# Patient Record
Sex: MALE | Race: WHITE | NOT HISPANIC OR LATINO | Employment: UNEMPLOYED | URBAN - METROPOLITAN AREA
[De-identification: names, ages, dates, MRNs, and addresses within clinical notes are randomized per-mention and may not be internally consistent; named-entity substitution may affect disease eponyms.]

---

## 2019-05-21 ENCOUNTER — HOSPITAL ENCOUNTER (EMERGENCY)
Facility: CLINIC | Age: 49
Discharge: HOME | End: 2019-05-21
Attending: EMERGENCY MEDICINE | Admitting: EMERGENCY MEDICINE
Payer: MEDICAID

## 2019-05-21 VITALS
TEMPERATURE: 98.1 F | SYSTOLIC BLOOD PRESSURE: 120 MMHG | HEART RATE: 114 BPM | DIASTOLIC BLOOD PRESSURE: 79 MMHG | OXYGEN SATURATION: 96 % | RESPIRATION RATE: 18 BRPM

## 2019-05-21 DIAGNOSIS — K92.1 HEMATOCHEZIA: ICD-10-CM

## 2019-05-21 DIAGNOSIS — R19.7 DIARRHEA, UNSPECIFIED TYPE: ICD-10-CM

## 2019-05-21 DIAGNOSIS — R11.0 NAUSEA: ICD-10-CM

## 2019-05-21 LAB
ALBUMIN SERPL-MCNC: 3.2 G/DL (ref 3.4–5)
ALP SERPL-CCNC: 74 U/L (ref 40–150)
ALT SERPL W P-5'-P-CCNC: 12 U/L (ref 0–70)
ANION GAP SERPL CALCULATED.3IONS-SCNC: 12 MMOL/L (ref 3–14)
AST SERPL W P-5'-P-CCNC: 12 U/L (ref 0–45)
BASOPHILS # BLD AUTO: 0.1 10E9/L (ref 0–0.2)
BASOPHILS NFR BLD AUTO: 0.8 %
BILIRUB SERPL-MCNC: 0.7 MG/DL (ref 0.2–1.3)
BUN SERPL-MCNC: 8 MG/DL (ref 7–30)
CALCIUM SERPL-MCNC: 8.8 MG/DL (ref 8.5–10.1)
CHLORIDE SERPL-SCNC: 99 MMOL/L (ref 94–109)
CO2 SERPL-SCNC: 26 MMOL/L (ref 20–32)
CREAT SERPL-MCNC: 0.95 MG/DL (ref 0.66–1.25)
DIFFERENTIAL METHOD BLD: ABNORMAL
EOSINOPHIL # BLD AUTO: 1.7 10E9/L (ref 0–0.7)
EOSINOPHIL NFR BLD AUTO: 10.8 %
ERYTHROCYTE [DISTWIDTH] IN BLOOD BY AUTOMATED COUNT: 12.6 % (ref 10–15)
GFR SERPL CREATININE-BSD FRML MDRD: >90 ML/MIN/{1.73_M2}
GLUCOSE SERPL-MCNC: 101 MG/DL (ref 70–99)
HCT VFR BLD AUTO: 45.9 % (ref 40–53)
HEMOCCULT STL QL: POSITIVE
HGB BLD-MCNC: 15.7 G/DL (ref 13.3–17.7)
IMM GRANULOCYTES # BLD: 0.1 10E9/L (ref 0–0.4)
IMM GRANULOCYTES NFR BLD: 0.4 %
LYMPHOCYTES # BLD AUTO: 2 10E9/L (ref 0.8–5.3)
LYMPHOCYTES NFR BLD AUTO: 13.1 %
MCH RBC QN AUTO: 29.9 PG (ref 26.5–33)
MCHC RBC AUTO-ENTMCNC: 34.2 G/DL (ref 31.5–36.5)
MCV RBC AUTO: 87 FL (ref 78–100)
MONOCYTES # BLD AUTO: 1.2 10E9/L (ref 0–1.3)
MONOCYTES NFR BLD AUTO: 7.7 %
NEUTROPHILS # BLD AUTO: 10.4 10E9/L (ref 1.6–8.3)
NEUTROPHILS NFR BLD AUTO: 67.2 %
NRBC # BLD AUTO: 0 10*3/UL
NRBC BLD AUTO-RTO: 0 /100
PLATELET # BLD AUTO: 396 10E9/L (ref 150–450)
POTASSIUM SERPL-SCNC: 3.4 MMOL/L (ref 3.4–5.3)
PROT SERPL-MCNC: 7.4 G/DL (ref 6.8–8.8)
RBC # BLD AUTO: 5.25 10E12/L (ref 4.4–5.9)
SODIUM SERPL-SCNC: 137 MMOL/L (ref 133–144)
WBC # BLD AUTO: 15.5 10E9/L (ref 4–11)

## 2019-05-21 PROCEDURE — 99284 EMERGENCY DEPT VISIT MOD MDM: CPT | Mod: 25

## 2019-05-21 PROCEDURE — 82272 OCCULT BLD FECES 1-3 TESTS: CPT | Performed by: EMERGENCY MEDICINE

## 2019-05-21 PROCEDURE — 85025 COMPLETE CBC W/AUTO DIFF WBC: CPT | Performed by: EMERGENCY MEDICINE

## 2019-05-21 PROCEDURE — 25800030 ZZH RX IP 258 OP 636: Performed by: EMERGENCY MEDICINE

## 2019-05-21 PROCEDURE — 96374 THER/PROPH/DIAG INJ IV PUSH: CPT

## 2019-05-21 PROCEDURE — 96361 HYDRATE IV INFUSION ADD-ON: CPT

## 2019-05-21 PROCEDURE — 25000128 H RX IP 250 OP 636: Performed by: EMERGENCY MEDICINE

## 2019-05-21 PROCEDURE — 80053 COMPREHEN METABOLIC PANEL: CPT | Performed by: EMERGENCY MEDICINE

## 2019-05-21 RX ORDER — ONDANSETRON 2 MG/ML
8 INJECTION INTRAMUSCULAR; INTRAVENOUS ONCE
Status: COMPLETED | OUTPATIENT
Start: 2019-05-21 | End: 2019-05-21

## 2019-05-21 RX ORDER — ONDANSETRON 4 MG/1
4 TABLET, ORALLY DISINTEGRATING ORAL EVERY 6 HOURS PRN
Qty: 15 TABLET | Refills: 0 | Status: SHIPPED | OUTPATIENT
Start: 2019-05-21 | End: 2019-05-25

## 2019-05-21 RX ORDER — DICYCLOMINE HCL 20 MG
20 TABLET ORAL 4 TIMES DAILY PRN
Qty: 15 TABLET | Refills: 0 | Status: ON HOLD | OUTPATIENT
Start: 2019-05-21 | End: 2019-06-15

## 2019-05-21 RX ADMIN — SODIUM CHLORIDE, POTASSIUM CHLORIDE, SODIUM LACTATE AND CALCIUM CHLORIDE 2000 ML: 600; 310; 30; 20 INJECTION, SOLUTION INTRAVENOUS at 05:39

## 2019-05-21 RX ADMIN — ONDANSETRON 8 MG: 2 INJECTION INTRAMUSCULAR; INTRAVENOUS at 05:39

## 2019-05-21 ASSESSMENT — ENCOUNTER SYMPTOMS
DIARRHEA: 1
SHORTNESS OF BREATH: 0
VOMITING: 0
NAUSEA: 1
FEVER: 0

## 2019-05-21 NOTE — ED AVS SNAPSHOT
[facility] Emergency Department  [address]  [facility] [address]  Phone:  [phone]  Fax:  [phone]                                    Alex Cardona   MRN: [id number]    Department:  [facility] Emergency Department   Date of Visit:  5/21/2019           After Visit Summary Signature Page    I have received my discharge instructions, and my questions have been answered. I have discussed any challenges I see with this plan with the nurse or doctor.    ..........................................................................................................................................  Patient/Patient Representative Signature      ..........................................................................................................................................  Patient Representative Print Name and Relationship to Patient    ..................................................               ................................................  Date                                   Time    ..........................................................................................................................................  Reviewed by Signature/Title    ...................................................              ..............................................  Date                                               Time          22EPIC Rev 08/18

## 2019-05-21 NOTE — ED PROVIDER NOTES
"  History     Chief Complaint:  Diarrhea     HPI   Alex Cardona is a 48 year old male who presents with diarrhea.  Over the last 3 to 4 weeks the patient has had progressively worsening diarrhea.  Initially the diarrhea was controlled with Imodium but over the last 2 weeks the diarrhea has become more severe.  He is having approximately 12 episodes of watery stool.  He has had intermittent episodes of bright red blood spotted throughout the stool.  He has mild abdominal discomfort described as a \"gas pain\" although it is not severe.  The pain is intermittent and nonradiating.  He has become nauseated without vomiting.  There is no associated fever.  He denies any history of recent travel, antibiotic use, hospitalization or sick contacts.    Allergies:  NKDA    Medications:    Imodium     Past Medical History:    None    Past Surgical History:    None    Family History:    Family history reviewed and noncontributory    Social History:   reports that he has quit smoking. He has never used smokeless tobacco. He reports that he drinks alcohol. He reports that he has current or past drug history. Drug: Marijuana.    PCP: No Ref-Primary, Physician     Review of Systems   Constitutional: Negative for fever.   Respiratory: Negative for shortness of breath.    Gastrointestinal: Positive for diarrhea and nausea. Negative for vomiting.   Skin: Negative for rash.   All other systems reviewed and are negative.        Physical Exam     Patient Vitals for the past 24 hrs:   BP Temp Temp src Pulse Resp SpO2   05/21/19 0510 (!) 143/94 98.1  F (36.7  C) Temporal 114 18 100 %        Physical Exam    Constitutional:  Pleasant, age appropriate.       Resting comfortably in the bed.  HEENT:    Oropharynx is moist.  Eyes:    Conjunctiva normal.  Neck:    Supple, no meningismus.     CV:     Regular rate and rhythm.      No murmurs, rubs or gallops.     No lower extremity edema.  PULM:    Clear to auscultation bilateral.       No " respiratory distress.      Good air exchange.     No rales or wheezing.  ABD:    Soft, non-distended.       No abdominal tenderness even with deep palpation of the abdomen.     Bowel sounds normal.     No pulsatile masses.       No rebound, guarding or rigidity.     No CVA tenderness.   :    No external hemorrhoids.     No anal fissure.     Good sphincter tone.      No rectal mass.     No blood on gloved finger.  MSK:     No gross deformity to all four extremities.   LYMPH:   No cervical lymphadenopathy.  NEURO:   Alert.  Good muscular tone, no atrophy.   Skin:    Warm, dry and intact.    Psych:    Mood is good and affect is appropriate.    Emergency Department Course       Laboratory:  Results for orders placed or performed during the hospital encounter of 05/21/19 (from the past 24 hour(s))   CBC + differential   Result Value Ref Range    WBC 15.5 (H) 4.0 - 11.0 10e9/L    RBC Count 5.25 4.4 - 5.9 10e12/L    Hemoglobin 15.7 13.3 - 17.7 g/dL    Hematocrit 45.9 40.0 - 53.0 %    MCV 87 78 - 100 fl    MCH 29.9 26.5 - 33.0 pg    MCHC 34.2 31.5 - 36.5 g/dL    RDW 12.6 10.0 - 15.0 %    Platelet Count 396 150 - 450 10e9/L    Diff Method Automated Method     % Neutrophils 67.2 %    % Lymphocytes 13.1 %    % Monocytes 7.7 %    % Eosinophils 10.8 %    % Basophils 0.8 %    % Immature Granulocytes 0.4 %    Nucleated RBCs 0 0 /100    Absolute Neutrophil 10.4 (H) 1.6 - 8.3 10e9/L    Absolute Lymphocytes 2.0 0.8 - 5.3 10e9/L    Absolute Monocytes 1.2 0.0 - 1.3 10e9/L    Absolute Eosinophils 1.7 (H) 0.0 - 0.7 10e9/L    Absolute Basophils 0.1 0.0 - 0.2 10e9/L    Abs Immature Granulocytes 0.1 0 - 0.4 10e9/L    Absolute Nucleated RBC 0.0    Comprehensive metabolic panel   Result Value Ref Range    Sodium 137 133 - 144 mmol/L    Potassium 3.4 3.4 - 5.3 mmol/L    Chloride 99 94 - 109 mmol/L    Carbon Dioxide 26 20 - 32 mmol/L    Anion Gap 12 3 - 14 mmol/L    Glucose 101 (H) 70 - 99 mg/dL    Urea Nitrogen 8 7 - 30 mg/dL    Creatinine  0.95 0.66 - 1.25 mg/dL    GFR Estimate >90 >60 mL/min/[1.73_m2]    GFR Estimate If Black >90 >60 mL/min/[1.73_m2]    Calcium 8.8 8.5 - 10.1 mg/dL    Bilirubin Total 0.7 0.2 - 1.3 mg/dL    Albumin 3.2 (L) 3.4 - 5.0 g/dL    Protein Total 7.4 6.8 - 8.8 g/dL    Alkaline Phosphatase 74 40 - 150 U/L    ALT 12 0 - 70 U/L    AST 12 0 - 45 U/L   Stool: occult blood   Result Value Ref Range    Occult Blood Positive (A) NEG^Negative       Interventions:  LR bolus IV 2000 mL  Zofran 8 mg IV      Emergency Department Course:  Past medical records, nursing notes, and vitals reviewed.  I performed an exam of the patient and obtained history, as documented above.   0620: HR 75    I rechecked the patient. Findings and plan explained to the Patient. Patient was discharged home.    Impression & Plan      Medical Decision Makin-year-old male presented to the ED with intermittent episodes of diarrhea and diffuse abdominal discomfort.  He has no risk factors that are concerning for C. difficile or bacterial colitis.  The way he describes his symptoms is most concerning for an exacerbation of a chronic underlying GI process (IBD, IBS, dietary intolerance) that is not well identified.  Basic laboratory studies are reassuring.  He is hemodynamically stable.  Hemoglobin is within normal limits.  No gross bleeding was in the ED thus no indication for admission the hospital.  Patient will be referred to gastroenterology given the recurrent nature of his symptoms and hematochezia.  Patient will return ED for any worsening symptoms.    Diagnosis:    ICD-10-CM    1. Diarrhea, unspecified type R19.7 Comprehensive metabolic panel     Enteric Bacteria and Virus Panel by [facility] Stool     Clostridium difficile toxin B   2. Nausea R11.0    3. Hematochezia K92.1         Discharge Medications:  Discharge Medication List as of 2019  6:54 AM      START taking these medications    Details   dicyclomine (BENTYL) 20 MG tablet Take 1 tablet (20 mg) by  mouth 4 times daily as needed (abdominal pain), Disp-15 tablet, R-0, Local Print      ondansetron (ZOFRAN ODT) 4 MG ODT tab Take 1 tablet (4 mg) by mouth every 6 hours as needed for nausea, Disp-15 tablet, R-0, Local Print              5/21/2019   Marvin Hernandez MD Matthews, Jeremiah R, MD  05/27/19 2012

## 2019-05-21 NOTE — ED TRIAGE NOTES
"Diarrhea for \"several weeks\" NV with diarrhea last 2-3 days.    Pt A&O x 3, CMS x 3, ABCD's adequate in triage    "

## 2019-05-21 NOTE — DISCHARGE INSTRUCTIONS
Please make an appointment to follow up with [facility] [phone] in 3-4 days even if entirely better.    Please use an over-the-counter probiotic once daily to assist with the diarrhea.  Discharge Instructions  Adult Diarrhea    You have been seen today for diarrhea (loose stools). This is usually caused by a virus, but some bacteria, parasites, medicines, or other medical conditions can cause similar symptoms. At this time your provider does not find that your diarrhea is a sign of anything dangerous or life-threatening. However, sometimes the signs of serious illness do not show up right away. If you have new or worse symptoms, you may need to be seen again in the Emergency Department or by your primary provider.     Generally, every Emergency Department visit should have a follow-up clinic visit with either a primary or a specialty clinic/provider. Please follow-up as instructed by your emergency provider today.    Return to the Emergency Department if:  You feel you are getting dehydrated, such as being very thirsty, not urinating (peeing) like usual, or feeling faint or lightheaded.   You develop a new fever.  You have abdominal (belly) pain that seems worse than cramps, is in one spot, or is getting worse over time.   You have blood in your stool or your stool becomes black.  (Remember that if you take Pepto-Bismol , this will turn your stool black).   You feel very weak.    What can I do to help myself?  The most important thing to do is to drink clear liquids.   It is best to have only small, frequent sips of liquids. Drinking too much at once may cause more diarrhea. You should also replace minerals, sodium and potassium lost with diarrhea. Pedialyte  and sports drinks can help you replace these minerals. You can also drink clear liquids such as water, weak tea, apple juice, and 7-Up . Avoid acidic liquids (orange juice), caffeine (coffee) or alcohol. Milk products will make the diarrhea  "worse.  Eat bland (plain) foods. Soda crackers, toast, plain noodles, gelatin, applesauce and bananas are good first choices. Avoid foods that have acid, are spicy, fatty or fibrous (such as meats, coarse grains, vegetables). You may start eating these foods again in about 3 days when you are better.   Sometimes treatment includes prescription medicine to prevent diarrhea. If your provider prescribes these for you, take them as directed.   Nonprescription medicine is available for the treatment of diarrhea and can be very effective. If you use it, make sure you use the dose recommended on the package. Check with your healthcare provider before you use any medicine for diarrhea.   Do not take ibuprofen, or other nonsteroidal anti-inflammatory medicines, without checking with your healthcare provider.   Probiotics: If you have been given an antibiotic, you may want to also take a probiotic pill or eat yogurt with live cultures. Probiotics have \"good bacteria\" to help your intestines stay healthy. Studies have shown that probiotics help prevent diarrhea and other intestine problems (including C. diff infection) when you take antibiotics. You can buy these without a prescription in the pharmacy section of the store.   If you were given a prescription for medicine here today, be sure to read all of the information (including the package insert) that comes with your prescription.  This will include important information about the medicine, its side effects, and any warnings that you need to know about.  The pharmacist who fills the prescription can provide more information and answer questions you may have about the medicine.  If you have questions or concerns that the pharmacist cannot address, please call or return to the Emergency Department.  Remember that you can always come back to the Emergency Department if you are not able to see your regular provider in the amount of time listed above, if you get any new " symptoms, or if there is anything that worries you.    Discharge Instructions  Gastrointestinal (GI) Bleeding    You have been seen today because of gastrointestinal (GI) bleeding, bleeding somewhere along your digestive tract.  Most common symptoms are blood in the stool or when you have a bowel movement.  The most common causes of minor GI bleeding are ulcers and hemorrhoids.  Other conditions that cause bleeding include abnormal blood vessels in your GI tract, diverticulosis, inflammatory bowel disease, and cancer.  Fortunately, many cases of GI bleeding are not immediately life-threatening and it does not appear that your bleeding is serious enough to require admission to the hospital.    Generally, every Emergency Department visit should have a follow-up clinic visit with either a primary or a specialty clinic/provider. Please follow-up as instructed by your emergency provider today.    Return to the Emergency Department right away if you:  Develop fever with a temperature above 100.4 F.  Vomit (throw up) blood or something that looks like coffee grounds.  Have a bowel movement that looks like tar or has a large amount of blood in it.  Feel weak, light-headed, or faint.  Have a racing heartbeat.  Have abdominal (belly) pain that is new or increasing.  Have new symptoms that worry you.    At your follow up, your regular provider or GI specialist may order further testing such as:  Blood and stool tests.  Endoscopy and/or colonoscopy, where a tube with a camera is used to look at your digestive tract.  Other very specialized tests depending on your symptoms.    What can I do to help myself?  Take any acid reducing medication prescribed by your provider.  Avoid over the counter medications such as aspirin and Advil , Motrin  (ibuprofen) that can thin your blood or irritate your stomach, making ulcers worse.  If you were given a prescription for medicine here today, be sure to read all of the information (including  the package insert) that comes with your prescription.  This will include important information about the medicine, its side effects, and any warnings that you need to know about.  The pharmacist who fills the prescription can provide more information and answer questions you may have about the medicine.  If you have questions or concerns that the pharmacist cannot address, please call or return to the Emergency Department.   Remember that you can always come back to the Emergency Department if you are not able to see your regular provider in the amount of time listed above, if you get any new symptoms, or if there is anything that worries you.

## 2019-05-21 NOTE — ED NOTES
Patient having increased anxiety. Reinforced calming techniques. Discussed discharge plans with patient.

## 2019-05-22 DIAGNOSIS — R19.7 DIARRHEA, UNSPECIFIED TYPE: ICD-10-CM

## 2019-05-22 LAB
C COLI+JEJUNI+LARI FUSA STL QL NAA+PROBE: NOT DETECTED
C DIFF TOX B STL QL: NEGATIVE
EC STX1 GENE STL QL NAA+PROBE: NOT DETECTED
EC STX2 GENE STL QL NAA+PROBE: NOT DETECTED
ENTERIC PATHOGEN COMMENT: NORMAL
NOROV GI+II ORF1-ORF2 JNC STL QL NAA+PR: NOT DETECTED
RVA NSP5 STL QL NAA+PROBE: NOT DETECTED
SALMONELLA SP RPOD STL QL NAA+PROBE: NOT DETECTED
SHIGELLA SP+EIEC IPAH STL QL NAA+PROBE: NOT DETECTED
SPECIMEN SOURCE: NORMAL
V CHOL+PARA RFBL+TRKH+TNAA STL QL NAA+PR: NOT DETECTED
Y ENTERO RECN STL QL NAA+PROBE: NOT DETECTED

## 2019-05-22 PROCEDURE — 87506 IADNA-DNA/RNA PROBE TQ 6-11: CPT | Performed by: EMERGENCY MEDICINE

## 2019-05-22 PROCEDURE — 87493 C DIFF AMPLIFIED PROBE: CPT | Mod: 59 | Performed by: EMERGENCY MEDICINE

## 2019-05-22 NOTE — RESULT ENCOUNTER NOTE
Final Clostridium Difficile toxin B PCR is NEGATIVE.    No treatment or change in treatment per [facility] ED Lab Result protocol.

## 2019-05-23 ENCOUNTER — HOSPITAL ENCOUNTER (EMERGENCY)
Facility: CLINIC | Age: 49
Discharge: HOME | End: 2019-05-23
Attending: PHYSICIAN ASSISTANT | Admitting: PHYSICIAN ASSISTANT
Payer: MEDICAID

## 2019-05-23 ENCOUNTER — APPOINTMENT (OUTPATIENT)
Dept: CT IMAGING | Facility: CLINIC | Age: 49
End: 2019-05-23
Attending: PHYSICIAN ASSISTANT
Payer: MEDICAID

## 2019-05-23 ENCOUNTER — TELEPHONE (OUTPATIENT)
Dept: EMERGENCY MEDICINE | Facility: CLINIC | Age: 49
End: 2019-05-23

## 2019-05-23 VITALS
OXYGEN SATURATION: 98 % | TEMPERATURE: 97.9 F | WEIGHT: 148.81 LBS | SYSTOLIC BLOOD PRESSURE: 128 MMHG | HEART RATE: 81 BPM | RESPIRATION RATE: 16 BRPM | DIASTOLIC BLOOD PRESSURE: 70 MMHG

## 2019-05-23 DIAGNOSIS — R19.7 DIARRHEA: ICD-10-CM

## 2019-05-23 LAB
ALBUMIN SERPL-MCNC: 3 G/DL (ref 3.4–5)
ALBUMIN UR-MCNC: 70 MG/DL
ALP SERPL-CCNC: 77 U/L (ref 40–150)
ALT SERPL W P-5'-P-CCNC: 16 U/L (ref 0–70)
ANION GAP SERPL CALCULATED.3IONS-SCNC: 11 MMOL/L (ref 3–14)
APPEARANCE UR: CLEAR
AST SERPL W P-5'-P-CCNC: 13 U/L (ref 0–45)
BASOPHILS # BLD AUTO: 0.1 10E9/L (ref 0–0.2)
BASOPHILS NFR BLD AUTO: 1 %
BILIRUB SERPL-MCNC: 0.6 MG/DL (ref 0.2–1.3)
BILIRUB UR QL STRIP: NEGATIVE
BUN SERPL-MCNC: 5 MG/DL (ref 7–30)
C DIFF TOX B STL QL: NEGATIVE
CALCIUM SERPL-MCNC: 8.7 MG/DL (ref 8.5–10.1)
CHLORIDE SERPL-SCNC: 96 MMOL/L (ref 94–109)
CO2 SERPL-SCNC: 26 MMOL/L (ref 20–32)
COLOR UR AUTO: YELLOW
CREAT SERPL-MCNC: 0.86 MG/DL (ref 0.66–1.25)
DIFFERENTIAL METHOD BLD: ABNORMAL
EOSINOPHIL # BLD AUTO: 0.8 10E9/L (ref 0–0.7)
EOSINOPHIL NFR BLD AUTO: 6.2 %
ERYTHROCYTE [DISTWIDTH] IN BLOOD BY AUTOMATED COUNT: 12.5 % (ref 10–15)
GFR SERPL CREATININE-BSD FRML MDRD: >90 ML/MIN/{1.73_M2}
GLUCOSE SERPL-MCNC: 95 MG/DL (ref 70–99)
GLUCOSE UR STRIP-MCNC: NEGATIVE MG/DL
HCT VFR BLD AUTO: 45.4 % (ref 40–53)
HGB BLD-MCNC: 16 G/DL (ref 13.3–17.7)
HGB UR QL STRIP: NEGATIVE
IMM GRANULOCYTES # BLD: 0.1 10E9/L (ref 0–0.4)
IMM GRANULOCYTES NFR BLD: 0.8 %
KETONES UR STRIP-MCNC: >150 MG/DL
LEUKOCYTE ESTERASE UR QL STRIP: NEGATIVE
LYMPHOCYTES # BLD AUTO: 1 10E9/L (ref 0.8–5.3)
LYMPHOCYTES NFR BLD AUTO: 8.1 %
MCH RBC QN AUTO: 30.2 PG (ref 26.5–33)
MCHC RBC AUTO-ENTMCNC: 35.2 G/DL (ref 31.5–36.5)
MCV RBC AUTO: 86 FL (ref 78–100)
MONOCYTES # BLD AUTO: 1.2 10E9/L (ref 0–1.3)
MONOCYTES NFR BLD AUTO: 9.3 %
MUCOUS THREADS #/AREA URNS LPF: PRESENT /LPF
NEUTROPHILS # BLD AUTO: 9.5 10E9/L (ref 1.6–8.3)
NEUTROPHILS NFR BLD AUTO: 74.6 %
NITRATE UR QL: NEGATIVE
NRBC # BLD AUTO: 0 10*3/UL
NRBC BLD AUTO-RTO: 0 /100
PH UR STRIP: 6 PH (ref 5–7)
PLATELET # BLD AUTO: 425 10E9/L (ref 150–450)
POTASSIUM SERPL-SCNC: 3.4 MMOL/L (ref 3.4–5.3)
PROT SERPL-MCNC: 7.5 G/DL (ref 6.8–8.8)
RBC # BLD AUTO: 5.3 10E12/L (ref 4.4–5.9)
RBC #/AREA URNS AUTO: <1 /HPF (ref 0–2)
SODIUM SERPL-SCNC: 133 MMOL/L (ref 133–144)
SOURCE: ABNORMAL
SP GR UR STRIP: 1.03 (ref 1–1.03)
SPECIMEN SOURCE: NORMAL
UROBILINOGEN UR STRIP-MCNC: NORMAL MG/DL (ref 0–2)
WBC # BLD AUTO: 12.7 10E9/L (ref 4–11)
WBC #/AREA URNS AUTO: 2 /HPF (ref 0–5)

## 2019-05-23 PROCEDURE — 81001 URINALYSIS AUTO W/SCOPE: CPT | Performed by: PHYSICIAN ASSISTANT

## 2019-05-23 PROCEDURE — 80053 COMPREHEN METABOLIC PANEL: CPT | Performed by: PHYSICIAN ASSISTANT

## 2019-05-23 PROCEDURE — 25000128 H RX IP 250 OP 636: Performed by: PHYSICIAN ASSISTANT

## 2019-05-23 PROCEDURE — 85025 COMPLETE CBC W/AUTO DIFF WBC: CPT | Performed by: PHYSICIAN ASSISTANT

## 2019-05-23 PROCEDURE — 99285 EMERGENCY DEPT VISIT HI MDM: CPT | Mod: 25

## 2019-05-23 PROCEDURE — 96361 HYDRATE IV INFUSION ADD-ON: CPT

## 2019-05-23 PROCEDURE — 74177 CT ABD & PELVIS W/CONTRAST: CPT

## 2019-05-23 PROCEDURE — 87493 C DIFF AMPLIFIED PROBE: CPT | Performed by: PHYSICIAN ASSISTANT

## 2019-05-23 PROCEDURE — 96374 THER/PROPH/DIAG INJ IV PUSH: CPT | Mod: 59

## 2019-05-23 RX ORDER — ONDANSETRON 2 MG/ML
4 INJECTION INTRAMUSCULAR; INTRAVENOUS ONCE
Status: COMPLETED | OUTPATIENT
Start: 2019-05-23 | End: 2019-05-23

## 2019-05-23 RX ORDER — DIPHENOXYLATE HCL/ATROPINE 2.5-.025MG
2 TABLET ORAL 2 TIMES DAILY
Qty: 30 TABLET | Refills: 0 | Status: ON HOLD | OUTPATIENT
Start: 2019-05-23 | End: 2019-06-15

## 2019-05-23 RX ORDER — IOPAMIDOL 755 MG/ML
500 INJECTION, SOLUTION INTRAVASCULAR ONCE
Status: COMPLETED | OUTPATIENT
Start: 2019-05-23 | End: 2019-05-23

## 2019-05-23 RX ADMIN — IOPAMIDOL 74 ML: 755 INJECTION, SOLUTION INTRAVENOUS at 16:09

## 2019-05-23 RX ADMIN — SODIUM CHLORIDE 1000 ML: 9 INJECTION, SOLUTION INTRAVENOUS at 13:57

## 2019-05-23 RX ADMIN — SODIUM CHLORIDE 1000 ML: 9 INJECTION, SOLUTION INTRAVENOUS at 15:24

## 2019-05-23 RX ADMIN — ONDANSETRON 4 MG: 2 INJECTION INTRAMUSCULAR; INTRAVENOUS at 13:57

## 2019-05-23 RX ADMIN — SODIUM CHLORIDE 59 ML: 9 INJECTION, SOLUTION INTRAVENOUS at 16:09

## 2019-05-23 ASSESSMENT — ENCOUNTER SYMPTOMS
DIZZINESS: 1
WEAKNESS: 1
BLOOD IN STOOL: 1
NAUSEA: 1
DIARRHEA: 1
ABDOMINAL PAIN: 1

## 2019-05-23 NOTE — ED TRIAGE NOTES
Diarrhea x 3 weeks, getting worse.  Dizziness and nausea also.  States he was here on Tuesday, nothing has helped.  ABCDs intact.

## 2019-05-23 NOTE — ED PROVIDER NOTES
History     Chief Complaint:  Diarrhea    HPI   Alex Cardona is a 48 year old male who presents to the emergency department for evaluation of diarrhea. The patient states that he has had diarrhea for the past several weeks. He notes it is essentially every hour on the hour even at night. However it has worsened within the past week. He notes his bowel movements consist of some blood intermittently, but mostly watery stool. He notes currently feeling dehydrated, nauseous, dizzy and weak. He experiences some abdominal pain due to gas. He notes one episode similar to this that happened last year which lasted 3-4 months. He tried drinking water and Gatorade to stay hydrated. He's had previous stool studies that were negative. He has not used any any recent antibiotics or had any recent travel.     Allergies:  No known Drug Allergies      Medications:    dicyclomine (BENTYL) 20 MG tablet  ondansetron (ZOFRAN ODT) 4 MG ODT tab    Past Medical History:    Past medical history reviewed. No pertinent history.     Past Surgical History:    Past surgical history reviewed. No pertinent history.     Family History:    History reviewed. No pertinent family history.     Social History:  Marital Status:  Single [1]  Social History     Tobacco Use     Smoking status: Former Smoker     Smokeless tobacco: Never Used     Tobacco comment: quit 4 years ago   Substance Use Topics     Alcohol use: Yes     Drug use: Yes     Types: Marijuana        Review of Systems   Gastrointestinal: Positive for abdominal pain, blood in stool, diarrhea and nausea.   Neurological: Positive for dizziness and weakness.   All other systems reviewed and are negative.      Physical Exam   First Vitals:  BP: 114/81  Pulse: 107  Temp: 97.9  F (36.6  C)  Resp: 20  Weight: 67.5 kg (148 lb 13 oz)  SpO2: 99 %      Physical Exam  Constitutional: well appearing, no acute distress.   Head: No external signs of trauma noted to head or face.   Eyes: Pupils are equal,  round, and reactive to light. Conjunctiva normal. No scleral icterus.  ENT: lips dry, but mucous membranes moist. Normal voice.   Neck: normal ROM.   Cardiovascular: Normal rate, regular rhythm, and intact distal pulses.    Respiratory: Effort normal. No respiratory distress. Lungs clear to auscultation bilaterally.   GI: Soft. Mild diffuse tenderness without rebound or guarding.   Musculoskeletal: No deformities appreciated. Normal ROM. No edema noted.  Neurological: Alert and Oriented x 3. Speech normal. Moves all extremities equally.   Psychiatric: Appropriate mood, affect, and behavior.   Skin: Skin is warm and dry. no jaundice.    Emergency Department Course     Imaging:  Radiology findings were communicated with the patient who voiced understanding of the findings.    CT Abdomen Pelvis w Contrast  Final Result  IMPRESSION: Infectious or inflammatory pancolitis. Minimal apparent  involvement of the rectum. Abdominal and pelvic organs are otherwise  within normal limits. Appendix and terminal ileum appear normal.  LAURIE CAPELLAN MD    Reading per radiology     Laboratory:  Laboratory findings were communicated with the patient who voiced understanding of the findings.    UA: mucous present (A), protein albumin 70 (A), Ketone >150 (A)  CBC: WBC 12.7 (H), HGB 16,   CMP: BUN 5 (L), albumin 3 (L), o/w WNL (Creatinine 0.86)    Interventions:  NS 1L IV Bolus x2  Zofran 4 mg IV    Emergency Department Course:  Nursing notes and vitals reviewed.  I performed an exam of the patient as documented above.   I rechecked the patient and updated him regarding results thus far.   The patient was sent for CT abd/pelvis with contrast.  I rechecked the patient again and updated him with CT results.  I discussed the patient with Dr. Burt of [facility].   I discussed the treatment plan with the patient. They expressed understanding of this plan and consented to discharge. They will be discharged home with instructions for care and  follow up. In addition, the patient will return to the emergency department if their symptoms persist, worsen, if new symptoms arise or if there is any concern.  All questions were answered.    I personally reviewed the imaging and lab results with the Patient and answered all related questions prior to discharge.  Impression & Plan      Medical Decision Makin year old male presenting with ongoing diarrhea for at least the past 3 weeks, progressively worsening. He is afebrile on arrival, but does appear mildly dehydrated. He has a non-surgical abdominal exam. Urinalysis is negative aside from ketones. The remainder of his labs are unrevealing aside from a mild leukocytosis. Given his persistent symptoms and diffuse abdominal tenderness, a CT abd/pelvis was obtained. This showed infectious or inflammatory pancolitis. Patient had negative stool studies 2 days ago. I discussed the patient with Dr. Burt of [facility]. He recommended close outpatient follow-up in clinic and for colonoscopy. Clinic will call him to schedule these. He also recommended trying Lomotil and pepto bismal, which patient will be prescribed. He also recommended obtaining C diff again, which patient was unable to do while here and was sent home with kit to collect. On reassessment, patient is well appearing with a non-surgical abdominal exam. He is tolerating PO here and is appropriate for discharge home with close follow-up with GI, who will contact him to schedule the follow up. He was instructed to return to the ED for any new or worsening symptoms.     Addendum 1747: Nurse informed me that patient was able to provide a C diff sample prior to discharge and that stool appeared bloody at this time. He had prior only reported intermittent blood, but mostly just watery stool. I do not think this needs to change our management at this time as he is hemodynamically stable with a normal hemoglobin and will have close follow-up with  GI.    Diagnosis:    ICD-10-CM    1. Diarrhea R19.7 Clostridium difficile toxin B     Disposition:   Discharged     Discharge Medications:  START taking      Dose / Directions   bismuth subsalicylate 262 MG Tabs  Commonly known as:  PEPTO-BISMOL      Dose:  2 tablet  Take 2 tablets by mouth every 6 hours  Quantity:  60 tablet  Refills:  0     diphenoxylate-atropine 2.5-0.025 MG tablet  Commonly known as:  LOMOTIL      Dose:  2 tablet  Take 2 tablets by mouth 2 times daily  Quantity:  30 tablet  Refills:  0        CONTINUE these medicines which have NOT CHANGED      Dose / Directions   NO ACTIVE MEDICATIONS      Refills:  0           Where to get your medicines      Some of these will need a paper prescription and others can be bought over the counter. Ask your nurse if you have questions.    Bring a paper prescription for each of these medications    bismuth subsalicylate 262 MG Tabs    diphenoxylate-atropine 2.5-0.025 MG tablet         Scribe Disclosure:  I, Kulwant Lowery, am serving as a scribe at 5:08 PM on 5/23/2019 to document services personally performed by Brenda Justice PA-C, based on my observations and the provider's statements to me.   [facility] EMERGENCY DEPARTMENT       Brenda Justice PA-C  05/23/19 1727       Brenda Justice PA-C  05/23/19 1728       Brenda Justice PA-C  05/23/19 1740

## 2019-05-23 NOTE — ED AVS SNAPSHOT
[facility] Emergency Department  [address]  [facility] [address]  Phone:  [phone]  Fax:  [phone]                                    Alex Cardona   MRN: [id number]    Department:  [facility] Emergency Department   Date of Visit:  5/23/2019           After Visit Summary Signature Page    I have received my discharge instructions, and my questions have been answered. I have discussed any challenges I see with this plan with the nurse or doctor.    ..........................................................................................................................................  Patient/Patient Representative Signature      ..........................................................................................................................................  Patient Representative Print Name and Relationship to Patient    ..................................................               ................................................  Date                                   Time    ..........................................................................................................................................  Reviewed by Signature/Title    ...................................................              ..............................................  Date                                               Time          22EPIC Rev 08/18

## 2019-05-23 NOTE — TELEPHONE ENCOUNTER
[facility] Emergency Department Lab result notification     Patient/parent Name  Alex    Reason for call  Patient requesting lab result    Lab Result  Fecal labs done following recent ED visit.   Current symptoms  Pt states his mouth is very dry and he is feeling weak and some what light headed. His diarrhea has increased.   Recommendations/Instructions  I advised he be seen in the ED again now for evaluation and treatment as needed.     Contact your PCP clinic or return to the Emergency department if your:    Symptoms return.    Symptoms worsen or other concerning symptom's.    PCP follow-up Questions asked: YES       Noemy Goodrich RN  [facility] RN  Lung Nodule and ED Lab Result F/u RN  Epic pool (ED late result f/u RN): P [id number]  # [phone]

## 2019-05-24 NOTE — RESULT ENCOUNTER NOTE
Final Clostridium Difficile toxin B PCR is NEGATIVE.    No treatment or change in treatment per [place] ED Lab Result protocol.

## 2019-05-25 ENCOUNTER — HOSPITAL ENCOUNTER (INPATIENT)
Facility: CLINIC | Age: 49
LOS: 22 days | Discharge: HOME HEALTH | End: 2019-06-16
Attending: EMERGENCY MEDICINE | Admitting: INTERNAL MEDICINE
Payer: MEDICAID

## 2019-05-25 DIAGNOSIS — R19.7 DIARRHEA, UNSPECIFIED TYPE: ICD-10-CM

## 2019-05-25 DIAGNOSIS — K52.9 COLITIS: ICD-10-CM

## 2019-05-25 DIAGNOSIS — K51.911 ULCERATIVE COLITIS, ACUTE, WITH RECTAL BLEEDING (H): Primary | ICD-10-CM

## 2019-05-25 LAB
ALBUMIN SERPL-MCNC: 2 G/DL (ref 3.4–5)
ALP SERPL-CCNC: 60 U/L (ref 40–150)
ALT SERPL W P-5'-P-CCNC: 9 U/L (ref 0–70)
ANION GAP SERPL CALCULATED.3IONS-SCNC: 7 MMOL/L (ref 3–14)
AST SERPL W P-5'-P-CCNC: 11 U/L (ref 0–45)
BASOPHILS # BLD AUTO: 0.1 10E9/L (ref 0–0.2)
BASOPHILS NFR BLD AUTO: 0.9 %
BILIRUB SERPL-MCNC: 0.6 MG/DL (ref 0.2–1.3)
BUN SERPL-MCNC: 6 MG/DL (ref 7–30)
CA-I BLD-MCNC: 4.1 MG/DL (ref 4.4–5.2)
CALCIUM SERPL-MCNC: 7.4 MG/DL (ref 8.5–10.1)
CHLORIDE SERPL-SCNC: 93 MMOL/L (ref 94–109)
CO2 SERPL-SCNC: 28 MMOL/L (ref 20–32)
CREAT SERPL-MCNC: 0.86 MG/DL (ref 0.66–1.25)
DIFFERENTIAL METHOD BLD: ABNORMAL
EOSINOPHIL # BLD AUTO: 0.2 10E9/L (ref 0–0.7)
EOSINOPHIL NFR BLD AUTO: 1.8 %
ERYTHROCYTE [DISTWIDTH] IN BLOOD BY AUTOMATED COUNT: 12.2 % (ref 10–15)
GFR SERPL CREATININE-BSD FRML MDRD: >90 ML/MIN/{1.73_M2}
GLUCOSE SERPL-MCNC: 146 MG/DL (ref 70–99)
HCT VFR BLD AUTO: 38.9 % (ref 40–53)
HGB BLD-MCNC: 13.5 G/DL (ref 13.3–17.7)
IMM GRANULOCYTES # BLD: 0.1 10E9/L (ref 0–0.4)
IMM GRANULOCYTES NFR BLD: 1.2 %
LACTATE BLD-SCNC: 2.5 MMOL/L (ref 0.7–2)
LIPASE SERPL-CCNC: 33 U/L (ref 73–393)
LYMPHOCYTES # BLD AUTO: 0.5 10E9/L (ref 0.8–5.3)
LYMPHOCYTES NFR BLD AUTO: 5.4 %
MAGNESIUM SERPL-MCNC: 1.6 MG/DL (ref 1.6–2.3)
MCH RBC QN AUTO: 29.6 PG (ref 26.5–33)
MCHC RBC AUTO-ENTMCNC: 34.7 G/DL (ref 31.5–36.5)
MCV RBC AUTO: 85 FL (ref 78–100)
MONOCYTES # BLD AUTO: 1.1 10E9/L (ref 0–1.3)
MONOCYTES NFR BLD AUTO: 11.1 %
NEUTROPHILS # BLD AUTO: 7.7 10E9/L (ref 1.6–8.3)
NEUTROPHILS NFR BLD AUTO: 79.6 %
NRBC # BLD AUTO: 0 10*3/UL
NRBC BLD AUTO-RTO: 0 /100
PHOSPHATE SERPL-MCNC: 1.2 MG/DL (ref 2.5–4.5)
PHOSPHATE SERPL-MCNC: 1.9 MG/DL (ref 2.5–4.5)
PLATELET # BLD AUTO: 395 10E9/L (ref 150–450)
POTASSIUM SERPL-SCNC: 3.4 MMOL/L (ref 3.4–5.3)
PROT SERPL-MCNC: 5.6 G/DL (ref 6.8–8.8)
RBC # BLD AUTO: 4.56 10E12/L (ref 4.4–5.9)
SODIUM SERPL-SCNC: 128 MMOL/L (ref 133–144)
WBC # BLD AUTO: 9.7 10E9/L (ref 4–11)

## 2019-05-25 PROCEDURE — 83690 ASSAY OF LIPASE: CPT | Performed by: EMERGENCY MEDICINE

## 2019-05-25 PROCEDURE — 80053 COMPREHEN METABOLIC PANEL: CPT | Performed by: EMERGENCY MEDICINE

## 2019-05-25 PROCEDURE — 25800030 ZZH RX IP 258 OP 636: Performed by: INTERNAL MEDICINE

## 2019-05-25 PROCEDURE — 87209 SMEAR COMPLEX STAIN: CPT | Performed by: INTERNAL MEDICINE

## 2019-05-25 PROCEDURE — 82330 ASSAY OF CALCIUM: CPT | Performed by: EMERGENCY MEDICINE

## 2019-05-25 PROCEDURE — G0378 HOSPITAL OBSERVATION PER HR: HCPCS

## 2019-05-25 PROCEDURE — 84100 ASSAY OF PHOSPHORUS: CPT | Performed by: EMERGENCY MEDICINE

## 2019-05-25 PROCEDURE — 87177 OVA AND PARASITES SMEARS: CPT | Performed by: INTERNAL MEDICINE

## 2019-05-25 PROCEDURE — 96374 THER/PROPH/DIAG INJ IV PUSH: CPT

## 2019-05-25 PROCEDURE — 25000128 H RX IP 250 OP 636: Performed by: EMERGENCY MEDICINE

## 2019-05-25 PROCEDURE — 25000128 H RX IP 250 OP 636: Performed by: INTERNAL MEDICINE

## 2019-05-25 PROCEDURE — 85025 COMPLETE CBC W/AUTO DIFF WBC: CPT | Performed by: EMERGENCY MEDICINE

## 2019-05-25 PROCEDURE — 84100 ASSAY OF PHOSPHORUS: CPT | Performed by: INTERNAL MEDICINE

## 2019-05-25 PROCEDURE — 12000000 ZZH R&B MED SURG/OB

## 2019-05-25 PROCEDURE — 25800030 ZZH RX IP 258 OP 636: Performed by: EMERGENCY MEDICINE

## 2019-05-25 PROCEDURE — 82306 VITAMIN D 25 HYDROXY: CPT | Performed by: EMERGENCY MEDICINE

## 2019-05-25 PROCEDURE — 99285 EMERGENCY DEPT VISIT HI MDM: CPT | Mod: 25

## 2019-05-25 PROCEDURE — 25000132 ZZH RX MED GY IP 250 OP 250 PS 637: Performed by: INTERNAL MEDICINE

## 2019-05-25 PROCEDURE — 83735 ASSAY OF MAGNESIUM: CPT | Performed by: EMERGENCY MEDICINE

## 2019-05-25 PROCEDURE — 83605 ASSAY OF LACTIC ACID: CPT | Performed by: EMERGENCY MEDICINE

## 2019-05-25 PROCEDURE — 25000125 ZZHC RX 250: Performed by: INTERNAL MEDICINE

## 2019-05-25 PROCEDURE — 36415 COLL VENOUS BLD VENIPUNCTURE: CPT | Performed by: INTERNAL MEDICINE

## 2019-05-25 PROCEDURE — 99223 1ST HOSP IP/OBS HIGH 75: CPT | Mod: AI | Performed by: INTERNAL MEDICINE

## 2019-05-25 RX ORDER — LIDOCAINE 40 MG/G
CREAM TOPICAL
Status: DISCONTINUED | OUTPATIENT
Start: 2019-05-25 | End: 2019-05-25

## 2019-05-25 RX ORDER — ONDANSETRON 4 MG/1
4 TABLET, ORALLY DISINTEGRATING ORAL EVERY 6 HOURS PRN
Status: ON HOLD | COMMUNITY
End: 2019-06-15

## 2019-05-25 RX ORDER — NITROGLYCERIN 0.4 MG/1
0.4 TABLET SUBLINGUAL EVERY 5 MIN PRN
Status: DISCONTINUED | OUTPATIENT
Start: 2019-05-25 | End: 2019-06-16 | Stop reason: HOSPADM

## 2019-05-25 RX ORDER — PROCHLORPERAZINE MALEATE 5 MG
10 TABLET ORAL EVERY 6 HOURS PRN
Status: DISCONTINUED | OUTPATIENT
Start: 2019-05-25 | End: 2019-06-12

## 2019-05-25 RX ORDER — ONDANSETRON 2 MG/ML
4 INJECTION INTRAMUSCULAR; INTRAVENOUS EVERY 6 HOURS PRN
Status: DISCONTINUED | OUTPATIENT
Start: 2019-05-25 | End: 2019-05-27

## 2019-05-25 RX ORDER — POTASSIUM CHLORIDE 1500 MG/1
20-40 TABLET, EXTENDED RELEASE ORAL
Status: DISCONTINUED | OUTPATIENT
Start: 2019-05-25 | End: 2019-06-16 | Stop reason: HOSPADM

## 2019-05-25 RX ORDER — MAGNESIUM SULFATE HEPTAHYDRATE 40 MG/ML
4 INJECTION, SOLUTION INTRAVENOUS EVERY 4 HOURS PRN
Status: DISCONTINUED | OUTPATIENT
Start: 2019-05-25 | End: 2019-06-16 | Stop reason: HOSPADM

## 2019-05-25 RX ORDER — FLUMAZENIL 0.1 MG/ML
0.2 INJECTION, SOLUTION INTRAVENOUS
Status: ACTIVE | OUTPATIENT
Start: 2019-05-26 | End: 2019-05-28

## 2019-05-25 RX ORDER — NALOXONE HYDROCHLORIDE 0.4 MG/ML
.1-.4 INJECTION, SOLUTION INTRAMUSCULAR; INTRAVENOUS; SUBCUTANEOUS
Status: DISCONTINUED | OUTPATIENT
Start: 2019-05-25 | End: 2019-05-25

## 2019-05-25 RX ORDER — POTASSIUM CHLORIDE 29.8 MG/ML
20 INJECTION INTRAVENOUS
Status: DISCONTINUED | OUTPATIENT
Start: 2019-05-25 | End: 2019-06-16 | Stop reason: HOSPADM

## 2019-05-25 RX ORDER — POTASSIUM CL/LIDO/0.9 % NACL 10MEQ/0.1L
10 INTRAVENOUS SOLUTION, PIGGYBACK (ML) INTRAVENOUS
Status: DISCONTINUED | OUTPATIENT
Start: 2019-05-25 | End: 2019-06-16 | Stop reason: HOSPADM

## 2019-05-25 RX ORDER — ACETAMINOPHEN 650 MG/1
650 SUPPOSITORY RECTAL EVERY 4 HOURS PRN
Status: DISCONTINUED | OUTPATIENT
Start: 2019-05-25 | End: 2019-05-27

## 2019-05-25 RX ORDER — SODIUM CHLORIDE 9 MG/ML
INJECTION, SOLUTION INTRAVENOUS CONTINUOUS
Status: DISCONTINUED | OUTPATIENT
Start: 2019-05-25 | End: 2019-05-26

## 2019-05-25 RX ORDER — PROCHLORPERAZINE 25 MG
25 SUPPOSITORY, RECTAL RECTAL EVERY 12 HOURS PRN
Status: DISCONTINUED | OUTPATIENT
Start: 2019-05-25 | End: 2019-06-12

## 2019-05-25 RX ORDER — LIDOCAINE 40 MG/G
CREAM TOPICAL
Status: DISCONTINUED | OUTPATIENT
Start: 2019-05-25 | End: 2019-06-12

## 2019-05-25 RX ORDER — NALOXONE HYDROCHLORIDE 0.4 MG/ML
.1-.4 INJECTION, SOLUTION INTRAMUSCULAR; INTRAVENOUS; SUBCUTANEOUS
Status: DISCONTINUED | OUTPATIENT
Start: 2019-05-25 | End: 2019-06-12

## 2019-05-25 RX ORDER — ONDANSETRON 4 MG/1
4 TABLET, ORALLY DISINTEGRATING ORAL EVERY 6 HOURS PRN
Status: DISCONTINUED | OUTPATIENT
Start: 2019-05-25 | End: 2019-05-27

## 2019-05-25 RX ORDER — ONDANSETRON 2 MG/ML
4 INJECTION INTRAMUSCULAR; INTRAVENOUS ONCE
Status: COMPLETED | OUTPATIENT
Start: 2019-05-25 | End: 2019-05-25

## 2019-05-25 RX ORDER — POTASSIUM CHLORIDE 7.45 MG/ML
10 INJECTION INTRAVENOUS
Status: DISCONTINUED | OUTPATIENT
Start: 2019-05-25 | End: 2019-06-16 | Stop reason: HOSPADM

## 2019-05-25 RX ORDER — SODIUM CHLORIDE 9 MG/ML
1000 INJECTION, SOLUTION INTRAVENOUS CONTINUOUS
Status: DISCONTINUED | OUTPATIENT
Start: 2019-05-25 | End: 2019-05-25

## 2019-05-25 RX ORDER — POTASSIUM CHLORIDE 1.5 G/1.58G
20-40 POWDER, FOR SOLUTION ORAL
Status: DISCONTINUED | OUTPATIENT
Start: 2019-05-25 | End: 2019-06-16 | Stop reason: HOSPADM

## 2019-05-25 RX ADMIN — SODIUM CHLORIDE 1000 ML: 9 INJECTION, SOLUTION INTRAVENOUS at 05:06

## 2019-05-25 RX ADMIN — ONDANSETRON 4 MG: 2 INJECTION INTRAMUSCULAR; INTRAVENOUS at 07:23

## 2019-05-25 RX ADMIN — POTASSIUM PHOSPHATE, MONOBASIC AND POTASSIUM PHOSPHATE, DIBASIC 20 MMOL: 224; 236 INJECTION, SOLUTION INTRAVENOUS at 13:13

## 2019-05-25 RX ADMIN — POLYETHYLENE GLYCOL-3350 AND ELECTROLYTES 4000 ML: 236; 6.74; 5.86; 2.97; 22.74 POWDER, FOR SOLUTION ORAL at 15:04

## 2019-05-25 RX ADMIN — PROCHLORPERAZINE EDISYLATE 10 MG: 5 INJECTION INTRAMUSCULAR; INTRAVENOUS at 19:59

## 2019-05-25 RX ADMIN — SODIUM CHLORIDE 1000 ML: 9 INJECTION, SOLUTION INTRAVENOUS at 06:14

## 2019-05-25 RX ADMIN — ONDANSETRON 4 MG: 2 INJECTION INTRAMUSCULAR; INTRAVENOUS at 16:39

## 2019-05-25 RX ADMIN — SODIUM CHLORIDE, POTASSIUM CHLORIDE, SODIUM LACTATE AND CALCIUM CHLORIDE 1000 ML: 600; 310; 30; 20 INJECTION, SOLUTION INTRAVENOUS at 10:27

## 2019-05-25 ASSESSMENT — ACTIVITIES OF DAILY LIVING (ADL)
ADLS_ACUITY_SCORE: 14

## 2019-05-25 ASSESSMENT — ENCOUNTER SYMPTOMS
DIARRHEA: 1
DIZZINESS: 1
FEVER: 0
VOMITING: 0
BLOOD IN STOOL: 1
ABDOMINAL PAIN: 1
PALPITATIONS: 1

## 2019-05-25 ASSESSMENT — MIFFLIN-ST. JEOR: SCORE: 1565.71

## 2019-05-25 NOTE — ED NOTES
[facility]  ED Nurse Handoff Report    Alex Cardona is a 48 year old male   ED Chief complaint: Generalized Weakness  . ED Diagnosis:   Final diagnoses:   None     Allergies: No Known Allergies    Code Status: Full Code  Activity level - Baseline/Home:  Independent. Activity Level - Current:   Stand with Assist. Lift room needed: No. Bariatric: No   Needed: No   Isolation: No. Infection: Not Applicable.     Vital Signs:   Vitals:    05/25/19 0434   BP: 132/76   Pulse: 99   Resp: 18   Temp: 97.9  F (36.6  C)   TempSrc: Oral   SpO2: 99%       Cardiac Rhythm:  ,      Pain level: 0-10 Pain Scale: 4  Patient confused: No. Patient Falls Risk: Yes.   Elimination Status: Due to void   Patient Report - Initial Complaint: Generalized weakness. Focused Assessment: GI- Frequent, watery diarrhea with intermittent shades of red over last few weeks. C/o abdominal distention, generalized soreness with palpation, belching and gas discomfort. Bowel sounds active x 4. Has been in ED 2x this week with same symptoms. Has not been able to follow up with GI yet d/t frequent return of symptoms. Had negative infectious stool studies within last week. Musculoskeletal- C/o generalized weakness related to dehydration from frequent diarrhea. Skin- Pale.   Tests Performed: Labs. Abnormal Results:   Labs Ordered and Resulted from Time of ED Arrival Up to the Time of Departure from the ED   CBC WITH PLATELETS DIFFERENTIAL - Abnormal; Notable for the following components:       Result Value    Hematocrit 38.9 (*)     Absolute Lymphocytes 0.5 (*)     All other components within normal limits   LACTIC ACID WHOLE BLOOD - Abnormal; Notable for the following components:    Lactic Acid 2.5 (*)     All other components within normal limits   COMPREHENSIVE METABOLIC PANEL   LIPASE     .   Treatments provided: 1 L bolus NS  Family Comments: N/A  OBS brochure/video discussed/provided to patient:  N/A  ED Medications:   Medications    0.9% sodium chloride BOLUS (1,000 mLs Intravenous New Bag 5/25/19 0506)     Followed by   sodium chloride 0.9% infusion (has no administration in time range)     Drips infusing:  No  For the majority of the shift, the patient's behavior Green. Interventions performed were N/A.     Severe Sepsis OR Septic Shock Diagnosis Present: No      ED Nurse Name/Phone Number: Tosha Conley,   5:16 AM    RECEIVING UNIT ED HANDOFF REVIEW    Above ED Nurse Handoff Report was reviewed: Yes  Reviewed by: Apple Dominguez on May 25, 2019 at 7:49 AM

## 2019-05-25 NOTE — H&P
[facility]    History and Physical - Hospitalist Service       Date of Admission:  5/25/2019    Assessment & Plan   Alex Cardona is a 48 year old male admitted on 5/25/2019. He presented to emergency room with abdominal pain, nausea, and diarrhea.  He was found to have pancolitis on CT scan 2 days ago.    1.  Pancolitis.  With bloody diarrhea.  Gastroenterology consult.  N.p.o.  Continuous IV fluids.  Antiemetics as needed.    2.  Dehydration.  Start continuous IV fluids.    3.  Hyponatremia.  Suspect due to dehydration and frequent diarrhea.  Start continuous IV fluids.    4.  Hypocalcemia.  Suspect due to frequent diarrhea.  Check ionized calcium level.  Also check phosphorus and magnesium levels.     Diet: NPO.  DVT Prophylaxis: Ambulate every shift  Huddleston Catheter: not present  Code Status: Full code.      Senthil Reynolds, DO  [facility]    ______________________________________________________________________    Chief Complaint   Diarrhea.    History is obtained from the patient    History of Present Illness   Alex Cardona is a 48 year old male who developed diarrhea 4 weeks ago.  Has continued to have diarrhea daily for the past 4 weeks.  Currently very frequent happening approximately every 2 hours.  Is also having nausea with this.  Does note that he has been having small amounts of bright red blood in his stools.  He has not vomited.  No fevers or chills.  Does feel quite dehydrated.  Has not been eating or drinking well now for a few weeks.  Has noted diminished urine output.  Feels weak.  Lightheaded at times.  Nothing at home seem to be helping symptoms.  He did have a similar episode approximately 1 year ago.  That episode lasted approximately 3 months.  Did not seem to resolve on its own.    Review of Systems    The 10 point Review of Systems is negative other than noted in the HPI     Past Medical History    I have reviewed this patient's medical history and  updated it with pertinent information if needed.   No past medical history on file.    Past Surgical History   I have reviewed this patient's surgical history and updated it with pertinent information if needed.  No past surgical history on file.    Social History   I have reviewed this patient's social history and updated it with pertinent information if needed.  Social History     Tobacco Use     Smoking status: Former Smoker     Smokeless tobacco: Never Used     Tobacco comment: quit 4 years ago   Substance Use Topics     Alcohol use: Yes     Drug use: Yes     Types: Marijuana       Family History   I have reviewed this patient's family history and updated it with pertinent information if needed.   Father with esophageal cancer.    Prior to Admission Medications   Prior to Admission Medications   Prescriptions Last Dose Informant Patient Reported? Taking?   NO ACTIVE MEDICATIONS   Yes No   Sig:     bismuth subsalicylate (PEPTO-BISMOL) 262 MG TABS   No No   Sig: Take 2 tablets by mouth every 6 hours   dicyclomine (BENTYL) 20 MG tablet   No No   Sig: Take 1 tablet (20 mg) by mouth 4 times daily as needed (abdominal pain)   diphenoxylate-atropine (LOMOTIL) 2.5-0.025 MG tablet   No No   Sig: Take 2 tablets by mouth 2 times daily   ondansetron (ZOFRAN ODT) 4 MG ODT tab   No No   Sig: Take 1 tablet (4 mg) by mouth every 6 hours as needed for nausea      Facility-Administered Medications: None     Allergies   No Known Allergies    Physical Exam   Vital Signs: Temp: 97.9  F (36.6  C) Temp src: Oral BP: 118/68 Pulse: 83 Heart Rate: 99 Resp: 18 SpO2: 97 %      Weight: 0 lbs 0 oz    Gen:  NAD, A&Ox3.  Eyes:  PERRL, sclera anicteric.  OP:  MMM, no lesions.  Neck:  Supple.  CV:  Regular, no murmurs.  Lung:  CTA b/l, normal effort.  Ab:  +BS, soft.  Skin:  Warm, dry to touch.  No rash.  Ext:  No pitting edema LE b/l.      Data   Data reviewed today: I reviewed all medications, new labs and imaging results over the last 24  hours.    Recent Labs   Lab 05/25/19  0505 05/23/19  1340 05/21/19  0533   WBC 9.7 12.7* 15.5*   HGB 13.5 16.0 15.7   MCV 85 86 87    425 396   * 133 137   POTASSIUM 3.4 3.4 3.4   CHLORIDE 93* 96 99   CO2 28 26 26   BUN 6* 5* 8   CR 0.86 0.86 0.95   ANIONGAP 7 11 12   KEZIA 7.4* 8.7 8.8   * 95 101*   ALBUMIN 2.0* 3.0* 3.2*   PROTTOTAL 5.6* 7.5 7.4   BILITOTAL 0.6 0.6 0.7   ALKPHOS 60 77 74   ALT 9 16 12   AST 11 13 12   LIPASE 33*  --   --

## 2019-05-25 NOTE — PROGRESS NOTES
GI   Full consult to follow.   Pt seen/examined.    Needs diagnosis, highly suspicious for IBD.   Orders placed for colon prep, plan for colonoscopy tomorrow 800.  Supervisor called, she is arranging with endo RN on call.     Herbert Dior DO   [facility]  Cell [phone]

## 2019-05-25 NOTE — ED PROVIDER NOTES
History     Chief Complaint:  Generalized Weakness    HPI   Alex Cardona is a 48 year old male who presents to the emergency department today for evaluation of generalized weakness. Per chart review, the patient was evaluated in the ED on 5/23 for multiple weeks of diarrhea, as well as nausea, dizziness, and weakness. At that time he underwent laboratory and imaging studies, noted below, and he was subsequently discharged on pepto bismol and lomotil.    The patient reports that he has been experiencing watery diarrhea every 2-3 hours accompanied by abdominal pressure and gas for multiple weeks, adding that over the last week he has noted bright red blood and white puss in his stools. He explains that tonight while using the bathroom he developed dizziness, palpitations, and had an episode of near syncope, prompting presentation. Here the patient reports a similar experience one year ago that resolved without intervention after 3-4 months. He denies any fever, emesis, or recent travel.     CT Abdomen Pelvis w Contrast: 5/23/19  Infectious or inflammatory pancolitis. Minimal apparent  involvement of the rectum. Abdominal and pelvic organs are otherwise  within normal limits. Appendix and terminal ileum appear normal.  LAURIE CAPELLAN MD    Laboratory Studies: 5/23/19  UA: mucous present (A), protein albumin 70 (A), ketone >150 (A), o/w WNL  CBC: WBC 12.7 (H), HGB 16,   CMP: BUN 5 (L), albumin 3 (L), o/w WNL (Creatinine 0.86)  C Diff Toxin B PCR: negative    Allergies:  No Known Drug Allergies     Medications:    Pepto Bismol  Bentyl  Lomotil  Zofran    Past Medical History:    Past medical history reviewed. No pertinent past medical history.    Past Surgical History:    Surgical history reviewed. No pertinent surgical history.    Family History:    Family history reviewed. No pertinent family history.    Social History:  Smoking Status: Former Smoker  Smokeless Tobacco: Never Used  Alcohol Use: Positive  Drug  Use: Positive   Types: Marijuana  Marital Status:  Single  Patient denies recent travel      Review of Systems   Constitutional: Negative for fever.   Cardiovascular: Positive for palpitations.   Gastrointestinal: Positive for abdominal pain, blood in stool (and puss) and diarrhea. Negative for vomiting.   Neurological: Positive for dizziness and syncope (near).   All other systems reviewed and are negative.      Physical Exam     Patient Vitals for the past 24 hrs:   BP Temp Temp src Pulse Heart Rate Resp SpO2   05/25/19 0543 118/69 -- -- 85 -- -- 99 %   05/25/19 0434 132/76 97.9  F (36.6  C) Oral 99 99 18 99 %     Physical Exam  General: Patient is alert and interactive when I enter the room, pale and thin appearing  Head:  The scalp, face, and head appear normal  Eyes:  Conjunctivae are normal  ENT:    The nose is normal    Pinnae are normal    External acoustic canals are normal    Very dry mucous membranes  Neck:  Trachea midline  CV:  Pulses are normal   Resp:  No respiratory distress   Abdomen:      Soft, non-tender, non-distended  Musc:  Normal muscular tone    No major joint effusions    No asymmetric leg swelling  Skin:  No rash or lesions noted  Neuro: Speech is normal and fluent. Face is symmetric.     Moving all extremities well.   Psych:  Awake. Alert.  Normal affect.  Appropriate interactions.    Emergency Department Course     Laboratory:  Laboratory findings were communicated with the patient who voiced understanding of the findings.    CBC: WBC 9.7, HGB 13.5,   CMP:  (L), Chloride 93 (L), Glucose 146 (H), Bun 6 (L), Calcium 7.4 (H), Albumin 2.0 (L), Protein Total 5.6 (L) o/w WNL (Creatinine 0.86)  Lactic Acid (Resulted: 0505): 2.5 (H)  Lipase: 33 (L)    Interventions:  0506 NS 1000 ml IV  0314 NS 1000 ml IV    Emergency Department Course:    0438 Nursing notes and vitals reviewed.    0452 I performed an exam of the patient as documented above.     0505 IV was inserted and blood was  drawn for laboratory testing, results above.    0609 I spoke with Dr. Reynolds of the hospitalist service from [facility] regarding patient's presentation, findings, and plan of care.    Impression & Plan      Medical Decision Making:  Alex Cardona is a 48 year old male who presents to the emergency department today for evaluation of diarrhea. The patient has been seen here twice for the same symptoms. He had a CT done on the 23rd which revealed pancolitis. At that point they consulted with GI who stated he could follow up as an outpatient as soon as possible. Patient has continued to have diarrhea and has been unable to follow up with GI as he has been unable to reach them. He arrives here vitally stable, however he looks profoundly dehydrated with dry mucous membranes. He has signs of dehydration on his bloodwork with hyponatremia at 128 and lactic acidosis at 2.5, which is likely dehydration. I think this is likely ulcerative colitis or crohn's disease. C diff was negative and enteric panel was negative. No need to repeat these at this time. He has had some blood in his stool but his hemoglobin is normal. The patient was given a liter of fluids and, given that this is his third presentation with continued dehydration, the patient will be admitted to medicine.     Diagnosis:    ICD-10-CM    1. Diarrhea, unspecified type R19.7      Disposition:   The patient is admitted into the care of Dr. Reynolds.    CMS Diagnoses: The Lactic acid level is elevated due to dehydration, at this time there is no sign of severe sepsis or septic shock.    Scribe Disclosure:  I, Zora Weathers, am serving as a scribe at 4:44 AM on 5/25/2019 to document services personally performed by Azul Vaughan MD based on my observations and the provider's statements to me.    [facility] EMERGENCY DEPARTMENT       Azul Vaughan MD  05/25/19 0708

## 2019-05-25 NOTE — ED TRIAGE NOTES
Generalized weakness, abdominal pain, sob, diarrhea with blood in stool ongoing for few weeks but getting worst. Seen here on Thursday for similar complaints. ABCs intact.

## 2019-05-25 NOTE — CONSULTS
GASTROENTEROLOGY CONSULTATION      Alex Cardona  [address]   [place] [id number]  48 year old male     Admission Date/Time: 5/25/2019  Primary Care Provider: No Ref-Primary, Physician  Referring / Attending Physician:  Gail     We were asked to see the patient in consultation by Dr. Reynolds for evaluation of colitis on CT.    ASSESSMENT:    Diarrhea, hematochezia, colitis on CT    DDx:  Given neg C diff, and chronicity, IBD is highest on the DDx, less likely ischemia or infectious.      RECOMMENDATIONS:  Colonoscopy tomorrow AM  Prep orders placed for today     Herbert Dior DO   [facility]  Cell [phone]  ________________________________________________________________________        CC: diarrhea     HPI:  Alex Cardona is a 48 year old male who we are asked to see for diarrhea and colitis on CT.  Pt states that about 4 wks ago developed diarrhea, with occasional blood/mucous in the stool.  Diarrhea was similar to several month episode about 15 months ago, which slowly resolved.  States over the last several days he's been having 5-15 stools/day, often several times per hour.  There tends to be diffuse abd cramping prior to BM, resolves afterward.  No vomiting, some nausea, thus decreased appetite and intake.  Endorses ~10lb wt loss over the last 4-6wks.     Denies abx, smoking oor NSAIDs.    Recalls that his father had an abscess years ago which required a resection of the small bowel.  Discussed with him that this sounds suggestive of Crohn's, if no alternative  Dx was offered.       ROS: A comprehensive ten point review of systems was negative aside from those in mentioned in the HPI.      PAST MEDICAL HISTORY:  Patient Active Problem List    Diagnosis Date Noted     Diarrhea 05/25/2019     Priority: Medium     Colitis 05/25/2019     Priority: Medium     SOCIAL HISTORY:  Social History     Tobacco Use     Smoking status: Former Smoker     Smokeless tobacco:  Never Used     Tobacco comment: quit 4 years ago   Substance Use Topics     Alcohol use: Yes     Drug use: Yes     Types: Marijuana     FAMILY HISTORY:  No family history on file.  ALLERGIES: No Known Allergies  MEDICATIONS:   Current Facility-Administered Medications   Medication     acetaminophen (TYLENOL) Suppository 650 mg     [START ON 5/26/2019] flumazenil (ROMAZICON) injection 0.2 mg     lidocaine (LMX4) cream     lidocaine 1 % 0.1-1 mL     lidocaine 1 % 0.1-1 mL     magnesium sulfate 4 g in 100 mL sterile water (premade)     May continue current IV fluids if patient has IV fluids infusing.     May take regular AM medications except those listed below     [START ON 5/26/2019] midazolam (VERSED) injection 2 mg    Followed by     [START ON 5/26/2019] midazolam (VERSED) injection 1 mg     naloxone (NARCAN) injection 0.1-0.4 mg     nitroGLYcerin (NITROSTAT) sublingual tablet 0.4 mg     ondansetron (ZOFRAN-ODT) ODT tab 4 mg    Or     ondansetron (ZOFRAN) injection 4 mg     polyethylene glycol (GoLYTELY/NuLYTELY) suspension 4,000 mL     potassium chloride (KLOR-CON) Packet 20-40 mEq     potassium chloride 10 mEq in 100 mL intermittent infusion with 10 mg lidocaine     potassium chloride 10 mEq in 100 mL sterile water intermittent infusion (premix)     potassium chloride 20 mEq in 50 mL intermittent infusion     potassium chloride ER (K-DUR/KLOR-CON M) CR tablet 20-40 mEq     potassium phosphate 15 mmol in D5W 250 mL intermittent infusion     potassium phosphate 20 mmol in D5W 250 mL intermittent infusion     potassium phosphate 20 mmol in D5W 500 mL intermittent infusion     potassium phosphate 25 mmol in D5W 500 mL intermittent infusion     prochlorperazine (COMPAZINE) injection 10 mg    Or     prochlorperazine (COMPAZINE) tablet 10 mg    Or     prochlorperazine (COMPAZINE) Suppository 25 mg     sodium chloride (PF) 0.9% PF flush 3 mL     sodium chloride (PF) 0.9% PF flush 3 mL     sodium chloride 0.9% infusion  "    PHYSICAL EXAM:   /63 (BP Location: Left arm)   Pulse 83   Temp 98.6  F (37  C) (Oral)   Resp 18   Ht 1.803 m (5' 11\")   Wt 67.9 kg (149 lb 9.6 oz)   SpO2 98%   BMI 20.86 kg/m     GEN: Alert, oriented x3, communicative and in NAD.  LEATHA: AT, anicteric, OP without erythema, exudate, or ulcers.    NECK: Supple.    LYMPH: No LAD noted.  HRT: Reg  LUNGS: CTA  ABD: ND, +BS, no guarding or pain to palpation, no rebound, no HSM.  SKIN: No rash, jaundice or spider angiomata  MSKL: LE free of edema, strength 5/5 all 4 extrems  NEURO: CN grossly intact, sensation intact to light touch, toes downgoing.     ADDITIONAL DATA:   I reviewed the patient's new clinical lab test results.   Recent Labs   Lab Test 05/25/19  0505 05/23/19  1340 05/21/19  0533   WBC 9.7 12.7* 15.5*   HGB 13.5 16.0 15.7   MCV 85 86 87    425 396     Recent Labs   Lab Test 05/25/19  0505 05/23/19  1340 05/21/19  0533   POTASSIUM 3.4 3.4 3.4   CHLORIDE 93* 96 99   CO2 28 26 26   BUN 6* 5* 8   ANIONGAP 7 11 12     Recent Labs   Lab Test 05/25/19  0505 05/23/19  1400 05/23/19  1340 05/21/19  0533   ALBUMIN 2.0*  --  3.0* 3.2*   BILITOTAL 0.6  --  0.6 0.7   ALT 9  --  16 12   AST 11  --  13 12   PROTEIN  --  70*  --   --    LIPASE 33*  --   --   --         I reviewed the patient's new imaging results.               "

## 2019-05-25 NOTE — PHARMACY-ADMISSION MEDICATION HISTORY
Admission medication history interview status for this patient is complete. See [facility] admission navigator for allergy information, prior to admission medications and immunization status.     Medication history interview source(s):Patient  Medication history resources (including written lists, pill bottles, clinic record):None    Changes made to PTA medication list:  Added: none  Deleted: none  Changed: re-entered ondansetron    Actions taken by pharmacist (provider contacted, etc):None     Additional medication history information:None    Medication reconciliation/reorder completed by provider prior to medication history? No    Prior to Admission medications    Medication Sig Last Dose Taking? Auth Provider   bismuth subsalicylate (PEPTO-BISMOL) 262 MG TABS Take 2 tablets by mouth every 6 hours 5/25/2019 at 0100 Yes Brenda Justice PA-C   diphenoxylate-atropine (LOMOTIL) 2.5-0.025 MG tablet Take 2 tablets by mouth 2 times daily 5/24/2019 at PM Yes Brenda Justice PA-C   ondansetron (ZOFRAN-ODT) 4 MG ODT tab Take 4 mg by mouth every 6 hours as needed for nausea 5/24/2019 at 1900 Yes Reported, Patient   dicyclomine (BENTYL) 20 MG tablet Take 1 tablet (20 mg) by mouth 4 times daily as needed (abdominal pain) a few days ago  Marvin Hernandez MD

## 2019-05-25 NOTE — PLAN OF CARE
Arrived to room 605 from ER at 0830 via cart, oriented to room and call system, reviewed welcome folder and pain/medication information packet with patient.    Pt A&O x4. VS stable; afebrile. Phosphorus 1.2-replacing. Rates pain 4/10-declines intervention. CMS intact. Up w/ SBA, using gait belt. Voiding in good amts. Loose BM x2 this shift-sample collected. Tolerating clear liquid diet-fluids infusing. Bolus given. Plan is to be NPO @ midnight for colonoscopy tomorrow-prep started @ 1500. Will continue to monitor.

## 2019-05-26 LAB
ANION GAP SERPL CALCULATED.3IONS-SCNC: 5 MMOL/L (ref 3–14)
BUN SERPL-MCNC: 5 MG/DL (ref 7–30)
CALCIUM SERPL-MCNC: 7.4 MG/DL (ref 8.5–10.1)
CHLORIDE SERPL-SCNC: 98 MMOL/L (ref 94–109)
CO2 SERPL-SCNC: 30 MMOL/L (ref 20–32)
COLONOSCOPY: NORMAL
CREAT SERPL-MCNC: 0.72 MG/DL (ref 0.66–1.25)
ERYTHROCYTE [DISTWIDTH] IN BLOOD BY AUTOMATED COUNT: 12.4 % (ref 10–15)
GFR SERPL CREATININE-BSD FRML MDRD: >90 ML/MIN/{1.73_M2}
GLUCOSE SERPL-MCNC: 129 MG/DL (ref 70–99)
HCT VFR BLD AUTO: 34.9 % (ref 40–53)
HGB BLD-MCNC: 12 G/DL (ref 13.3–17.7)
MAGNESIUM SERPL-MCNC: 1.9 MG/DL (ref 1.6–2.3)
MCH RBC QN AUTO: 29.3 PG (ref 26.5–33)
MCHC RBC AUTO-ENTMCNC: 34.4 G/DL (ref 31.5–36.5)
MCV RBC AUTO: 85 FL (ref 78–100)
PHOSPHATE SERPL-MCNC: 2.7 MG/DL (ref 2.5–4.5)
PLATELET # BLD AUTO: 345 10E9/L (ref 150–450)
POTASSIUM SERPL-SCNC: 3.3 MMOL/L (ref 3.4–5.3)
POTASSIUM SERPL-SCNC: 3.7 MMOL/L (ref 3.4–5.3)
RBC # BLD AUTO: 4.1 10E12/L (ref 4.4–5.9)
SODIUM SERPL-SCNC: 133 MMOL/L (ref 133–144)
WBC # BLD AUTO: 8.7 10E9/L (ref 4–11)

## 2019-05-26 PROCEDURE — 83735 ASSAY OF MAGNESIUM: CPT | Performed by: INTERNAL MEDICINE

## 2019-05-26 PROCEDURE — 45380 COLONOSCOPY AND BIOPSY: CPT | Performed by: INTERNAL MEDICINE

## 2019-05-26 PROCEDURE — 25000128 H RX IP 250 OP 636: Performed by: INTERNAL MEDICINE

## 2019-05-26 PROCEDURE — 86705 HEP B CORE ANTIBODY IGM: CPT | Performed by: INTERNAL MEDICINE

## 2019-05-26 PROCEDURE — 84132 ASSAY OF SERUM POTASSIUM: CPT | Performed by: INTERNAL MEDICINE

## 2019-05-26 PROCEDURE — 36415 COLL VENOUS BLD VENIPUNCTURE: CPT | Performed by: INTERNAL MEDICINE

## 2019-05-26 PROCEDURE — 99233 SBSQ HOSP IP/OBS HIGH 50: CPT | Performed by: INTERNAL MEDICINE

## 2019-05-26 PROCEDURE — 12000000 ZZH R&B MED SURG/OB

## 2019-05-26 PROCEDURE — 88342 IMHCHEM/IMCYTCHM 1ST ANTB: CPT | Performed by: INTERNAL MEDICINE

## 2019-05-26 PROCEDURE — 25000125 ZZHC RX 250: Performed by: INTERNAL MEDICINE

## 2019-05-26 PROCEDURE — 25000132 ZZH RX MED GY IP 250 OP 250 PS 637: Performed by: INTERNAL MEDICINE

## 2019-05-26 PROCEDURE — 86708 HEPATITIS A ANTIBODY: CPT | Performed by: INTERNAL MEDICINE

## 2019-05-26 PROCEDURE — G0500 MOD SEDAT ENDO SERVICE >5YRS: HCPCS | Performed by: INTERNAL MEDICINE

## 2019-05-26 PROCEDURE — 85027 COMPLETE CBC AUTOMATED: CPT | Performed by: INTERNAL MEDICINE

## 2019-05-26 PROCEDURE — 86481 TB AG RESPONSE T-CELL SUSP: CPT | Performed by: INTERNAL MEDICINE

## 2019-05-26 PROCEDURE — 88342 IMHCHEM/IMCYTCHM 1ST ANTB: CPT | Mod: 26 | Performed by: INTERNAL MEDICINE

## 2019-05-26 PROCEDURE — 88305 TISSUE EXAM BY PATHOLOGIST: CPT | Performed by: INTERNAL MEDICINE

## 2019-05-26 PROCEDURE — 25800030 ZZH RX IP 258 OP 636: Performed by: INTERNAL MEDICINE

## 2019-05-26 PROCEDURE — 86704 HEP B CORE ANTIBODY TOTAL: CPT | Performed by: INTERNAL MEDICINE

## 2019-05-26 PROCEDURE — 88305 TISSUE EXAM BY PATHOLOGIST: CPT | Mod: 26 | Performed by: INTERNAL MEDICINE

## 2019-05-26 PROCEDURE — 84100 ASSAY OF PHOSPHORUS: CPT | Performed by: INTERNAL MEDICINE

## 2019-05-26 PROCEDURE — 0DBG8ZX EXCISION OF LEFT LARGE INTESTINE, VIA NATURAL OR ARTIFICIAL OPENING ENDOSCOPIC, DIAGNOSTIC: ICD-10-PCS | Performed by: INTERNAL MEDICINE

## 2019-05-26 PROCEDURE — 80048 BASIC METABOLIC PNL TOTAL CA: CPT | Performed by: INTERNAL MEDICINE

## 2019-05-26 PROCEDURE — 86706 HEP B SURFACE ANTIBODY: CPT | Performed by: INTERNAL MEDICINE

## 2019-05-26 RX ORDER — METHYLPREDNISOLONE SODIUM SUCCINATE 40 MG/ML
40 INJECTION, POWDER, LYOPHILIZED, FOR SOLUTION INTRAMUSCULAR; INTRAVENOUS EVERY 12 HOURS
Status: DISCONTINUED | OUTPATIENT
Start: 2019-05-26 | End: 2019-06-01

## 2019-05-26 RX ORDER — OXYCODONE HYDROCHLORIDE 5 MG/1
5 TABLET ORAL EVERY 4 HOURS PRN
Status: DISCONTINUED | OUTPATIENT
Start: 2019-05-26 | End: 2019-06-03

## 2019-05-26 RX ORDER — TRAMADOL HYDROCHLORIDE 50 MG/1
50 TABLET ORAL EVERY 6 HOURS PRN
Status: DISCONTINUED | OUTPATIENT
Start: 2019-05-26 | End: 2019-06-12

## 2019-05-26 RX ORDER — FENTANYL CITRATE 50 UG/ML
INJECTION, SOLUTION INTRAMUSCULAR; INTRAVENOUS PRN
Status: DISCONTINUED | OUTPATIENT
Start: 2019-05-26 | End: 2019-05-26 | Stop reason: HOSPADM

## 2019-05-26 RX ORDER — FLUMAZENIL 0.1 MG/ML
0.2 INJECTION, SOLUTION INTRAVENOUS
Status: ACTIVE | OUTPATIENT
Start: 2019-05-26 | End: 2019-05-26

## 2019-05-26 RX ORDER — NALOXONE HYDROCHLORIDE 0.4 MG/ML
.1-.4 INJECTION, SOLUTION INTRAMUSCULAR; INTRAVENOUS; SUBCUTANEOUS
Status: ACTIVE | OUTPATIENT
Start: 2019-05-26 | End: 2019-05-27

## 2019-05-26 RX ADMIN — METHYLPREDNISOLONE 40 MG: 40 INJECTION, POWDER, LYOPHILIZED, FOR SOLUTION INTRAMUSCULAR; INTRAVENOUS at 10:38

## 2019-05-26 RX ADMIN — PROCHLORPERAZINE EDISYLATE 10 MG: 5 INJECTION INTRAMUSCULAR; INTRAVENOUS at 21:42

## 2019-05-26 RX ADMIN — PROCHLORPERAZINE EDISYLATE 10 MG: 5 INJECTION INTRAMUSCULAR; INTRAVENOUS at 07:14

## 2019-05-26 RX ADMIN — ONDANSETRON 4 MG: 2 INJECTION INTRAMUSCULAR; INTRAVENOUS at 20:52

## 2019-05-26 RX ADMIN — POTASSIUM PHOSPHATE, MONOBASIC AND POTASSIUM PHOSPHATE, DIBASIC 20 MMOL: 224; 236 INJECTION, SOLUTION INTRAVENOUS at 00:12

## 2019-05-26 RX ADMIN — Medication 10 MEQ: at 14:12

## 2019-05-26 RX ADMIN — Medication 10 MEQ: at 12:58

## 2019-05-26 RX ADMIN — SODIUM CHLORIDE: 9 INJECTION, SOLUTION INTRAVENOUS at 09:52

## 2019-05-26 RX ADMIN — OXYCODONE HYDROCHLORIDE 5 MG: 5 TABLET ORAL at 22:00

## 2019-05-26 RX ADMIN — ONDANSETRON 4 MG: 2 INJECTION INTRAMUSCULAR; INTRAVENOUS at 03:37

## 2019-05-26 RX ADMIN — Medication 10 MEQ: at 15:27

## 2019-05-26 RX ADMIN — METHYLPREDNISOLONE 40 MG: 40 INJECTION, POWDER, LYOPHILIZED, FOR SOLUTION INTRAMUSCULAR; INTRAVENOUS at 21:58

## 2019-05-26 RX ADMIN — Medication 10 MEQ: at 11:43

## 2019-05-26 ASSESSMENT — ACTIVITIES OF DAILY LIVING (ADL)
ADLS_ACUITY_SCORE: 14

## 2019-05-26 ASSESSMENT — MIFFLIN-ST. JEOR: SCORE: 1567.53

## 2019-05-26 NOTE — PROGRESS NOTES
Had an endoscopy and a coloscopy this morning, results pending. Patient ambulating to the bathroom with SB assist. Bowels clearing up in color. Potassium replacement in process, 3.3 this morning. Patient resting comfortably, advanced to full liquid diet, some belching no major discomforts. Will keep monitoring. Last potassium started recheck at 1830. BMP tomorrow morning.

## 2019-05-26 NOTE — PROGRESS NOTES
[facility]    Hospitalist Progress Note  Name: Alex Cardona    MRN: [id number]  Provider: Preeti Starr MD  Date of Service: 05/26/2019    Assessment & Plan   Summary of Stay: Alex Cardona is a 48 year old male who was admitted on 5/25/2019 Abdominal pain nausea and diarrhea.  Prior to admission CT scan showed pancolitis.    Pancolitis with bloody diarrhea  --Status post colonoscopy 5/26/2019  --Clinical presentation concerning for ulcerative colitis  --Advance diet post procedure  --IV fluids  -Started on steroids per GI  --We will check hepatitis profile and QuantiFERON      Dehydration  --Continue IV fluids until adequate p.o. Intake    Hyponatremia  --Resolved    Hypokalemia  --We will replace per protocol    Hypocalcemia  --Corrected calcium would be normal if corrected for albumin    DVT Prophylaxis: Pneumatic Compression Devices.  Encourage ambulation  Code Status: Full Code    Disposition: Expected discharge in 2 to 3 days to home      Interval History   Assumed care reviewed chart.  Patient seen after colonoscopy complained of some bloating nausea and fullness in the abdomen.  Denies any chest pain shortness of breath fever or chills.  He was started on steroids.  Diet would be advanced.  Review of all the other symptoms are negative    -Data reviewed today: I reviewed all new labs and imaging reports over the last 24 hours. I personally reviewed no images or EKG's today.    Physical Exam   Temp: 95.9  F (35.5  C) Temp src: Axillary BP: 128/70 Pulse: 79 Heart Rate: 73 Resp: 18 SpO2: 100 % O2 Device: None (Room air)    Vitals:    05/25/19 0831 05/26/19 0750   Weight: 67.9 kg (149 lb 9.6 oz) 68 kg (150 lb)     Vital Signs with Ranges  Temp:  [95.9  F (35.5  C)-98.6  F (37  C)] 95.9  F (35.5  C)  Pulse:  [74-88] 79  Heart Rate:  [73-86] 73  Resp:  [8-21] 18  BP: (110-138)/(62-85) 128/70  SpO2:  [93 %-100 %] 100 %  I/O last 3 completed shifts:  In: 1482 [I.V.:1482]  Out: -       GEN:   Alert, oriented x 3, appears comfortable, NAD.  HEENT:  Normocephalic/atraumatic, no scleral icterus, no nasal discharge, mouth moist.  CV:  Regular rate and rhythm, no murmur or JVD.  S1 + S2 noted, no S3 or S4.  LUNGS:  Clear to auscultation bilaterally without rales/rhonchi/wheezing/retractions.  Symmetric chest rise on inhalation noted.  ABD:  Active bowel sounds, soft, distended. no rebound/guarding/rigidity.  EXT:  No edema.  No cyanosis.  No joint synovitis noted.  SKIN:  Dry to touch, no exanthems noted in the visualized areas.    Medications     - MEDICATION INSTRUCTIONS -       - MEDICATION INSTRUCTIONS -       - MEDICATION INSTRUCTIONS -       sodium chloride 125 mL/hr at 05/26/19 0952       methylPREDNISolone  40 mg Intravenous Q12H     midazolam  2 mg Intravenous Once within 24 hrs     sodium chloride (PF)  3 mL Intracatheter Q8H     Data     Recent Labs   Lab 05/26/19  0629 05/25/19  0505 05/23/19  1340   WBC 8.7 9.7 12.7*   HGB 12.0* 13.5 16.0   HCT 34.9* 38.9* 45.4   MCV 85 85 86    395 425     Recent Labs   Lab 05/26/19  0629 05/25/19  0505 05/23/19  1340    128* 133   POTASSIUM 3.3* 3.4 3.4   CHLORIDE 98 93* 96   CO2 30 28 26   ANIONGAP 5 7 11   * 146* 95   BUN 5* 6* 5*   CR 0.72 0.86 0.86   GFRESTIMATED >90 >90 >90   GFRESTBLACK >90 >90 >90   KEZIA 7.4* 7.4* 8.7     No results for input(s): CULT in the last 168 hours.  Recent Labs   Lab 05/25/19  0505 05/23/19  1340 05/21/19  0533   AST 11 13 12   ALT 9 16 12   ALKPHOS 60 77 74   BILITOTAL 0.6 0.6 0.7     No results for input(s): INR in the last 168 hours.  Recent Labs   Lab 05/25/19  0505   LACT 2.5*     Recent Labs   Lab 05/23/19  1400   COLOR Yellow   APPEARANCE Clear   URINEGLC Negative   URINEBILI Negative   URINEKETONE >150*   SG 1.030   UBLD Negative   URINEPH 6.0   PROTEIN 70*   NITRITE Negative   LEUKEST Negative   RBCU <1   WBCU 2       No results found for this or any previous visit (from the past 24 hour(s)).

## 2019-05-26 NOTE — PLAN OF CARE
Pt A&Ox4. Up with SBA. Abdomen soft, tender. Rated discomfort 4/10 pain,denied pain medication. Pt started bowel prep at 3pm for colonoscopy procedure. Pt had trouble taking the fluids down d/t nausea. Given zofran and compazine with some relief. Continuing to encourage to drink more. . Stool is Clear yellow with small blood noted.  Voiding. VSS, RA. Phosphorous recheck-1.9. Will start another replacement. NPO after midnight.

## 2019-05-26 NOTE — PLAN OF CARE
A&Ox4, VSS: stable. NPO from midnight. Finished most of the liquid prep before midnight. Has been going to the bathroom almost every hour overnight. Watery bloody stools. C/O mild abdominal pain and nausea. Zofran given with relief. Phosphorous replaced and redraw is scheduled this am. Nursing continue to monitor.

## 2019-05-26 NOTE — PROGRESS NOTES
GI    Incomplete colonoscopy today due to severity of colitis, favor UC.   Okay to advance diet, draw hep serologies, quantiferon, start steroids.  HIGH risk for DVT - compression devices appropriate, encourage ambulation.   Anticipate discharge home in 3-5 days if he response to steroids.   No need at this time for colorectal surgery, however if there is no response to steroids by day #2, this would be appropriate.     Herbert Dior DO   [facility]  Cell [phone]

## 2019-05-27 LAB
ANION GAP SERPL CALCULATED.3IONS-SCNC: 5 MMOL/L (ref 3–14)
BASOPHILS # BLD AUTO: 0.1 10E9/L (ref 0–0.2)
BASOPHILS NFR BLD AUTO: 0.7 %
BUN SERPL-MCNC: 8 MG/DL (ref 7–30)
CALCIUM SERPL-MCNC: 7.7 MG/DL (ref 8.5–10.1)
CHLORIDE SERPL-SCNC: 97 MMOL/L (ref 94–109)
CO2 SERPL-SCNC: 29 MMOL/L (ref 20–32)
CREAT SERPL-MCNC: 0.71 MG/DL (ref 0.66–1.25)
DIFFERENTIAL METHOD BLD: ABNORMAL
EOSINOPHIL # BLD AUTO: 0 10E9/L (ref 0–0.7)
EOSINOPHIL NFR BLD AUTO: 0.1 %
ERYTHROCYTE [DISTWIDTH] IN BLOOD BY AUTOMATED COUNT: 12.6 % (ref 10–15)
GFR SERPL CREATININE-BSD FRML MDRD: >90 ML/MIN/{1.73_M2}
GLUCOSE SERPL-MCNC: 138 MG/DL (ref 70–99)
HCT VFR BLD AUTO: 37.6 % (ref 40–53)
HGB BLD-MCNC: 12.8 G/DL (ref 13.3–17.7)
IMM GRANULOCYTES # BLD: 0.1 10E9/L (ref 0–0.4)
IMM GRANULOCYTES NFR BLD: 1.2 %
LYMPHOCYTES # BLD AUTO: 0.5 10E9/L (ref 0.8–5.3)
LYMPHOCYTES NFR BLD AUTO: 5.3 %
MCH RBC QN AUTO: 29.4 PG (ref 26.5–33)
MCHC RBC AUTO-ENTMCNC: 34 G/DL (ref 31.5–36.5)
MCV RBC AUTO: 86 FL (ref 78–100)
MONOCYTES # BLD AUTO: 0.9 10E9/L (ref 0–1.3)
MONOCYTES NFR BLD AUTO: 8.6 %
NEUTROPHILS # BLD AUTO: 8.4 10E9/L (ref 1.6–8.3)
NEUTROPHILS NFR BLD AUTO: 84.1 %
NRBC # BLD AUTO: 0 10*3/UL
NRBC BLD AUTO-RTO: 0 /100
O+P STL MICRO: NORMAL
PLATELET # BLD AUTO: 376 10E9/L (ref 150–450)
POTASSIUM SERPL-SCNC: 3.7 MMOL/L (ref 3.4–5.3)
RBC # BLD AUTO: 4.35 10E12/L (ref 4.4–5.9)
SODIUM SERPL-SCNC: 131 MMOL/L (ref 133–144)
SPECIMEN SOURCE: NORMAL
WBC # BLD AUTO: 10 10E9/L (ref 4–11)

## 2019-05-27 PROCEDURE — 25000132 ZZH RX MED GY IP 250 OP 250 PS 637: Performed by: INTERNAL MEDICINE

## 2019-05-27 PROCEDURE — 85025 COMPLETE CBC W/AUTO DIFF WBC: CPT | Performed by: INTERNAL MEDICINE

## 2019-05-27 PROCEDURE — 99233 SBSQ HOSP IP/OBS HIGH 50: CPT | Performed by: INTERNAL MEDICINE

## 2019-05-27 PROCEDURE — 36415 COLL VENOUS BLD VENIPUNCTURE: CPT | Performed by: INTERNAL MEDICINE

## 2019-05-27 PROCEDURE — 25000128 H RX IP 250 OP 636: Performed by: INTERNAL MEDICINE

## 2019-05-27 PROCEDURE — 12000000 ZZH R&B MED SURG/OB

## 2019-05-27 PROCEDURE — 80048 BASIC METABOLIC PNL TOTAL CA: CPT | Performed by: INTERNAL MEDICINE

## 2019-05-27 RX ADMIN — TRAMADOL HYDROCHLORIDE 50 MG: 50 TABLET, COATED ORAL at 12:09

## 2019-05-27 RX ADMIN — ENOXAPARIN SODIUM 40 MG: 40 INJECTION SUBCUTANEOUS at 12:44

## 2019-05-27 RX ADMIN — PROCHLORPERAZINE EDISYLATE 10 MG: 5 INJECTION INTRAMUSCULAR; INTRAVENOUS at 18:25

## 2019-05-27 RX ADMIN — OXYCODONE HYDROCHLORIDE 5 MG: 5 TABLET ORAL at 06:19

## 2019-05-27 RX ADMIN — PROCHLORPERAZINE MALEATE 10 MG: 10 TABLET, FILM COATED ORAL at 12:09

## 2019-05-27 RX ADMIN — METHYLPREDNISOLONE 40 MG: 40 INJECTION, POWDER, LYOPHILIZED, FOR SOLUTION INTRAMUSCULAR; INTRAVENOUS at 10:45

## 2019-05-27 RX ADMIN — TRAMADOL HYDROCHLORIDE 50 MG: 50 TABLET, COATED ORAL at 18:25

## 2019-05-27 RX ADMIN — METHYLPREDNISOLONE 40 MG: 40 INJECTION, POWDER, LYOPHILIZED, FOR SOLUTION INTRAMUSCULAR; INTRAVENOUS at 21:52

## 2019-05-27 ASSESSMENT — ACTIVITIES OF DAILY LIVING (ADL)
ADLS_ACUITY_SCORE: 14
TRANSFERRING: 0-->INDEPENDENT
DRESS: 0-->INDEPENDENT
ADLS_ACUITY_SCORE: 12
FALL_HISTORY_WITHIN_LAST_SIX_MONTHS: NO
ADLS_ACUITY_SCORE: 12
ADLS_ACUITY_SCORE: 12
SWALLOWING: 0-->SWALLOWS FOODS/LIQUIDS WITHOUT DIFFICULTY
TOILETING: 0-->INDEPENDENT
ADLS_ACUITY_SCORE: 14
RETIRED_COMMUNICATION: 0-->UNDERSTANDS/COMMUNICATES WITHOUT DIFFICULTY
RETIRED_EATING: 0-->INDEPENDENT
BATHING: 0-->INDEPENDENT
AMBULATION: 0-->INDEPENDENT
ADLS_ACUITY_SCORE: 14
COGNITION: 0 - NO COGNITION ISSUES REPORTED

## 2019-05-27 NOTE — PLAN OF CARE
A&Ox4, VSS: stable. Up SBAx1 to bathroom, ambulated in hallways.  Edema +2 BLE. Continues to have bloody stools but improving, stool have more brownish consistence. Denies nausea.PRN  Oxycodone is managing pain. Remote tele monitoring. Nursing continue to monitor.

## 2019-05-27 NOTE — PLAN OF CARE
Pt A&O x4. VSS. Ambulating to bathroom with SBA. Continues to have bloody stools. Tolerating full liquids. K+ on recheck 3.7. Tele-SR. Zofran and compazine given for nausea. PRN Oxycodone given for abdominal pain. On scheduled solu-medrol. Will continue to monitor.

## 2019-05-27 NOTE — PROGRESS NOTES
"GASTROENTEROLOGY PROGRESS NOTE     IMPRESSION:  Severe indeterminate colitis, favor UC   -day #2 steroids, improving   -pending quant, Hep serologies   -goal is to transition to po prednisone no later than Wednesday (vs start Remicade)  Mild acute blood loss anemia due to colitis    RECOMMENDATIONS:  Okay to start DVT prophylaxis  Doubtful that this patient will benefit from starting 5ASA prior to discharge - outpt decision  Await colonoscopy path  Continue diet, solumedrol  No NSAIDs.  Narcotics okay  Low threshold to repeat C diff if not responding to steroids    Herbert Dior,    [facility]  Cell [phone]  ________________________________________________________________________      SUBJECTIVE:  States overnight about 4-5 stools, about 40-50% few stools last 24 hrs however - though colonoscopy was yesterday and diet resumed afterward.  Significantly less abd pain with BM today.  With po has some \"bloating and nausea.\"  Compazine seems to work better than zofran.     Discussed that goal is to move to po steroids by day #3-5, otherwise will start Remicade.      OBJECTIVE:  /79 (BP Location: Left arm)   Pulse 73   Temp 96.7  F (35.9  C) (Oral)   Resp 16   Ht 1.803 m (5' 11\")   Wt 68 kg (150 lb)   SpO2 98%   BMI 20.92 kg/m    Temp (24hrs), Av.5  F (36.4  C), Min:96.5  F (35.8  C), Max:98.2  F (36.8  C)    Patient Vitals for the past 72 hrs:   Weight   19 0750 68 kg (150 lb)   19 0831 67.9 kg (149 lb 9.6 oz)       Intake/Output Summary (Last 24 hours) at 2019 1106  Last data filed at 2019 2135  Gross per 24 hour   Intake 1324 ml   Output --   Net 1324 ml        PHYSICAL EXAM  GEN: Alert, oriented x3, communicative and in NAD.    LYMPH: No LAD noted.  HRT: RRR  LUNGS: CTA  ABD: ND, +BS, no guarding or pain to palpation, no rebound, no HSM.  SKIN: No rash, jaundice or spider angiomata      Additional Data:  I have reviewed the patient's new clinical lab " results:     Recent Labs   Lab Test 05/27/19  0639 05/26/19  0629 05/25/19  0505   WBC 10.0 8.7 9.7   HGB 12.8* 12.0* 13.5   MCV 86 85 85    345 395     Recent Labs   Lab Test 05/27/19  0639 05/26/19  1834 05/26/19  0629 05/25/19  0505   POTASSIUM 3.7 3.7 3.3* 3.4   CHLORIDE 97  --  98 93*   CO2 29  --  30 28   BUN 8  --  5* 6*   ANIONGAP 5  --  5 7     Recent Labs   Lab Test 05/25/19  0505 05/23/19  1400 05/23/19  1340 05/21/19  0533   ALBUMIN 2.0*  --  3.0* 3.2*   BILITOTAL 0.6  --  0.6 0.7   ALT 9  --  16 12   AST 11  --  13 12   PROTEIN  --  70*  --   --    LIPASE 33*  --   --   --

## 2019-05-27 NOTE — PLAN OF CARE
Orientation: Alert and oriented x 4  VSS. 98% on RA.   Tele: SR. HR 76.   LS: clear and equal  GI:  Passing gas. 3 red, bloody, watery BM. Denies N/V. BS hypoactive.  : Adequate urine output.   Skin: clean and dry  Has +2 edema to lower extremities, PCDs in place and on Lovenox for DVTS.  Activity: SBA. . Pt slept and ambulated comfortably throughout shift.   Pain: 5/10. Ultram given once  Plan: Continue with current cares. Admitted 5/25 w/ potential colitis. Had colonoscopy w/ no success d/t inflammation. Started on IV solumedrol w/ hope of reducing inflammation. Hgb 12.8, Na 131.  GI following. Plan of discharge in 2-3 days.

## 2019-05-27 NOTE — PROGRESS NOTES
[facility]    Hospitalist Progress Note  Name: Alex Cardona    MRN: [id number]  Provider: Preeti Starr MD  Date of Service: 05/27/2019    Assessment & Plan   Summary of Stay: Alex Cardona is a 48 year old male who was admitted on 5/25/2019 Abdominal pain nausea and diarrhea.  Prior to admission CT scan showed pancolitis.    Pancolitis with bloody diarrhea.  Clinical presentation suggestive of ulcerative colitis  --Status post colonoscopy 5/26/2019  --Clinical presentation concerning for ulcerative colitis  --Advance diet post procedure  --Continue IV fluids until patient able to tolerate p.o.  -Started on steroids per GI  --hepatitis profile and QuantiFERON pending  --GI is following        Dehydration  --Continue IV fluids until adequate p.o. Intake    Hyponatremia  --Resolved    Hypokalemia  --Replace per protocol    Hypocalcemia  --Corrected calcium would be normal if corrected for albumin    DVT Prophylaxis: Pneumatic Compression Devices.  Encourage ambulation  Code Status: Full Code    Disposition: Expected discharge in 2 to 3 days to home      Interval History   Reviewed chart.  Trying to take p.o.  Is afraid of abdominal discomfort nausea.  Continues to have BMs but minimal amount of blood.  Feels bloated and minimal abdominal discomfort and he is anxious about his diagnosis.  Review of all the other symptoms are negative    -Data reviewed today: I reviewed all new labs and imaging reports over the last 24 hours. I personally reviewed no images or EKG's today.    Physical Exam   Temp: 96.7  F (35.9  C) Temp src: Oral BP: 124/79 Pulse: 73 Heart Rate: 76 Resp: 16 SpO2: 98 % O2 Device: None (Room air)    Vitals:    05/25/19 0831 05/26/19 0750   Weight: 67.9 kg (149 lb 9.6 oz) 68 kg (150 lb)     Vital Signs with Ranges  Temp:  [95.9  F (35.5  C)-98.2  F (36.8  C)] 96.7  F (35.9  C)  Pulse:  [73-79] 73  Heart Rate:  [] 76  Resp:  [14-18] 16  BP: (117-137)/(67-79) 124/79  SpO2:  [95  %-100 %] 98 %  I/O last 3 completed shifts:  In: 1324 [P.O.:540; I.V.:784]  Out: -       GEN:  Alert, oriented x 3, appears comfortable, NAD.  HEENT:  Normocephalic/atraumatic, no scleral icterus, no nasal discharge, mouth moist.  CV:  Regular rate and rhythm, no murmur or JVD.  S1 + S2 noted, no S3 or S4.  LUNGS:  Clear to auscultation bilaterally without rales/rhonchi/wheezing/retractions.  Symmetric chest rise on inhalation noted.  ABD:  Active bowel sounds, soft, distended. no rebound/guarding/rigidity.  EXT:  No edema.  No cyanosis.  No joint synovitis noted.  SKIN:  Dry to touch, no exanthems noted in the visualized areas.    Medications     - MEDICATION INSTRUCTIONS -       - MEDICATION INSTRUCTIONS -       - MEDICATION INSTRUCTIONS -         methylPREDNISolone  40 mg Intravenous Q12H     sodium chloride (PF)  3 mL Intracatheter Q8H     Data     Recent Labs   Lab 05/27/19  0639 05/26/19  0629 05/25/19  0505   WBC 10.0 8.7 9.7   HGB 12.8* 12.0* 13.5   HCT 37.6* 34.9* 38.9*   MCV 86 85 85    345 395     Recent Labs   Lab 05/27/19  0639 05/26/19  1834 05/26/19  0629 05/25/19  0505   *  --  133 128*   POTASSIUM 3.7 3.7 3.3* 3.4   CHLORIDE 97  --  98 93*   CO2 29  --  30 28   ANIONGAP 5  --  5 7   *  --  129* 146*   BUN 8  --  5* 6*   CR 0.71  --  0.72 0.86   GFRESTIMATED >90  --  >90 >90   GFRESTBLACK >90  --  >90 >90   KEZIA 7.7*  --  7.4* 7.4*     No results for input(s): CULT in the last 168 hours.  Recent Labs   Lab 05/25/19  0505 05/23/19  1340 05/21/19  0533   AST 11 13 12   ALT 9 16 12   ALKPHOS 60 77 74   BILITOTAL 0.6 0.6 0.7     No results for input(s): INR in the last 168 hours.  Recent Labs   Lab 05/25/19  0505   LACT 2.5*     Recent Labs   Lab 05/23/19  1400   COLOR Yellow   APPEARANCE Clear   URINEGLC Negative   URINEBILI Negative   URINEKETONE >150*   SG 1.030   UBLD Negative   URINEPH 6.0   PROTEIN 70*   NITRITE Negative   LEUKEST Negative   RBCU <1   WBCU 2       No results  found for this or any previous visit (from the past 24 hour(s)).

## 2019-05-28 LAB
ANION GAP SERPL CALCULATED.3IONS-SCNC: 4 MMOL/L (ref 3–14)
BASOPHILS # BLD AUTO: 0 10E9/L (ref 0–0.2)
BASOPHILS NFR BLD AUTO: 0 %
BUN SERPL-MCNC: 10 MG/DL (ref 7–30)
CALCIUM SERPL-MCNC: 7.8 MG/DL (ref 8.5–10.1)
CHLORIDE SERPL-SCNC: 94 MMOL/L (ref 94–109)
CO2 SERPL-SCNC: 32 MMOL/L (ref 20–32)
CREAT SERPL-MCNC: 0.64 MG/DL (ref 0.66–1.25)
DEPRECATED CALCIDIOL+CALCIFEROL SERPL-MC: 19 UG/L (ref 20–75)
DIFFERENTIAL METHOD BLD: ABNORMAL
EOSINOPHIL # BLD AUTO: 0 10E9/L (ref 0–0.7)
EOSINOPHIL NFR BLD AUTO: 0 %
ERYTHROCYTE [DISTWIDTH] IN BLOOD BY AUTOMATED COUNT: 12.6 % (ref 10–15)
GAMMA INTERFERON BACKGROUND BLD IA-ACNC: 0.06 IU/ML
GFR SERPL CREATININE-BSD FRML MDRD: >90 ML/MIN/{1.73_M2}
GLUCOSE SERPL-MCNC: 133 MG/DL (ref 70–99)
HAV IGG SER QL IA: REACTIVE
HBV CORE AB SERPL QL IA: NONREACTIVE
HBV CORE IGM SERPL QL IA: NONREACTIVE
HBV SURFACE AB SERPL IA-ACNC: 0.3 M[IU]/ML
HCT VFR BLD AUTO: 35.9 % (ref 40–53)
HGB BLD-MCNC: 12.5 G/DL (ref 13.3–17.7)
LYMPHOCYTES # BLD AUTO: 0.3 10E9/L (ref 0.8–5.3)
LYMPHOCYTES NFR BLD AUTO: 3 %
M TB IFN-G BLD-IMP: ABNORMAL
M TB IFN-G CD4+ BCKGRND COR BLD-ACNC: 0.46 IU/ML
MAGNESIUM SERPL-MCNC: 2.1 MG/DL (ref 1.6–2.3)
MCH RBC QN AUTO: 29.5 PG (ref 26.5–33)
MCHC RBC AUTO-ENTMCNC: 34.8 G/DL (ref 31.5–36.5)
MCV RBC AUTO: 85 FL (ref 78–100)
MITOGEN IGNF BCKGRD COR BLD-ACNC: 0 IU/ML
MITOGEN IGNF BCKGRD COR BLD-ACNC: 0 IU/ML
MONOCYTES # BLD AUTO: 1.1 10E9/L (ref 0–1.3)
MONOCYTES NFR BLD AUTO: 12 %
NEUTROPHILS # BLD AUTO: 8.1 10E9/L (ref 1.6–8.3)
NEUTROPHILS NFR BLD AUTO: 85 %
PLATELET # BLD AUTO: 398 10E9/L (ref 150–450)
PLATELET # BLD EST: ABNORMAL 10*3/UL
POTASSIUM SERPL-SCNC: 3.9 MMOL/L (ref 3.4–5.3)
RBC # BLD AUTO: 4.24 10E12/L (ref 4.4–5.9)
RBC MORPH BLD: ABNORMAL
SODIUM SERPL-SCNC: 129 MMOL/L (ref 133–144)
TOXIC GRANULES BLD QL SMEAR: PRESENT
WBC # BLD AUTO: 9.5 10E9/L (ref 4–11)

## 2019-05-28 PROCEDURE — 25800030 ZZH RX IP 258 OP 636: Performed by: INTERNAL MEDICINE

## 2019-05-28 PROCEDURE — 25000128 H RX IP 250 OP 636: Performed by: INTERNAL MEDICINE

## 2019-05-28 PROCEDURE — 25000132 ZZH RX MED GY IP 250 OP 250 PS 637: Performed by: INTERNAL MEDICINE

## 2019-05-28 PROCEDURE — 80048 BASIC METABOLIC PNL TOTAL CA: CPT | Performed by: INTERNAL MEDICINE

## 2019-05-28 PROCEDURE — 36415 COLL VENOUS BLD VENIPUNCTURE: CPT | Performed by: INTERNAL MEDICINE

## 2019-05-28 PROCEDURE — 85025 COMPLETE CBC W/AUTO DIFF WBC: CPT | Performed by: INTERNAL MEDICINE

## 2019-05-28 PROCEDURE — 12000000 ZZH R&B MED SURG/OB

## 2019-05-28 PROCEDURE — 99233 SBSQ HOSP IP/OBS HIGH 50: CPT | Performed by: INTERNAL MEDICINE

## 2019-05-28 PROCEDURE — 83735 ASSAY OF MAGNESIUM: CPT | Performed by: INTERNAL MEDICINE

## 2019-05-28 RX ORDER — SODIUM CHLORIDE 9 MG/ML
INJECTION, SOLUTION INTRAVENOUS CONTINUOUS
Status: ACTIVE | OUTPATIENT
Start: 2019-05-28 | End: 2019-05-29

## 2019-05-28 RX ADMIN — METHYLPREDNISOLONE 40 MG: 40 INJECTION, POWDER, LYOPHILIZED, FOR SOLUTION INTRAMUSCULAR; INTRAVENOUS at 22:56

## 2019-05-28 RX ADMIN — PROCHLORPERAZINE MALEATE 10 MG: 10 TABLET, FILM COATED ORAL at 00:01

## 2019-05-28 RX ADMIN — PROCHLORPERAZINE MALEATE 10 MG: 10 TABLET, FILM COATED ORAL at 12:17

## 2019-05-28 RX ADMIN — TRAMADOL HYDROCHLORIDE 50 MG: 50 TABLET, COATED ORAL at 00:01

## 2019-05-28 RX ADMIN — ENOXAPARIN SODIUM 40 MG: 40 INJECTION SUBCUTANEOUS at 11:47

## 2019-05-28 RX ADMIN — TRAMADOL HYDROCHLORIDE 50 MG: 50 TABLET, COATED ORAL at 06:35

## 2019-05-28 RX ADMIN — TRAMADOL HYDROCHLORIDE 50 MG: 50 TABLET, COATED ORAL at 18:35

## 2019-05-28 RX ADMIN — PROCHLORPERAZINE MALEATE 10 MG: 10 TABLET, FILM COATED ORAL at 06:35

## 2019-05-28 RX ADMIN — METHYLPREDNISOLONE 40 MG: 40 INJECTION, POWDER, LYOPHILIZED, FOR SOLUTION INTRAMUSCULAR; INTRAVENOUS at 09:39

## 2019-05-28 RX ADMIN — TRAMADOL HYDROCHLORIDE 50 MG: 50 TABLET, COATED ORAL at 12:17

## 2019-05-28 RX ADMIN — SODIUM CHLORIDE: 9 INJECTION, SOLUTION INTRAVENOUS at 13:59

## 2019-05-28 RX ADMIN — OMEPRAZOLE 20 MG: 20 CAPSULE, DELAYED RELEASE ORAL at 17:00

## 2019-05-28 RX ADMIN — PROCHLORPERAZINE MALEATE 10 MG: 10 TABLET, FILM COATED ORAL at 18:36

## 2019-05-28 ASSESSMENT — ACTIVITIES OF DAILY LIVING (ADL)
ADLS_ACUITY_SCORE: 12

## 2019-05-28 NOTE — PLAN OF CARE
Pt continues to have watery bloody stools every 1-2 hours. Hgb 12.5. Vss but pt is exhausted from interrupted sleep. IVF started up this pm. Continues to c/o bloating, abdominal fullness and discomfort. Taking the tramadol and po compazine every 6 hours so pain doesn't get out of control. Taking only small sips of clears today. Continues on the IV steroids and lovenox. Ambulated in halls twice this shift. Getting frustrated about lack of progress. SWS consulted to help with lack of insurance so can possibly start remecade.

## 2019-05-28 NOTE — PLAN OF CARE
A/O x 4. Up Independent in room.  3-4 watery red/bloody stools.   Tramadol and Compazine for pain and nausea.   Tolerating full liquid diet.

## 2019-05-28 NOTE — PLAN OF CARE
RN [id number] A/O, VSS 3 stools this shift red watery voiding adequate.  Tolerated a ice cream denied bloating.  Pain controlled with tramadol.  Ambulating in hallways SBA.  On solumedrol.  Biopsy results pending from colonoscopy.

## 2019-05-28 NOTE — PROGRESS NOTES
"GASTROENTEROLOGY PROGRESS NOTE        SUBJECTIVE:  Ongoing loose stool with blood. Intermittent abdominal pain. No fever.      OBJECTIVE:    /79 (BP Location: Left arm)   Pulse 70   Temp 96.4  F (35.8  C) (Oral)   Resp 16   Ht 1.803 m (5' 11\")   Wt 68 kg (150 lb)   SpO2 95%   BMI 20.92 kg/m    Temp (24hrs), Av.7  F (35.9  C), Min:95.9  F (35.5  C), Max:98  F (36.7  C)    Patient Vitals for the past 72 hrs:   Weight   19 0750 68 kg (150 lb)        PHYSICAL EXAM     Constitutional: Fatigued  Cardiovascular: RRR, normal S1, S2, no murmur appreciated   Respiratory: good transmission, CTAB    Abdomen: +BS, diffusely tender      Additional Comments:  ROS, FH, SH: See initial GI consult for details.     I have reviewed the patient's new clinical lab results:     Recent Labs   Lab Test 19  0603 19  0639 19  0629   WBC 9.5 10.0 8.7   HGB 12.5* 12.8* 12.0*   MCV 85 86 85    376 345     Recent Labs   Lab Test 19  0603 19  0639 19  1834 19  0629   POTASSIUM 3.9 3.7 3.7 3.3*   CHLORIDE 94 97  --  98   CO2 32 29  --  30   BUN 10 8  --  5*   ANIONGAP 4 5  --  5     Recent Labs   Lab Test 19  0505 19  1400 19  1340 19  0533   ALBUMIN 2.0*  --  3.0* 3.2*   BILITOTAL 0.6  --  0.6 0.7   ALT 9  --  16 12   AST 11  --  13 12   PROTEIN  --  70*  --   --    LIPASE 33*  --   --   --         ASSESSMENT/ PLAN  Alex Cardona is a 48-year-old male who was admitted to the hospital with abdominal pain, diarrhea, hematochezia found to have colitis on the CT scan.  Colonoscopy was completed 526 but was incomplete due to the severity of colitis.  Patient was started on IV steroids.  Surgical pathology is pending      1. Severe indeterminate colitis, favor UC: Patient is on day 3 of IV steroids.  Continues to have bowel movements every 1-2 hours with blood.  QuantiFERON and hepatitis serologies are pending.   Initial goal was to transition to PO " prednisone no later than Wednesday (vs start Remicade)  Unfortunately patient does not have medical insurance.  Will consult social work for options.     -- Doubtful that this patient will benefit from starting 5ASA prior to discharge - outpt decision  -- Low threshold to repeat C diff if not responding to steroids      Discussed with HOANG DiazC  [facility] ( [facility])

## 2019-05-28 NOTE — CONSULTS
"Care Transitions Team: Following for CC, discharge planning, and disposition.      Per chart review pt is identified as having no insurance and no PCP, with 2 recent ER's and now hospitalization with potential for further outpt follow up and medications on discharge.     Met with pt at bedside, who states that he has been so sick \"in and out of the ER\" and I have not been able to do anything about having no insurance\"  He did explain that he would be interested in having FC meet with him to discuss options and assist him with those. He is concerned as he has not been feeling well enough to do any \"business\" and is not sure when he will, but does identifiy need for more information and assistance with this.     Explained CM to Email FC and update as well as CM to follow for PCP and other potential needs     Tamra Waters RN, BSN, Care Transitions Specialist   Care Transitions Team  [phone]    "

## 2019-05-28 NOTE — PROGRESS NOTES
[facility]    Hospitalist Progress Note  Name: Alex Cardona    MRN: [id number]  Provider: Preeti Starr MD  Date of Service: 05/28/2019    Assessment & Plan   Summary of Stay: Alex Cardona is a 48 year old male who was admitted on 5/25/2019 Abdominal pain nausea and diarrhea.  Prior to admission CT scan showed pancolitis.    Pancolitis with bloody diarrhea.  Clinical presentation suggestive of ulcerative colitis  --Status post colonoscopy 5/26/2019  --Clinical presentation concerning for ulcerative colitis  --Advance diet   --Continue IV fluids until patient able to tolerate p.o.  -Started on steroids per GI  --hepatitis profile and QuantiFERON pending  --GI is following  -- willl add PPI pt on steroids and c/p bloating and epigastric discomfort      Dehydration  --Continue IV fluids until adequate p.o. Intake  -- Sodium is trending down    Hyponatremia  --Resolved initially with fluids.  Sodium trending down     Hypokalemia  --Replace per protocol    Hypocalcemia  --Corrected calcium would be normal if corrected for albumin    DVT Prophylaxis: Pneumatic Compression Devices.  Encourage ambulation  Code Status: Full Code    Disposition: Expected discharge in 1 to 2  days to home if able to take po      Interval History   Reviewed chart.  Still feels bloated unable to tolerate p.o. feels epigastric fullness and bloating when he eats.  Continues to have blood in stools.  Review of all the other symptoms are negative    -Data reviewed today: I reviewed all new labs and imaging reports over the last 24 hours. I personally reviewed no images or EKG's today.    Physical Exam   Temp: 96.4  F (35.8  C) Temp src: Oral BP: 132/79 Pulse: 70 Heart Rate: 70 Resp: 16 SpO2: 95 % O2 Device: None (Room air)    Vitals:    05/25/19 0831 05/26/19 0750   Weight: 67.9 kg (149 lb 9.6 oz) 68 kg (150 lb)     Vital Signs with Ranges  Temp:  [95.9  F (35.5  C)-98  F (36.7  C)] 96.4  F (35.8  C)  Pulse:  [68-70] 70  Heart  Rate:  [68-70] 70  Resp:  [14-16] 16  BP: (126-146)/(75-83) 132/79  SpO2:  [95 %-99 %] 95 %  No intake/output data recorded.      GEN:  Alert, oriented x 3, appears comfortable, NAD.  HEENT:  Normocephalic/atraumatic, no scleral icterus, no nasal discharge, mouth moist.  CV:  Regular rate and rhythm, no murmur or JVD.  S1 + S2 noted, no S3 or S4.  LUNGS:  Clear to auscultation bilaterally without rales/rhonchi/wheezing/retractions.  Symmetric chest rise on inhalation noted.  ABD:  Active bowel sounds, soft, distended. no rebound/guarding/rigidity.  EXT:  No edema.  No cyanosis.  No joint synovitis noted.  SKIN:  Dry to touch, no exanthems noted in the visualized areas.    Medications     - MEDICATION INSTRUCTIONS -       - MEDICATION INSTRUCTIONS -       - MEDICATION INSTRUCTIONS -         enoxaparin  40 mg Subcutaneous Q24H     methylPREDNISolone  40 mg Intravenous Q12H     sodium chloride (PF)  3 mL Intracatheter Q8H     Data     Recent Labs   Lab 05/28/19  0603 05/27/19  0639 05/26/19  0629   WBC 9.5 10.0 8.7   HGB 12.5* 12.8* 12.0*   HCT 35.9* 37.6* 34.9*   MCV 85 86 85    376 345     Recent Labs   Lab 05/28/19  0603 05/27/19  0639 05/26/19  1834 05/26/19  0629   * 131*  --  133   POTASSIUM 3.9 3.7 3.7 3.3*   CHLORIDE 94 97  --  98   CO2 32 29  --  30   ANIONGAP 4 5  --  5   * 138*  --  129*   BUN 10 8  --  5*   CR 0.64* 0.71  --  0.72   GFRESTIMATED >90 >90  --  >90   GFRESTBLACK >90 >90  --  >90   KEZIA 7.8* 7.7*  --  7.4*     No results for input(s): CULT in the last 168 hours.  Recent Labs   Lab 05/25/19  0505 05/23/19  1340   AST 11 13   ALT 9 16   ALKPHOS 60 77   BILITOTAL 0.6 0.6     No results for input(s): INR in the last 168 hours.  Recent Labs   Lab 05/25/19  0505   LACT 2.5*     Recent Labs   Lab 05/23/19  1400   COLOR Yellow   APPEARANCE Clear   URINEGLC Negative   URINEBILI Negative   URINEKETONE >150*   SG 1.030   UBLD Negative   URINEPH 6.0   PROTEIN 70*   NITRITE Negative    LEUKEST Negative   RBCU <1   WBCU 2       No results found for this or any previous visit (from the past 24 hour(s)).

## 2019-05-29 LAB
ANION GAP SERPL CALCULATED.3IONS-SCNC: 6 MMOL/L (ref 3–14)
BUN SERPL-MCNC: 10 MG/DL (ref 7–30)
C DIFF TOX B STL QL: NEGATIVE
CALCIUM SERPL-MCNC: 8 MG/DL (ref 8.5–10.1)
CHLORIDE SERPL-SCNC: 94 MMOL/L (ref 94–109)
CO2 SERPL-SCNC: 30 MMOL/L (ref 20–32)
CREAT SERPL-MCNC: 0.63 MG/DL (ref 0.66–1.25)
GFR SERPL CREATININE-BSD FRML MDRD: >90 ML/MIN/{1.73_M2}
GLUCOSE SERPL-MCNC: 119 MG/DL (ref 70–99)
POTASSIUM SERPL-SCNC: 3.7 MMOL/L (ref 3.4–5.3)
SODIUM SERPL-SCNC: 130 MMOL/L (ref 133–144)
SPECIMEN SOURCE: NORMAL

## 2019-05-29 PROCEDURE — 85025 COMPLETE CBC W/AUTO DIFF WBC: CPT | Performed by: INTERNAL MEDICINE

## 2019-05-29 PROCEDURE — 25000128 H RX IP 250 OP 636: Performed by: INTERNAL MEDICINE

## 2019-05-29 PROCEDURE — 25000132 ZZH RX MED GY IP 250 OP 250 PS 637: Performed by: INTERNAL MEDICINE

## 2019-05-29 PROCEDURE — 87493 C DIFF AMPLIFIED PROBE: CPT | Performed by: PHYSICIAN ASSISTANT

## 2019-05-29 PROCEDURE — 36415 COLL VENOUS BLD VENIPUNCTURE: CPT | Performed by: INTERNAL MEDICINE

## 2019-05-29 PROCEDURE — 25800030 ZZH RX IP 258 OP 636: Performed by: INTERNAL MEDICINE

## 2019-05-29 PROCEDURE — 99233 SBSQ HOSP IP/OBS HIGH 50: CPT | Performed by: INTERNAL MEDICINE

## 2019-05-29 PROCEDURE — 12000000 ZZH R&B MED SURG/OB

## 2019-05-29 PROCEDURE — 80048 BASIC METABOLIC PNL TOTAL CA: CPT | Performed by: INTERNAL MEDICINE

## 2019-05-29 RX ADMIN — PROCHLORPERAZINE MALEATE 10 MG: 10 TABLET, FILM COATED ORAL at 06:49

## 2019-05-29 RX ADMIN — TRAMADOL HYDROCHLORIDE 50 MG: 50 TABLET, COATED ORAL at 14:07

## 2019-05-29 RX ADMIN — OMEPRAZOLE 20 MG: 20 CAPSULE, DELAYED RELEASE ORAL at 08:24

## 2019-05-29 RX ADMIN — PROCHLORPERAZINE MALEATE 10 MG: 10 TABLET, FILM COATED ORAL at 00:50

## 2019-05-29 RX ADMIN — ENOXAPARIN SODIUM 40 MG: 40 INJECTION SUBCUTANEOUS at 14:08

## 2019-05-29 RX ADMIN — PROCHLORPERAZINE MALEATE 10 MG: 10 TABLET, FILM COATED ORAL at 14:07

## 2019-05-29 RX ADMIN — OMEPRAZOLE 20 MG: 20 CAPSULE, DELAYED RELEASE ORAL at 16:21

## 2019-05-29 RX ADMIN — METHYLPREDNISOLONE 40 MG: 40 INJECTION, POWDER, LYOPHILIZED, FOR SOLUTION INTRAMUSCULAR; INTRAVENOUS at 10:21

## 2019-05-29 RX ADMIN — TRAMADOL HYDROCHLORIDE 50 MG: 50 TABLET, COATED ORAL at 06:49

## 2019-05-29 RX ADMIN — METHYLPREDNISOLONE 40 MG: 40 INJECTION, POWDER, LYOPHILIZED, FOR SOLUTION INTRAMUSCULAR; INTRAVENOUS at 21:34

## 2019-05-29 RX ADMIN — PROCHLORPERAZINE MALEATE 10 MG: 10 TABLET, FILM COATED ORAL at 20:22

## 2019-05-29 RX ADMIN — TRAMADOL HYDROCHLORIDE 50 MG: 50 TABLET, COATED ORAL at 20:22

## 2019-05-29 RX ADMIN — TRAMADOL HYDROCHLORIDE 50 MG: 50 TABLET, COATED ORAL at 00:50

## 2019-05-29 RX ADMIN — SODIUM CHLORIDE: 9 INJECTION, SOLUTION INTRAVENOUS at 03:42

## 2019-05-29 ASSESSMENT — ACTIVITIES OF DAILY LIVING (ADL)
ADLS_ACUITY_SCORE: 12

## 2019-05-29 NOTE — PROGRESS NOTES
"GASTROENTEROLOGY PROGRESS NOTE        SUBJECTIVE:  Stools more frequent today, mostly all blood. Passing blood/stool every 45 minutes. Ongoing abdominal pain although not significantly worse. Tolerating small amts of clears.      OBJECTIVE:    /82 (BP Location: Left arm)   Pulse 82   Temp 97.2  F (36.2  C) (Oral)   Resp 18   Ht 1.803 m (5' 11\")   Wt 68 kg (150 lb)   SpO2 95%   BMI 20.92 kg/m    Temp (24hrs), Av.7  F (35.9  C), Min:95.9  F (35.5  C), Max:98  F (36.7  C)    No data found.     PHYSICAL EXAM     Constitutional: Fatigued  Cardiovascular: RRR, normal S1, S2, no murmur appreciated   Respiratory: good transmission, CTAB    Abdomen: +BS, diffusely tender      Additional Comments:  ROS, FH, SH: See initial GI consult for details.     I have reviewed the patient's new clinical lab results:     Recent Labs   Lab Test 19  1055 19  0603 19  0639   WBC PENDING 9.5 10.0   HGB 14.1 12.5* 12.8*   MCV 87 85 86   * 398 376     Recent Labs   Lab Test 19  1055 19  0603 19  0639   POTASSIUM 3.7 3.9 3.7   CHLORIDE 94 94 97   CO2 30 32 29   BUN 10 10 8   ANIONGAP 6 4 5     Recent Labs   Lab Test 19  0505 19  1400 19  1340 19  0533   ALBUMIN 2.0*  --  3.0* 3.2*   BILITOTAL 0.6  --  0.6 0.7   ALT 9  --  16 12   AST 11  --  13 12   PROTEIN  --  70*  --   --    LIPASE 33*  --   --   --         ASSESSMENT/ PLAN  Alex Cardona is a 48-year-old male who was admitted to the hospital with abdominal pain, diarrhea, hematochezia found to have colitis on the CT scan.  Colonoscopy was completed 526 but was incomplete due to the severity of colitis.  Patient was started on IV steroids.  Surgical pathology acute/chronic colitis, CMV pending.       1. Severe indeterminate colitis, consistent with UC on path: Patient is on day 4 of IV steroids.  Continues to have frequent BMs with blood.  QuantiFERON indeterminate likely related to steroids. Hepatitis B " negative. Plans were to start Remicade if not improving but patient without insurance. I spoke with SW today and patient now with MA in effect.   --Recheck C diff.   --No clear risk factors for TB, no cough.   --Continue IV steroids.   --Plan to start Remicade tomorrow inpatient pending repeat C diff negative.   --I notified SW about plans to start inpatient Remicade. I will have our office work on insurance approval for outpatient infusions.     D/w Dr. Schuster.     Karla Chilel, [facility] ( [facility])  Office: [phone].

## 2019-05-29 NOTE — PLAN OF CARE
Pt  up to BR about every 1 hour liquid bloody  stools, spec sent to lab. To r/o cdiff. Enteric isolation in place. Pt taking on small amts of clear liquids, broth and water. Does not want items offered on full liquid diet. Financial office had been here and pt happy that they were getting MA insurance started. Frustrated with his illness, but has been pleasant and cooperative.

## 2019-05-29 NOTE — PLAN OF CARE
Pt alert and oriented.  VSS on room air.  Up independently in room.  Pain managed with prn tramadol and compazine.  IV infusing, NS 75 ml/hr.  Pt having frequent loose bloody stools.  Tolerating full liquid diet.  Social work assisting with discharge.  Will continue to monitor.

## 2019-05-29 NOTE — CONSULTS
Care Transitions Team: Following for CC, discharge planning, and disposition.      Idenitified SW consult for No insurance, may need Remicade for severe colitis.     Pharmacy Liaison consulted to wt in on potential options as well as FC updated on follow up needs.      CM following

## 2019-05-29 NOTE — PROGRESS NOTES
[facility]    Hospitalist Progress Note  Name: Alex Cardona    MRN: [id number]  Provider: Preeti Starr MD  Date of Service: 05/29/2019    Assessment & Plan   Summary of Stay: Alex Cardona is a 48 year old male who was admitted on 5/25/2019 Abdominal pain nausea and diarrhea.  Prior to admission CT scan showed pancolitis.    Pancolitis with bloody diarrhea.  Clinical presentation suggestive of ulcerative colitis  --Status post colonoscopy 5/26/2019  --Clinical presentation concerning for ulcerative colitis  --Advance diet   --Continue IV fluids until patient able to tolerate p.o.  -Started on steroids per GI  --hepatitis profile and QuantiFERON pending  --GI is following  -- willl add PPI pt on steroids and c/p bloating and epigastric discomfort  --Continues to have bloody stool plan to consider Remicade      Dehydration  --Continue IV fluids until adequate p.o. Intake  -- Sodium is trending down    Hyponatremia  --Resolved initially with fluids.  Sodium trending down     Hypokalemia  --Replace per protocol    Hypocalcemia  --Corrected calcium would be normal if corrected for albumin    DVT Prophylaxis: Pneumatic Compression Devices.  Encourage ambulation  Code Status: Full Code    Disposition: Expected discharge in 1 to 2  days to home if able to take po      Interval History   Reviewed chart.  Still feels bloated unable to tolerate p.o. feels epigastric fullness and bloating when he eats.  Continues to have blood in stools.  Review of all the other symptoms are negative    -Data reviewed today: I reviewed all new labs and imaging reports over the last 24 hours. I personally reviewed no images or EKG's today.    Physical Exam   Temp: 97.2  F (36.2  C) Temp src: Oral BP: 135/82 Pulse: 82 Heart Rate: 72 Resp: 18 SpO2: 95 % O2 Device: None (Room air)    Vitals:    05/25/19 0831 05/26/19 0750   Weight: 67.9 kg (149 lb 9.6 oz) 68 kg (150 lb)     Vital Signs with Ranges  Temp:  [96.2  F (35.7   C)-98.2  F (36.8  C)] 97.2  F (36.2  C)  Pulse:  [82] 82  Heart Rate:  [72-74] 72  Resp:  [16-18] 18  BP: (130-140)/(69-82) 135/82  SpO2:  [95 %-98 %] 95 %  I/O last 3 completed shifts:  In: 1866 [P.O.:840; I.V.:1026]  Out: -       GEN:  Alert, oriented x 3, appears comfortable, NAD.  HEENT:  Normocephalic/atraumatic, no scleral icterus, no nasal discharge, mouth moist.  CV:  Regular rate and rhythm, no murmur or JVD.  S1 + S2 noted, no S3 or S4.  LUNGS:  Clear to auscultation bilaterally without rales/rhonchi/wheezing/retractions.  Symmetric chest rise on inhalation noted.  ABD:  Active bowel sounds, soft, distended. no rebound/guarding/rigidity.  EXT:  No edema.  No cyanosis.  No joint synovitis noted.  SKIN:  Dry to touch, no exanthems noted in the visualized areas.    Medications     - MEDICATION INSTRUCTIONS -       - MEDICATION INSTRUCTIONS -       - MEDICATION INSTRUCTIONS -       sodium chloride 75 mL/hr at 05/29/19 0342       enoxaparin  40 mg Subcutaneous Q24H     methylPREDNISolone  40 mg Intravenous Q12H     omeprazole  20 mg Oral BID AC     sodium chloride (PF)  3 mL Intracatheter Q8H     Data     Recent Labs   Lab 05/28/19  0603 05/27/19  0639 05/26/19  0629   WBC 9.5 10.0 8.7   HGB 12.5* 12.8* 12.0*   HCT 35.9* 37.6* 34.9*   MCV 85 86 85    376 345     Recent Labs   Lab 05/28/19  0603 05/27/19  0639 05/26/19  1834 05/26/19  0629   * 131*  --  133   POTASSIUM 3.9 3.7 3.7 3.3*   CHLORIDE 94 97  --  98   CO2 32 29  --  30   ANIONGAP 4 5  --  5   * 138*  --  129*   BUN 10 8  --  5*   CR 0.64* 0.71  --  0.72   GFRESTIMATED >90 >90  --  >90   GFRESTBLACK >90 >90  --  >90   KEZIA 7.8* 7.7*  --  7.4*     No results for input(s): CULT in the last 168 hours.  Recent Labs   Lab 05/25/19  0505 05/23/19  1340   AST 11 13   ALT 9 16   ALKPHOS 60 77   BILITOTAL 0.6 0.6     No results for input(s): INR in the last 168 hours.  Recent Labs   Lab 05/25/19  0505   LACT 2.5*     Recent Labs   Lab  05/23/19  1400   COLOR Yellow   APPEARANCE Clear   URINEGLC Negative   URINEBILI Negative   URINEKETONE >150*   SG 1.030   UBLD Negative   URINEPH 6.0   PROTEIN 70*   NITRITE Negative   LEUKEST Negative   RBCU <1   WBCU 2       No results found for this or any previous visit (from the past 24 hour(s)).

## 2019-05-29 NOTE — PLAN OF CARE
A&O. Up independently in room. Abdomen tender, BS faint. Pt having frequent loose bloody stools. Tolerating full liquid diet. IVF infusing. Pain managed with prn tramadol and compazine. Social work following.

## 2019-05-30 LAB
ANION GAP SERPL CALCULATED.3IONS-SCNC: 6 MMOL/L (ref 3–14)
BASOPHILS # BLD AUTO: 0 10E9/L (ref 0–0.2)
BASOPHILS # BLD AUTO: 0.1 10E9/L (ref 0–0.2)
BASOPHILS NFR BLD AUTO: 0 %
BASOPHILS NFR BLD AUTO: 0.8 %
BUN SERPL-MCNC: 10 MG/DL (ref 7–30)
CALCIUM SERPL-MCNC: 8 MG/DL (ref 8.5–10.1)
CHLORIDE SERPL-SCNC: 94 MMOL/L (ref 94–109)
CO2 SERPL-SCNC: 30 MMOL/L (ref 20–32)
COPATH REPORT: NORMAL
CREAT SERPL-MCNC: 0.62 MG/DL (ref 0.66–1.25)
DIFFERENTIAL METHOD BLD: ABNORMAL
DIFFERENTIAL METHOD BLD: ABNORMAL
EOSINOPHIL # BLD AUTO: 0 10E9/L (ref 0–0.7)
EOSINOPHIL # BLD AUTO: 0 10E9/L (ref 0–0.7)
EOSINOPHIL NFR BLD AUTO: 0 %
EOSINOPHIL NFR BLD AUTO: 0 %
ERYTHROCYTE [DISTWIDTH] IN BLOOD BY AUTOMATED COUNT: 12.4 % (ref 10–15)
ERYTHROCYTE [DISTWIDTH] IN BLOOD BY AUTOMATED COUNT: 12.6 % (ref 10–15)
GFR SERPL CREATININE-BSD FRML MDRD: >90 ML/MIN/{1.73_M2}
GLUCOSE SERPL-MCNC: 119 MG/DL (ref 70–99)
HCT VFR BLD AUTO: 38.6 % (ref 40–53)
HCT VFR BLD AUTO: 41.9 % (ref 40–53)
HGB BLD-MCNC: 13.2 G/DL (ref 13.3–17.7)
HGB BLD-MCNC: 14.1 G/DL (ref 13.3–17.7)
IMM GRANULOCYTES # BLD: 0.3 10E9/L (ref 0–0.4)
IMM GRANULOCYTES NFR BLD: 2.2 %
LYMPHOCYTES # BLD AUTO: 0.3 10E9/L (ref 0.8–5.3)
LYMPHOCYTES # BLD AUTO: 0.7 10E9/L (ref 0.8–5.3)
LYMPHOCYTES NFR BLD AUTO: 2 %
LYMPHOCYTES NFR BLD AUTO: 5.5 %
MCH RBC QN AUTO: 29.1 PG (ref 26.5–33)
MCH RBC QN AUTO: 29.5 PG (ref 26.5–33)
MCHC RBC AUTO-ENTMCNC: 33.7 G/DL (ref 31.5–36.5)
MCHC RBC AUTO-ENTMCNC: 34.2 G/DL (ref 31.5–36.5)
MCV RBC AUTO: 86 FL (ref 78–100)
MCV RBC AUTO: 87 FL (ref 78–100)
METAMYELOCYTES # BLD: 0.3 10E9/L
METAMYELOCYTES NFR BLD MANUAL: 2 %
MONOCYTES # BLD AUTO: 0.5 10E9/L (ref 0–1.3)
MONOCYTES # BLD AUTO: 0.6 10E9/L (ref 0–1.3)
MONOCYTES NFR BLD AUTO: 4 %
MONOCYTES NFR BLD AUTO: 5.1 %
NEUTROPHILS # BLD AUTO: 10.3 10E9/L (ref 1.6–8.3)
NEUTROPHILS # BLD AUTO: 11.8 10E9/L (ref 1.6–8.3)
NEUTROPHILS NFR BLD AUTO: 86.4 %
NEUTROPHILS NFR BLD AUTO: 92 %
NRBC # BLD AUTO: 0 10*3/UL
NRBC BLD AUTO-RTO: 0 /100
PLATELET # BLD AUTO: 473 10E9/L (ref 150–450)
PLATELET # BLD AUTO: 508 10E9/L (ref 150–450)
PLATELET # BLD EST: ABNORMAL 10*3/UL
POTASSIUM SERPL-SCNC: 4.1 MMOL/L (ref 3.4–5.3)
RBC # BLD AUTO: 4.48 10E12/L (ref 4.4–5.9)
RBC # BLD AUTO: 4.84 10E12/L (ref 4.4–5.9)
RBC MORPH BLD: ABNORMAL
SODIUM SERPL-SCNC: 130 MMOL/L (ref 133–144)
TOXIC GRANULES BLD QL SMEAR: PRESENT
WBC # BLD AUTO: 11.9 10E9/L (ref 4–11)
WBC # BLD AUTO: 12.8 10E9/L (ref 4–11)

## 2019-05-30 PROCEDURE — 80048 BASIC METABOLIC PNL TOTAL CA: CPT | Performed by: INTERNAL MEDICINE

## 2019-05-30 PROCEDURE — 25000132 ZZH RX MED GY IP 250 OP 250 PS 637: Performed by: INTERNAL MEDICINE

## 2019-05-30 PROCEDURE — 25000128 H RX IP 250 OP 636: Performed by: PHYSICIAN ASSISTANT

## 2019-05-30 PROCEDURE — 99233 SBSQ HOSP IP/OBS HIGH 50: CPT | Performed by: INTERNAL MEDICINE

## 2019-05-30 PROCEDURE — 25000128 H RX IP 250 OP 636: Performed by: INTERNAL MEDICINE

## 2019-05-30 PROCEDURE — 85025 COMPLETE CBC W/AUTO DIFF WBC: CPT | Performed by: INTERNAL MEDICINE

## 2019-05-30 PROCEDURE — 12000000 ZZH R&B MED SURG/OB

## 2019-05-30 PROCEDURE — 25800030 ZZH RX IP 258 OP 636: Performed by: PHYSICIAN ASSISTANT

## 2019-05-30 PROCEDURE — 36415 COLL VENOUS BLD VENIPUNCTURE: CPT | Performed by: INTERNAL MEDICINE

## 2019-05-30 RX ADMIN — METHYLPREDNISOLONE 40 MG: 40 INJECTION, POWDER, LYOPHILIZED, FOR SOLUTION INTRAMUSCULAR; INTRAVENOUS at 10:44

## 2019-05-30 RX ADMIN — TRAMADOL HYDROCHLORIDE 50 MG: 50 TABLET, COATED ORAL at 08:15

## 2019-05-30 RX ADMIN — PROCHLORPERAZINE MALEATE 10 MG: 10 TABLET, FILM COATED ORAL at 14:27

## 2019-05-30 RX ADMIN — OMEPRAZOLE 20 MG: 20 CAPSULE, DELAYED RELEASE ORAL at 15:59

## 2019-05-30 RX ADMIN — TRAMADOL HYDROCHLORIDE 50 MG: 50 TABLET, COATED ORAL at 20:33

## 2019-05-30 RX ADMIN — PROCHLORPERAZINE MALEATE 10 MG: 10 TABLET, FILM COATED ORAL at 02:51

## 2019-05-30 RX ADMIN — OMEPRAZOLE 20 MG: 20 CAPSULE, DELAYED RELEASE ORAL at 08:15

## 2019-05-30 RX ADMIN — PROCHLORPERAZINE MALEATE 10 MG: 10 TABLET, FILM COATED ORAL at 08:15

## 2019-05-30 RX ADMIN — TRAMADOL HYDROCHLORIDE 50 MG: 50 TABLET, COATED ORAL at 14:26

## 2019-05-30 RX ADMIN — INFLIXIMAB 340 MG: 100 INJECTION, POWDER, LYOPHILIZED, FOR SOLUTION INTRAVENOUS at 13:54

## 2019-05-30 RX ADMIN — ENOXAPARIN SODIUM 40 MG: 40 INJECTION SUBCUTANEOUS at 12:56

## 2019-05-30 RX ADMIN — PROCHLORPERAZINE MALEATE 10 MG: 10 TABLET, FILM COATED ORAL at 20:33

## 2019-05-30 RX ADMIN — METHYLPREDNISOLONE 40 MG: 40 INJECTION, POWDER, LYOPHILIZED, FOR SOLUTION INTRAMUSCULAR; INTRAVENOUS at 22:37

## 2019-05-30 RX ADMIN — TRAMADOL HYDROCHLORIDE 50 MG: 50 TABLET, COATED ORAL at 02:50

## 2019-05-30 ASSESSMENT — ACTIVITIES OF DAILY LIVING (ADL)
ADLS_ACUITY_SCORE: 12

## 2019-05-30 NOTE — PROGRESS NOTES
"GASTROENTEROLOGY PROGRESS NOTE        SUBJECTIVE:  Still with bloody diarrhea every 45 minutes. Abdominal cramping but no significant change. Tolerating small amounts of clear liquids. No vomiting.      OBJECTIVE:    /82 (BP Location: Left arm)   Pulse 82   Temp 96.7  F (35.9  C) (Oral)   Resp 16   Ht 1.803 m (5' 11\")   Wt 68 kg (150 lb)   SpO2 98%   BMI 20.92 kg/m    Temp (24hrs), Av.7  F (35.9  C), Min:95.9  F (35.5  C), Max:98  F (36.7  C)    No data found.     PHYSICAL EXAM     Constitutional: Fatigued  Cardiovascular: RRR, normal S1, S2, no murmur appreciated   Respiratory: good transmission, CTAB    Abdomen: +BS, diffusely tender, no rebound/guarding      Additional Comments:  ROS, FH, SH: See initial GI consult for details.     I have reviewed the patient's new clinical lab results:     Recent Labs   Lab Test 19  0613 19  1055 19  0603   WBC 11.9* 12.8* 9.5   HGB 13.2* 14.1 12.5*   MCV 86 87 85   * 508* 398     Recent Labs   Lab Test 19  0613 19  1055 19  0603   POTASSIUM 4.1 3.7 3.9   CHLORIDE 94 94 94   CO2 30 30 32   BUN 10 10 10   ANIONGAP 6 6 4     Recent Labs   Lab Test 19  0505 19  1400 19  1340 19  0533   ALBUMIN 2.0*  --  3.0* 3.2*   BILITOTAL 0.6  --  0.6 0.7   ALT 9  --  16 12   AST 11  --  13 12   PROTEIN  --  70*  --   --    LIPASE 33*  --   --   --         ASSESSMENT/ PLAN  Alex Cardona is a 48-year-old male who was admitted to the hospital with abdominal pain, diarrhea, hematochezia found to have colitis on the CT scan.  Colonoscopy was completed 526 but was incomplete due to the severity of colitis.  Patient was started on IV steroids.  Surgical pathology acute/chronic colitis, CMV pending.       1. Severe indeterminate colitis, consistent with UC on path, IV steroid refractory: Patient is on day 5 of IV steroids.  Continues to have frequent BMs with blood.  QuantiFERON indeterminate likely related to " steroids. Hepatitis B negative. I spoke with SW yesterday and patient now has MA. Repeat C diff negative. Discussed CMV stains with pathology and stains are being repeated but no significant concern for CMV based on initial review.   --No clear risk factors for TB, no cough.   --Continue IV steroids.   --First induction of Remicade 5m/kg today. Will need next infusion in 2 weeks.   --Our office is working on outpatient insurance approval for next Remicade infusion.     D/w Dr. Schuster.     Karla Chilel, [facility] ( [facility])  Office: [phone].

## 2019-05-30 NOTE — PROGRESS NOTES
"[facility]    Hospitalist Progress Note  Name: Alex Cardona    MRN: [id number]  Provider: Preeti Starr MD  Date of Service: 05/30/2019    Assessment & Plan   Summary of Stay: Alex Cardona is a 48 year old male who was admitted on 5/25/2019 Abdominal pain nausea and diarrhea.  Prior to admission CT scan showed pancolitis.  Underwent colonoscopy biopsies showed chronic colitis with areas of ulceration suggestive of ulcerative colitis hepatitis panel was nonreactive except for hep A IgG.  QuantiFERON  is indeterminant.  Patient was started on steroids with plans to start on Remicade      Pancolitis with bloody diarrhea.  Clinical presentation suggestive of ulcerative colitis  --Status post colonoscopy 5/26/2019. Surgical pathology    \"Left colon biopsies.    - Severe active chronic colitis with areas of ulceration and  reactive  changes.    - Negative for dysplasia and malignancy.    - CMV immunostain pending. \"  --Clinical presentation suggestive  for ulcerative colitis  --Advance diet per gi  --Continue IV fluids until patient able to tolerate p.o.  -Started on steroids per GI. PLan to start on Ramicaide  --hepatitis profile Hep A igG positive. Serology neg for Hep Band QuantiFERON  Is indeterminate  --GI is following  -- willl add PPI pt on steroids and c/p bloating and epigastric discomfort  --Continues to have bloody stool plan to consider Remicade      Dehydration  --Continue IV fluids until adequate p.o. Intake      Hyponatremia improved with hydration  --Improved with hydration  Hypokalemia  --Replace per protocol    Hypocalcemia  --Corrected calcium would be normal if corrected for albumin    DVT Prophylaxis: Pneumatic Compression Devices.  Encourage ambulation  Code Status: Full Code    Disposition: Expected discharge in 1 to 2  days to home if able to take po      Interval History   Reviewed chart.  She is to have bloody loose stools.  Feels discomfort cannot tolerate much.  Was able " to tolerate 4 cups of broth. review of all the other symptoms are negative    -Data reviewed today: I reviewed all new labs and imaging reports over the last 24 hours. I personally reviewed no images or EKG's today.    Physical Exam   Temp: 96.7  F (35.9  C) Temp src: Oral BP: 141/82   Heart Rate: 78 Resp: 16 SpO2: 98 % O2 Device: None (Room air)    Vitals:    05/25/19 0831 05/26/19 0750   Weight: 67.9 kg (149 lb 9.6 oz) 68 kg (150 lb)     Vital Signs with Ranges  Temp:  [95.9  F (35.5  C)-98.2  F (36.8  C)] 96.7  F (35.9  C)  Heart Rate:  [76-78] 78  Resp:  [16] 16  BP: (134-143)/(75-82) 141/82  SpO2:  [97 %-98 %] 98 %  I/O last 3 completed shifts:  In: 1660 [P.O.:1660]  Out: -       GEN:  Alert, oriented x 3, appears comfortable, NAD.  HEENT:  Normocephalic/atraumatic, no scleral icterus, no nasal discharge, mouth moist.  CV:  Regular rate and rhythm, no murmur or JVD.  S1 + S2 noted, no S3 or S4.  LUNGS:  Clear to auscultation bilaterally without rales/rhonchi/wheezing/retractions.  Symmetric chest rise on inhalation noted.  ABD:  Active bowel sounds, soft, distended. no rebound/guarding/rigidity.  EXT:  No edema.  No cyanosis.  No joint synovitis noted.  SKIN:  Dry to touch, no exanthems noted in the visualized areas.    Medications     - MEDICATION INSTRUCTIONS -       - MEDICATION INSTRUCTIONS -       - MEDICATION INSTRUCTIONS -         enoxaparin  40 mg Subcutaneous Q24H     methylPREDNISolone  40 mg Intravenous Q12H     omeprazole  20 mg Oral BID AC     sodium chloride (PF)  3 mL Intracatheter Q8H     Data     Recent Labs   Lab 05/30/19  0613 05/29/19  1055 05/28/19  0603   WBC 11.9* 12.8* 9.5   HGB 13.2* 14.1 12.5*   HCT 38.6* 41.9 35.9*   MCV 86 87 85   * 508* 398     Recent Labs   Lab 05/30/19  0613 05/29/19  1055 05/28/19  0603   * 130* 129*   POTASSIUM 4.1 3.7 3.9   CHLORIDE 94 94 94   CO2 30 30 32   ANIONGAP 6 6 4   * 119* 133*   BUN 10 10 10   CR 0.62* 0.63* 0.64*   GFRESTIMATED  >90 >90 >90   GFRESTBLACK >90 >90 >90   KEZIA 8.0* 8.0* 7.8*     No results for input(s): CULT in the last 168 hours.  Recent Labs   Lab 05/25/19  0505 05/23/19  1340   AST 11 13   ALT 9 16   ALKPHOS 60 77   BILITOTAL 0.6 0.6     No results for input(s): INR in the last 168 hours.  Recent Labs   Lab 05/25/19  0505   LACT 2.5*     Recent Labs   Lab 05/23/19  1400   COLOR Yellow   APPEARANCE Clear   URINEGLC Negative   URINEBILI Negative   URINEKETONE >150*   SG 1.030   UBLD Negative   URINEPH 6.0   PROTEIN 70*   NITRITE Negative   LEUKEST Negative   RBCU <1   WBCU 2       No results found for this or any previous visit (from the past 24 hour(s)).

## 2019-05-30 NOTE — PLAN OF CARE
A&Ox4. VSS. LS CTA all fields. BS active x4. Multiple watery, bright red stools. Cdiff neg. Enteric precautions d/c'd. haydee clears well, up independently. PO tramadol managing pain, compazine managing nausea. voiding in good amts. Awaiting starting remicade. Will continue to monitor.

## 2019-05-30 NOTE — PLAN OF CARE
Pt continues to have small bloody watery stools about every 1 hour. Taking broth only for intake, and sips of water. 2+ edema noted of bilateral lower legs, feet, ankles. Encouraged activity. Remicade, infusion started and pt tolerates well. Pt happy to be starting this new med with hopes of improvement. VSS.

## 2019-05-30 NOTE — PLAN OF CARE
VSS. Patient requests tramadol and compazine q6h, none given on my 4 hour shift. Remicade infusion completed. Hypoactive bowel sounds, watery bloody stools. Voiding. Tolerated pudding. Showered, ambulated in shaw. Will continue to monitor.

## 2019-05-31 LAB
ANION GAP SERPL CALCULATED.3IONS-SCNC: 2 MMOL/L (ref 3–14)
BASOPHILS # BLD AUTO: 0.1 10E9/L (ref 0–0.2)
BASOPHILS NFR BLD AUTO: 0.8 %
BUN SERPL-MCNC: 9 MG/DL (ref 7–30)
CALCIUM SERPL-MCNC: 7.5 MG/DL (ref 8.5–10.1)
CHLORIDE SERPL-SCNC: 95 MMOL/L (ref 94–109)
CO2 SERPL-SCNC: 32 MMOL/L (ref 20–32)
CREAT SERPL-MCNC: 0.6 MG/DL (ref 0.66–1.25)
DIFFERENTIAL METHOD BLD: ABNORMAL
EOSINOPHIL # BLD AUTO: 0 10E9/L (ref 0–0.7)
EOSINOPHIL NFR BLD AUTO: 0 %
ERYTHROCYTE [DISTWIDTH] IN BLOOD BY AUTOMATED COUNT: 12.4 % (ref 10–15)
GFR SERPL CREATININE-BSD FRML MDRD: >90 ML/MIN/{1.73_M2}
GLUCOSE SERPL-MCNC: 171 MG/DL (ref 70–99)
HCT VFR BLD AUTO: 34.2 % (ref 40–53)
HGB BLD-MCNC: 11.7 G/DL (ref 13.3–17.7)
IMM GRANULOCYTES # BLD: 0.2 10E9/L (ref 0–0.4)
IMM GRANULOCYTES NFR BLD: 2.3 %
LYMPHOCYTES # BLD AUTO: 0.5 10E9/L (ref 0.8–5.3)
LYMPHOCYTES NFR BLD AUTO: 5.9 %
MCH RBC QN AUTO: 29.2 PG (ref 26.5–33)
MCHC RBC AUTO-ENTMCNC: 34.2 G/DL (ref 31.5–36.5)
MCV RBC AUTO: 85 FL (ref 78–100)
MONOCYTES # BLD AUTO: 0.5 10E9/L (ref 0–1.3)
MONOCYTES NFR BLD AUTO: 5.5 %
NEUTROPHILS # BLD AUTO: 7.2 10E9/L (ref 1.6–8.3)
NEUTROPHILS NFR BLD AUTO: 85.5 %
NRBC # BLD AUTO: 0 10*3/UL
NRBC BLD AUTO-RTO: 0 /100
PLATELET # BLD AUTO: 420 10E9/L (ref 150–450)
POTASSIUM SERPL-SCNC: 3.8 MMOL/L (ref 3.4–5.3)
RBC # BLD AUTO: 4.01 10E12/L (ref 4.4–5.9)
SODIUM SERPL-SCNC: 129 MMOL/L (ref 133–144)
WBC # BLD AUTO: 8.4 10E9/L (ref 4–11)

## 2019-05-31 PROCEDURE — 25000132 ZZH RX MED GY IP 250 OP 250 PS 637: Performed by: INTERNAL MEDICINE

## 2019-05-31 PROCEDURE — 12000000 ZZH R&B MED SURG/OB

## 2019-05-31 PROCEDURE — 85025 COMPLETE CBC W/AUTO DIFF WBC: CPT | Performed by: INTERNAL MEDICINE

## 2019-05-31 PROCEDURE — 36415 COLL VENOUS BLD VENIPUNCTURE: CPT | Performed by: INTERNAL MEDICINE

## 2019-05-31 PROCEDURE — 25000128 H RX IP 250 OP 636: Performed by: INTERNAL MEDICINE

## 2019-05-31 PROCEDURE — 80048 BASIC METABOLIC PNL TOTAL CA: CPT | Performed by: INTERNAL MEDICINE

## 2019-05-31 PROCEDURE — 99233 SBSQ HOSP IP/OBS HIGH 50: CPT | Performed by: INTERNAL MEDICINE

## 2019-05-31 RX ADMIN — METHYLPREDNISOLONE 40 MG: 40 INJECTION, POWDER, LYOPHILIZED, FOR SOLUTION INTRAMUSCULAR; INTRAVENOUS at 21:37

## 2019-05-31 RX ADMIN — PROCHLORPERAZINE MALEATE 10 MG: 10 TABLET, FILM COATED ORAL at 21:07

## 2019-05-31 RX ADMIN — OMEPRAZOLE 20 MG: 20 CAPSULE, DELAYED RELEASE ORAL at 15:55

## 2019-05-31 RX ADMIN — METHYLPREDNISOLONE 40 MG: 40 INJECTION, POWDER, LYOPHILIZED, FOR SOLUTION INTRAMUSCULAR; INTRAVENOUS at 10:13

## 2019-05-31 RX ADMIN — PROCHLORPERAZINE MALEATE 10 MG: 10 TABLET, FILM COATED ORAL at 15:10

## 2019-05-31 RX ADMIN — PROCHLORPERAZINE MALEATE 10 MG: 10 TABLET, FILM COATED ORAL at 03:09

## 2019-05-31 RX ADMIN — OMEPRAZOLE 20 MG: 20 CAPSULE, DELAYED RELEASE ORAL at 08:06

## 2019-05-31 RX ADMIN — TRAMADOL HYDROCHLORIDE 50 MG: 50 TABLET, COATED ORAL at 21:05

## 2019-05-31 RX ADMIN — PROCHLORPERAZINE MALEATE 10 MG: 10 TABLET, FILM COATED ORAL at 08:59

## 2019-05-31 RX ADMIN — TRAMADOL HYDROCHLORIDE 50 MG: 50 TABLET, COATED ORAL at 08:58

## 2019-05-31 RX ADMIN — TRAMADOL HYDROCHLORIDE 50 MG: 50 TABLET, COATED ORAL at 15:10

## 2019-05-31 RX ADMIN — ENOXAPARIN SODIUM 40 MG: 40 INJECTION SUBCUTANEOUS at 12:00

## 2019-05-31 RX ADMIN — TRAMADOL HYDROCHLORIDE 50 MG: 50 TABLET, COATED ORAL at 03:09

## 2019-05-31 ASSESSMENT — ACTIVITIES OF DAILY LIVING (ADL)
ADLS_ACUITY_SCORE: 12

## 2019-05-31 NOTE — PROGRESS NOTES
"GASTROENTEROLOGY PROGRESS NOTE        SUBJECTIVE:  Had a span of 3-4 hours without BMs last night. Stool less bloody and starting to have some form. Abdominal cramping with food, otherwise stable. Received first dose of Remicade yesterday.     OBJECTIVE:    /77   Pulse 80   Temp 97.1  F (36.2  C)   Resp 16   Ht 1.803 m (5' 11\")   Wt 68 kg (150 lb)   SpO2 97%   BMI 20.92 kg/m    Temp (24hrs), Av.7  F (35.9  C), Min:95.9  F (35.5  C), Max:98  F (36.7  C)    No data found.     PHYSICAL EXAM     Constitutional: Fatigued  Cardiovascular: RRR, normal S1, S2, no murmur appreciated   Respiratory: good transmission, CTAB    Abdomen: +BS, diffusely tender - improved today, no rebound/guarding      Additional Comments:  ROS, FH, SH: See initial GI consult for details.     I have reviewed the patient's new clinical lab results:     Recent Labs   Lab Test 19  0602 19  0613 19  1055   WBC 8.4 11.9* 12.8*   HGB 11.7* 13.2* 14.1   MCV 85 86 87    473* 508*     Recent Labs   Lab Test 19  0602 19  0613 19  1055   POTASSIUM 3.8 4.1 3.7   CHLORIDE 95 94 94   CO2 32 30 30   BUN 9 10 10   ANIONGAP 2* 6 6     Recent Labs   Lab Test 19  0505 19  1400 19  1340 19  0533   ALBUMIN 2.0*  --  3.0* 3.2*   BILITOTAL 0.6  --  0.6 0.7   ALT 9  --  16 12   AST 11  --  13 12   PROTEIN  --  70*  --   --    LIPASE 33*  --   --   --         ASSESSMENT/ PLAN  Alex Cardona is a 48-year-old male who was admitted to the hospital with abdominal pain, diarrhea, hematochezia found to have colitis on the CT scan.  Colonoscopy was completed 526 but was incomplete due to the severity of colitis.  Patient was started on IV steroids.  Surgical pathology acute/chronic colitis, CMV pending.       1. Severe indeterminate colitis, consistent with UC on path, IV steroid refractory: Patient is on day 5 of IV steroids.  Continues to have frequent BMs with blood.  QuantiFERON " indeterminate likely related to steroids. Hepatitis B negative. I spoke with SW yesterday and patient now has MA. Repeat C diff negative. CMV stains negative. Received Remicade first induction yesterday, 5/30. Clinically already with improvements.   --No clear risk factors for TB, no cough.   --Continue IV steroids.   --Notified by our office that MA likely to deny Remicade as first line therapy, needs to try and fail steroids/Humira first. Looking into free drug program.   --Ok to slowly advance diet to low residue.  --Next Remicade dose due in 2 weeks.     D/w Dr. Schuster.     Karla Chilel, [facility] ( [facility])  Office: [phone].

## 2019-05-31 NOTE — PLAN OF CARE
Afebrile.  Mild pain managed with PRN ultram.  No nausea, tolerating diet.  Multiple loose stools this shift, sediments spotty blood still present.  Mild edema BLEs, encouraged increase mobility.  Up independently.

## 2019-05-31 NOTE — PROGRESS NOTES
"[facility]    Hospitalist Progress Note  Name: Alex Cardona    MRN: [id number]  Provider: Peggy Wilson MD  Date of Service: 05/31/2019    Assessment & Plan   Summary of Stay: Alex Cardona is a 48 year old male who was admitted on 5/25/2019 Abdominal pain nausea and diarrhea. Prior to admission CT scan showed pancolitis.  Underwent colonoscopy biopsies showed chronic colitis with areas of ulceration suggestive of ulcerative colitis hepatitis panel was nonreactive except for hep A IgG. QuantiFERON  is indeterminant. Patient was started on steroids with plans to start on Remicade    1. Pancolitis with bloody diarrhea. Clinical presentation suggestive of ulcerative colitis  --Status post colonoscopy 5/26/2019. Surgical pathology showing \"Left colon biopsies  severe active chronic colitis with areas of ulceration and reactive changes,Negative for dysplasia and malignancy. CMV immunostain pending. \"  --Clinical presentation suggestive  for ulcerative colitis  --Continue IV fluids until patient able to tolerate p.o.  --Started on steroids per GI. A dose of Remicade given on 5/30  --diet advanced to low fiber diet    2. Dehydration  --Continue IV fluids until adequate p.o. Intake    3. Hyponatremia, likely chronic  --Serum sodium 129. Will continue IV fluid     4. Hypokalemia  --Will replace per protocol. Will monitor electrolytes    5. Hypocalcemia  --Corrected calcium would be normal if corrected for albumin    DVT Prophylaxis: Pneumatic Compression Devices. Encourage ambulation  Code Status: Full Code    Disposition: Expected discharge: anticipate more than two nights of hospital course    Interval History   Patient seen and examined. He stated that he is feeling a little better. He has no vomiting. Bloody diarrhea improving. Has no fever or cough. Denies chest pain.     -Data reviewed today: I reviewed all new labs and imaging reports over the last 24 hours. I personally reviewed no images or " EKG's today.    Physical Exam   Temp: 97.1  F (36.2  C) Temp src: Oral BP: 123/77 Pulse: 80 Heart Rate: 77 Resp: 16 SpO2: 97 % O2 Device: None (Room air)    Vitals:    05/25/19 0831 05/26/19 0750   Weight: 67.9 kg (149 lb 9.6 oz) 68 kg (150 lb)     Vital Signs with Ranges  Temp:  [97.1  F (36.2  C)-98.6  F (37  C)] 97.1  F (36.2  C)  Pulse:  [77-80] 80  Heart Rate:  [77-86] 77  Resp:  [16] 16  BP: (123-138)/(74-77) 123/77  SpO2:  [97 %-99 %] 97 %  I/O last 3 completed shifts:  In: 560 [P.O.:560]  Out: -       GEN:  Alert, oriented x 3, appears comfortable, NAD.  HEENT:  Normocephalic/atraumatic, no scleral icterus, no nasal discharge, mouth moist.  CV:  Regular rate and rhythm, no murmur or JVD.  S1 + S2 noted, no S3 or S4.  LUNGS:  Clear to auscultation bilaterally without rales/rhonchi/wheezing/retractions.  Symmetric chest rise on inhalation noted.  ABD:  Active bowel sounds, soft, distended. no rebound/guarding/rigidity.  EXT:  No edema.  No cyanosis.  No joint synovitis noted.  SKIN:  Dry to touch, no exanthems noted in the visualized areas.    Medications     - MEDICATION INSTRUCTIONS -       - MEDICATION INSTRUCTIONS -       - MEDICATION INSTRUCTIONS -         enoxaparin  40 mg Subcutaneous Q24H     methylPREDNISolone  40 mg Intravenous Q12H     omeprazole  20 mg Oral BID AC     sodium chloride (PF)  3 mL Intracatheter Q8H     Data     Recent Labs   Lab 05/31/19  0602 05/30/19  0613 05/29/19  1055   WBC 8.4 11.9* 12.8*   HGB 11.7* 13.2* 14.1   HCT 34.2* 38.6* 41.9   MCV 85 86 87    473* 508*     Recent Labs   Lab 05/31/19  0602 05/30/19  0613 05/29/19  1055   * 130* 130*   POTASSIUM 3.8 4.1 3.7   CHLORIDE 95 94 94   CO2 32 30 30   ANIONGAP 2* 6 6   * 119* 119*   BUN 9 10 10   CR 0.60* 0.62* 0.63*   GFRESTIMATED >90 >90 >90   GFRESTBLACK >90 >90 >90   KEZIA 7.5* 8.0* 8.0*     No results for input(s): CULT in the last 168 hours.  Recent Labs   Lab 05/25/19  0505   AST 11   ALT 9   ALKPHOS 60    BILITOTAL 0.6     No results for input(s): INR in the last 168 hours.  Recent Labs   Lab 05/25/19  0505   LACT 2.5*     No results for input(s): COLOR, APPEARANCE, URINEGLC, URINEBILI, URINEKETONE, SG, UBLD, URINEPH, PROTEIN, UROBILINOGEN, NITRITE, LEUKEST, RBCU, WBCU in the last 168 hours.    No results found for this or any previous visit (from the past 24 hour(s)).

## 2019-05-31 NOTE — PLAN OF CARE
Vital signs:  Temp: 96.2  F (35.7  C) Temp src: Axillary BP: 129/79 Pulse: 80 Heart Rate: 88 Resp: 16 SpO2: 97 %   Alert and oriented x4. Independent. Had 2 small  loose brown BM's. Tolerated dinner. Pain  4/10 but declined  medications at this time. No nausea.  Plan to discharge in > 2 nights

## 2019-05-31 NOTE — PROGRESS NOTES
Care Transitions Team: Following for CC, discharge planning, and disposition.      Met with pt regarding no PCP .  Pt would like to have information on [facility] added to his AVS. He will plan on scheduling an care establishment upon discharge.     Tamra Waters RN, BSN, Care Transitions Specialist   Care Transitions Team  [phone]

## 2019-05-31 NOTE — PLAN OF CARE
A&O x4. VSS. LS CTA all fields. BS hypoactive, haydee full diet well. Abdominal tenderness noted. Bloody stools that are starting to have some sediment and frequency is improving. Remicade started yesterday. IV solumedrol.  up independently. PO tramadol managing pain. Compazine given to avoid nausea. Edema to BLE. voiding in good amts. plans to dc home when stable.

## 2019-06-01 LAB
ANION GAP SERPL CALCULATED.3IONS-SCNC: 5 MMOL/L (ref 3–14)
BASOPHILS # BLD AUTO: 0.1 10E9/L (ref 0–0.2)
BASOPHILS NFR BLD AUTO: 0.5 %
BUN SERPL-MCNC: 9 MG/DL (ref 7–30)
CALCIUM SERPL-MCNC: 7.7 MG/DL (ref 8.5–10.1)
CHLORIDE SERPL-SCNC: 96 MMOL/L (ref 94–109)
CO2 SERPL-SCNC: 30 MMOL/L (ref 20–32)
CREAT SERPL-MCNC: 0.63 MG/DL (ref 0.66–1.25)
DIFFERENTIAL METHOD BLD: ABNORMAL
EOSINOPHIL # BLD AUTO: 0 10E9/L (ref 0–0.7)
EOSINOPHIL NFR BLD AUTO: 0 %
ERYTHROCYTE [DISTWIDTH] IN BLOOD BY AUTOMATED COUNT: 12.5 % (ref 10–15)
GFR SERPL CREATININE-BSD FRML MDRD: >90 ML/MIN/{1.73_M2}
GLUCOSE SERPL-MCNC: 166 MG/DL (ref 70–99)
HCT VFR BLD AUTO: 35.7 % (ref 40–53)
HGB BLD-MCNC: 12 G/DL (ref 13.3–17.7)
IMM GRANULOCYTES # BLD: 0.2 10E9/L (ref 0–0.4)
IMM GRANULOCYTES NFR BLD: 1.8 %
LYMPHOCYTES # BLD AUTO: 0.7 10E9/L (ref 0.8–5.3)
LYMPHOCYTES NFR BLD AUTO: 7.1 %
MCH RBC QN AUTO: 29.2 PG (ref 26.5–33)
MCHC RBC AUTO-ENTMCNC: 33.6 G/DL (ref 31.5–36.5)
MCV RBC AUTO: 87 FL (ref 78–100)
MONOCYTES # BLD AUTO: 0.7 10E9/L (ref 0–1.3)
MONOCYTES NFR BLD AUTO: 6.6 %
NEUTROPHILS # BLD AUTO: 8.6 10E9/L (ref 1.6–8.3)
NEUTROPHILS NFR BLD AUTO: 84 %
NRBC # BLD AUTO: 0 10*3/UL
NRBC BLD AUTO-RTO: 0 /100
PLATELET # BLD AUTO: 447 10E9/L (ref 150–450)
POTASSIUM SERPL-SCNC: 4.1 MMOL/L (ref 3.4–5.3)
RBC # BLD AUTO: 4.11 10E12/L (ref 4.4–5.9)
SODIUM SERPL-SCNC: 131 MMOL/L (ref 133–144)
WBC # BLD AUTO: 10.2 10E9/L (ref 4–11)

## 2019-06-01 PROCEDURE — 25000132 ZZH RX MED GY IP 250 OP 250 PS 637: Performed by: INTERNAL MEDICINE

## 2019-06-01 PROCEDURE — 25000128 H RX IP 250 OP 636: Performed by: INTERNAL MEDICINE

## 2019-06-01 PROCEDURE — 85025 COMPLETE CBC W/AUTO DIFF WBC: CPT | Performed by: INTERNAL MEDICINE

## 2019-06-01 PROCEDURE — 12000000 ZZH R&B MED SURG/OB

## 2019-06-01 PROCEDURE — 36415 COLL VENOUS BLD VENIPUNCTURE: CPT | Performed by: INTERNAL MEDICINE

## 2019-06-01 PROCEDURE — 80048 BASIC METABOLIC PNL TOTAL CA: CPT | Performed by: INTERNAL MEDICINE

## 2019-06-01 PROCEDURE — 99233 SBSQ HOSP IP/OBS HIGH 50: CPT | Performed by: INTERNAL MEDICINE

## 2019-06-01 RX ORDER — NYSTATIN 100000/ML
500000 SUSPENSION, ORAL (FINAL DOSE FORM) ORAL 4 TIMES DAILY
Status: COMPLETED | OUTPATIENT
Start: 2019-06-01 | End: 2019-06-08

## 2019-06-01 RX ORDER — METHYLPREDNISOLONE SODIUM SUCCINATE 40 MG/ML
20 INJECTION, POWDER, LYOPHILIZED, FOR SOLUTION INTRAMUSCULAR; INTRAVENOUS EVERY 8 HOURS
Status: DISCONTINUED | OUTPATIENT
Start: 2019-06-01 | End: 2019-06-06

## 2019-06-01 RX ADMIN — TRAMADOL HYDROCHLORIDE 50 MG: 50 TABLET, COATED ORAL at 03:36

## 2019-06-01 RX ADMIN — NYSTATIN 500000 UNITS: 500000 SUSPENSION ORAL at 21:12

## 2019-06-01 RX ADMIN — OMEPRAZOLE 20 MG: 20 CAPSULE, DELAYED RELEASE ORAL at 08:21

## 2019-06-01 RX ADMIN — TRAMADOL HYDROCHLORIDE 50 MG: 50 TABLET, COATED ORAL at 21:12

## 2019-06-01 RX ADMIN — NYSTATIN 500000 UNITS: 500000 SUSPENSION ORAL at 16:56

## 2019-06-01 RX ADMIN — OMEPRAZOLE 20 MG: 20 CAPSULE, DELAYED RELEASE ORAL at 16:56

## 2019-06-01 RX ADMIN — METHYLPREDNISOLONE SODIUM SUCCINATE 20 MG: 40 INJECTION, POWDER, FOR SOLUTION INTRAMUSCULAR; INTRAVENOUS at 16:55

## 2019-06-01 RX ADMIN — ENOXAPARIN SODIUM 40 MG: 40 INJECTION SUBCUTANEOUS at 12:59

## 2019-06-01 RX ADMIN — METHYLPREDNISOLONE 40 MG: 40 INJECTION, POWDER, LYOPHILIZED, FOR SOLUTION INTRAMUSCULAR; INTRAVENOUS at 09:10

## 2019-06-01 RX ADMIN — TRAMADOL HYDROCHLORIDE 50 MG: 50 TABLET, COATED ORAL at 15:05

## 2019-06-01 RX ADMIN — TRAMADOL HYDROCHLORIDE 50 MG: 50 TABLET, COATED ORAL at 09:16

## 2019-06-01 ASSESSMENT — ACTIVITIES OF DAILY LIVING (ADL)
ADLS_ACUITY_SCORE: 12

## 2019-06-01 ASSESSMENT — MIFFLIN-ST. JEOR: SCORE: 1529.13

## 2019-06-01 NOTE — PLAN OF CARE
VSS. Pt complains of abdominal pain- prn tramadol x1. Requested prn compazine to prevent nausea- discussed with pt alternative methods for nausea treatment, as well as encouraged to wait and see if he actually became nauseated-pt declined offers/education, prn compazine given x1. Continues to have small, loose bloody bowel movements.   Up independently, tolerating low fiber diet.

## 2019-06-01 NOTE — PROGRESS NOTES
"[facility]    Hospitalist Progress Note  Name: Alex Cardona    MRN: [id number]  Provider: Peggy Wilson MD  Date of Service: 06/01/2019    Assessment & Plan   Summary of Stay: Alex Cardona is a 48 year old male who was admitted on 5/25/2019 Abdominal pain nausea and diarrhea. Prior to admission CT scan showed pancolitis.  Underwent colonoscopy biopsies showed chronic colitis with areas of ulceration suggestive of ulcerative colitis hepatitis panel was nonreactive except for hep A IgG. QuantiFERON  is indeterminant. Patient was started on steroids with plans to start on Remicade    1. Pancolitis with bloody diarrhea. Clinical presentation suggestive of ulcerative colitis  --Status post colonoscopy 5/26/2019. Surgical pathology showing \"Left colon biopsies severe active chronic colitis with areas of ulceration and reactive changes,Negative for dysplasia and malignancy. CMV immunostain pending. \"  --Clinical presentation suggestive  for ulcerative colitis  --Continue IV fluids until patient able to tolerate p.o.  --Started on steroids per GI. A dose of Remicade given on 5/30  --diet advanced to low fiber diet    2. Dehydration  --Continue IV fluids until adequate p.o. Intake    3. Hyponatremia, likely chronic  --Serum sodium 131 today. Will continue IV fluid     4. Hypokalemia  --Will replace per protocol. Will monitor electrolytes    5. Hypocalcemia  --Corrected calcium would be normal if corrected for albumin    DVT Prophylaxis: Pneumatic Compression Devices. Encourage ambulation  Code Status: Full Code    Disposition: Expected discharge: anticipate more than two nights of hospital course    Interval History   Patient seen and examined.  He stated that he still has some bloody stool.  Abdominal pain improving no nausea or vomiting.  No fever.  -Data reviewed today: I reviewed all new labs and imaging reports over the last 24 hours. I personally reviewed no images or EKG's today.    Physical " Exam   Temp: 96.2  F (35.7  C) Temp src: Oral BP: 142/74   Heart Rate: 88 Resp: 16 SpO2: 98 % O2 Device: None (Room air)    Vitals:    05/25/19 0831 05/26/19 0750   Weight: 67.9 kg (149 lb 9.6 oz) 68 kg (150 lb)     Vital Signs with Ranges  Temp:  [96.2  F (35.7  C)-98.2  F (36.8  C)] 96.2  F (35.7  C)  Heart Rate:  [84-88] 88  Resp:  [16] 16  BP: (122-142)/(74-88) 142/74  SpO2:  [97 %-98 %] 98 %  I/O last 3 completed shifts:  In: 1840 [P.O.:1840]  Out: -       GEN:  Alert, oriented x 3, appears comfortable, NAD.  HEENT:  Normocephalic/atraumatic, no scleral icterus, no nasal discharge, mouth moist.  CV:  Regular rate and rhythm, no murmur or JVD.  S1 + S2 noted, no S3 or S4.  LUNGS:  Clear to auscultation bilaterally without rales/rhonchi/wheezing/retractions.  Symmetric chest rise on inhalation noted.  ABD:  Active bowel sounds, soft, distended. no rebound/guarding/rigidity.  EXT:  No edema.  No cyanosis.  No joint synovitis noted.  SKIN:  Dry to touch, no exanthems noted in the visualized areas.    Medications     - MEDICATION INSTRUCTIONS -       - MEDICATION INSTRUCTIONS -       - MEDICATION INSTRUCTIONS -         enoxaparin  40 mg Subcutaneous Q24H     methylPREDNISolone  40 mg Intravenous Q12H     omeprazole  20 mg Oral BID AC     sodium chloride (PF)  3 mL Intracatheter Q8H     Data     Recent Labs   Lab 06/01/19  0651 05/31/19  0602 05/30/19  0613   WBC 10.2 8.4 11.9*   HGB 12.0* 11.7* 13.2*   HCT 35.7* 34.2* 38.6*   MCV 87 85 86    420 473*     Recent Labs   Lab 06/01/19  0651 05/31/19  0602 05/30/19  0613   * 129* 130*   POTASSIUM 4.1 3.8 4.1   CHLORIDE 96 95 94   CO2 30 32 30   ANIONGAP 5 2* 6   * 171* 119*   BUN 9 9 10   CR 0.63* 0.60* 0.62*   GFRESTIMATED >90 >90 >90   GFRESTBLACK >90 >90 >90   KEZIA 7.7* 7.5* 8.0*     No results for input(s): CULT in the last 168 hours.  No results for input(s): AST, ALT, GGT, ALKPHOS, BILITOTAL, BILICONJ, BILIDIRECT, CELINE in the last 168  hours.    Invalid input(s): BILIRUBININDIRECT  No results for input(s): INR in the last 168 hours.  No results for input(s): LACT in the last 168 hours.  No results for input(s): COLOR, APPEARANCE, URINEGLC, URINEBILI, URINEKETONE, SG, UBLD, URINEPH, PROTEIN, UROBILINOGEN, NITRITE, LEUKEST, RBCU, WBCU in the last 168 hours.    No results found for this or any previous visit (from the past 24 hour(s)).

## 2019-06-01 NOTE — PROGRESS NOTES
"[facility] - GASTROENTEROLOGY PROGRESS NOTE     IMPRESSION:  1. Severe indeterminate colitis - Remicade 5/30/19 x 1, some improvement, but still significant symptoms, possibly 12 stools per day. Patient will loly down his BMs, so we have an accurate number for tomorrow. Day 6 of steroids. CMV negative on biopsies. C. Diff negative.    RECOMMENDATIONS:  1. Change steroids to 20mg IV q8hr  2. Continue current diet  3. Assess any improvement tomorrow.    Alessandro Ruffiny  [facility] - [facility]  Cell [phone]  After 5 PM, please call [phone]  ________________________________________________________________________      SUBJECTIVE:  Patient feels better, appetite improved, uncertain of number of BMs, maybe 12 yesterday.        OBJECTIVE:  /74   Pulse 80   Temp 96.2  F (35.7  C)   Resp 16   Ht 1.803 m (5' 11\")   Wt 64.2 kg (141 lb 8.6 oz)   SpO2 98%   BMI 19.74 kg/m    Temp (24hrs), Av.9  F (36.1  C), Min:96.2  F (35.7  C), Max:98.2  F (36.8  C)    Patient Vitals for the past 72 hrs:   Weight   19 1100 64.2 kg (141 lb 8.6 oz)        PHYSICAL EXAM  GEN: Alert, oriented x3, NAD.    HRT: reg  LUNGS: CTA  ABD: +BS, non-tender, non-distended, no rebound or guarding.    Additional Data:  I have reviewed the patient's new clinical lab results:     Recent Labs   Lab Test 19  0602 19  06   WBC 10.2 8.4 11.9*   HGB 12.0* 11.7* 13.2*   MCV 87 85 86    420 473*     Recent Labs   Lab Test 19  0602 19  0613   POTASSIUM 4.1 3.8 4.1   CHLORIDE 96 95 94   CO2 30 32 30   BUN 9 9 10   ANIONGAP 5 2* 6     Recent Labs   Lab Test 19  0505 19  1400 19  1340 19  0533   ALBUMIN 2.0*  --  3.0* 3.2*   BILITOTAL 0.6  --  0.6 0.7   ALT 9  --  16 12   AST 11  --  13 12   PROTEIN  --  70*  --   --    LIPASE 33*  --   --   --             "

## 2019-06-01 NOTE — PROGRESS NOTES
"CLINICAL NUTRITION SERVICES  -  ASSESSMENT NOTE  Recommendations Ordered by Registered Dietitian (RD):   - oral nutrition supplement @  2pm   Malnutrition:   % Weight Loss:  > 2% in 1 week (severe malnutrition)  % Intake:  </= 50% for >/= 5 days (severe malnutrition)  Subcutaneous Fat Loss:  Orbital region moderate depletion and Upper arm region mild-moderate depletion  Muscle Loss:  Temporal region moderate-severe depletion, Clavicle bone region moderate depletion, Dorsal hand region moderate depletion, Anterior thigh region mild depletion and Posterior calf region mild depletion  Fluid Retention:  Mild 2+    Malnutrition Diagnosis: Severe malnutrition  In Context of:  Acute illness or injury     REASON FOR ASSESSMENT  Alex Cardona is a 48 year old male seen by Registered Dietitian for LOS    NUTRITION HISTORY  - Information obtained from patient, EMR.   - Admitted with abd pain, nausea, diarrhea. Diarrhea developed 4 weeks PTA (5 weeks ago now). Also noted to have small amounts of bright blood and white pus in stools.   - No vomiting endorsed PTA, though poor oral intake related to diarrhea.   - No other pertinent PMH.    - Patient verbalizes eating okay once he started feeling sick, but he was not eating the same volume as he normally can. A few days PTA his intakes dropped off to almost nothing. Verbalized feelings of \"not knowing what his body needed to do\".    - When feeling well, Alex eats fine and seems to keep track of his caloric intake throughout the day.   - Breakfast: almost always bran flakes with raisins and 2% milk. occasionally eats eggs and toast. OJ 6 oz  - Lunch: 6665-9871 kcal meal w/ 12 oz glass of milk, food varies but the calorie-density stays the same.   - snack/meal later in evening 500-600 kcal  - small snack before bed  - NKFA    CURRENT NUTRITION ORDERS  Diet Order:     Low Fiber     Current Intake/Tolerance:  Eating 100% small meals since diet advancement. He is very on top of how " "much fiber he is getting with meals, and is attempting a small/frequent eating pattern. Agrees to a milkshake mid-afternoon.     NUTRITION FOCUSED PHYSICAL ASSESSMENT (NFPA)  Completed:  Yes Full assessment         Observed:    Muscle wasting (refer to documentation in Malnutrition section) and Subcutaneous fat loss (refer to documentation in Malnutrition section)   Pt also verbalized overall muscle loss, notices especially in the shower.     Obtained from Chart/Interdisciplinary Team:  CT abdomin 5/23 - pancolitis.   Pale and thin appearing  Stooling is improving, however loose/bloody stools continue     ANTHROPOMETRICS  Height: 5' 11\"  Weight: 141 lbs 8.57 oz (64.2 kg) --> reweighed during visit  Body mass index is 19.74 kg/m .  Weight Status:  Normal BMI  IBW: 78.2 kg  % IBW: 82%  Weight History: suspects a weight loss since admission. 150# is his usual body weight. Alex pays attention to the way his clothes fit - if too loose, will try to put on a few pounds. If feeling sluggish and clothes too tight, will try to lose a few. Pt with a ~9# loss just since admission (6%).   Wt Readings from Last 10 Encounters:   06/01/19 64.2 kg (141 lb 8.6 oz)   05/23/19 67.5 kg (148 lb 13 oz)     LABS  Labs reviewed    MEDICATIONS  Medications reviewed  Remicade started on 5/30  Prilosec --> has been helping a lot with belching  Off nausea medication     ASSESSED NUTRITION NEEDS PER APPROVED PRACTICE GUIDELINES:  Dosing Weight 68 kg - admit  Estimated Energy Needs: 9333-9033 kcals (30-35 Kcal/Kg)  Justification: repletion  Estimated Protein Needs:  grams protein (1.2-1.5 g pro/Kg)  Justification: repletion/maintenance   Estimated Fluid Needs: >1 mL/kcal  Justification: maintenance    MALNUTRITION:  % Weight Loss:  > 2% in 1 week (severe malnutrition)  % Intake:  </= 50% for >/= 5 days (severe malnutrition)  Subcutaneous Fat Loss:  Orbital region moderate depletion and Upper arm region mild-moderate depletion  Muscle Loss: "  Temporal region moderate-severe depletion, Clavicle bone region moderate depletion, Dorsal hand region moderate depletion, Anterior thigh region mild depletion and Posterior calf region mild depletion  Fluid Retention:  Mild 2+    Malnutrition Diagnosis: Severe malnutrition  In Context of:  Acute illness or injury    NUTRITION DIAGNOSIS:  Inadequate oral intake related to acute GI symptoms causing diarrhea, nausea as evidenced by oral intakes <50% on average since admission with slowly improving intakes, reduced PO PTA x 3-4 weeks, weight loss of 6% in 1 week, e/o fat and muscle wasting with repletion needs as above.     NUTRITION INTERVENTIONS  Recommendations / Nutrition Prescription  Continue low fiber diet per MD    Add high protein supplement @ 2 pm    Small/freq meals as tolerated, emphasis on high kcal/high protein     Reweigh patient --> appreciate nursing assistance      Implementation  Nutrition education: Provided education on low fiber, high kcal/high pro    Assessed learning needs, learning preferences, and willingness to learn.    Nutrition Education (Content):  a) Provided handout   a. Low residue nutrition therapy  b) Discussed   a. Foods to avoid/limit  b. Recommended foods  c. Small/frequent meals  d. Chewing thoroughly   e. High calorie options for repletion    Nutrition Education (Application):  a) Discussed eating habits and recommended alternative food choices    Anticipate good compliance    Diet Education - refer to Education Flowsheet    Medical Food Supplement: see above  Collaboration and Referral of Nutrition care: requested reweigh, appreciate nursing assistance.     Nutrition Goals  Pt to tolerate at least 75% meals tid + 1 high protein supplement.     MONITORING AND EVALUATION:  Progress towards goals will be monitored and evaluated per protocol and Practice Guidelines    Hattie Merida RD, LD  3rd floor/ICU: [phone]  All other floors: [phone]  Weekend/holiday:  [phone]  Office: [phone]

## 2019-06-01 NOTE — PLAN OF CARE
Pt vss, lungs are clear, pt has been encouraged to use IS hourly.   BS+, eating and drinking well on low fiber diet.   Pain controlled with oral pain meds. Ultram every 6hrs  Independent walking the halls and up in room  Multiple loose stools today- bloody and stool mixed.   On Iv solumedrol

## 2019-06-01 NOTE — PLAN OF CARE
Continues to have small loose bloody stools, improving per pt. Denies nausea. Independent in the room. Pain managed with PRN Tramadol.nursing continue to monitor.

## 2019-06-02 LAB
ANION GAP SERPL CALCULATED.3IONS-SCNC: 2 MMOL/L (ref 3–14)
BASOPHILS # BLD AUTO: 0 10E9/L (ref 0–0.2)
BASOPHILS NFR BLD AUTO: 0.4 %
BUN SERPL-MCNC: 13 MG/DL (ref 7–30)
CALCIUM SERPL-MCNC: 7.7 MG/DL (ref 8.5–10.1)
CHLORIDE SERPL-SCNC: 96 MMOL/L (ref 94–109)
CO2 SERPL-SCNC: 33 MMOL/L (ref 20–32)
CREAT SERPL-MCNC: 0.61 MG/DL (ref 0.66–1.25)
DIFFERENTIAL METHOD BLD: ABNORMAL
EOSINOPHIL # BLD AUTO: 0 10E9/L (ref 0–0.7)
EOSINOPHIL NFR BLD AUTO: 0.1 %
ERYTHROCYTE [DISTWIDTH] IN BLOOD BY AUTOMATED COUNT: 12.5 % (ref 10–15)
GFR SERPL CREATININE-BSD FRML MDRD: >90 ML/MIN/{1.73_M2}
GLUCOSE SERPL-MCNC: 122 MG/DL (ref 70–99)
HCT VFR BLD AUTO: 33.8 % (ref 40–53)
HGB BLD-MCNC: 11.4 G/DL (ref 13.3–17.7)
IMM GRANULOCYTES # BLD: 0.2 10E9/L (ref 0–0.4)
IMM GRANULOCYTES NFR BLD: 2.1 %
LYMPHOCYTES # BLD AUTO: 0.7 10E9/L (ref 0.8–5.3)
LYMPHOCYTES NFR BLD AUTO: 6.9 %
MAGNESIUM SERPL-MCNC: 1.9 MG/DL (ref 1.6–2.3)
MCH RBC QN AUTO: 29.5 PG (ref 26.5–33)
MCHC RBC AUTO-ENTMCNC: 33.7 G/DL (ref 31.5–36.5)
MCV RBC AUTO: 87 FL (ref 78–100)
MONOCYTES # BLD AUTO: 0.6 10E9/L (ref 0–1.3)
MONOCYTES NFR BLD AUTO: 6 %
NEUTROPHILS # BLD AUTO: 8.2 10E9/L (ref 1.6–8.3)
NEUTROPHILS NFR BLD AUTO: 84.5 %
NRBC # BLD AUTO: 0 10*3/UL
NRBC BLD AUTO-RTO: 0 /100
PHOSPHATE SERPL-MCNC: 2.8 MG/DL (ref 2.5–4.5)
PLATELET # BLD AUTO: 448 10E9/L (ref 150–450)
POTASSIUM SERPL-SCNC: 4.7 MMOL/L (ref 3.4–5.3)
RBC # BLD AUTO: 3.87 10E12/L (ref 4.4–5.9)
SODIUM SERPL-SCNC: 131 MMOL/L (ref 133–144)
WBC # BLD AUTO: 9.7 10E9/L (ref 4–11)

## 2019-06-02 PROCEDURE — 84100 ASSAY OF PHOSPHORUS: CPT | Performed by: INTERNAL MEDICINE

## 2019-06-02 PROCEDURE — 36415 COLL VENOUS BLD VENIPUNCTURE: CPT | Performed by: INTERNAL MEDICINE

## 2019-06-02 PROCEDURE — 25000132 ZZH RX MED GY IP 250 OP 250 PS 637: Performed by: INTERNAL MEDICINE

## 2019-06-02 PROCEDURE — 25000128 H RX IP 250 OP 636: Performed by: INTERNAL MEDICINE

## 2019-06-02 PROCEDURE — 83735 ASSAY OF MAGNESIUM: CPT | Performed by: INTERNAL MEDICINE

## 2019-06-02 PROCEDURE — 99233 SBSQ HOSP IP/OBS HIGH 50: CPT | Performed by: INTERNAL MEDICINE

## 2019-06-02 PROCEDURE — 12000000 ZZH R&B MED SURG/OB

## 2019-06-02 PROCEDURE — 85025 COMPLETE CBC W/AUTO DIFF WBC: CPT | Performed by: INTERNAL MEDICINE

## 2019-06-02 PROCEDURE — 80048 BASIC METABOLIC PNL TOTAL CA: CPT | Performed by: INTERNAL MEDICINE

## 2019-06-02 RX ADMIN — TRAMADOL HYDROCHLORIDE 50 MG: 50 TABLET, COATED ORAL at 03:12

## 2019-06-02 RX ADMIN — OMEPRAZOLE 20 MG: 20 CAPSULE, DELAYED RELEASE ORAL at 16:47

## 2019-06-02 RX ADMIN — METHYLPREDNISOLONE SODIUM SUCCINATE 20 MG: 40 INJECTION, POWDER, FOR SOLUTION INTRAMUSCULAR; INTRAVENOUS at 23:43

## 2019-06-02 RX ADMIN — ENOXAPARIN SODIUM 40 MG: 40 INJECTION SUBCUTANEOUS at 12:17

## 2019-06-02 RX ADMIN — METHYLPREDNISOLONE SODIUM SUCCINATE 20 MG: 40 INJECTION, POWDER, FOR SOLUTION INTRAMUSCULAR; INTRAVENOUS at 16:47

## 2019-06-02 RX ADMIN — OMEPRAZOLE 20 MG: 20 CAPSULE, DELAYED RELEASE ORAL at 08:35

## 2019-06-02 RX ADMIN — NYSTATIN 500000 UNITS: 500000 SUSPENSION ORAL at 12:17

## 2019-06-02 RX ADMIN — TRAMADOL HYDROCHLORIDE 50 MG: 50 TABLET, COATED ORAL at 14:58

## 2019-06-02 RX ADMIN — NYSTATIN 500000 UNITS: 500000 SUSPENSION ORAL at 20:55

## 2019-06-02 RX ADMIN — TRAMADOL HYDROCHLORIDE 50 MG: 50 TABLET, COATED ORAL at 08:35

## 2019-06-02 RX ADMIN — NYSTATIN 500000 UNITS: 500000 SUSPENSION ORAL at 16:47

## 2019-06-02 RX ADMIN — NYSTATIN 500000 UNITS: 500000 SUSPENSION ORAL at 08:35

## 2019-06-02 RX ADMIN — TRAMADOL HYDROCHLORIDE 50 MG: 50 TABLET, COATED ORAL at 20:55

## 2019-06-02 RX ADMIN — METHYLPREDNISOLONE SODIUM SUCCINATE 20 MG: 40 INJECTION, POWDER, FOR SOLUTION INTRAMUSCULAR; INTRAVENOUS at 00:43

## 2019-06-02 RX ADMIN — METHYLPREDNISOLONE SODIUM SUCCINATE 20 MG: 40 INJECTION, POWDER, FOR SOLUTION INTRAMUSCULAR; INTRAVENOUS at 08:35

## 2019-06-02 ASSESSMENT — ACTIVITIES OF DAILY LIVING (ADL)
ADLS_ACUITY_SCORE: 12
ADLS_ACUITY_SCORE: 10
ADLS_ACUITY_SCORE: 12
ADLS_ACUITY_SCORE: 10
ADLS_ACUITY_SCORE: 12
ADLS_ACUITY_SCORE: 10

## 2019-06-02 NOTE — PROGRESS NOTES
[facility] note    Rounded on patient this AM. He was not available, per his board, he had 8 stools since yesterday at 1500, may be improvement. Our team will assess him tomorrow and determine any further work-up or treatment at that time. Please call with any questions or concerns in the interim.     Alessandro Mendez MD   [facility]  Cell [phone]  After 5 PM, please call [phone]

## 2019-06-02 NOTE — PROGRESS NOTES
"[facility]    Hospitalist Progress Note  Name: Alex Cardona    MRN: [id number]  Provider: Peggy Wilson MD  Date of Service: 06/02/2019    Assessment & Plan   Summary of Stay: Alex Cardona is a 48 year old male who was admitted on 5/25/2019 Abdominal pain nausea and diarrhea. Prior to admission CT scan showed pancolitis.  Underwent colonoscopy biopsies showed chronic colitis with areas of ulceration suggestive of ulcerative colitis hepatitis panel was nonreactive except for hep A IgG. QuantiFERON  is indeterminant. Patient was started on steroids with plans to start on Remicade    1. Pancolitis with bloody diarrhea. Clinical presentation suggestive of ulcerative colitis  --Status post colonoscopy 5/26/2019. Surgical pathology showing \"Left colon biopsies severe active chronic colitis with areas of ulceration and reactive changes,Negative for dysplasia and malignancy. CMV immunostain pending. \"  --Clinical presentation suggestive for ulcerative colitis  --Continue IV fluids until patient able to tolerate p.o.  --Started on steroids per GI. A dose of Remicade given on 5/30  --Solu-Medrol decreased to 20 mg every 8 hours per gastroenterology.  --diet advanced to low fiber diet    2. Dehydration  --Continue IV fluids until adequate p.o. Intake    3. Hyponatremia, likely chronic  --Serum sodium 131 today. Will continue IV fluid     4. Hypokalemia  --Will replace per protocol. Will monitor electrolytes    5. Hypocalcemia  --Corrected calcium would be normal if corrected for albumin    DVT Prophylaxis: Pneumatic Compression Devices. Encourage ambulation  Code Status: Full Code    Disposition: Expected discharge: anticipate more than two nights of hospital course    Interval History   Patient seen and examined.  He stated that he is a little better today.  Denies nausea or vomiting.  Has no fever.  Abdominal pain improving.  Still has frequent loose stools with some blood.    -Data reviewed today: I " reviewed all new labs and imaging reports over the last 24 hours. I personally reviewed no images or EKG's today.    Physical Exam   Temp: 95.7  F (35.4  C) Temp src: Oral BP: 106/80 Pulse: 104 Heart Rate: 117 Resp: 16 SpO2: 98 %(when walking ing in shaw) O2 Device: None (Room air)    Vitals:    05/25/19 0831 05/26/19 0750 06/01/19 1100   Weight: 67.9 kg (149 lb 9.6 oz) 68 kg (150 lb) 64.2 kg (141 lb 8.6 oz)     Vital Signs with Ranges  Temp:  [95.7  F (35.4  C)-98  F (36.7  C)] 95.7  F (35.4  C)  Pulse:  [104] 104  Heart Rate:  [] 117  Resp:  [16-18] 16  BP: (106-149)/(74-83) 106/80  SpO2:  [97 %-99 %] 98 %  I/O last 3 completed shifts:  In: 930 [P.O.:930]  Out: -       GEN:  Alert, oriented x 3, appears comfortable, NAD.  HEENT:  Normocephalic/atraumatic, no scleral icterus, no nasal discharge, mouth moist.  CV:  Regular rate and rhythm, no murmur or JVD.  S1 + S2 noted, no S3 or S4.  LUNGS:  Clear to auscultation bilaterally without rales/rhonchi/wheezing/retractions.  Symmetric chest rise on inhalation noted.  ABD:  Active bowel sounds, soft, distended. no rebound/guarding/rigidity.  EXT:  No edema.  No cyanosis.  No joint synovitis noted.  SKIN:  Dry to touch, no exanthems noted in the visualized areas.    Medications     - MEDICATION INSTRUCTIONS -       - MEDICATION INSTRUCTIONS -       - MEDICATION INSTRUCTIONS -         enoxaparin  40 mg Subcutaneous Q24H     methylPREDNISolone  20 mg Intravenous Q8H     nystatin  500,000 Units Oral 4x Daily     omeprazole  20 mg Oral BID AC     sodium chloride (PF)  3 mL Intracatheter Q8H     Data     Recent Labs   Lab 06/02/19  0559 06/01/19  0651 05/31/19  0602   WBC 9.7 10.2 8.4   HGB 11.4* 12.0* 11.7*   HCT 33.8* 35.7* 34.2*   MCV 87 87 85    447 420     Recent Labs   Lab 06/02/19  0559 06/01/19  0651 05/31/19  0602   * 131* 129*   POTASSIUM 4.7 4.1 3.8   CHLORIDE 96 96 95   CO2 33* 30 32   ANIONGAP 2* 5 2*   * 166* 171*   BUN 13 9 9   CR  0.61* 0.63* 0.60*   GFRESTIMATED >90 >90 >90   GFRESTBLACK >90 >90 >90   KEZIA 7.7* 7.7* 7.5*     No results for input(s): CULT in the last 168 hours.  No results for input(s): AST, ALT, GGT, ALKPHOS, BILITOTAL, BILICONJ, BILIDIRECT, CELINE in the last 168 hours.    Invalid input(s): BILIRUBININDIRECT  No results for input(s): INR in the last 168 hours.  No results for input(s): LACT in the last 168 hours.  No results for input(s): COLOR, APPEARANCE, URINEGLC, URINEBILI, URINEKETONE, SG, UBLD, URINEPH, PROTEIN, UROBILINOGEN, NITRITE, LEUKEST, RBCU, WBCU in the last 168 hours.    No results found for this or any previous visit (from the past 24 hour(s)).

## 2019-06-02 NOTE — PROGRESS NOTES
A&O. VSS. Independent. +BS/gas, frequent stools though slowing down. Denies nausea though reported abd fullness this am, waited to eat anything until after noon. IV saline locked. Ultram PRN for pain control. WIll continue to monitor.

## 2019-06-02 NOTE — PLAN OF CARE
Pt is a 48yr old M, new diagnosis of Ulcerative Colitis.   Pt appears to be hopeful and open to learning about diagnosis. Pt education given on Ulcerative Colitis, symptoms, treatment, low fiber diet and lifestyle changes.   He is AxO x4, independent, ambulates frequently in the halls.   VSS.   LS-clear  -voiding adeq.  Cardiac-WNL,VSS  BS + all quads, loose stools x3, appears to be firming up a bit, scant red in stool still.   Pt c/o fullness after dinner, couldn't quite eat that much.   IV steroids given x1.   Abd pain noted before bed at 2210, tramadol given.

## 2019-06-02 NOTE — PLAN OF CARE
A/O, VSS pain 5/10 pain in abdomen.  Tramadol for pain control. BS hypoactive passing flatus feels bloated, denies nausea.  2 stool this shift less bloody per pt.  Ultram for pain control.  On IV solumedrol

## 2019-06-03 PROCEDURE — 25000132 ZZH RX MED GY IP 250 OP 250 PS 637: Performed by: INTERNAL MEDICINE

## 2019-06-03 PROCEDURE — 25000128 H RX IP 250 OP 636: Performed by: INTERNAL MEDICINE

## 2019-06-03 PROCEDURE — 12000000 ZZH R&B MED SURG/OB

## 2019-06-03 PROCEDURE — 99233 SBSQ HOSP IP/OBS HIGH 50: CPT | Performed by: INTERNAL MEDICINE

## 2019-06-03 RX ADMIN — TRAMADOL HYDROCHLORIDE 50 MG: 50 TABLET, COATED ORAL at 09:23

## 2019-06-03 RX ADMIN — ENOXAPARIN SODIUM 40 MG: 40 INJECTION SUBCUTANEOUS at 11:58

## 2019-06-03 RX ADMIN — OMEPRAZOLE 20 MG: 20 CAPSULE, DELAYED RELEASE ORAL at 05:26

## 2019-06-03 RX ADMIN — NYSTATIN 500000 UNITS: 500000 SUSPENSION ORAL at 11:58

## 2019-06-03 RX ADMIN — TRAMADOL HYDROCHLORIDE 50 MG: 50 TABLET, COATED ORAL at 21:26

## 2019-06-03 RX ADMIN — METHYLPREDNISOLONE SODIUM SUCCINATE 20 MG: 40 INJECTION, POWDER, FOR SOLUTION INTRAMUSCULAR; INTRAVENOUS at 07:38

## 2019-06-03 RX ADMIN — NYSTATIN 500000 UNITS: 500000 SUSPENSION ORAL at 19:58

## 2019-06-03 RX ADMIN — TRAMADOL HYDROCHLORIDE 50 MG: 50 TABLET, COATED ORAL at 03:00

## 2019-06-03 RX ADMIN — TRAMADOL HYDROCHLORIDE 50 MG: 50 TABLET, COATED ORAL at 15:14

## 2019-06-03 RX ADMIN — NYSTATIN 500000 UNITS: 500000 SUSPENSION ORAL at 07:39

## 2019-06-03 RX ADMIN — METHYLPREDNISOLONE SODIUM SUCCINATE 20 MG: 40 INJECTION, POWDER, FOR SOLUTION INTRAMUSCULAR; INTRAVENOUS at 15:45

## 2019-06-03 RX ADMIN — OMEPRAZOLE 20 MG: 20 CAPSULE, DELAYED RELEASE ORAL at 15:45

## 2019-06-03 RX ADMIN — NYSTATIN 500000 UNITS: 500000 SUSPENSION ORAL at 15:45

## 2019-06-03 ASSESSMENT — ACTIVITIES OF DAILY LIVING (ADL)
ADLS_ACUITY_SCORE: 10

## 2019-06-03 NOTE — PLAN OF CARE
"Alert, talkative. Independent ambulates in shaw and room. 2 stools this morning since 0700- \"a little watery, might have some blood in it. Its improving I know that. A lot better.\" Solumedrol continues. Abd flat, not distended. Thrush to mouth better. Edema moderate to bilateral ankles, teds on. Lungs clear. Ambulates often in hallway. Tolerating reg diet. No nausea. Ultram for pain. Plan will be home at discharge.     1346 Update- 4 stools total since 7am. Patient states last stool semisolid- not watery and no blood. He was pleased with this progress. Independent in hallways. Eating well.  "

## 2019-06-03 NOTE — PROGRESS NOTES
Discharge Planner   Discharge Plans in progress: Yes.   Barriers to discharge plan: Patient is not currently established with PCP. Met with patient who has chosen [facility] and requests assistance with scheduling PCP appointment.   Follow up plan: Appointment scheduled to establish care for post hospitalization with Dr. Garcia on Tuesday June 11th at 2:20PM and updated to S. Patient will need to follow up with financial counseling for any further questions with MA application/ setup.        Entered by: Brigitte Rey 06/03/2019 2:01 PM       Brigitte Rey MA-RN  Care Transition Services  [phone]

## 2019-06-03 NOTE — PROGRESS NOTES
"[facility]    Hospitalist Progress Note  Name: Alex Cardona    MRN: [id number]  Provider: Peggy Wilson MD  Date of Service: 06/03/2019    Assessment & Plan   Summary of Stay: Alex Cardona is a 48 year old male who was admitted on 5/25/2019 Abdominal pain nausea and diarrhea. Prior to admission CT scan showed pancolitis.  Underwent colonoscopy biopsies showed chronic colitis with areas of ulceration suggestive of ulcerative colitis hepatitis panel was nonreactive except for hep A IgG. QuantiFERON  is indeterminant. Patient was started on steroids with plans to start on Remicade    1. Pancolitis with bloody diarrhea. Clinical presentation suggestive of ulcerative colitis  --Status post colonoscopy 5/26/2019. Surgical pathology showing \"Left colon biopsies severe active chronic colitis with areas of ulceration and reactive changes,Negative for dysplasia and malignancy. CMV immunostain pending.\"  --Clinical presentation suggestive for ulcerative colitis  --Started on steroids per GI. A dose of Remicade given on 5/30  --Solu-Medrol decreased to 20 mg every 8 hours per gastroenterology.  --diet advanced to low fiber diet    2. Dehydration  --improved with IV fluids    3. Hyponatremia, likely chronic  --Serum sodium 131. Will monitor     4. Hypokalemia  --replaced per protocol. Will monitor electrolytes    5. Hypocalcemia  --Corrected calcium would be normal if corrected for albumin    DVT Prophylaxis: Pneumatic Compression Devices. Encourage ambulation    Code Status: Full Code    Disposition: Expected discharge: Anticipate more than two nights of hospital course    Interval History   Patient seen and examined. He stated that his symptoms are improving. Frequency of his diarrhea is improving. No fever. No significant abdominal pain.     -Data reviewed today: I reviewed all new labs and imaging reports over the last 24 hours. I personally reviewed no images or EKG's today.    Physical Exam   Temp: " 96.1  F (35.6  C) Temp src: Axillary BP: 147/72   Heart Rate: 97 Resp: 16 SpO2: 100 % O2 Device: None (Room air)    Vitals:    05/25/19 0831 05/26/19 0750 06/01/19 1100   Weight: 67.9 kg (149 lb 9.6 oz) 68 kg (150 lb) 64.2 kg (141 lb 8.6 oz)     Vital Signs with Ranges  Temp:  [95.7  F (35.4  C)-97.5  F (36.4  C)] 96.1  F (35.6  C)  Heart Rate:  [] 97  Resp:  [16-18] 16  BP: (106-147)/(69-80) 147/72  SpO2:  [97 %-100 %] 100 %  I/O last 3 completed shifts:  In: 1790 [P.O.:1790]  Out: -       GEN:  Alert, oriented x 3, appears comfortable, NAD.  HEENT:  Normocephalic/atraumatic, no scleral icterus, no nasal discharge, mouth moist.  CV:  Regular rate and rhythm, no murmur or JVD.  S1 + S2 noted, no S3 or S4.  LUNGS:  Clear to auscultation bilaterally without rales/rhonchi/wheezing/retractions.  Symmetric chest rise on inhalation noted.  ABD:  Active bowel sounds, soft, distended. no rebound/guarding/rigidity.  EXT:  No edema.  No cyanosis.  No joint synovitis noted.  SKIN:  Dry to touch, no exanthems noted in the visualized areas.    Medications       enoxaparin  40 mg Subcutaneous Q24H     methylPREDNISolone  20 mg Intravenous Q8H     nystatin  500,000 Units Oral 4x Daily     omeprazole  20 mg Oral BID AC     sodium chloride (PF)  3 mL Intracatheter Q8H     Data     Recent Labs   Lab 06/02/19  0559 06/01/19  0651 05/31/19  0602   WBC 9.7 10.2 8.4   HGB 11.4* 12.0* 11.7*   HCT 33.8* 35.7* 34.2*   MCV 87 87 85    447 420     Recent Labs   Lab 06/02/19  0559 06/01/19  0651 05/31/19  0602   * 131* 129*   POTASSIUM 4.7 4.1 3.8   CHLORIDE 96 96 95   CO2 33* 30 32   ANIONGAP 2* 5 2*   * 166* 171*   BUN 13 9 9   CR 0.61* 0.63* 0.60*   GFRESTIMATED >90 >90 >90   GFRESTBLACK >90 >90 >90   KEZIA 7.7* 7.7* 7.5*     No results for input(s): CULT in the last 168 hours.  No results for input(s): AST, ALT, GGT, ALKPHOS, BILITOTAL, BILICONJ, BILIDIRECT, CELINE in the last 168 hours.    Invalid input(s):  BILIRUBININDIRECT  No results for input(s): INR in the last 168 hours.  No results for input(s): LACT in the last 168 hours.  No results for input(s): COLOR, APPEARANCE, URINEGLC, URINEBILI, URINEKETONE, SG, UBLD, URINEPH, PROTEIN, UROBILINOGEN, NITRITE, LEUKEST, RBCU, WBCU in the last 168 hours.    No results found for this or any previous visit (from the past 24 hour(s)).

## 2019-06-03 NOTE — PROGRESS NOTES
Pt independent ambulating in the shaw. BM X2 during shift 1st one loose watery with some blood and 2nd one with the same consistency but no blood. Pain controlled with PRN tramadol and rest. Still on IV solumedrol 20 [id number]. Some bloating and discomfort after breakfast according to pt but no problem with dinner. Feet ankles still swollen, teds in use. Will keep monitoring.

## 2019-06-03 NOTE — PROGRESS NOTES
"[facility] - GASTROENTEROLOGY PROGRESS NOTE     IMPRESSION:  1. Severe indeterminate colitis - Remicade 5/30/19 x 1, day 8 of steroids. CMV negative on biopsies. C. Diff negative. Does seem to be having sustained improvement.    RECOMMENDATIONS:  1. Continue solumedrol 20mg IV q8hr  2. Continue current diet    Leon Starks  [facility]  Cell [phone]  After 5 PM, please call [phone]  ________________________________________________________________________      SUBJECTIVE:  Patient feels better, good appetite. Number of bowel movements do seem to be improved, with about 8 BM in the last 24 hours, compared with 12 in the previous 24 hours. Has several hours overnight without a bowel movement. Recent stools this morning with no blood per patient.      OBJECTIVE:  /72   Pulse 104   Temp 96.1  F (35.6  C) (Axillary)   Resp 16   Ht 1.803 m (5' 11\")   Wt 64.2 kg (141 lb 8.6 oz)   SpO2 100%   BMI 19.74 kg/m    Temp (24hrs), Av.9  F (36.1  C), Min:96.2  F (35.7  C), Max:98.2  F (36.8  C)    Patient Vitals for the past 72 hrs:   Weight   19 1100 64.2 kg (141 lb 8.6 oz)        PHYSICAL EXAM  GEN: Alert, oriented x3, NAD.    HRT: reg  LUNGS: CTA  ABD: +BS, non-tender, non-distended, no rebound or guarding.    Additional Data:  I have reviewed the patient's new clinical lab results:     Recent Labs   Lab Test 19  0559 19  0651 19  0602   WBC 9.7 10.2 8.4   HGB 11.4* 12.0* 11.7*   MCV 87 87 85    447 420     Recent Labs   Lab Test 19  0559 19  0651 19  0602   POTASSIUM 4.7 4.1 3.8   CHLORIDE 96 96 95   CO2 33* 30 32   BUN 13 9 9   ANIONGAP 2* 5 2*     Recent Labs   Lab Test 19  0505 19  1400 19  1340 19  0533   ALBUMIN 2.0*  --  3.0* 3.2*   BILITOTAL 0.6  --  0.6 0.7   ALT 9  --  16 12   AST 11  --  13 12   PROTEIN  --  70*  --   --    LIPASE 33*  --   --   --             "

## 2019-06-03 NOTE — PROGRESS NOTES
Legs still swollen eusebia stockings applied, ambulating in the shaw most of the shift. No nausea emesis, pain controlled with PRN tramadol. Still on IV solumedrol, hoping to discharge tomorrow. Still having loose stools says he counted 12 from 1500 yesterday to 1500 today. Says he had 3 brown BMs today. Some formed and some loose. Pt also says that he gets bloody stool usually 2 hours before IV solumedrol. Good appetitive no discomfort after meals. Will keep monitoring.

## 2019-06-03 NOTE — PLAN OF CARE
A&Ox4, up ad kirstin  LDA: PIV SL  Vitals: stable, RA  Pain: controlled w/PRN ultram  Tolerating low fiber diet  GI/: 3 BMs this shift per pt  Followed by GI  Plan: IV solumedrol  Will continue to monitor

## 2019-06-04 PROCEDURE — 25000132 ZZH RX MED GY IP 250 OP 250 PS 637: Performed by: INTERNAL MEDICINE

## 2019-06-04 PROCEDURE — 99233 SBSQ HOSP IP/OBS HIGH 50: CPT | Performed by: INTERNAL MEDICINE

## 2019-06-04 PROCEDURE — 12000000 ZZH R&B MED SURG/OB

## 2019-06-04 PROCEDURE — 25000128 H RX IP 250 OP 636: Performed by: PHYSICIAN ASSISTANT

## 2019-06-04 PROCEDURE — 25000128 H RX IP 250 OP 636: Performed by: INTERNAL MEDICINE

## 2019-06-04 PROCEDURE — 25800030 ZZH RX IP 258 OP 636: Performed by: PHYSICIAN ASSISTANT

## 2019-06-04 RX ADMIN — ENOXAPARIN SODIUM 40 MG: 40 INJECTION SUBCUTANEOUS at 13:10

## 2019-06-04 RX ADMIN — NYSTATIN 500000 UNITS: 500000 SUSPENSION ORAL at 14:12

## 2019-06-04 RX ADMIN — SODIUM CHLORIDE 600 MG: 9 INJECTION, SOLUTION INTRAVENOUS at 15:42

## 2019-06-04 RX ADMIN — NYSTATIN 500000 UNITS: 500000 SUSPENSION ORAL at 16:53

## 2019-06-04 RX ADMIN — METHYLPREDNISOLONE SODIUM SUCCINATE 20 MG: 40 INJECTION, POWDER, FOR SOLUTION INTRAMUSCULAR; INTRAVENOUS at 08:53

## 2019-06-04 RX ADMIN — TRAMADOL HYDROCHLORIDE 50 MG: 50 TABLET, COATED ORAL at 18:25

## 2019-06-04 RX ADMIN — NYSTATIN 500000 UNITS: 500000 SUSPENSION ORAL at 19:45

## 2019-06-04 RX ADMIN — OMEPRAZOLE 20 MG: 20 CAPSULE, DELAYED RELEASE ORAL at 16:53

## 2019-06-04 RX ADMIN — OMEPRAZOLE 20 MG: 20 CAPSULE, DELAYED RELEASE ORAL at 06:28

## 2019-06-04 RX ADMIN — TRAMADOL HYDROCHLORIDE 50 MG: 50 TABLET, COATED ORAL at 03:53

## 2019-06-04 RX ADMIN — METHYLPREDNISOLONE SODIUM SUCCINATE 20 MG: 40 INJECTION, POWDER, FOR SOLUTION INTRAMUSCULAR; INTRAVENOUS at 17:46

## 2019-06-04 RX ADMIN — METHYLPREDNISOLONE SODIUM SUCCINATE 20 MG: 40 INJECTION, POWDER, FOR SOLUTION INTRAMUSCULAR; INTRAVENOUS at 00:01

## 2019-06-04 RX ADMIN — TRAMADOL HYDROCHLORIDE 50 MG: 50 TABLET, COATED ORAL at 09:56

## 2019-06-04 RX ADMIN — NYSTATIN 500000 UNITS: 500000 SUSPENSION ORAL at 08:54

## 2019-06-04 ASSESSMENT — ACTIVITIES OF DAILY LIVING (ADL)
ADLS_ACUITY_SCORE: 10
ADLS_ACUITY_SCORE: 12
ADLS_ACUITY_SCORE: 10
ADLS_ACUITY_SCORE: 10

## 2019-06-04 NOTE — PLAN OF CARE
Pt has had several stools. Pt states they have been watery with formed stools.  Pt states he had 1 bloody stool this shift. Pt is voiding, ambulating  and taking tramadol for pain.  Pt has been tolerating his diet, denies having nausea. Pt will be getting remicade this shift.

## 2019-06-04 NOTE — PLAN OF CARE
Up independently. Pain managed with PRN tramadol. BLE edema with TEDs on. Continues with loose stools with larger amount towards end of shift per patient. He feels this occurs following steroids. Tolerating low fiber diet.

## 2019-06-04 NOTE — PROGRESS NOTES
"[facility]  [facility]  Gastroenterology Progress note    Interval History:    Continued loose stools but gradually becoming more solid per the patient.  Tolerating low fiber diet.  Small amount of blood in stool this am.  Patient reports feeling stronger.    Vital Signs:      /72 (BP Location: Left arm)   Pulse 106   Temp 96.7  F (35.9  C) (Oral)   Resp 16   Ht 1.803 m (5' 11\")   Wt 64.2 kg (141 lb 8.6 oz)   SpO2 98%   BMI 19.74 kg/m    Temp (24hrs), Av.2  F (36.2  C), Min:96.7  F (35.9  C), Max:97.7  F (36.5  C)    Patient Vitals for the past 72 hrs:   Weight   19 1100 64.2 kg (141 lb 8.6 oz)       Intake/Output Summary (Last 24 hours) at 2019 1051  Last data filed at 2019 0631  Gross per 24 hour   Intake 400 ml   Output --   Net 400 ml         Constitutional: NAD, comfortable  Cardiovascular: RRR, normal S1, S2   Respiratory: CTAB  Abdomen: soft, non-tender, nondistended    Additional Comments:  ROS, FH, SH: See initial GI consult for details.    Laboratory Data:  Recent Labs   Lab Test 19  0559 19  0651 19  0602   WBC 9.7 10.2 8.4   HGB 11.4* 12.0* 11.7*   MCV 87 87 85    447 420     Recent Labs   Lab Test 19  0559 19  0651 19  0602   * 131* 129*   POTASSIUM 4.7 4.1 3.8   CHLORIDE 96 96 95   CO2 33* 30 32   BUN 13 9 9   CR 0.61* 0.63* 0.60*   ANIONGAP 2* 5 2*   KEZIA 7.7* 7.7* 7.5*     Recent Labs   Lab Test 19  0505 19  1400 19  1340 19  0533   ALBUMIN 2.0*  --  3.0* 3.2*   BILITOTAL 0.6  --  0.6 0.7   ALT 9  --  16 12   AST 11  --  13 12   ALKPHOS 60  --  77 74   PROTEIN  --  70*  --   --    LIPASE 33*  --   --   --          Assessment:  49 yo male with severe indeterminate colitis.  He received Remicade 5/30 x 1.  Currently on day 9 of steroids.  Biopsies negative for CMV.  C. Diff negative.  Plan:  -  Continue solumedrol 20 mg IV q8hr  -  Diet as tolerated      Rocío " JASWINDER Pineda  [facility]  Office:  [phone] call if needed after 5PM  Cell:  [phone], not available after 5PM at this number

## 2019-06-04 NOTE — PLAN OF CARE
Up to floor at approximately 2040. Pain managed with dilaudid PCA and ice.. ЕЛЕНА x2. [place] patent and emptying. Dressing is clean, dry, and intact. On 3L with capnography or c-pap on room air.

## 2019-06-04 NOTE — PROGRESS NOTES
Assumed RN cares [id number]:  Received IV remicade this shift.  Pain managed with PRN ultram.  No nausea.  Voiding, multiple stools with scant red spots.  Independent.  Ambulating hallway.

## 2019-06-05 LAB
ANION GAP SERPL CALCULATED.3IONS-SCNC: 4 MMOL/L (ref 3–14)
BASOPHILS # BLD AUTO: 0.1 10E9/L (ref 0–0.2)
BASOPHILS NFR BLD AUTO: 0.4 %
BUN SERPL-MCNC: 11 MG/DL (ref 7–30)
CALCIUM SERPL-MCNC: 8.5 MG/DL (ref 8.5–10.1)
CHLORIDE SERPL-SCNC: 93 MMOL/L (ref 94–109)
CO2 SERPL-SCNC: 32 MMOL/L (ref 20–32)
CREAT SERPL-MCNC: 0.69 MG/DL (ref 0.66–1.25)
DIFFERENTIAL METHOD BLD: ABNORMAL
EOSINOPHIL # BLD AUTO: 0 10E9/L (ref 0–0.7)
EOSINOPHIL NFR BLD AUTO: 0 %
ERYTHROCYTE [DISTWIDTH] IN BLOOD BY AUTOMATED COUNT: 12.8 % (ref 10–15)
GFR SERPL CREATININE-BSD FRML MDRD: >90 ML/MIN/{1.73_M2}
GLUCOSE SERPL-MCNC: 132 MG/DL (ref 70–99)
HCT VFR BLD AUTO: 39.6 % (ref 40–53)
HGB BLD-MCNC: 13.1 G/DL (ref 13.3–17.7)
IMM GRANULOCYTES # BLD: 0.2 10E9/L (ref 0–0.4)
IMM GRANULOCYTES NFR BLD: 1.6 %
LACTATE BLD-SCNC: 2.9 MMOL/L (ref 0.7–2)
LYMPHOCYTES # BLD AUTO: 0.6 10E9/L (ref 0.8–5.3)
LYMPHOCYTES NFR BLD AUTO: 4.6 %
MCH RBC QN AUTO: 29.1 PG (ref 26.5–33)
MCHC RBC AUTO-ENTMCNC: 33.1 G/DL (ref 31.5–36.5)
MCV RBC AUTO: 88 FL (ref 78–100)
MONOCYTES # BLD AUTO: 0.5 10E9/L (ref 0–1.3)
MONOCYTES NFR BLD AUTO: 3.5 %
NEUTROPHILS # BLD AUTO: 12.5 10E9/L (ref 1.6–8.3)
NEUTROPHILS NFR BLD AUTO: 89.9 %
NRBC # BLD AUTO: 0 10*3/UL
NRBC BLD AUTO-RTO: 0 /100
PLATELET # BLD AUTO: 658 10E9/L (ref 150–450)
POTASSIUM SERPL-SCNC: 4.4 MMOL/L (ref 3.4–5.3)
RBC # BLD AUTO: 4.5 10E12/L (ref 4.4–5.9)
SODIUM SERPL-SCNC: 129 MMOL/L (ref 133–144)
WBC # BLD AUTO: 13.9 10E9/L (ref 4–11)

## 2019-06-05 PROCEDURE — 25000132 ZZH RX MED GY IP 250 OP 250 PS 637: Performed by: INTERNAL MEDICINE

## 2019-06-05 PROCEDURE — 80048 BASIC METABOLIC PNL TOTAL CA: CPT | Performed by: INTERNAL MEDICINE

## 2019-06-05 PROCEDURE — 12000000 ZZH R&B MED SURG/OB

## 2019-06-05 PROCEDURE — 85025 COMPLETE CBC W/AUTO DIFF WBC: CPT | Performed by: INTERNAL MEDICINE

## 2019-06-05 PROCEDURE — 25000128 H RX IP 250 OP 636: Performed by: INTERNAL MEDICINE

## 2019-06-05 PROCEDURE — 36415 COLL VENOUS BLD VENIPUNCTURE: CPT | Performed by: INTERNAL MEDICINE

## 2019-06-05 PROCEDURE — 83605 ASSAY OF LACTIC ACID: CPT | Performed by: INTERNAL MEDICINE

## 2019-06-05 PROCEDURE — 99232 SBSQ HOSP IP/OBS MODERATE 35: CPT | Performed by: INTERNAL MEDICINE

## 2019-06-05 RX ADMIN — ENOXAPARIN SODIUM 40 MG: 40 INJECTION SUBCUTANEOUS at 12:46

## 2019-06-05 RX ADMIN — METHYLPREDNISOLONE SODIUM SUCCINATE 20 MG: 40 INJECTION, POWDER, FOR SOLUTION INTRAMUSCULAR; INTRAVENOUS at 01:44

## 2019-06-05 RX ADMIN — TRAMADOL HYDROCHLORIDE 50 MG: 50 TABLET, COATED ORAL at 06:47

## 2019-06-05 RX ADMIN — NYSTATIN 500000 UNITS: 500000 SUSPENSION ORAL at 21:19

## 2019-06-05 RX ADMIN — TRAMADOL HYDROCHLORIDE 50 MG: 50 TABLET, COATED ORAL at 00:35

## 2019-06-05 RX ADMIN — NYSTATIN 500000 UNITS: 500000 SUSPENSION ORAL at 12:46

## 2019-06-05 RX ADMIN — TRAMADOL HYDROCHLORIDE 50 MG: 50 TABLET, COATED ORAL at 12:46

## 2019-06-05 RX ADMIN — OMEPRAZOLE 20 MG: 20 CAPSULE, DELAYED RELEASE ORAL at 06:35

## 2019-06-05 RX ADMIN — OMEPRAZOLE 20 MG: 20 CAPSULE, DELAYED RELEASE ORAL at 16:12

## 2019-06-05 RX ADMIN — NYSTATIN 500000 UNITS: 500000 SUSPENSION ORAL at 08:26

## 2019-06-05 RX ADMIN — METHYLPREDNISOLONE SODIUM SUCCINATE 20 MG: 40 INJECTION, POWDER, FOR SOLUTION INTRAMUSCULAR; INTRAVENOUS at 18:16

## 2019-06-05 RX ADMIN — TRAMADOL HYDROCHLORIDE 50 MG: 50 TABLET, COATED ORAL at 19:49

## 2019-06-05 RX ADMIN — METHYLPREDNISOLONE SODIUM SUCCINATE 20 MG: 40 INJECTION, POWDER, FOR SOLUTION INTRAMUSCULAR; INTRAVENOUS at 09:55

## 2019-06-05 RX ADMIN — NYSTATIN 500000 UNITS: 500000 SUSPENSION ORAL at 16:12

## 2019-06-05 ASSESSMENT — ACTIVITIES OF DAILY LIVING (ADL)
ADLS_ACUITY_SCORE: 12

## 2019-06-05 ASSESSMENT — MIFFLIN-ST. JEOR: SCORE: 1496.67

## 2019-06-05 NOTE — PLAN OF CARE
Pts BM have decreased today.  Pt has some that are soft and watery, and some small formed.  Pt has not had any blood in his stools this shift.  Pt is up independently ambulating frequently.  Pt is voiding.  Pt is taking tramadol for his abdomen pain.

## 2019-06-05 NOTE — PROGRESS NOTES
CLINICAL NUTRITION SERVICES - REASSESSMENT NOTE  Malnutrition:   % Weight Loss:  > 2% in 1 week (severe malnutrition)  % Intake:  No longer meets criteria --> see intake/tolerance section.   Subcutaneous Fat Loss:  Orbital region moderate depletion and Upper arm region mild-moderate depletion  Muscle Loss:  Temporal region moderate-severe depletion, Clavicle bone region moderate depletion, Dorsal hand region moderate depletion, Anterior thigh region mild depletion and Posterior calf region mild depletion  Fluid Retention:  Mild 2-3+     Malnutrition Diagnosis: Severe malnutrition  In Context of:  Acute illness or injury     EVALUATION OF PROGRESS TOWARD GOALS   Diet:  Low fiber (6/1)  Intake/Tolerance: Alex reports overall good tolerance of his diet. His main concerns are the amount of fiber he should be getting daily, if he should be drinking as much milk as he is, and watching acidic/fatty foods. He orders meals TID + receives on oral nutrition supplement daily. Reports that he has only drank the shake 2x, due to timing of its arrival (sometimes comes too close to his lunch arrival). He would like to continue to receive it. Recorded intakes: 100% of most meals since last assessment (6/1), meal review indicates adequate meals ordered w/ appropriate protein option.     ASSESSED NUTRITION NEEDS:  Dosing Weight 68 kg - admit  Estimated Energy Needs: 5239-4837 kcals (30-35 Kcal/Kg)  Justification: repletion  Estimated Protein Needs:  grams protein (1.2-1.5 g pro/Kg)  Justification: repletion/maintenance   Estimated Fluid Needs: >1 mL/kcal  Justification: maintenance    NEW FINDINGS:   - Stools gradually becoming more solid.   - No wt update since 6/1 --> ordered reweigh  - Discharge per GI  - Labs reviewed:   Na 129 (L) --> trending low over admission 129-131  - Meds reviewed  Remicade given 5/30    Previous Goals:   Pt to tolerate at least 75% meals tid + 1 high protein supplement.   Evaluation: Met --> on  average with 100% intakes for most meals, 2 supplements in 4 days since ordered.     Previous Nutrition Diagnosis:   Inadequate oral intake related to acute GI symptoms causing diarrhea, nausea as evidenced by oral intakes <50% on average since admission with slowly improving intakes, reduced PO PTA x 3-4 weeks, weight loss of 6% in 1 week, e/o fat and muscle wasting with repletion needs as above.   Evaluation: Completed  --> update below     CURRENT NUTRITION DIAGNOSIS  Increased nutrient needs (pro/energy) related to acute illness resulting in acute weight loss as evidenced by assessed needs as above, e/o fat and muscle loss, with up to 6% mass loss in just one week.     INTERVENTIONS  Recommendations / Nutrition Prescription  Continue diet per GI --> defer increase in fiber intake to this service as well    Continue current oral nutrition supplement order    Small/freq meals as tolerated, eat slowly    Reweigh patient    Implementation  Nutrition education --> reinforced low fiber diet, small bites, avoid acid/high fat. Pt's questions answered.   Collaboration and referral of nutrition care --> reweigh, appreciate nursing assistance.     Goals  Pt to tolerate at least 75% meals TID + one supplement daily.       MONITORING AND EVALUATION:  Progress towards goals will be monitored and evaluated per protocol and Practice Guidelines    Hattie Merida RD, LD  3rd floor/ICU: [phone]  All other floors: [phone]  Weekend/holiday: [phone]  Office: [phone]

## 2019-06-05 NOTE — PROVIDER NOTIFICATION
Triggered sepsis protocol.  Lactic acid 2.9 per lab.  Dr. Diop informed, no new orders at this time.  Will continue to monitor.

## 2019-06-05 NOTE — PLAN OF CARE
5914-1831: Patient independent. Denies pain/discomfort. Alert/oriented x4. BM this shift. Continue POC.

## 2019-06-05 NOTE — PROGRESS NOTES
[facility]    Hospitalist Progress Note      Assessment & Plan   Alex Cardona is a 48 year old male who was admitted on 5/25/2019.  No past medical history prior to admission.  Patient presented to emergency department due to worsening abdominal pain following 3 to 4 weeks of diarrhea prior to presentation.  Patient had also come to the emergency department 5/21 and 5/23 due to ongoing symptoms.  On 5/25, patient returned with ongoing pain and bloody diarrhea.  CT scan from 2 days earlier showed pancolitis.  Patient was admitted to hospitalist service for further evaluation and treatment with plan for gastroenterology consult.    Pancolitis with bloody diarrhea, severe  Suggestive of ulcerative colitis, however indeterminate at this time  Appreciate gastroenterology following.  Patient continues on IV Solu-Medrol per GI and has now received a second dose of Remicade.  Starting to have improvement in diarrhea and frequency.  Diet as tolerated.      Dehydration  Improved with IV fluids.    Hyponatremia  Hypokalemia  Likely both secondary to ongoing diarrhea.  Replacing potassium as needed.  We will continue to follow sodium with improvement in diarrhea and could consider further work-up if not resolving.    Hypocalcemia  Corrected calcium is normal.    Oropharyngeal candidiasis  Continue nystatin for total duration of 7 days.    DVT Prophylaxis: Enoxaparin (Lovenox) SQ  Code Status: Full Code  Expected discharge: Patient is currently requiring IV steroids.  Suspect that patient will require at least 1-2 more days depending on GI's adjustment in medications.    Ana Rosa Holloway MD FACP  Hospitalist Service  [facility]  Text Page (7am - 6pm)      Interval History   Patient received second dose of Remicade yesterday.  Patient reports of bloody stools have resolved.  General improvement in frequency stool starting to see more formed.  Patient tolerating diet good appetite.  Ambulating well  in hallway.  Otherwise no acute events.  Patient without additional complaints.    -Data reviewed today: I reviewed all new labs and imaging results over the last 24 hours.       Physical Exam   Temp: 97.1  F (36.2  C) Temp src: Oral BP: 131/77   Heart Rate: 97 Resp: 16 SpO2: 98 % O2 Device: None (Room air)    Vitals:    05/25/19 0831 05/26/19 0750 06/01/19 1100   Weight: 67.9 kg (149 lb 9.6 oz) 68 kg (150 lb) 64.2 kg (141 lb 8.6 oz)     Vital Signs with Ranges  Temp:  [97.1  F (36.2  C)-97.7  F (36.5  C)] 97.1  F (36.2  C)  Heart Rate:  [88-97] 97  Resp:  [16] 16  BP: (131-137)/(77) 131/77  SpO2:  [98 %-100 %] 98 %  No intake/output data recorded.    Constitutional: Thin man.  Seen ambulating in the hallway.  Alert and oriented x3.  No acute distress.  Patient with rapid and increased speech and somewhat hoarse voice.  States that his voice was normal in the morning.  HEENT: NCAT. EOMI. Moist oral mucosa.  Respiratory: Clear to auscultation bilaterally. No crackles or wheezes.  Cardiovascular: Regular rate and rhythm. No murmur.  GI: Soft, nontender, nondistended.  Hyperactive bowel sounds.   Musculoskeletal: Bilateral 2+ pitting pedal edema up to slightly above ankles.  Otherwise no gross deformities.   Neurologic: Alert and oriented x3. No focal neurologic deficits.  Normal gait.  Psychiatric: Patient notably with rapid and increased speech.  Suspect related to ongoing IV steroids.    Medications       enoxaparin  40 mg Subcutaneous Q24H     methylPREDNISolone  20 mg Intravenous Q8H     nystatin  500,000 Units Oral 4x Daily     omeprazole  20 mg Oral BID AC     sodium chloride (PF)  3 mL Intracatheter Q8H       Data   Recent Labs   Lab 06/05/19  0641 06/02/19  0559 06/01/19  0651   WBC 13.9* 9.7 10.2   HGB 13.1* 11.4* 12.0*   MCV 88 87 87   * 448 447   * 131* 131*   POTASSIUM 4.4 4.7 4.1   CHLORIDE 93* 96 96   CO2 32 33* 30   BUN 11 13 9   CR 0.69 0.61* 0.63*   ANIONGAP 4 2* 5   KEZIA 8.5 7.7* 7.7*    * 122* 166*       No results found for this or any previous visit (from the past 24 hour(s)).

## 2019-06-05 NOTE — PLAN OF CARE
Up independently. Loose, maroon, stools. Reports less abdominal cramping, although continues tramadol PRN. Edema remains to BLE. Tolerating a low fiber diet.

## 2019-06-05 NOTE — PROGRESS NOTES
"GASTROENTEROLOGY PROGRESS NOTE        SUBJECTIVE:  Improving BM frequency. Little to no blood.      OBJECTIVE:    /77 (BP Location: Left arm)   Pulse 106   Temp 97.1  F (36.2  C) (Oral)   Resp 16   Ht 1.803 m (5' 11\")   Wt 64.2 kg (141 lb 8.6 oz)   SpO2 98%   BMI 19.74 kg/m    Temp (24hrs), Av.4  F (36.3  C), Min:97.1  F (36.2  C), Max:97.7  F (36.5  C)    No data found.  No intake or output data in the 24 hours ending 19 1153     PHYSICAL EXAM     Constitutional: NAD  Cardiovascular: RRR, normal S1, S2, no murmur appreciated   Respiratory: good transmission, CTAB    Abdomen: soft, NTTP         Additional Comments:  ROS, FH, SH: See initial GI consult for details.     I have reviewed the patient's new clinical lab results:     Recent Labs   Lab Test 19  0641 19  0559 19  0651   WBC 13.9* 9.7 10.2   HGB 13.1* 11.4* 12.0*   MCV 88 87 87   * 448 447     Recent Labs   Lab Test 19  0641 19  0559 19  0651   POTASSIUM 4.4 4.7 4.1   CHLORIDE 93* 96 96   CO2 32 33* 30   BUN 11 13 9   ANIONGAP 4 2* 5     Recent Labs   Lab Test 19  0505 19  1400 19  1340 19  0533   ALBUMIN 2.0*  --  3.0* 3.2*   BILITOTAL 0.6  --  0.6 0.7   ALT 9  --  16 12   AST 11  --  13 12   PROTEIN  --  70*  --   --    LIPASE 33*  --   --   --         ASSESSMENT/ PLAN  Alex Cardona 47 yo male with severe indeterminate colitis.  He received Remicade 5/30 x 1.  Currently on day 10 of steroids.  Biopsies negative for CMV.  C. Diff negative.    - Received additional 10mg/kg dose of Remicade yesterday . Significant improvement in diarrhea frequency.  -  Continue solumedrol 20 mg IV q8hr  -  Diet as tolerated      Discussed with Dr. Raudel Dunbar PA-C  [facility] ( [facility])    "

## 2019-06-06 LAB
ANION GAP SERPL CALCULATED.3IONS-SCNC: 5 MMOL/L (ref 3–14)
BUN SERPL-MCNC: 13 MG/DL (ref 7–30)
CALCIUM SERPL-MCNC: 7.6 MG/DL (ref 8.5–10.1)
CHLORIDE SERPL-SCNC: 97 MMOL/L (ref 94–109)
CO2 SERPL-SCNC: 30 MMOL/L (ref 20–32)
CREAT SERPL-MCNC: 0.69 MG/DL (ref 0.66–1.25)
CRP SERPL-MCNC: 25.8 MG/L (ref 0–8)
ERYTHROCYTE [DISTWIDTH] IN BLOOD BY AUTOMATED COUNT: 12.8 % (ref 10–15)
GFR SERPL CREATININE-BSD FRML MDRD: >90 ML/MIN/{1.73_M2}
GLUCOSE SERPL-MCNC: 176 MG/DL (ref 70–99)
HCT VFR BLD AUTO: 31.4 % (ref 40–53)
HGB BLD-MCNC: 10.6 G/DL (ref 13.3–17.7)
LACTATE BLD-SCNC: 2.7 MMOL/L (ref 0.7–2)
MAGNESIUM SERPL-MCNC: 2.4 MG/DL (ref 1.6–2.3)
MCH RBC QN AUTO: 29.5 PG (ref 26.5–33)
MCHC RBC AUTO-ENTMCNC: 33.8 G/DL (ref 31.5–36.5)
MCV RBC AUTO: 88 FL (ref 78–100)
PHOSPHATE SERPL-MCNC: 2.4 MG/DL (ref 2.5–4.5)
PLATELET # BLD AUTO: 461 10E9/L (ref 150–450)
POTASSIUM SERPL-SCNC: 4.1 MMOL/L (ref 3.4–5.3)
RBC # BLD AUTO: 3.59 10E12/L (ref 4.4–5.9)
SODIUM SERPL-SCNC: 132 MMOL/L (ref 133–144)
WBC # BLD AUTO: 14.8 10E9/L (ref 4–11)

## 2019-06-06 PROCEDURE — 25000128 H RX IP 250 OP 636: Performed by: INTERNAL MEDICINE

## 2019-06-06 PROCEDURE — 25000132 ZZH RX MED GY IP 250 OP 250 PS 637: Performed by: INTERNAL MEDICINE

## 2019-06-06 PROCEDURE — 86140 C-REACTIVE PROTEIN: CPT | Performed by: INTERNAL MEDICINE

## 2019-06-06 PROCEDURE — 83605 ASSAY OF LACTIC ACID: CPT | Performed by: INTERNAL MEDICINE

## 2019-06-06 PROCEDURE — 83735 ASSAY OF MAGNESIUM: CPT | Performed by: INTERNAL MEDICINE

## 2019-06-06 PROCEDURE — 99232 SBSQ HOSP IP/OBS MODERATE 35: CPT | Performed by: INTERNAL MEDICINE

## 2019-06-06 PROCEDURE — 36415 COLL VENOUS BLD VENIPUNCTURE: CPT | Performed by: INTERNAL MEDICINE

## 2019-06-06 PROCEDURE — 80048 BASIC METABOLIC PNL TOTAL CA: CPT | Performed by: INTERNAL MEDICINE

## 2019-06-06 PROCEDURE — 12000000 ZZH R&B MED SURG/OB

## 2019-06-06 PROCEDURE — 85027 COMPLETE CBC AUTOMATED: CPT | Performed by: INTERNAL MEDICINE

## 2019-06-06 PROCEDURE — 84100 ASSAY OF PHOSPHORUS: CPT | Performed by: INTERNAL MEDICINE

## 2019-06-06 PROCEDURE — 25000131 ZZH RX MED GY IP 250 OP 636 PS 637: Performed by: PHYSICIAN ASSISTANT

## 2019-06-06 RX ORDER — PREDNISONE 20 MG/1
40 TABLET ORAL DAILY
Status: COMPLETED | OUTPATIENT
Start: 2019-06-06 | End: 2019-06-12

## 2019-06-06 RX ADMIN — NYSTATIN 500000 UNITS: 500000 SUSPENSION ORAL at 08:29

## 2019-06-06 RX ADMIN — OMEPRAZOLE 20 MG: 20 CAPSULE, DELAYED RELEASE ORAL at 06:05

## 2019-06-06 RX ADMIN — METHYLPREDNISOLONE SODIUM SUCCINATE 20 MG: 40 INJECTION, POWDER, FOR SOLUTION INTRAMUSCULAR; INTRAVENOUS at 02:04

## 2019-06-06 RX ADMIN — TRAMADOL HYDROCHLORIDE 50 MG: 50 TABLET, COATED ORAL at 02:05

## 2019-06-06 RX ADMIN — ENOXAPARIN SODIUM 40 MG: 40 INJECTION SUBCUTANEOUS at 11:59

## 2019-06-06 RX ADMIN — PREDNISONE 40 MG: 20 TABLET ORAL at 10:56

## 2019-06-06 RX ADMIN — TRAMADOL HYDROCHLORIDE 50 MG: 50 TABLET, COATED ORAL at 08:29

## 2019-06-06 RX ADMIN — TRAMADOL HYDROCHLORIDE 50 MG: 50 TABLET, COATED ORAL at 21:07

## 2019-06-06 RX ADMIN — TRAMADOL HYDROCHLORIDE 50 MG: 50 TABLET, COATED ORAL at 15:01

## 2019-06-06 RX ADMIN — NYSTATIN 500000 UNITS: 500000 SUSPENSION ORAL at 21:07

## 2019-06-06 RX ADMIN — OMEPRAZOLE 20 MG: 20 CAPSULE, DELAYED RELEASE ORAL at 16:27

## 2019-06-06 RX ADMIN — NYSTATIN 500000 UNITS: 500000 SUSPENSION ORAL at 11:59

## 2019-06-06 RX ADMIN — NYSTATIN 500000 UNITS: 500000 SUSPENSION ORAL at 16:27

## 2019-06-06 ASSESSMENT — ACTIVITIES OF DAILY LIVING (ADL)
ADLS_ACUITY_SCORE: 12
ADLS_ACUITY_SCORE: 12
ADLS_ACUITY_SCORE: 10
ADLS_ACUITY_SCORE: 12

## 2019-06-06 ASSESSMENT — MIFFLIN-ST. JEOR: SCORE: 1483.61

## 2019-06-06 NOTE — PLAN OF CARE
"A&Ox4. HR tachy 116 -124, other VSS.  Lactic acid 2.9, MD aware, no new orders, continue to monitor.  Denies pain, states \"no cramping today\".  BM x2 this shift, one with pink tinged output, one soft formed brown.  Up independently, ambulated in shaw and room.  Tolerating low fiber diet.  Continues on IV Solu medrol as ordered.  Will continue to monitor  "

## 2019-06-06 NOTE — PROVIDER NOTIFICATION
Paged [facility] to notify that pt had 1 episode of small amount of blood in stool this afternoon.  2250

## 2019-06-06 NOTE — PLAN OF CARE
A&O. VSS. Up ad kirstin. Abdominal Pain managed with Tramadol. On IV Solu-Medrol. On low fiber diet. Has been having small amounts of formed stool along with watery stool. Denies blood in stool.

## 2019-06-06 NOTE — CONSULTS
"CLINICAL NUTRITION SERVICES BRIEF NOTE  Refer to full reassessment done yesterday, 6/5 for more detail.     - Received consult \"Pt/family request\" --> pt concerned about weight loss, see below.   - Diet: Low Fiber  - Intake/tolerance: very good appetite, orders large meals frequently, and tolerates %. Suspect some degree of malabsorption given excessive loose stooling (up to 10X daily). Meal review indicates pt ordering ~3000 kcal, >150 g protein daily. Pt concerned about kitchen closing early (6:30 PM) and would like to set up snacks for overnight.     - Weight re-check ordered yesterday (appreciate nursing assistance), see trending:  Vitals:    05/25/19 0831 05/26/19 0750 06/01/19 1100 06/05/19 1621   Weight: 67.9 kg (149 lb 9.6 oz) 68 kg (150 lb) 64.2 kg (141 lb 8.6 oz) 61 kg (134 lb 6.1 oz)    06/06/19 0618   Weight: 59.6 kg (131 lb 8 oz)     - Extensive weight loss over admission, outlined above. Potential for up to 18# in just 2 weeks. Suspect admission weight slightly skewed, patient did endorse some leg swelling that has since gone down. He also feels that his weight loss begin before his admission, likely in the prior 1-2 weeks.   - 18# = 12% loss in <1 month. Although some weight loss may be attributed to changes in fluid, given the patients baseline weight, fat/muscle mass, expect the majority of the weight shift to be true weight loss.     NUTRITION STATUS VALIDATION  % Intake:Decreased intake does not meet criteria for malnutrition -- pt continues to have a great appetite, orders calorie- and protein- dense meals.   % Weight Loss:> 5% in 1 month (severe malnutrition)  Subcutaneous Fat Loss:Orbital region moderate-severe depletion and Upper arm region moderate-severe depletion  Muscle Loss:Temporal region moderate-severe depletion, Clavicle bone region moderate-severe depletion, Acromion bone region severe depletion and Dorsal hand region moderate depletion  Fluid/Edema:None noted  Severe " malnutrition in the context of acute on chronic illness    Intervention:  - Add high calorie/protein snack for overnight. Deli meat, cheese, PB, high protein milkshake. To be stored in unit fridge and used overnight.   - Discussed importance of small/freq and high calorie meals and snacks.   - Discussed POC with bedside RN.     Assessed learning needs, learning preferences, and willingness to learn    Nutrition Education (Content):  a) Provided handout   a. High calorie, high protein nutrition therapy  b) Discussed   a. Small/freq meals  b. High calorie foods    Nutrition Education (Application):  a) Added overnight snack  b) Choose a variety of foods  c) Limit sugar-dense foods if they seem to exacerbate diarrhea (if not, okay to continue).     Patient verbalizes understanding of diet    Anticipate good compliance    Diet Education - refer to Education Flowsheet    RD will continue to follow.     Hattie Merida RD, LD  3rd floor/ICU: [phone]  All other floors: [phone]  Weekend/holiday: [phone]  Office: [phone]

## 2019-06-06 NOTE — PROGRESS NOTES
"GASTROENTEROLOGY PROGRESS NOTE        SUBJECTIVE:  No longer seeing blood in stool. About 3-4 BMs in past 12 hours with form. No abdominal pain or cramping.      OBJECTIVE:    /76 (BP Location: Left arm)   Pulse 106   Temp 97  F (36.1  C) (Oral)   Resp 16   Ht 1.803 m (5' 11\")   Wt 59.6 kg (131 lb 8 oz)   SpO2 98%   BMI 18.34 kg/m    Temp (24hrs), Av.1  F (36.2  C), Min:95.8  F (35.4  C), Max:98.2  F (36.8  C)    Patient Vitals for the past 72 hrs:   Weight   19 0618 59.6 kg (131 lb 8 oz)   19 1621 61 kg (134 lb 6.1 oz)       Intake/Output Summary (Last 24 hours) at 2019 09  Last data filed at 2019  Gross per 24 hour   Intake 250 ml   Output --   Net 250 ml        PHYSICAL EXAM     Constitutional: NAD  Cardiovascular: Tachy, normal S1, S2, no murmur appreciated   Respiratory: good transmission, CTAB  Abdomen: + BS, soft, thin, NTTP         Additional Comments:  ROS, FH, SH: See initial GI consult for details.     I have reviewed the patient's new clinical lab results:     Recent Labs   Lab Test 19  0600 19  0641 19  0559   WBC 14.8* 13.9* 9.7   HGB 10.6* 13.1* 11.4*   MCV 88 88 87   * 658* 448     Recent Labs   Lab Test 19  0600 19  0641 19  0559   POTASSIUM 4.1 4.4 4.7   CHLORIDE 97 93* 96   CO2 30 32 33*   BUN 13 11 13   ANIONGAP 5 4 2*     Recent Labs   Lab Test 19  0505 19  1400 19  1340 19  0533   ALBUMIN 2.0*  --  3.0* 3.2*   BILITOTAL 0.6  --  0.6 0.7   ALT 9  --  16 12   AST 11  --  13 12   PROTEIN  --  70*  --   --    LIPASE 33*  --   --   --         FINAL DIAGNOSIS:   Left colon biopsies.   - Severe active chronic colitis with areas of ulceration and reactive   changes.   - Negative for dysplasia and malignancy.   - CMV immunostain pending.   - See microscopic description.       MICROSCOPIC:   There is severe colitis with cryptitis, crypt abscess formation, and areas    of ulceration and " granulation   tissue formation.  Areas of crypt distortion and chronic architectural   changes are seen.  No granulomas are   identified.  The findings would be consistent with inflammatory bowel   disease and ulcerative colitis in the   proper clinical setting, however, clinical correlation with pattern of   distribution in the remainder of the   colon are required.  No viral inclusions are seen on H&E stained sections.       ASSESSMENT/ PLAN  Alex Cardona is a 47 yo male with severe indeterminate colitis, favoring UC although colonoscopy 5/26 incomplete due to severity of colitis.  He received Remicade 5 mg/kg on (5/30). He has received 11 days of steroids. Biopsies negative for CMV. C. Diff negative on 5/22 and 5/29.     -- Received additional 10mg/kg dose of Remicade 6/4 with significant improvement in diarrhea frequency.  -- As clinically improving, will transition to oral prednisone 40 mg daily. Decrease dose by 5 mg every WEEK.   -- If symptoms worsen with transition to oral steroids, will need to consult colorectal surgery.  -- Dietitian consulted with plan for nutritional support.     Discussed with Dr. Starks.  Jaycee Kate PA-C  [facility] ( [facility])

## 2019-06-06 NOTE — PLAN OF CARE
Day RN  VS monitored, up independently, tachycardia, notified and discussed with Dr Diop, resting HR in 90's, sepsis protocol triggered, MD notified, no new orders at this time, haydee low fiber diet, nutrition consulted and spoke with pt re: weight loss (see their note in EHR), Tramadol for pain, voiding, 5 bm's today, 1 episode of blood in stool, left message with [facility], no call back, IV Solumedrol transitioned to PO Prednisone today, will cont to monitor.

## 2019-06-06 NOTE — PROGRESS NOTES
[facility]    Hospitalist Progress Note      Assessment & Plan   Alex Cardona is a 48 year old male who was admitted on 5/25/2019.  No past medical history prior to admission.  Patient presented to emergency department due to worsening abdominal pain following 3 to 4 weeks of diarrhea prior to presentation.  Patient had also come to the emergency department 5/21 and 5/23 due to ongoing symptoms.  On 5/25, patient returned with ongoing pain and bloody diarrhea.  CT scan from 2 days earlier showed pancolitis.  Patient was admitted to hospitalist service for further evaluation and treatment with plan for gastroenterology consult.    Pancolitis with bloody diarrhea, severe  Suggestive of ulcerative colitis, however indeterminate at this time  Appreciate gastroenterology following.  Patient continues on IV Solu-Medrol per GI and received a second dose of Remicade on 6/4.    Continues to have some improvement in diarrhea and frequency. Denies any further bleeding.   Appetite improving and diet increasing.   Regarding tapering of steroids, note that HPA suppression is unlikely to occur in patients taking ANY dose of steroids for less than 3 weeks.     Dehydration  Improved with IV fluids.    Hyponatremia  Hypokalemia  Likely both secondary to ongoing diarrhea.  Replacing potassium as needed.  We will continue to follow sodium with improvement in diarrhea and could consider further work-up if not resolving.    Hypocalcemia  Corrected calcium is normal.    Oropharyngeal candidiasis  Continue nystatin for total duration of 7 days.    DVT Prophylaxis: Enoxaparin (Lovenox) SQ  Code Status: Full Code  Expected discharge: Patient is currently requiring IV steroids.  Suspect that patient will require at least 1-2 more days depending on GI's adjustment in medications.    Ana Rosa Holloway MD FACP  Hospitalist Service  [facility]  Text Page (7am - 6pm)      Interval History   No acute events.  Increased appetite. Patient reports difficulty overnight given his degree of hunger and inability to order from the kitchen at that time. Suspect this is being driven partially by steroids.   Otherwise patient without complaints.     -Data reviewed today: I reviewed all new labs and imaging results over the last 24 hours.       Physical Exam   Temp: 97  F (36.1  C) Temp src: Oral BP: 132/76   Heart Rate: 125 Resp: 16 SpO2: 98 % O2 Device: None (Room air)    Vitals:    06/01/19 1100 06/05/19 1621 06/06/19 0618   Weight: 64.2 kg (141 lb 8.6 oz) 61 kg (134 lb 6.1 oz) 59.6 kg (131 lb 8 oz)     Vital Signs with Ranges  Temp:  [95.8  F (35.4  C)-98.2  F (36.8  C)] 97  F (36.1  C)  Heart Rate:  [] 125  Resp:  [16-17] 16  BP: (113-137)/(72-87) 132/76  SpO2:  [98 %-100 %] 98 %  I/O last 3 completed shifts:  In: 250 [P.O.:250]  Out: -     Constitutional: Thin man. Alert and oriented x3. No acute distress.  Continues to have rapid speech, though it is no longer hoarse.  Nontoxic.   HEENT: NCAT. EOMI. Moist oral mucosa.  Respiratory: Clear to auscultation bilaterally. No crackles or wheezes.  Cardiovascular: Tachycardic at time of exam, the patient has been room.  Regular rhythm.  No murmur.    GI: Soft, nontender, nondistended. Hyperactive bowel sounds unchanged.  Musculoskeletal: 1+ pitting edema bilaterally.  Otherwise no gross deformities.    Neurologic: Alert and oriented x3. No focal neurologic deficits.   Psychiatric: Patient continues with rapid and increased speech, which is likely secondary to high-dose steroids.    Medications       enoxaparin  40 mg Subcutaneous Q24H     methylPREDNISolone  20 mg Intravenous Q8H     nystatin  500,000 Units Oral 4x Daily     omeprazole  20 mg Oral BID AC     sodium chloride (PF)  3 mL Intracatheter Q8H       Data   Recent Labs   Lab 06/06/19  0600 06/05/19  0641 06/02/19  0559   WBC 14.8* 13.9* 9.7   HGB 10.6* 13.1* 11.4*   MCV 88 88 87   * 658* 448   * 129* 131*    POTASSIUM 4.1 4.4 4.7   CHLORIDE 97 93* 96   CO2 30 32 33*   BUN 13 11 13   CR 0.69 0.69 0.61*   ANIONGAP 5 4 2*   KEZIA 7.6* 8.5 7.7*   * 132* 122*       No results found for this or any previous visit (from the past 24 hour(s)).

## 2019-06-06 NOTE — PROVIDER NOTIFICATION
Paged Dr Diop to notify:     Sepsis protocol triggered. Lactic 2.7. VS in EPIC. HR still 120. Pt is going to sit still after lunch and then we will check resting HR.  1045    Spoke with Dr Diop via phone re: elevated lactic. No new orders at this time.

## 2019-06-07 LAB
ANION GAP SERPL CALCULATED.3IONS-SCNC: 5 MMOL/L (ref 3–14)
BUN SERPL-MCNC: 17 MG/DL (ref 7–30)
CALCIUM SERPL-MCNC: 8.2 MG/DL (ref 8.5–10.1)
CHLORIDE SERPL-SCNC: 94 MMOL/L (ref 94–109)
CO2 SERPL-SCNC: 31 MMOL/L (ref 20–32)
CREAT SERPL-MCNC: 0.78 MG/DL (ref 0.66–1.25)
GFR SERPL CREATININE-BSD FRML MDRD: >90 ML/MIN/{1.73_M2}
GLUCOSE SERPL-MCNC: 100 MG/DL (ref 70–99)
HGB BLD-MCNC: 11.9 G/DL (ref 13.3–17.7)
LACTATE BLD-SCNC: 1.2 MMOL/L (ref 0.7–2)
POTASSIUM SERPL-SCNC: 4.5 MMOL/L (ref 3.4–5.3)
SODIUM SERPL-SCNC: 130 MMOL/L (ref 133–144)

## 2019-06-07 PROCEDURE — 25000131 ZZH RX MED GY IP 250 OP 636 PS 637: Performed by: PHYSICIAN ASSISTANT

## 2019-06-07 PROCEDURE — 25000128 H RX IP 250 OP 636: Performed by: INTERNAL MEDICINE

## 2019-06-07 PROCEDURE — 80048 BASIC METABOLIC PNL TOTAL CA: CPT | Performed by: INTERNAL MEDICINE

## 2019-06-07 PROCEDURE — 36415 COLL VENOUS BLD VENIPUNCTURE: CPT | Performed by: INTERNAL MEDICINE

## 2019-06-07 PROCEDURE — 25000132 ZZH RX MED GY IP 250 OP 250 PS 637: Performed by: INTERNAL MEDICINE

## 2019-06-07 PROCEDURE — 85018 HEMOGLOBIN: CPT | Performed by: INTERNAL MEDICINE

## 2019-06-07 PROCEDURE — 83605 ASSAY OF LACTIC ACID: CPT | Performed by: INTERNAL MEDICINE

## 2019-06-07 PROCEDURE — 12000000 ZZH R&B MED SURG/OB

## 2019-06-07 PROCEDURE — 99232 SBSQ HOSP IP/OBS MODERATE 35: CPT | Performed by: INTERNAL MEDICINE

## 2019-06-07 RX ADMIN — TRAMADOL HYDROCHLORIDE 50 MG: 50 TABLET, COATED ORAL at 03:02

## 2019-06-07 RX ADMIN — NYSTATIN 500000 UNITS: 500000 SUSPENSION ORAL at 19:53

## 2019-06-07 RX ADMIN — NYSTATIN 500000 UNITS: 500000 SUSPENSION ORAL at 12:44

## 2019-06-07 RX ADMIN — TRAMADOL HYDROCHLORIDE 50 MG: 50 TABLET, COATED ORAL at 21:14

## 2019-06-07 RX ADMIN — NYSTATIN 500000 UNITS: 500000 SUSPENSION ORAL at 16:14

## 2019-06-07 RX ADMIN — TRAMADOL HYDROCHLORIDE 50 MG: 50 TABLET, COATED ORAL at 08:56

## 2019-06-07 RX ADMIN — ENOXAPARIN SODIUM 40 MG: 40 INJECTION SUBCUTANEOUS at 12:44

## 2019-06-07 RX ADMIN — OMEPRAZOLE 20 MG: 20 CAPSULE, DELAYED RELEASE ORAL at 16:14

## 2019-06-07 RX ADMIN — OMEPRAZOLE 20 MG: 20 CAPSULE, DELAYED RELEASE ORAL at 05:40

## 2019-06-07 RX ADMIN — TRAMADOL HYDROCHLORIDE 50 MG: 50 TABLET, COATED ORAL at 14:49

## 2019-06-07 RX ADMIN — PREDNISONE 40 MG: 20 TABLET ORAL at 08:54

## 2019-06-07 RX ADMIN — NYSTATIN 500000 UNITS: 500000 SUSPENSION ORAL at 08:55

## 2019-06-07 ASSESSMENT — ACTIVITIES OF DAILY LIVING (ADL)
ADLS_ACUITY_SCORE: 10
ADLS_ACUITY_SCORE: 10
ADLS_ACUITY_SCORE: 12
ADLS_ACUITY_SCORE: 10

## 2019-06-07 ASSESSMENT — MIFFLIN-ST. JEOR: SCORE: 1489.96

## 2019-06-07 NOTE — PROGRESS NOTES
[facility]    Hospitalist Progress Note      Assessment & Plan   Alex Cardona is a 48 year old male who was admitted on 5/25/2019.  No past medical history prior to admission.  Patient presented to emergency department due to worsening abdominal pain following 3 to 4 weeks of diarrhea prior to presentation.  Patient had also come to the emergency department 5/21 and 5/23 due to ongoing symptoms.  On 5/25, patient returned with ongoing pain and bloody diarrhea.  CT scan from 2 days earlier showed pancolitis.  Patient was admitted to hospitalist service for further evaluation and treatment with plan for gastroenterology consult.    Pancolitis with bloody diarrhea, severe  Suggestive of ulcerative colitis, however indeterminate at this time (will need further endoscopy)  Appreciate gastroenterology following.  Patient's IV solumedrol TID was changed to prednisone 40 mg PO daily on 6/6.  Patient received a second dose of Remicade on 6/4.    GI plans for patient to decrease prednisone dose by 5 mg every week and notes that he will need next infusion of Remicade in about 4 weeks with [facility] coordinating (and helping with coverage).   Has had some improvement in diarrhea and frequency.  Did have small amount of blood with stool overnight. Continue to follow closely.  GI suspects patient will be able to discharge over the weekend.    Dehydration  Improved with IV fluids.    Hyponatremia  Hypokalemia  Likely both secondary to ongoing diarrhea.  Replacing potassium as needed.  We will continue to follow sodium with improvement in diarrhea and could consider further work-up if not resolving.    Hypocalcemia  Corrected calcium is normal.    Oropharyngeal candidiasis  Continue nystatin for total duration of 7 days.    Severe malnutrition in setting of acute illness  Appreciate dietician recommendations for increasing protein and calories.     DVT Prophylaxis: Enoxaparin (Lovenox) SQ  Code Status: Full  Code  Expected discharge: Patient has transitioned to PO prednisone. Possible that patient will discharge in next 1-2 days.    Ana Rosa Holloway MD FACP  Hospitalist Service  [facility]  Text Page (7am - 6pm)      Interval History   Small amount of blood with stool overnight. Frequency of diarrhea improving. Continues to have strong appetite.  Patient without new complaints.   Otherwise no acute events.    -Data reviewed today: I reviewed all new labs and imaging results over the last 24 hours.       Physical Exam   Temp: 98.6  F (37  C) Temp src: Oral BP: 134/73   Heart Rate: 112 Resp: 18 SpO2: 97 % O2 Device: None (Room air)    Vitals:    06/05/19 1621 06/06/19 0618 06/07/19 0344   Weight: 61 kg (134 lb 6.1 oz) 59.6 kg (131 lb 8 oz) 60.3 kg (132 lb 14.4 oz)     Vital Signs with Ranges  Temp:  [96.7  F (35.9  C)-98.6  F (37  C)] 98.6  F (37  C)  Heart Rate:  [] 112  Resp:  [16-18] 18  BP: (116-134)/(66-73) 134/73  SpO2:  [97 %-100 %] 97 %  I/O last 3 completed shifts:  In: 540 [P.O.:540]  Out: -     Constitutional: Thin gentleman. Alert and oriented x3. No acute distress. Rapid speech, but improving from yesterday. Patient appears non-toxic.   HEENT: NCAT. EOMI. Moist oral mucosa.  Respiratory: Clear to auscultation bilaterally. No crackles or wheezes.  Cardiovascular: Continues to be mildly tachycardic. Regular rhythm. No murmur.     GI: Soft, nontender, nondistended. Hyperactive bowel sounds unchanged.  Musculoskeletal: No lower extremity edema. No gross deformities.    Neurologic: Alert and oriented x3. No focal neurologic deficits.   Psychiatric: Patient continues with rapid and increased speech, which is likely secondary to steroids, which are fortunately being decreased.     Medications       enoxaparin  40 mg Subcutaneous Q24H     nystatin  500,000 Units Oral 4x Daily     omeprazole  20 mg Oral BID AC     predniSONE  40 mg Oral Daily     sodium chloride (PF)  3 mL Intracatheter Q8H        Data   Recent Labs   Lab 06/07/19  0604 06/06/19  0600 06/05/19  0641 06/02/19  0559   WBC  --  14.8* 13.9* 9.7   HGB 11.9* 10.6* 13.1* 11.4*   MCV  --  88 88 87   PLT  --  461* 658* 448   * 132* 129* 131*   POTASSIUM 4.5 4.1 4.4 4.7   CHLORIDE 94 97 93* 96   CO2 31 30 32 33*   BUN 17 13 11 13   CR 0.78 0.69 0.69 0.61*   ANIONGAP 5 5 4 2*   KEZIA 8.2* 7.6* 8.5 7.7*   * 176* 132* 122*       No results found for this or any previous visit (from the past 24 hour(s)).

## 2019-06-07 NOTE — PROGRESS NOTES
"GASTROENTEROLOGY PROGRESS NOTE        SUBJECTIVE:  About 5 BMs yesterday, 2 loose and watery with small amount of blood. No abdominal pain. Eating and drinking.      OBJECTIVE:    /68 (BP Location: Left arm)   Pulse 106   Temp 98  F (36.7  C) (Oral)   Resp 18   Ht 1.803 m (5' 11\")   Wt 60.3 kg (132 lb 14.4 oz)   SpO2 100%   BMI 18.54 kg/m    Temp (24hrs), Av.1  F (36.2  C), Min:95.8  F (35.4  C), Max:98.2  F (36.8  C)    Patient Vitals for the past 72 hrs:   Weight   19 0344 60.3 kg (132 lb 14.4 oz)   19 0618 59.6 kg (131 lb 8 oz)   19 1621 61 kg (134 lb 6.1 oz)       Intake/Output Summary (Last 24 hours) at 2019 0907  Last data filed at 2019  Gross per 24 hour   Intake 250 ml   Output --   Net 250 ml        PHYSICAL EXAM     Constitutional: NAD  Cardiovascular: Tachy, normal S1, S2, no murmur appreciated   Respiratory: good transmission, CTAB  Abdomen: + BS, soft, thin, NTTP         Additional Comments:  ROS, FH, SH: See initial GI consult for details.     I have reviewed the patient's new clinical lab results:     Recent Labs   Lab Test 19  0604 19  0600 19  0641 19  0559   WBC  --  14.8* 13.9* 9.7   HGB 11.9* 10.6* 13.1* 11.4*   MCV  --  88 88 87   PLT  --  461* 658* 448     Recent Labs   Lab Test 19  0604 19  0600 19  0641   POTASSIUM 4.5 4.1 4.4   CHLORIDE 94 97 93*   CO2 31 30 32   BUN 17 13 11   ANIONGAP 5 5 4     Recent Labs   Lab Test 19  0505 19  1400 19  1340 19  0533   ALBUMIN 2.0*  --  3.0* 3.2*   BILITOTAL 0.6  --  0.6 0.7   ALT 9  --  16 12   AST 11  --  13 12   PROTEIN  --  70*  --   --    LIPASE 33*  --   --   --         FINAL DIAGNOSIS:   Left colon biopsies.   - Severe active chronic colitis with areas of ulceration and reactive   changes.   - Negative for dysplasia and malignancy.   - CMV immunostain pending.   - See microscopic description.       MICROSCOPIC:   There is severe " colitis with cryptitis, crypt abscess formation, and areas    of ulceration and granulation   tissue formation.  Areas of crypt distortion and chronic architectural   changes are seen.  No granulomas are   identified.  The findings would be consistent with inflammatory bowel   disease and ulcerative colitis in the   proper clinical setting, however, clinical correlation with pattern of   distribution in the remainder of the   colon are required.  No viral inclusions are seen on H&E stained sections.       ASSESSMENT/ PLAN  Alex Cardona is a 49 yo male with severe indeterminate colitis, favoring UC although colonoscopy 5/26 incomplete due to severity of colitis.  He received Remicade 5 mg/kg on (5/30). He has received 11 days of steroids. Biopsies negative for CMV. C. Diff negative on 5/22 and 5/29.     -- Received additional 10mg/kg dose of Remicade 6/4 with significant improvement in diarrhea frequency.  -- As clinically improving, will transition to oral prednisone 40 mg daily. Decrease dose by 5 mg every WEEK.   -- Patient will need next Remicade infusion in about 4 weeks. [facility] is coordinating infusion and helping with coverage.   -- Suspect stable for discharge this weekend.       Discussed with Dr. Starks.  Jaycee Kate PA-C  [facility] ( [facility])

## 2019-06-07 NOTE — PROGRESS NOTES
2 bloody and watery stools, times and amount in flow sheet. Ambulating in the shaw, swelling on lower extremities decreased. Meats and cheeses along with shakes in the fridge. Will keep monitoring.

## 2019-06-07 NOTE — PLAN OF CARE
Day RN  VS monitored, mild tachycardia in low 100's, up independently, Tramadol for mild abd pain, 4 bm's today per pt, haydee low fiber diet, voiding, possible discharge home this weekend with f/up with [facility] for next Remicade infusion in 4 weeks, will cont to monitor.

## 2019-06-08 LAB
ANION GAP SERPL CALCULATED.3IONS-SCNC: 4 MMOL/L (ref 3–14)
BUN SERPL-MCNC: 14 MG/DL (ref 7–30)
CALCIUM SERPL-MCNC: 7.6 MG/DL (ref 8.5–10.1)
CHLORIDE SERPL-SCNC: 95 MMOL/L (ref 94–109)
CO2 SERPL-SCNC: 31 MMOL/L (ref 20–32)
CREAT SERPL-MCNC: 0.68 MG/DL (ref 0.66–1.25)
GFR SERPL CREATININE-BSD FRML MDRD: >90 ML/MIN/{1.73_M2}
GLUCOSE SERPL-MCNC: 123 MG/DL (ref 70–99)
HGB BLD-MCNC: 10.3 G/DL (ref 13.3–17.7)
PLATELET # BLD AUTO: 449 10E9/L (ref 150–450)
POTASSIUM SERPL-SCNC: 3.8 MMOL/L (ref 3.4–5.3)
SODIUM SERPL-SCNC: 130 MMOL/L (ref 133–144)

## 2019-06-08 PROCEDURE — 25000128 H RX IP 250 OP 636: Performed by: INTERNAL MEDICINE

## 2019-06-08 PROCEDURE — 25000131 ZZH RX MED GY IP 250 OP 636 PS 637: Performed by: PHYSICIAN ASSISTANT

## 2019-06-08 PROCEDURE — 80048 BASIC METABOLIC PNL TOTAL CA: CPT | Performed by: INTERNAL MEDICINE

## 2019-06-08 PROCEDURE — 99232 SBSQ HOSP IP/OBS MODERATE 35: CPT | Performed by: INTERNAL MEDICINE

## 2019-06-08 PROCEDURE — 25000132 ZZH RX MED GY IP 250 OP 250 PS 637: Performed by: INTERNAL MEDICINE

## 2019-06-08 PROCEDURE — 12000000 ZZH R&B MED SURG/OB

## 2019-06-08 PROCEDURE — 85049 AUTOMATED PLATELET COUNT: CPT | Performed by: INTERNAL MEDICINE

## 2019-06-08 PROCEDURE — 36415 COLL VENOUS BLD VENIPUNCTURE: CPT | Performed by: INTERNAL MEDICINE

## 2019-06-08 PROCEDURE — 85018 HEMOGLOBIN: CPT | Performed by: INTERNAL MEDICINE

## 2019-06-08 RX ADMIN — TRAMADOL HYDROCHLORIDE 50 MG: 50 TABLET, COATED ORAL at 08:53

## 2019-06-08 RX ADMIN — OMEPRAZOLE 20 MG: 20 CAPSULE, DELAYED RELEASE ORAL at 05:46

## 2019-06-08 RX ADMIN — TRAMADOL HYDROCHLORIDE 50 MG: 50 TABLET, COATED ORAL at 03:31

## 2019-06-08 RX ADMIN — NYSTATIN 500000 UNITS: 500000 SUSPENSION ORAL at 11:43

## 2019-06-08 RX ADMIN — NYSTATIN 500000 UNITS: 500000 SUSPENSION ORAL at 08:52

## 2019-06-08 RX ADMIN — ENOXAPARIN SODIUM 40 MG: 40 INJECTION SUBCUTANEOUS at 11:43

## 2019-06-08 RX ADMIN — TRAMADOL HYDROCHLORIDE 50 MG: 50 TABLET, COATED ORAL at 15:08

## 2019-06-08 RX ADMIN — OMEPRAZOLE 20 MG: 20 CAPSULE, DELAYED RELEASE ORAL at 16:25

## 2019-06-08 RX ADMIN — PREDNISONE 40 MG: 20 TABLET ORAL at 08:53

## 2019-06-08 RX ADMIN — TRAMADOL HYDROCHLORIDE 50 MG: 50 TABLET, COATED ORAL at 21:04

## 2019-06-08 ASSESSMENT — ACTIVITIES OF DAILY LIVING (ADL)
ADLS_ACUITY_SCORE: 12
ADLS_ACUITY_SCORE: 10

## 2019-06-08 NOTE — PROGRESS NOTES
Pt stable during shift HR in the 90s at rest. BM X4 little to no blood according to pt. Good appetite eating 100% of his meals and additionally items in the fridge. Will keep monitoring.

## 2019-06-08 NOTE — PLAN OF CARE
Pt VSS. Prn tramadol x2 for bloating/abd pain. Pt stated little to no blood in liquid bowel movements overnight. Ambulating halls, pudding snacks x2. Pt on low fiber diet. Independent. Possible discharge today.

## 2019-06-08 NOTE — PLAN OF CARE
RN - VSS. Pt abdominal pain well controlled with PO ultram. Up ad kirstin in room. Tolerating low fiber diet. Continues to endorse watery stools - denies any presence of blood. Continues to be quite energetic secondary to steroid dosing - appearing to be improving from previous reports. Pt expected to stay one additional night - likely discharge tomorrow.

## 2019-06-08 NOTE — PROGRESS NOTES
"[facility]  Gastroenterology Progress note      Assessment       Indeterminate colitis status post two doses of remicade.  Last dose about 5 days ago.  He feels significantly improved but still having ?12 stools yesterday... 4 so far today.    Tachycardia noted today.  Not clear to me that he is improving      Plan    Repeat cbc, c reactive protein in AM        Interval History           Physical Exam    /76   Pulse 109   Temp 96.9  F (36.1  C) (Oral)   Resp 20   Ht 1.803 m (5' 11\")   Wt 60.3 kg (132 lb 14.4 oz)   SpO2 99%   BMI 18.54 kg/m    Temp (24hrs), Av  F (36.7  C), Min:96.9  F (36.1  C), Max:99.2  F (37.3  C)    Patient Vitals for the past 72 hrs:   Weight   19 0344 60.3 kg (132 lb 14.4 oz)   19 0618 59.6 kg (131 lb 8 oz)   19 1621 61 kg (134 lb 6.1 oz)       Intake/Output Summary (Last 24 hours) at 2019 1421  Last data filed at 2019 0800  Gross per 24 hour   Intake 240 ml   Output --   Net 240 ml         Constitutional: NAD, comfortable  Cardiovascular: RRR, normal S1, S2   Respiratory: CTAB  Abdomen: soft, non-tender, nondistended,  bs+      Additional Comments     ROS, FH, SH: See initial GI consult for details.    Lab Data     Recent Labs   Lab Test 19  0604 19  0600 19  0641 19  0559   WBC  --   --  14.8* 13.9* 9.7   HGB 10.3* 11.9* 10.6* 13.1* 11.4*   MCV  --   --  88 88 87     --  461* 658* 448     Recent Labs   Lab Test 19  0558 19  0604 19  0600   * 130* 132*   POTASSIUM 3.8 4.5 4.1   CHLORIDE 95 94 97   CO2 31 31 30   BUN 14 17 13   CR 0.68 0.78 0.69   ANIONGAP 4 5 5   KEZIA 7.6* 8.2* 7.6*     Recent Labs   Lab Test 19  0505 19  1400 19  1340 19  0533   ALBUMIN 2.0*  --  3.0* 3.2*   BILITOTAL 0.6  --  0.6 0.7   ALT 9  --  16 12   AST 11  --  13 12   ALKPHOS 60  --  77 74   PROTEIN  --  70*  --   --    LIPASE 33*  --   --   --  "               STEPHEN Bermeo MD  [facility]  Office: [phone] call if needed after 5PM or on weekends  Cell: [phone], not available after 5PM at this number

## 2019-06-08 NOTE — PROGRESS NOTES
[facility]    Hospitalist Progress Note      Assessment & Plan   Alex Cardona is a 48 year old male who was admitted on 5/25/2019.  No past medical history prior to admission.  Patient presented to emergency department due to worsening abdominal pain following 3 to 4 weeks of diarrhea prior to presentation.  Patient had also come to the emergency department 5/21 and 5/23 due to ongoing symptoms.  On 5/25, patient returned with ongoing pain and bloody diarrhea.  CT scan from 2 days earlier showed pancolitis.  Patient was admitted to hospitalist service for further evaluation and treatment with plan for gastroenterology consult.    Pancolitis with bloody diarrhea, severe  Suggestive of ulcerative colitis, however indeterminate at this time (will need further endoscopy)  Appreciate gastroenterology following.  Patient's IV solumedrol TID was changed to prednisone 40 mg PO daily on 6/6.  Patient received a second dose of Remicade on 6/4.    GI plans for patient to decrease prednisone dose by 5 mg every week and notes that he will need next infusion of Remicade in about 4 weeks with [facility] coordinating (and helping with coverage).   Has had some improvement in diarrhea and frequency.  No further bleeding.   Anticipate discharge home tomorrow if remains stable.    Dehydration  Improved with IV fluids.    Hyponatremia  Hypokalemia  Likely both secondary to ongoing diarrhea.  Replacing potassium as needed.  We will continue to follow sodium with improvement in diarrhea and could consider further work-up if not resolving.  As patient continues to have ongoing diarrhea, do not expect sodium to improve. This should be followed outpatient by primary care in the coming 1-2 months.     Hypocalcemia  Corrected calcium is normal.    Oropharyngeal candidiasis  Continued nystatin for total duration of 7 days.    Severe malnutrition in setting of acute illness  Appreciate dietician recommendations for increasing protein  and calories.     DVT Prophylaxis: Enoxaparin (Lovenox) SQ  Code Status: Full Code  Expected discharge: Patient has transitioned to PO prednisone. Possible that patient will discharge as early as tomorrow.    Ana Rosa Holloway MD FACP  Hospitalist Service  [facility]  Text Page (7am - 6pm)      Interval History   No further bloody stools, but patient reports continued watery diarrhea. Increasing diet reported. Patient describes foods he will eat at home on discharge to keep up his caloric intake.  No new complaints.  Otherwise no acute events.    -Data reviewed today: I reviewed all new labs and imaging results over the last 24 hours.       Physical Exam   Temp: 96.9  F (36.1  C) Temp src: Oral BP: 121/76 Pulse: 109 Heart Rate: 112 Resp: 20 SpO2: 99 % O2 Device: None (Room air)    Vitals:    06/05/19 1621 06/06/19 0618 06/07/19 0344   Weight: 61 kg (134 lb 6.1 oz) 59.6 kg (131 lb 8 oz) 60.3 kg (132 lb 14.4 oz)     Vital Signs with Ranges  Temp:  [96.9  F (36.1  C)-99.2  F (37.3  C)] 96.9  F (36.1  C)  Pulse:  [] 109  Heart Rate:  [] 112  Resp:  [18-20] 20  BP: (121-134)/(63-86) 121/76  SpO2:  [97 %-100 %] 99 %  I/O last 3 completed shifts:  In: 540 [P.O.:540]  Out: -     Constitutional: Thin gentleman. Alert and oriented x3. No acute distress. Continues to have rapid speech, but slowly improving each day. Non-toxic.  HEENT: NCAT. EOMI. Moist oral mucosa.  Respiratory: Clear to auscultation bilaterally. No crackles or wheezes.  Cardiovascular: Continues to be mildly tachycardic. Regular rhythm. No murmur.     GI: Soft, nontender, nondistended. Normoactive bowel sounds unchanged.  Musculoskeletal: Compression stockings in place. Trace pedal edema bilaterally. Otherwise no gross deformities.    Neurologic: Alert and oriented x3. No focal neurologic deficits.     Medications       enoxaparin  40 mg Subcutaneous Q24H     omeprazole  20 mg Oral BID AC     predniSONE  40 mg Oral Daily     sodium  chloride (PF)  3 mL Intracatheter Q8H       Data   Recent Labs   Lab 06/08/19  0558 06/07/19  0604 06/06/19  0600 06/05/19  0641 06/02/19  0559   WBC  --   --  14.8* 13.9* 9.7   HGB 10.3* 11.9* 10.6* 13.1* 11.4*   MCV  --   --  88 88 87     --  461* 658* 448   * 130* 132* 129* 131*   POTASSIUM 3.8 4.5 4.1 4.4 4.7   CHLORIDE 95 94 97 93* 96   CO2 31 31 30 32 33*   BUN 14 17 13 11 13   CR 0.68 0.78 0.69 0.69 0.61*   ANIONGAP 4 5 5 4 2*   KEZIA 7.6* 8.2* 7.6* 8.5 7.7*   * 100* 176* 132* 122*       No results found for this or any previous visit (from the past 24 hour(s)).

## 2019-06-09 LAB
BASOPHILS # BLD AUTO: 0 10E9/L (ref 0–0.2)
BASOPHILS NFR BLD AUTO: 0.2 %
CRP SERPL-MCNC: 41.8 MG/L (ref 0–8)
DIFFERENTIAL METHOD BLD: ABNORMAL
EOSINOPHIL # BLD AUTO: 0.1 10E9/L (ref 0–0.7)
EOSINOPHIL NFR BLD AUTO: 1 %
ERYTHROCYTE [DISTWIDTH] IN BLOOD BY AUTOMATED COUNT: 13 % (ref 10–15)
HCT VFR BLD AUTO: 32.3 % (ref 40–53)
HGB BLD-MCNC: 10.9 G/DL (ref 13.3–17.7)
IMM GRANULOCYTES # BLD: 0.1 10E9/L (ref 0–0.4)
IMM GRANULOCYTES NFR BLD: 0.9 %
LYMPHOCYTES # BLD AUTO: 1.7 10E9/L (ref 0.8–5.3)
LYMPHOCYTES NFR BLD AUTO: 16.9 %
MCH RBC QN AUTO: 29.6 PG (ref 26.5–33)
MCHC RBC AUTO-ENTMCNC: 33.7 G/DL (ref 31.5–36.5)
MCV RBC AUTO: 88 FL (ref 78–100)
MONOCYTES # BLD AUTO: 0.9 10E9/L (ref 0–1.3)
MONOCYTES NFR BLD AUTO: 9.5 %
NEUTROPHILS # BLD AUTO: 7 10E9/L (ref 1.6–8.3)
NEUTROPHILS NFR BLD AUTO: 71.5 %
NRBC # BLD AUTO: 0 10*3/UL
NRBC BLD AUTO-RTO: 0 /100
PLATELET # BLD AUTO: 477 10E9/L (ref 150–450)
RBC # BLD AUTO: 3.68 10E12/L (ref 4.4–5.9)
WBC # BLD AUTO: 9.8 10E9/L (ref 4–11)

## 2019-06-09 PROCEDURE — 36415 COLL VENOUS BLD VENIPUNCTURE: CPT | Performed by: INTERNAL MEDICINE

## 2019-06-09 PROCEDURE — 93005 ELECTROCARDIOGRAM TRACING: CPT

## 2019-06-09 PROCEDURE — 85018 HEMOGLOBIN: CPT | Performed by: INTERNAL MEDICINE

## 2019-06-09 PROCEDURE — 25000131 ZZH RX MED GY IP 250 OP 636 PS 637: Performed by: PHYSICIAN ASSISTANT

## 2019-06-09 PROCEDURE — 93010 ELECTROCARDIOGRAM REPORT: CPT | Performed by: INTERNAL MEDICINE

## 2019-06-09 PROCEDURE — 25000132 ZZH RX MED GY IP 250 OP 250 PS 637: Performed by: INTERNAL MEDICINE

## 2019-06-09 PROCEDURE — 86140 C-REACTIVE PROTEIN: CPT | Performed by: INTERNAL MEDICINE

## 2019-06-09 PROCEDURE — 25000128 H RX IP 250 OP 636: Performed by: INTERNAL MEDICINE

## 2019-06-09 PROCEDURE — 99233 SBSQ HOSP IP/OBS HIGH 50: CPT | Performed by: INTERNAL MEDICINE

## 2019-06-09 PROCEDURE — 85025 COMPLETE CBC W/AUTO DIFF WBC: CPT | Performed by: INTERNAL MEDICINE

## 2019-06-09 PROCEDURE — 12000000 ZZH R&B MED SURG/OB

## 2019-06-09 RX ORDER — NYSTATIN 100000/ML
500000 SUSPENSION, ORAL (FINAL DOSE FORM) ORAL 4 TIMES DAILY
Status: DISCONTINUED | OUTPATIENT
Start: 2019-06-10 | End: 2019-06-16 | Stop reason: HOSPADM

## 2019-06-09 RX ADMIN — TRAMADOL HYDROCHLORIDE 50 MG: 50 TABLET, COATED ORAL at 09:00

## 2019-06-09 RX ADMIN — OMEPRAZOLE 20 MG: 20 CAPSULE, DELAYED RELEASE ORAL at 15:40

## 2019-06-09 RX ADMIN — PREDNISONE 40 MG: 20 TABLET ORAL at 09:00

## 2019-06-09 RX ADMIN — SODIUM CHLORIDE 1000 ML: 9 INJECTION, SOLUTION INTRAVENOUS at 21:41

## 2019-06-09 RX ADMIN — TRAMADOL HYDROCHLORIDE 50 MG: 50 TABLET, COATED ORAL at 15:09

## 2019-06-09 RX ADMIN — TRAMADOL HYDROCHLORIDE 50 MG: 50 TABLET, COATED ORAL at 03:03

## 2019-06-09 RX ADMIN — OMEPRAZOLE 20 MG: 20 CAPSULE, DELAYED RELEASE ORAL at 06:06

## 2019-06-09 RX ADMIN — TRAMADOL HYDROCHLORIDE 50 MG: 50 TABLET, COATED ORAL at 21:19

## 2019-06-09 ASSESSMENT — ACTIVITIES OF DAILY LIVING (ADL)
ADLS_ACUITY_SCORE: 10

## 2019-06-09 ASSESSMENT — MIFFLIN-ST. JEOR: SCORE: 1485.7

## 2019-06-09 NOTE — PROGRESS NOTES
"[facility]  Gastroenterology Progress note      Assessment        Response to infliximab uncertain.  No more bleeding but still having 10 bm's a day.  Gets up X3 at night to use toilet.  CRP increased to 45.  Tachycardia to 120.        Plan    No change today  I would consider repeating flex sig in AM and having him seen by colorectal surgery      Interval History     as above      Physical Exam    /71   Pulse 123   Temp 97.9  F (36.6  C) (Oral)   Resp 16   Ht 1.803 m (5' 11\")   Wt 59.9 kg (131 lb 15.4 oz)   SpO2 100%   BMI 18.40 kg/m    Temp (24hrs), Av.2  F (36.8  C), Min:97.9  F (36.6  C), Max:98.7  F (37.1  C)    Patient Vitals for the past 72 hrs:   Weight   19 0624 59.9 kg (131 lb 15.4 oz)   19 0344 60.3 kg (132 lb 14.4 oz)       Intake/Output Summary (Last 24 hours) at 2019 1003  Last data filed at 2019 0827  Gross per 24 hour   Intake 480 ml   Output --   Net 480 ml         Constitutional: NAD, comfortable  Cardiovascular: RRR, normal S1, S2   Respiratory: CTAB  Abdomen: soft, non-tender, nondistended, bs+      Additional Comments     ROS, FH, SH: See initial GI consult for details.    Lab Data     Recent Labs   Lab Test 19  0610 19  0558 19  0604 19  0600 19  0641   WBC 9.8  --   --  14.8* 13.9*   HGB 10.9* 10.3* 11.9* 10.6* 13.1*   MCV 88  --   --  88 88   * 449  --  461* 658*     Recent Labs   Lab Test 19  0558 19  0604 19  0600   * 130* 132*   POTASSIUM 3.8 4.5 4.1   CHLORIDE 95 94 97   CO2 31 31 30   BUN 14 17 13   CR 0.68 0.78 0.69   ANIONGAP 4 5 5   KEZIA 7.6* 8.2* 7.6*     Recent Labs   Lab Test 19  0505 19  1400 19  1340 19  0533   ALBUMIN 2.0*  --  3.0* 3.2*   BILITOTAL 0.6  --  0.6 0.7   ALT 9  --  16 12   AST 11  --  13 12   ALKPHOS 60  --  77 74   PROTEIN  --  70*  --   --    LIPASE 33*  --   --   --                STEPHEN Bermeo MD  [place] [facility]  Office: [phone] call if needed after 5PM or on weekends  Cell: [phone], not available after 5PM at this number

## 2019-06-09 NOTE — PLAN OF CARE
A&O. Up independently. BM x4 during shift says somewhat formed and no bleeding. Tolerating low fiber diet well. Pain managed w/PRN tramadol. Plans to discharge possibly today.

## 2019-06-09 NOTE — PLAN OF CARE
A/O.  Pt reports only 2 stools on day shift, still loose, but no blood.  Good appetite, some bloating after meals.  Encouraged ambulation after meals. Up ad kirstin in room. Pain managed well with Tramadol.Tachycardic this shift, improved this evening to low 100's.  Voiding.  Await input from GI tomorrow regarding discharge vs another scope.  Will continue to monitor.

## 2019-06-09 NOTE — PROGRESS NOTES
[facility]    Hospitalist Progress Note      Assessment & Plan   Alex Cardona is a 48 year old male who was admitted on 5/25/2019.  No past medical history prior to admission.  Patient presented to emergency department due to worsening abdominal pain following 3 to 4 weeks of diarrhea prior to presentation.  Patient had also come to the emergency department 5/21 and 5/23 due to ongoing symptoms.  On 5/25, patient returned with ongoing pain and bloody diarrhea.  CT scan from 2 days earlier showed pancolitis.  Patient was admitted to hospitalist service for further evaluation and treatment with plan for gastroenterology consult.    Pancolitis with bloody diarrhea, severe  Suggestive of ulcerative colitis, however indeterminate at this time (will need further endoscopy)  Appreciate gastroenterology following.  Patient's IV solumedrol TID was changed to prednisone 40 mg PO daily on 6/6.  Patient received a second dose of Remicade on 6/4.    GI had planned for patient to decrease prednisone dose by 5 mg every week and notes that he will need next infusion of Remicade in about 4 weeks with [facility] coordinating (and helping with coverage).   However, unclear that patient has had any true improvement. Continues to have diarrhea and is having increased stools. No further bleeding.   CRP increased from 25 to 41.  Discussed with GI this morning and concern that patient is not actually improving as much as we hope to.  Will await further recommendations tomorrow and question if patient may ultimately need CRS involvement vs consideration of repeat colonoscopy at this time.   Appreciate ongoing GI recommendations.    Dehydration  Improved previously with IV fluids.  However, with ongoing frequent stools and intermittent diarrhea, patient may require further IV fluids.  See below.    Tachycardia  Question if patient still needs ongoing IV fluids despite adequate PO intake given ongoing diarrhea. Checking EKG to  ensure sinus tachycardia.  Ordering 1L NS overnight. Further fluid needs to be determined tomorrow.     Hyponatremia  Hypokalemia  Likely both secondary to ongoing diarrhea.  Replacing potassium as needed.  Continue to follow sodium once there is improvement in diarrhea and consider further work-up if not resolving.  As patient continues to have ongoing diarrhea, do not expect sodium to improve.     Hypocalcemia  Corrected calcium is normal.    Oropharyngeal candidiasis  Continued nystatin for total duration of 7 days.    Severe malnutrition in setting of acute illness  Appreciate dietician recommendations for increasing protein and calories.     DVT Prophylaxis: Enoxaparin (Lovenox) SQ  Code Status: Full Code  Expected discharge: Patient has transitioned to PO prednisone. Question if patient has improved on this regimen. Will appreciate further GI recommendations.    Ana Rosa Holloway MD FACP  Hospitalist Service  [facility]  Text Page (7am - 6pm)      Interval History   Patient continues to have significant bowel movements daily.  Greater than 10 bowel movements since previous day.  CRP has increased as well.  No further bloody stools.  GI recommends remaining inpatient for further evaluation and planning.  Patient continues to have strong appetite and is taking in significant number of calories daily. Not gaining any weight, however. Does report previous pedal edema is improved.  No new complaints.   Otherwise no acute events.     -Data reviewed today: I reviewed all new labs and imaging results over the last 24 hours.       Physical Exam   Temp: 98.4  F (36.9  C) Temp src: Oral BP: 122/62 Pulse: 123 Heart Rate: 105 Resp: 16 SpO2: 98 % O2 Device: None (Room air)    Vitals:    06/06/19 0618 06/07/19 0344 06/09/19 0624   Weight: 59.6 kg (131 lb 8 oz) 60.3 kg (132 lb 14.4 oz) 59.9 kg (131 lb 15.4 oz)     Vital Signs with Ranges  Temp:  [97.9  F (36.6  C)-98.7  F (37.1  C)] 98.4  F (36.9  C)  Pulse:   [101-123] 123  Heart Rate:  [101-108] 105  Resp:  [16] 16  BP: (111-128)/(62-71) 122/62  SpO2:  [98 %-100 %] 98 %  I/O last 3 completed shifts:  In: 480 [P.O.:480]  Out: -     Constitutional: Thin gentleman, sitting up in bed. Alert and oriented x3. No acute distress. Continues to have rapid speech, but this is improving in nature daily. Non-toxic.   HEENT: NCAT. EOMI. Patient has moist oral mucosa.  Respiratory: Clear to auscultation bilaterally with no crackles or wheezes.  Cardiovascular: Tachycardic, regular rhythm.  No murmurs.  Pedal edema has resolved.  GI: Soft, nontender, nondistended.  Hyperactive bowel sounds at time of exam.    Musculoskeletal: No gross deformities.    Neurologic: Alert and oriented x3. No focal neurologic deficits.     Medications       enoxaparin  40 mg Subcutaneous Q24H     omeprazole  20 mg Oral BID AC     predniSONE  40 mg Oral Daily       Data   Recent Labs   Lab 06/09/19  0610 06/08/19  0558 06/07/19  0604 06/06/19  0600 06/05/19  0641   WBC 9.8  --   --  14.8* 13.9*   HGB 10.9* 10.3* 11.9* 10.6* 13.1*   MCV 88  --   --  88 88   * 449  --  461* 658*   NA  --  130* 130* 132* 129*   POTASSIUM  --  3.8 4.5 4.1 4.4   CHLORIDE  --  95 94 97 93*   CO2  --  31 31 30 32   BUN  --  14 17 13 11   CR  --  0.68 0.78 0.69 0.69   ANIONGAP  --  4 5 5 4   KEZIA  --  7.6* 8.2* 7.6* 8.5   GLC  --  123* 100* 176* 132*       No results found for this or any previous visit (from the past 24 hour(s)).

## 2019-06-09 NOTE — PROGRESS NOTES
Still hoping to discharge tomorrow, BM X2 during shift says somewhat formed and no bleeding. Pain constantly at 4/10 with PRN tramadol. Good appetite, with shakes and chesses and meat for snack. Will keep monitoring.

## 2019-06-10 LAB
ANION GAP SERPL CALCULATED.3IONS-SCNC: 6 MMOL/L (ref 3–14)
BUN SERPL-MCNC: 14 MG/DL (ref 7–30)
CALCIUM SERPL-MCNC: 7.5 MG/DL (ref 8.5–10.1)
CHLORIDE SERPL-SCNC: 96 MMOL/L (ref 94–109)
CO2 SERPL-SCNC: 28 MMOL/L (ref 20–32)
COLONOSCOPY: NORMAL
CREAT SERPL-MCNC: 0.61 MG/DL (ref 0.66–1.25)
ERYTHROCYTE [DISTWIDTH] IN BLOOD BY AUTOMATED COUNT: 13 % (ref 10–15)
GFR SERPL CREATININE-BSD FRML MDRD: >90 ML/MIN/{1.73_M2}
GLUCOSE SERPL-MCNC: 126 MG/DL (ref 70–99)
HCT VFR BLD AUTO: 31.6 % (ref 40–53)
HGB BLD-MCNC: 10.5 G/DL (ref 13.3–17.7)
INTERPRETATION ECG - MUSE: NORMAL
MAGNESIUM SERPL-MCNC: 1.7 MG/DL (ref 1.6–2.3)
MCH RBC QN AUTO: 29.3 PG (ref 26.5–33)
MCHC RBC AUTO-ENTMCNC: 33.2 G/DL (ref 31.5–36.5)
MCV RBC AUTO: 88 FL (ref 78–100)
PHOSPHATE SERPL-MCNC: 3.1 MG/DL (ref 2.5–4.5)
PLATELET # BLD AUTO: 446 10E9/L (ref 150–450)
POTASSIUM SERPL-SCNC: 4.2 MMOL/L (ref 3.4–5.3)
RBC # BLD AUTO: 3.58 10E12/L (ref 4.4–5.9)
SODIUM SERPL-SCNC: 130 MMOL/L (ref 133–144)
WBC # BLD AUTO: 10 10E9/L (ref 4–11)

## 2019-06-10 PROCEDURE — 84100 ASSAY OF PHOSPHORUS: CPT | Performed by: INTERNAL MEDICINE

## 2019-06-10 PROCEDURE — 85027 COMPLETE CBC AUTOMATED: CPT | Performed by: INTERNAL MEDICINE

## 2019-06-10 PROCEDURE — 88342 IMHCHEM/IMCYTCHM 1ST ANTB: CPT | Mod: 26 | Performed by: INTERNAL MEDICINE

## 2019-06-10 PROCEDURE — 88341 IMHCHEM/IMCYTCHM EA ADD ANTB: CPT | Performed by: INTERNAL MEDICINE

## 2019-06-10 PROCEDURE — 25000131 ZZH RX MED GY IP 250 OP 636 PS 637: Performed by: PHYSICIAN ASSISTANT

## 2019-06-10 PROCEDURE — 25000132 ZZH RX MED GY IP 250 OP 250 PS 637: Performed by: INTERNAL MEDICINE

## 2019-06-10 PROCEDURE — 36415 COLL VENOUS BLD VENIPUNCTURE: CPT | Performed by: INTERNAL MEDICINE

## 2019-06-10 PROCEDURE — 83735 ASSAY OF MAGNESIUM: CPT | Performed by: INTERNAL MEDICINE

## 2019-06-10 PROCEDURE — 88341 IMHCHEM/IMCYTCHM EA ADD ANTB: CPT | Mod: 26 | Performed by: INTERNAL MEDICINE

## 2019-06-10 PROCEDURE — 88342 IMHCHEM/IMCYTCHM 1ST ANTB: CPT | Performed by: INTERNAL MEDICINE

## 2019-06-10 PROCEDURE — 12000000 ZZH R&B MED SURG/OB

## 2019-06-10 PROCEDURE — 88305 TISSUE EXAM BY PATHOLOGIST: CPT | Performed by: INTERNAL MEDICINE

## 2019-06-10 PROCEDURE — 25000128 H RX IP 250 OP 636: Performed by: INTERNAL MEDICINE

## 2019-06-10 PROCEDURE — 25000125 ZZHC RX 250: Performed by: INTERNAL MEDICINE

## 2019-06-10 PROCEDURE — 99232 SBSQ HOSP IP/OBS MODERATE 35: CPT | Performed by: INTERNAL MEDICINE

## 2019-06-10 PROCEDURE — 80048 BASIC METABOLIC PNL TOTAL CA: CPT | Performed by: INTERNAL MEDICINE

## 2019-06-10 PROCEDURE — 88305 TISSUE EXAM BY PATHOLOGIST: CPT | Mod: 26 | Performed by: INTERNAL MEDICINE

## 2019-06-10 PROCEDURE — 45331 SIGMOIDOSCOPY AND BIOPSY: CPT | Performed by: INTERNAL MEDICINE

## 2019-06-10 PROCEDURE — 0DDE8ZX EXTRACTION OF LARGE INTESTINE, VIA NATURAL OR ARTIFICIAL OPENING ENDOSCOPIC, DIAGNOSTIC: ICD-10-PCS | Performed by: INTERNAL MEDICINE

## 2019-06-10 RX ORDER — NALOXONE HYDROCHLORIDE 0.4 MG/ML
.1-.4 INJECTION, SOLUTION INTRAMUSCULAR; INTRAVENOUS; SUBCUTANEOUS
Status: DISCONTINUED | OUTPATIENT
Start: 2019-06-10 | End: 2019-06-10

## 2019-06-10 RX ORDER — LIDOCAINE 40 MG/G
CREAM TOPICAL
Status: DISCONTINUED | OUTPATIENT
Start: 2019-06-10 | End: 2019-06-10 | Stop reason: HOSPADM

## 2019-06-10 RX ORDER — FLUMAZENIL 0.1 MG/ML
0.2 INJECTION, SOLUTION INTRAVENOUS
Status: ACTIVE | OUTPATIENT
Start: 2019-06-10 | End: 2019-06-11

## 2019-06-10 RX ORDER — LIDOCAINE HYDROCHLORIDE 20 MG/ML
JELLY TOPICAL PRN
Status: DISCONTINUED | OUTPATIENT
Start: 2019-06-10 | End: 2019-06-10 | Stop reason: HOSPADM

## 2019-06-10 RX ADMIN — PREDNISONE 40 MG: 20 TABLET ORAL at 08:17

## 2019-06-10 RX ADMIN — NYSTATIN 500000 UNITS: 100000 SUSPENSION ORAL at 13:19

## 2019-06-10 RX ADMIN — OMEPRAZOLE 20 MG: 20 CAPSULE, DELAYED RELEASE ORAL at 18:01

## 2019-06-10 RX ADMIN — TRAMADOL HYDROCHLORIDE 50 MG: 50 TABLET, COATED ORAL at 03:18

## 2019-06-10 RX ADMIN — NYSTATIN 500000 UNITS: 100000 SUSPENSION ORAL at 18:02

## 2019-06-10 RX ADMIN — TRAMADOL HYDROCHLORIDE 50 MG: 50 TABLET, COATED ORAL at 21:00

## 2019-06-10 RX ADMIN — ENOXAPARIN SODIUM 40 MG: 40 INJECTION SUBCUTANEOUS at 13:19

## 2019-06-10 RX ADMIN — NYSTATIN 500000 UNITS: 100000 SUSPENSION ORAL at 21:40

## 2019-06-10 RX ADMIN — OMEPRAZOLE 20 MG: 20 CAPSULE, DELAYED RELEASE ORAL at 05:55

## 2019-06-10 RX ADMIN — TRAMADOL HYDROCHLORIDE 50 MG: 50 TABLET, COATED ORAL at 15:05

## 2019-06-10 RX ADMIN — NYSTATIN 500000 UNITS: 100000 SUSPENSION ORAL at 08:17

## 2019-06-10 RX ADMIN — TRAMADOL HYDROCHLORIDE 50 MG: 50 TABLET, COATED ORAL at 09:36

## 2019-06-10 ASSESSMENT — ACTIVITIES OF DAILY LIVING (ADL)
ADLS_ACUITY_SCORE: 10
ADLS_ACUITY_SCORE: 12
ADLS_ACUITY_SCORE: 10
ADLS_ACUITY_SCORE: 12
ADLS_ACUITY_SCORE: 12

## 2019-06-10 ASSESSMENT — MIFFLIN-ST. JEOR: SCORE: 1499.03

## 2019-06-10 NOTE — PROGRESS NOTES
GI Staff Note:    Patient seen for colonoscopy today (6/10/19) with findings of severe pancolitis with deep ulcerations.  Visual appearance is no better when compared to findings on colonoscopy (5/26/19) and actually look worse (deep ulcerations are fairly diffuse in most areas). Terminal ileum was normal.  Findings are most consistent with severe Ulcerative Colitis - pancolitis refractory to two doses of Remicade (5mg/kg on 5/30 and 10mg/kg on 6/4).  He was on IV steroids but this was transitioned to oral steroids 3 days ago.    At this point he's clinically not improving despite maximal medical therapy.  I don't see a role for additional Remicade at this time.  We could return to IV steroids however he appears to be steroid refractory and I doubt we would get any additional benefit from this.  Will discuss with Colon & Rectal Surgery.    Gallo Burt MD  [facility]

## 2019-06-10 NOTE — PROGRESS NOTES
Approximately 8-10 stools yesterday.  Abdominal cramping and bloating.  One stool of small amount of blood but hemoglobin is remained stable.  He feels a little better and he thinks his bowels are better with eating more regular diet.  Unclear if he is truly getting better or not although he looks stable other than a mild regular tachycardia.  Sodium slightly low 130 as it has been.  Awaiting GI recommendations from today.  If no plans to change immunosuppressant regimen and to stick with a prolonged prednisone course and outpatient Remicade, would consider discharge home.

## 2019-06-10 NOTE — PLAN OF CARE
Patient reports one loose stool this am no blood noted.  Does complain of gas pains and is belching.  At [facility] for colonoscopy at this time.  Tolerating low fiber diet.

## 2019-06-10 NOTE — CONSULTS
"[facility]  Colon and Rectal Surgery Consult Note  Name: Alex Cardona    MRN: [id number]  YOB: 1970    Age: 48 year old  Date of admission: 5/25/2019  Primary care provider: [facility]     Requesting Physician:  Reina Dunbar PA-C  Reason for consult:  Colitis           History of Present Illness:   Alex Cardona is a 48 year old male with no significant past medical history, seen at the request of Reina Dunbar PA-C, for colitis.    Patient presented to the ED on 5/25/19 with ongoing diarrhea for ~1 month where patient was having loose stools about every 2-3 hours.  About 1 week prior to presentation, he began noticing bright red blood in his stools.  Bowel movements were associated with abdominal cramping.  Over this period of time, his appetite became poor and he believes he lost ~5-10 lbs prior to hospitalization.  In the ED, CT scan showed \"Infectious or inflammatory pancolitis. Minimal apparent involvement of the rectum.\" He was admitted on 5/25 and colonoscopy was done 5/26, but this was incomplete due to severity of colitis.  Pathology most consistent with ulcerative colitis.  He tested negative for c Diff and also negative for CMV.  He was started on IV steroids on 5/26, but was refractory to this.  He was then started on Remicade 5 mg/kg on 5/30 and had a second dose at 10 mg/kg on 6/4.  He has had some improvement with this having less frequent stools and less frequent blood and stools are beginning to have more form.  He was transitioned to oral steroids on 6/6.    Today, patient reports he has not had any crampy abdominal pain since starting Remicade about 1 week ago.  Though, he does complain of a \"dull\" \"bloaty\" pain that rates 4/10.  So far today he has had 1 BM that was loose/mushy without blood.  Yesterday, he reports having a total of 8 BMs and a couple were formed and only 1 had a small amount of blood present.  He is eating a low fiber diet " "without nausea or vomiting.    Patient reports he had a similar episode ~1 year ago that resolved on its own without any intervention.    At baseline, patient reports having 1 formed bowel movement daily that is non-bloody.  He denies straining.  He denies family history of IBD, but notes that his father had an abscess in the past which required resection of part of the small bowel.     Colonoscopy History:  Last colonoscopy done 5/26/19, but this was incomplete due to severity of colitis, pathology most consistent with ulcerative colitis.    Surgical History: No past surgeries.            Past Medical History:   No past medical history on file.          Past Surgical History:     Past Surgical History:   Procedure Laterality Date     COLONOSCOPY N/A 5/26/2019    Procedure: incomplete COLONOSCOPY with left colon biopsies by cold biopsy forceps;  Surgeon: Herbert Dior DO;  Location:  GI               Social History:     Social History     Tobacco Use     Smoking status: Former Smoker     Smokeless tobacco: Never Used     Tobacco comment: quit 4 years ago   Substance Use Topics     Alcohol use: Yes             Family History:   History reviewed. No pertinent family history.          Allergies:   No Known Allergies          Medications:       enoxaparin  40 mg Subcutaneous Q24H     nystatin  500,000 Units Swish & Spit 4x Daily     omeprazole  20 mg Oral BID AC     predniSONE  40 mg Oral Daily             Review of Systems:   A comprehensive greater than 10 system review of systems was carried out.  Pertinent positives and negatives are noted above.  Otherwise negative for contributory info.            Physical Exam:     Blood pressure 112/69, pulse 104, temperature 98.5  F (36.9  C), temperature source Oral, resp. rate 16, height 1.803 m (5' 11\"), weight 61.2 kg (134 lb 14.4 oz), SpO2 99 %.    Intake/Output Summary (Last 24 hours) at 6/10/2019 1115  Last data filed at 6/10/2019 0143  Gross per 24 hour   Intake " 1828 ml   Output --   Net 1828 ml     EXAM:  GEN: Awake alert and oriented, appears stated age  PULM: Non-labored breathing with normal respiratory effort  CVS: reg rate and rhythm, no peripheral edema  ABD: Soft, non tender, non distended. No rebound or guarding.  No peritoneal signs.   NEURO: CN II-XII grossly intact  MSK: extremeties with no clubbing, cyanosis or edema; able to ambulate.  PSYCH: responsive, alert, cooperative; oriented x3; appropriate mood and affect.  EXT/SKIN: inspection reveals no rashes, lesions or ulcers, normal coloring         Data Reviewed:     Results for orders placed or performed during the hospital encounter of 05/23/19   CT Abdomen Pelvis w Contrast    Narrative    CT ABDOMEN AND PELVIS WITH CONTRAST 5/23/2019 4:15 PM     HISTORY: Abdominal pain, diarrhea x 3 weeks     TECHNIQUE: Axial images from the lung bases to the symphysis are  performed with additional coronal reformatted images. 74 mL of Isovue  370 are given intravenously.  Radiation dose for this scan was reduced  using automated exposure control, adjustment of the mA and/or kV  according to patient size, or iterative reconstruction technique.    FINDINGS: The lung bases are clear.    Abdomen: The upper abdominal organs are within normal limits including  the liver, spleen, gallbladder, pancreas, adrenal glands and kidneys.  No hydronephrosis or obvious renal calculi. No enlarged lymph nodes.  The bowel is normal in caliber without obstruction, but there is  significant colonic wall thickening throughout the entire colon from  the cecum to the sigmoid colon consistent with an infectious or  inflammatory colitis. Small bowel is unremarkable. Appendix is  unremarkable. Aorta demonstrates scattered calcified plaque.    Pelvis: The bladder, prostate and rectum are otherwise unremarkable.  Only mild rectal wall thickening is noted. Small amount of free pelvic  fluid is present probably related to the colonic inflammation.  No  enlarged lymph nodes. Bone window examination is within normal limits.      Impression    IMPRESSION: Infectious or inflammatory pancolitis. Minimal apparent  involvement of the rectum. Abdominal and pelvic organs are otherwise  within normal limits. Appendix and terminal ileum appear normal.    LAURIE CAPELLAN MD       Recent Labs   Lab 06/10/19  0715 06/09/19  0610 06/08/19  0558  06/06/19  0600   WBC 10.0 9.8  --   --  14.8*   HGB 10.5* 10.9* 10.3*   < > 10.6*   HCT 31.6* 32.3*  --   --  31.4*   MCV 88 88  --   --  88    477* 449  --  461*    < > = values in this interval not displayed.     Recent Labs   Lab 06/10/19  0715 06/08/19  0558 06/07/19  0604 06/06/19  0600   * 130* 130* 132*   POTASSIUM 4.2 3.8 4.5 4.1   CHLORIDE 96 95 94 97   CO2 28 31 31 30   ANIONGAP 6 4 5 5   * 123* 100* 176*   BUN 14 14 17 13   CR 0.61* 0.68 0.78 0.69   GFRESTIMATED >90 >90 >90 >90   GFRESTBLACK >90 >90 >90 >90   KEZIA 7.5* 7.6* 8.2* 7.6*   MAG 1.7  --   --  2.4*   PHOS 3.1  --   --  2.4*     No results for input(s): INR in the last 168 hours.      Assessment and Plan:   Alex Cardona is a 48 year old man with severe indeterminate colitis.  Incomplete colonoscopy done 5/26 with pathology most consistent with ulcerative colitis.  GI is following and patient has received steroids and two doses of Remicade at this point.  It is unclear whether patient is having any significant improvement with these treatments.  Afebrile, mildly tachycardic to 104.  Hgb 10.5.  Agree with plan for flex sig today.      Plan:  1. Admitted to hospitalist service.  2. Surgery: No emergent surgery indicated at this time.  Agree with Remicade and steroids per GI.  Briefly discussed intention to avoid urgent surgery.  If patient fails to improve or if symptoms worsen despite medication treatments, surgery would be indicated and discussed at that time.  Patient s questions were answered.  He understands and agrees with the plan.  We will  continue to follow.  3. Diet: per GI  4. IV Fluids: per hospitalist  5. Antibiotics:  Not indicated.  6. Medications: Steroids & Remicade per GI  7. I&O s:  strict I&O s  8. Labs:   - Reviewed: by myself & Dr. Ruano.  - Ordered: None.   9. Imaging:   - Dr. Ruano and myself have personally viewed: CT abd/pelvis  - Ordered:  None  10. Activity: ambulate as tolerated, encourage OOB  11. DVT prophylaxis: SCD s, Lovenox  12. This plan has been discussed with Dr. Ruano.      Additional history obtained from patient and the chart.  Time spent on consultation: 45 minutes, greater than 50 percent of the total encounter time is spent in counseling and/or coordination of care          Willow Quintana PA-C  [facility]  Phone:  [phone]

## 2019-06-10 NOTE — PROGRESS NOTES
"GASTROENTEROLOGY PROGRESS NOTE        SUBJECTIVE:  No abdominal pain. Eating and drinking. 8 BM yesterday. One with blood.      OBJECTIVE:    /69 (BP Location: Left arm)   Pulse 104   Temp 98.5  F (36.9  C) (Oral)   Resp 16   Ht 1.803 m (5' 11\")   Wt 61.2 kg (134 lb 14.4 oz)   SpO2 99%   BMI 18.81 kg/m    Temp (24hrs), Av.1  F (36.2  C), Min:95.8  F (35.4  C), Max:98.2  F (36.8  C)    Patient Vitals for the past 72 hrs:   Weight   06/10/19 0551 61.2 kg (134 lb 14.4 oz)   19 0624 59.9 kg (131 lb 15.4 oz)       Intake/Output Summary (Last 24 hours) at 2019  Last data filed at 2019  Gross per 24 hour   Intake 250 ml   Output --   Net 250 ml        PHYSICAL EXAM     Constitutional: NAD  Cardiovascular: Tachy, normal S1, S2, no murmur appreciated   Respiratory: good transmission, CTAB  Abdomen: + BS, soft, thin, NTTP         Additional Comments:  ROS, FH, SH: See initial GI consult for details.     I have reviewed the patient's new clinical lab results:     Recent Labs   Lab Test 06/10/19  0715 19  0610 19  0558  19  0600   WBC 10.0 9.8  --   --  14.8*   HGB 10.5* 10.9* 10.3*   < > 10.6*   MCV 88 88  --   --  88    477* 449  --  461*    < > = values in this interval not displayed.     Recent Labs   Lab Test 06/10/19  0715 19  0558 19  0604   POTASSIUM 4.2 3.8 4.5   CHLORIDE 96 95 94   CO2 28 31 31   BUN 14 14 17   ANIONGAP 6 4 5     Recent Labs   Lab Test 19  0505 19  1400 19  1340 19  0533   ALBUMIN 2.0*  --  3.0* 3.2*   BILITOTAL 0.6  --  0.6 0.7   ALT 9  --  16 12   AST 11  --  13 12   PROTEIN  --  70*  --   --    LIPASE 33*  --   --   --         FINAL DIAGNOSIS:   Left colon biopsies.   - Severe active chronic colitis with areas of ulceration and reactive   changes.   - Negative for dysplasia and malignancy.   - CMV immunostain pending.   - See microscopic description.       ASSESSMENT/ PLAN  Alex Cardona is a 48 " yo male with severe indeterminate colitis, favoring UC although colonoscopy 5/26 incomplete due to severity of colitis.  He received Remicade 5 mg/kg on (5/30). He has received 11 days of steroids. Biopsies negative for CMV. C. Diff negative on 5/22 and 5/29.     -- Received additional 10mg/kg dose of Remicade 6/4 with significant improvement in diarrhea frequency.  -- Continue prednisone.  -- Patient will need next Remicade infusion in about 4 weeks. [facility] is coordinating infusion and helping with coverage.   -- Uncertain if improving, plan for flexible sigmoidoscopy today.   -- NPO. No prep needed for flex sig.       Discussed with Dr. Burt.  Jaycee Kate PA-C  [facility] ( [facility])

## 2019-06-10 NOTE — PROGRESS NOTES
Pre-Endoscopy History and Physical     Alex Cardona MRN# [id number]   YOB: 1970 Age: 48 year old     Date of Procedure: 5/25/2019  Primary care provider: Vero Matias  Type of Endoscopy: flexible sigmoidoscopy  Reason for Procedure: ulcerative colitis  Type of Anesthesia Anticipated: No sedation    HPI:    Alex is a 48 year old male who will be undergoing the above procedure.      A history and physical has been performed. The patient's medications and allergies have been reviewed. The risks and benefits of the procedure and the sedation options and risks were discussed with the patient.  All questions were answered and informed consent was obtained.      He denies a personal or family history of anesthesia complications or bleeding disorders.     No Known Allergies     Current Facility-Administered Medications   Medication     enoxaparin (LOVENOX) injection 40 mg     lidocaine (LMX4) cream     lidocaine (LMX4) kit     lidocaine 1 % 0.1-1 mL     lidocaine 1 % 0.1-1 mL     magnesium sulfate 4 g in 100 mL sterile water (premade)     May continue current IV fluids if patient has IV fluids infusing.     May take regular AM medications except those listed below     naloxone (NARCAN) injection 0.1-0.4 mg     nitroGLYcerin (NITROSTAT) sublingual tablet 0.4 mg     nystatin (MYCOSTATIN) suspension 500,000 Units     omeprazole (priLOSEC) CR capsule 20 mg     potassium chloride (KLOR-CON) Packet 20-40 mEq     potassium chloride 10 mEq in 100 mL intermittent infusion with 10 mg lidocaine     potassium chloride 10 mEq in 100 mL sterile water intermittent infusion (premix)     potassium chloride 20 mEq in 50 mL intermittent infusion     potassium chloride ER (K-DUR/KLOR-CON M) CR tablet 20-40 mEq     potassium phosphate 15 mmol in D5W 250 mL intermittent infusion     potassium phosphate 20 mmol in D5W 250 mL intermittent infusion     potassium phosphate 20 mmol in D5W 500 mL intermittent infusion  "    potassium phosphate 25 mmol in D5W 500 mL intermittent infusion     predniSONE (DELTASONE) tablet 40 mg     prochlorperazine (COMPAZINE) injection 10 mg    Or     prochlorperazine (COMPAZINE) tablet 10 mg    Or     prochlorperazine (COMPAZINE) Suppository 25 mg     sodium chloride (PF) 0.9% PF flush 3 mL     sodium chloride (PF) 0.9% PF flush 3 mL     traMADol (ULTRAM) tablet 50 mg       Patient Active Problem List   Diagnosis     Diarrhea     Colitis        History reviewed. No pertinent past medical history.     Past Surgical History:   Procedure Laterality Date     COLONOSCOPY N/A 5/26/2019    Procedure: incomplete COLONOSCOPY with left colon biopsies by cold biopsy forceps;  Surgeon: Herbert Dior DO;  Location:  GI       Social History     Tobacco Use     Smoking status: Former Smoker     Smokeless tobacco: Never Used     Tobacco comment: quit 4 years ago   Substance Use Topics     Alcohol use: Yes       Family History   Problem Relation Age of Onset     Colon Cancer No family hx of        REVIEW OF SYSTEMS:     5 point ROS negative except as noted above in HPI, including Gen., Resp., CV, GI &  system review.    PHYSICAL EXAM:   /88   Pulse 116   Temp 98.5  F (36.9  C) (Oral)   Resp 8   Ht 1.803 m (5' 11\")   Wt 61.2 kg (134 lb 14.4 oz)   SpO2 98%   BMI 18.81 kg/m   Estimated body mass index is 18.81 kg/m  as calculated from the following:    Height as of this encounter: 1.803 m (5' 11\").    Weight as of this encounter: 61.2 kg (134 lb 14.4 oz).   GENERAL APPEARANCE: no distress  MENTAL STATUS: alert  AIRWAY EXAM: Mallampatti Class I (visualization of the soft palate, fauces, uvula, anterior and posterior pillars)  RESP: lungs clear to auscultation - no rales, rhonchi or wheezes  CV: regular rates and rhythm    DIAGNOSTICS:      Not indicated    IMPRESSION     ASA Class 2 - Mild systemic disease    PLAN:     Flexible sigmoidoscopy    The above has been forwarded to the consulting " provider.    Signed Electronically by: Gallo Burt  Sonia 10, 2019

## 2019-06-10 NOTE — PLAN OF CARE
A&O. Up independently. BM x4  during shift says somewhat formed and had one w/small blood tinged. Tolerating low fiber diet well. Voiding well. IV placed for bolus order. Pain managed w/PRN tramadol. Plans to discharge possibly today.

## 2019-06-10 NOTE — PROGRESS NOTES
[facility]  Hospitalist Progress Note  Naom Ortiz MD 06/10/19    Reason for Stay (Diagnosis): Colitis         Assessment and Plan:      Summary of Stay: Alex Cardona is a 48 year old male with no significant past medical history other than a few previous episodes of diarrhea over the past 2 years that he was not seen for was admitted on 5/25/2019 with 3 to 4 weeks of diffuse abdominal pain and frequent diarrhea with some hematochezia.  Previously seen in the emergency department on 5/21 and 5/23.  Worsening pain on 5/25 to return to the ED and had a CT scan that showed pancolitis.  GI was consulted and he underwent a colonoscopy that was limited due to severity of colitis.  He was initially started on IV Solu-Medrol 3 times daily.  Due to lack of response he was also given Remicade with second dose on 6/4.  IV Solu-Medrol has been narrowed down to p.o. prednisone.  Has had issues with hyponatremia and hypokalemia likely secondary to ongoing diarrhea.  Overall has been tolerating a low fiber diet and keeping up on his oral fluids, however still having numerous bowel movements per day and difficult to say whether he is clinically improving despite steroids and Remicade.  Per GI plan for colonoscopy today and also consult colorectal surgery.    Problem List/Assessment and Plan:   Colitis, suspected ulcerative colitis: Presenting with very frequent bloody diarrhea for the past 3 to 4 weeks.  Has had similar episodes a few times over the past 2 years.  CT scan showing pancolitis.  Colonoscopy incomplete due to severity of colitis, but suggestive of ulcerative colitis.  Initially treated with 3 times daily IV Solu-Medrol then narrowed to prednisone 40 daily on 6/6.  Started on Remicade due to his lack of response to steroids and received his second dose on 6/4.  Difficult to say whether he is really having much improvement due to still having 8 bowel movements over 24-hour period.  CRP also  "rising.  -[facility] consulted, greatly appreciate assistance  -Continue prednisone 40 mg p.o. Daily  -GI planning for outpatient Remicade in about 4 weeks  -Per GI plan for repeat colonoscopy and colorectal surgery consult as it is unclear whether patient is having any significant improvement despite steroids and Remicade  -Tramadol as needed for pain    Sinus tachycardia: Suspect secondary to ongoing inflammation.  He has good oral intake including fluids.  No improvement with a liter of IV fluid overnight.    Hyponatremia, hypokalemia: Sodium level stably low in the 130 range.  Potassium is been replaced.  Will likely secondary to ongoing profuse diarrhea.    Hypocalcemia: Corrected calcium normal.    Oral pharyngeal candidiasis: Nystatin for 7-day course.    Severe malnutrition: Secondary to acute diarrheal illness.  -Dietitian consulted    Anemia: Hemoglobin is remained relatively stable at above 10 despite some intermittent mild hematochezia.  Suspect the inflammatory process from his colitis also preventing improvement in anemia.    DVT Prophylaxis: Enoxaparin (Lovenox) SQ  Code Status: Full Code  FEN: N.p.o. for colonoscopy otherwise low fiber diet  Discharge Dispo: Home  Estimated Disch Date / # of Days until Disch: TBD, further investigation today with colonoscopy and colorectal surgery consult        Interval History (Subjective):      Assumed care today.  Patient did have 8-10 bowel movements over the past 24 hours.  1 of them had a small amount of red blood.  Is not having any significant abdominal pain but does endorse some bloating and flatulence.  Afebrile and no chills.  Tolerating regular diet without any nausea or vomiting.                    Physical Exam:      Last Vital Signs:  /69 (BP Location: Left arm)   Pulse 104   Temp 98.5  F (36.9  C) (Oral)   Resp 16   Ht 1.803 m (5' 11\")   Wt 61.2 kg (134 lb 14.4 oz)   SpO2 99%   BMI 18.81 kg/m        Intake/Output Summary (Last 24 " hours) at 6/10/2019 1131  Last data filed at 6/10/2019 0143  Gross per 24 hour   Intake 1828 ml   Output --   Net 1828 ml       Constitutional: Awake, NAD   Eyes: sclera white   HEENT:  MMM  Respiratory: no respiratory distress, lungs cta bilaterally, no crackles or wheeze  Cardiovascular: Mild regular tachycardia, no murmur  GI: Thin, non-tender, not distended to palpation, bowel sounds present  Skin: no rash  Musculoskeletal/extremities: No edema  Neurologic: A&O  Psychiatric: calm, cooperative, normal affect         Medications:      All current medications were reviewed with changes reflected in problem list.         Data:      All new lab and imaging data was reviewed.   Labs:  Recent Labs   Lab 06/10/19  0715 06/08/19  0558 06/07/19  0604   * 130* 130*   POTASSIUM 4.2 3.8 4.5   CHLORIDE 96 95 94   CO2 28 31 31   ANIONGAP 6 4 5   * 123* 100*   BUN 14 14 17   CR 0.61* 0.68 0.78   GFRESTIMATED >90 >90 >90   GFRESTBLACK >90 >90 >90   KEZIA 7.5* 7.6* 8.2*     Recent Labs   Lab 06/10/19  0715 06/09/19  0610 06/08/19  0558  06/06/19  0600   WBC 10.0 9.8  --   --  14.8*   HGB 10.5* 10.9* 10.3*   < > 10.6*   HCT 31.6* 32.3*  --   --  31.4*   MCV 88 88  --   --  88    477* 449  --  461*    < > = values in this interval not displayed.      Imaging:   None today      Noam Ortiz MD

## 2019-06-11 LAB
ABO + RH BLD: ABNORMAL
ABO + RH BLD: ABNORMAL
ALBUMIN SERPL-MCNC: 2 G/DL (ref 3.4–5)
ANION GAP SERPL CALCULATED.3IONS-SCNC: 4 MMOL/L (ref 3–14)
BLD GP AB SCN SERPL QL: ABNORMAL
BLD PROD TYP BPU: ABNORMAL
BLOOD BANK CMNT PATIENT-IMP: ABNORMAL
BLOOD BANK CMNT PATIENT-IMP: ABNORMAL
BUN SERPL-MCNC: 12 MG/DL (ref 7–30)
CALCIUM SERPL-MCNC: 8.1 MG/DL (ref 8.5–10.1)
CHLORIDE SERPL-SCNC: 93 MMOL/L (ref 94–109)
CO2 SERPL-SCNC: 31 MMOL/L (ref 20–32)
CREAT SERPL-MCNC: 0.66 MG/DL (ref 0.66–1.25)
ERYTHROCYTE [DISTWIDTH] IN BLOOD BY AUTOMATED COUNT: 13.2 % (ref 10–15)
GFR SERPL CREATININE-BSD FRML MDRD: >90 ML/MIN/{1.73_M2}
GLUCOSE SERPL-MCNC: 90 MG/DL (ref 70–99)
HCT VFR BLD AUTO: 34.1 % (ref 40–53)
HGB BLD-MCNC: 11.2 G/DL (ref 13.3–17.7)
MCH RBC QN AUTO: 28.9 PG (ref 26.5–33)
MCHC RBC AUTO-ENTMCNC: 32.8 G/DL (ref 31.5–36.5)
MCV RBC AUTO: 88 FL (ref 78–100)
NUM BPU REQUESTED: 2
PLATELET # BLD AUTO: 353 10E9/L (ref 150–450)
PLATELET # BLD AUTO: 485 10E9/L (ref 150–450)
POTASSIUM SERPL-SCNC: 4.6 MMOL/L (ref 3.4–5.3)
PREALB SERPL IA-MCNC: 18 MG/DL (ref 15–45)
RBC # BLD AUTO: 3.88 10E12/L (ref 4.4–5.9)
SODIUM SERPL-SCNC: 128 MMOL/L (ref 133–144)
SPECIMEN EXP DATE BLD: ABNORMAL
WBC # BLD AUTO: 10 10E9/L (ref 4–11)

## 2019-06-11 PROCEDURE — 25000132 ZZH RX MED GY IP 250 OP 250 PS 637: Performed by: COLON & RECTAL SURGERY

## 2019-06-11 PROCEDURE — 86922 COMPATIBILITY TEST ANTIGLOB: CPT | Performed by: COLON & RECTAL SURGERY

## 2019-06-11 PROCEDURE — 36415 COLL VENOUS BLD VENIPUNCTURE: CPT | Performed by: INTERNAL MEDICINE

## 2019-06-11 PROCEDURE — 80048 BASIC METABOLIC PNL TOTAL CA: CPT | Performed by: INTERNAL MEDICINE

## 2019-06-11 PROCEDURE — 84134 ASSAY OF PREALBUMIN: CPT | Performed by: INTERNAL MEDICINE

## 2019-06-11 PROCEDURE — 86870 RBC ANTIBODY IDENTIFICATION: CPT | Performed by: INTERNAL MEDICINE

## 2019-06-11 PROCEDURE — 86850 RBC ANTIBODY SCREEN: CPT | Performed by: COLON & RECTAL SURGERY

## 2019-06-11 PROCEDURE — 85049 AUTOMATED PLATELET COUNT: CPT | Performed by: COLON & RECTAL SURGERY

## 2019-06-11 PROCEDURE — 40000343 ZZHCL STATISTIC RH: Performed by: INTERNAL MEDICINE

## 2019-06-11 PROCEDURE — 86906 BLD TYPING SEROLOGIC RH PHNT: CPT | Performed by: INTERNAL MEDICINE

## 2019-06-11 PROCEDURE — 12000000 ZZH R&B MED SURG/OB

## 2019-06-11 PROCEDURE — 86900 BLOOD TYPING SEROLOGIC ABO: CPT | Performed by: COLON & RECTAL SURGERY

## 2019-06-11 PROCEDURE — 86880 COOMBS TEST DIRECT: CPT | Performed by: INTERNAL MEDICINE

## 2019-06-11 PROCEDURE — 99232 SBSQ HOSP IP/OBS MODERATE 35: CPT | Performed by: INTERNAL MEDICINE

## 2019-06-11 PROCEDURE — 85027 COMPLETE CBC AUTOMATED: CPT | Performed by: INTERNAL MEDICINE

## 2019-06-11 PROCEDURE — 25000128 H RX IP 250 OP 636: Performed by: COLON & RECTAL SURGERY

## 2019-06-11 PROCEDURE — 40000342 ZZHCL STATISTIC ABO: Performed by: INTERNAL MEDICINE

## 2019-06-11 PROCEDURE — 25000132 ZZH RX MED GY IP 250 OP 250 PS 637: Performed by: INTERNAL MEDICINE

## 2019-06-11 PROCEDURE — 86901 BLOOD TYPING SEROLOGIC RH(D): CPT | Performed by: COLON & RECTAL SURGERY

## 2019-06-11 PROCEDURE — 40000904 ZZH STATISTIC WOC PT EDUCATION, 46-60 MIN: Performed by: NURSE PRACTITIONER

## 2019-06-11 PROCEDURE — 86904 BLOOD TYPING PATIENT SERUM: CPT | Performed by: COLON & RECTAL SURGERY

## 2019-06-11 PROCEDURE — 36415 COLL VENOUS BLD VENIPUNCTURE: CPT | Performed by: COLON & RECTAL SURGERY

## 2019-06-11 PROCEDURE — 25800030 ZZH RX IP 258 OP 636: Performed by: INTERNAL MEDICINE

## 2019-06-11 PROCEDURE — 82040 ASSAY OF SERUM ALBUMIN: CPT | Performed by: INTERNAL MEDICINE

## 2019-06-11 PROCEDURE — 25000131 ZZH RX MED GY IP 250 OP 636 PS 637: Performed by: INTERNAL MEDICINE

## 2019-06-11 PROCEDURE — 86860 RBC ANTIBODY ELUTION: CPT | Performed by: INTERNAL MEDICINE

## 2019-06-11 RX ORDER — HEPARIN SODIUM 5000 [USP'U]/.5ML
5000 INJECTION, SOLUTION INTRAVENOUS; SUBCUTANEOUS EVERY 8 HOURS
Status: DISCONTINUED | OUTPATIENT
Start: 2019-06-11 | End: 2019-06-12 | Stop reason: DRUGHIGH

## 2019-06-11 RX ORDER — NEOMYCIN SULFATE 500 MG/1
1000 TABLET ORAL 3 TIMES DAILY
Status: COMPLETED | OUTPATIENT
Start: 2019-06-11 | End: 2019-06-11

## 2019-06-11 RX ORDER — METRONIDAZOLE 500 MG/1
500 TABLET ORAL 3 TIMES DAILY
Status: COMPLETED | OUTPATIENT
Start: 2019-06-11 | End: 2019-06-11

## 2019-06-11 RX ORDER — SODIUM CHLORIDE 9 MG/ML
INJECTION, SOLUTION INTRAVENOUS CONTINUOUS
Status: DISCONTINUED | OUTPATIENT
Start: 2019-06-11 | End: 2019-06-12

## 2019-06-11 RX ADMIN — METRONIDAZOLE 500 MG: 500 TABLET ORAL at 14:21

## 2019-06-11 RX ADMIN — PREDNISONE 40 MG: 20 TABLET ORAL at 08:08

## 2019-06-11 RX ADMIN — SODIUM CHLORIDE: 9 INJECTION, SOLUTION INTRAVENOUS at 08:12

## 2019-06-11 RX ADMIN — TRAMADOL HYDROCHLORIDE 50 MG: 50 TABLET, COATED ORAL at 03:08

## 2019-06-11 RX ADMIN — OMEPRAZOLE 20 MG: 20 CAPSULE, DELAYED RELEASE ORAL at 06:32

## 2019-06-11 RX ADMIN — TRAMADOL HYDROCHLORIDE 50 MG: 50 TABLET, COATED ORAL at 09:03

## 2019-06-11 RX ADMIN — NYSTATIN 500000 UNITS: 100000 SUSPENSION ORAL at 16:43

## 2019-06-11 RX ADMIN — NYSTATIN 500000 UNITS: 100000 SUSPENSION ORAL at 12:14

## 2019-06-11 RX ADMIN — NEOMYCIN SULFATE 1000 MG: 500 TABLET ORAL at 13:07

## 2019-06-11 RX ADMIN — NEOMYCIN SULFATE 1000 MG: 500 TABLET ORAL at 14:21

## 2019-06-11 RX ADMIN — HEPARIN SODIUM 5000 UNITS: 5000 INJECTION, SOLUTION INTRAVENOUS; SUBCUTANEOUS at 13:42

## 2019-06-11 RX ADMIN — NYSTATIN 500000 UNITS: 100000 SUSPENSION ORAL at 08:08

## 2019-06-11 RX ADMIN — OMEPRAZOLE 20 MG: 20 CAPSULE, DELAYED RELEASE ORAL at 16:43

## 2019-06-11 RX ADMIN — NYSTATIN 500000 UNITS: 100000 SUSPENSION ORAL at 20:17

## 2019-06-11 RX ADMIN — HEPARIN SODIUM 5000 UNITS: 5000 INJECTION, SOLUTION INTRAVENOUS; SUBCUTANEOUS at 20:17

## 2019-06-11 RX ADMIN — NEOMYCIN SULFATE 1000 MG: 500 TABLET ORAL at 22:54

## 2019-06-11 RX ADMIN — TRAMADOL HYDROCHLORIDE 50 MG: 50 TABLET, COATED ORAL at 15:04

## 2019-06-11 RX ADMIN — SODIUM CHLORIDE: 9 INJECTION, SOLUTION INTRAVENOUS at 16:37

## 2019-06-11 RX ADMIN — METRONIDAZOLE 500 MG: 500 TABLET ORAL at 22:54

## 2019-06-11 RX ADMIN — METRONIDAZOLE 500 MG: 500 TABLET ORAL at 13:07

## 2019-06-11 RX ADMIN — TRAMADOL HYDROCHLORIDE 50 MG: 50 TABLET, COATED ORAL at 21:07

## 2019-06-11 ASSESSMENT — ACTIVITIES OF DAILY LIVING (ADL)
ADLS_ACUITY_SCORE: 12

## 2019-06-11 ASSESSMENT — MIFFLIN-ST. JEOR: SCORE: 1496.76

## 2019-06-11 NOTE — PROGRESS NOTES
CLINICAL NUTRITION SERVICES - REASSESSMENT NOTE    Malnutrition:  % Weight Loss:> 5% in 1 month (severe malnutrition)  % Intake:  Decreased intake does not meet criteria for malnutrition - though unable to fully evaluate given difficulty assessing extent of malabsorption.   Subcutaneous Fat Loss:Orbital region moderate-severe depletion and Upper arm region moderate-severe depletion  Muscle Loss:Temporal region moderate-severe depletion, Clavicle bone region severe depletion, Acromion bone region severe depletion and Dorsal hand region moderate depletion  Fluid Retention:  None noted    Malnutrition Diagnosis: Severe malnutrition  In Context of:  Acute illness or injury     EVALUATION OF PROGRESS TOWARD GOALS   Diet:  Low Fiber (CLD today for surgery tomorrow)   Intake/Tolerance:   - Since diet advancement to low fiber on 5/31, Alex has had a very good appetite and is making an effort to include nutrient-dense (emphasis on protein) foods in his meals and snacks. When he is allowed to eat, he eats 100% of meals/snacks.   - Meal review shows consistently adequate meals ordered, ranging 6298-7660 kcal ordered daily, and 150-330 g protein daily. Given that patient is likely malabsorbing some portion of the food he eats, would consider these numbers appropriate.     ASSESSED NUTRITION NEEDS:  Dosing Weight 68 kg - admit  Estimated Energy Needs: 3570-4388 kcals (40-45 Kcal/Kg)  Justification: repletion, suspected malabsorption   Estimated Protein Needs: 105-136 grams protein (1.5-2 g pro/Kg)  Justification: repletion/maintenance, suspected malabsorption   Estimated Fluid Needs: >1 mL/kcal  Justification: maintenance    NEW FINDINGS:   - GI --> Colonoscopy on 6/10 revealed deep ulcerations, potentially even worse than initial evaluation.   - CRS --> 6/12 plan for laparoscopic assisted total abdominal colectomy with end ileostomy.   - I/O: ongoing loose stooling. Up to 8-10x daily, pt c/o ongoing need to go to the bathroom,  sitting there for ~45 minutes, laying back in bed for a little while and having to get back to the bathroom soon after.     - Labs reviewed   Na 128(L) --> has been consistently low ranging 128-132 over admission. Today is the lowest it has been.   K, last phos/mg values all NL  CRP 6/9 - 41.8 (H)    - Meds reviewed  2 doses of Remicade given this admission. No indication for further therapy. (5/30 and 6/4).  NaCl IVF @ 100 mL/hr (ordered 6/11)    - Weight Trending: Appears to have stabilized 131-134#, which is still a significant decline from his reported admit weight/reported UBW prior to admission. 16-19# loss in the past 1 month or less (10.7-12.7% loss).  Vitals:    05/25/19 0831 05/26/19 0750 06/01/19 1100 06/05/19 1621   Weight: 67.9 kg (149 lb 9.6 oz) 68 kg (150 lb) 64.2 kg (141 lb 8.6 oz) 61 kg (134 lb 6.1 oz)    06/06/19 0618 06/07/19 0344 06/09/19 0624 06/10/19 0551   Weight: 59.6 kg (131 lb 8 oz) 60.3 kg (132 lb 14.4 oz) 59.9 kg (131 lb 15.4 oz) 61.2 kg (134 lb 14.4 oz)    06/11/19 0602   Weight: 61 kg (134 lb 6.4 oz)       Previous Goals:   Pt to tolerate at least 75% meals TID + one supplement daily.   Evaluation: Met, exceeding goal    Previous Nutrition Diagnosis:   Increased nutrient needs (pro/energy) related to acute illness resulting in acute weight loss as evidenced by assessed needs as above, e/o fat and muscle loss, with up to 6% mass loss in just one week.   Evaluation: No change - update below    MALNUTRITION  % Weight Loss:> 5% in 1 month (severe malnutrition)  % Intake:  Decreased intake does not meet criteria for malnutrition - though unable to fully evaluate given difficulty assessing extent of malabsorption.   Subcutaneous Fat Loss:Orbital region moderate-severe depletion and Upper arm region moderate-severe depletion  Muscle Loss:Temporal region moderate-severe depletion, Clavicle bone region severe depletion, Acromion bone region severe depletion and Dorsal hand region moderate  depletion  Fluid Retention:  None noted    Malnutrition Diagnosis: Severe malnutrition  In Context of:  Acute illness or injury    CURRENT NUTRITION DIAGNOSIS  Increased nutrient needs (pro/energy) related to acute illness resulting in acute weight loss as evidenced by assessed needs as above, e/o fat and muscle loss, with up to 6% mass loss in just one week.     INTERVENTIONS  Recommendations / Nutrition Prescription  Diet advancement post-op per CRS    Hold high protein supplements until appropriate       Implementation  None new today --> RD will continue to follow, will be available post-op for nutritional needs.       Goals  Ongoing consumption of at least 75% meals TID + 2-3 high protein supplements daily.   No further weight loss <131# during review.       MONITORING AND EVALUATION:  Progress towards goals will be monitored and evaluated per protocol and Practice Guidelines    Hattie Merida RD, LD  3rd floor/ICU: [phone]  All other floors: [phone]  Weekend/holiday: [phone]  Office: [phone]'

## 2019-06-11 NOTE — PLAN OF CARE
Pt independent. VSS. Pt reports multiple loose stools during shift abdominal discomfort. Pain controlled with Ultram. Tolerating low fiber diet. Will continue to monitor.

## 2019-06-11 NOTE — PROGRESS NOTES
[facility]  Hospitalist Progress Note  Noam Ortiz MD 06/11/19    Reason for Stay (Diagnosis): Colitis         Assessment and Plan:      Summary of Stay: Alex Cardona is a 48 year old male with no significant past medical history other than a few previous episodes of diarrhea over the past 2 years that he was not seen for was admitted on 5/25/2019 with 3 to 4 weeks of diffuse abdominal pain and frequent diarrhea with some hematochezia.  Previously seen in the emergency department on 5/21 and 5/23.  Worsening pain on 5/25 to return to the ED and had a CT scan that showed pancolitis.  GI was consulted and he underwent a colonoscopy that was limited due to severity of colitis.  He was initially started on IV Solu-Medrol 3 times daily.  Due to lack of response he was also given Remicade with second dose on 6/4.  IV Solu-Medrol has been narrowed down to p.o. prednisone.  Has had issues with hyponatremia and hypokalemia likely secondary to ongoing diarrhea.  Overall has been tolerating a low fiber diet and keeping up on his oral fluids, however still having numerous bowel movements per day.  Underwent repeat colonoscopy that shows severe ulceration, actually looking worse than previous colonoscopy despite high-dose steroids and 2 doses of Remicade.  Colorectal surgery consulted.  Planning on colectomy tomorrow.  N.p.o. at midnight.    Problem List/Assessment and Plan:   Colitis, suspected ulcerative colitis: Presenting with very frequent bloody diarrhea for the past 3 to 4 weeks.  Has had similar episodes a few times over the past 2 years.  CT scan showing pancolitis.  Colonoscopy incomplete due to severity of colitis, but suggestive of ulcerative colitis.  Initially treated with 3 times daily IV Solu-Medrol then narrowed to prednisone 40 daily on 6/6.  Started on Remicade due to his lack of response to steroids and received his second dose on 6/4.  Still having 8-10 bowel movements per day.  CRP  also rising.  Repeat colonoscopy 6/10 showing diffuse ulceration actually looking worse than before.  Terminal ileum was normal.  Severe ulcerative colitis despite Remicade and steroids, colectomy indicated.  -[facility] consulted  -Continue prednisone 40 mg p.o. daily, CRS planning for long taper as outpatient.  Has been on higher dose steroids here, may need stress dose steroids intraoperatively if any hypotension  -No further Remicade plan as no significant response with 2 doses  -Tramadol as needed for pain  -Colorectal surgery consulted  -OR tomorrow for laparoscopic assisted total abdominal colectomy with end ileostomy  -N.p.o. at midnight per CRS.    Sinus tachycardia: Suspect secondary to ongoing inflammation.  He has good oral intake including fluids.  Starting IV normal saline for hyponatremia and will be n.p.o. at midnight.    Hyponatremia, hypokalemia: Sodium had been stably low around 130, now down to 128.  Potassium is been replaced.  Electrolyte derangements secondary to ongoing profuse diarrhea.  -Start IV NS at 100 ml/hr    Hypocalcemia: Corrected calcium normal.    Oral pharyngeal candidiasis: Nystatin for 7-day course.    Severe malnutrition: Secondary to acute diarrheal illness.  -Dietitian consulted    Anemia: Hemoglobin is remained relatively stable at above 10 despite some intermittent mild hematochezia.  Suspect the inflammatory process from his colitis also preventing improvement in anemia.    DVT Prophylaxis: Hold Lovenox in anticipation for surgery.  PCD's  Code Status: Full Code  FEN: Low fiber diet, n.p.o. at midnight, IV NS at 100 ml/hr  Discharge Dispo: TBD  Estimated Disch Date / # of Days until Disch: TBD, OR tomorrow for laparoscopic assisted total abdominal colectomy with end ileostomy        Interval History (Subjective):      Continues to have abdominal bloating, but not having any severe sharp pains.  Did have one bloody stool following colonoscopy then mostly since then has  "been watery frequent stools throughout the evening and into this morning.  No nausea or vomiting and is tolerating diet from this standpoint.  No fevers.  Confirms desire for surgical intervention.                  Physical Exam:      Last Vital Signs:  /61   Pulse 104   Temp 99.1  F (37.3  C) (Oral)   Resp 18   Ht 1.803 m (5' 11\")   Wt 61 kg (134 lb 6.4 oz)   SpO2 100%   BMI 18.74 kg/m      Intake/Output Summary (Last 24 hours) at 6/11/2019 0901  Last data filed at 6/11/2019 0813  Gross per 24 hour   Intake 1600 ml   Output --   Net 1600 ml     Constitutional: Awake, NAD   Eyes: sclera white   HEENT:  MMM  Respiratory: no respiratory distress, lungs cta bilaterally, no crackles or wheeze  Cardiovascular: Mild regular tachycardia, no murmur  GI: Thin, non-tender, minimal diffuse tenderness to palpation without guarding, bowel sounds present  Skin: no rash  Musculoskeletal/extremities: No edema  Neurologic: A&O  Psychiatric: calm, cooperative, normal affect         Medications:      All current medications were reviewed with changes reflected in problem list.         Data:      All new lab and imaging data was reviewed.   Labs:  Recent Labs   Lab 06/11/19  0625 06/10/19  0715 06/08/19  0558   * 130* 130*   POTASSIUM 4.6 4.2 3.8   CHLORIDE 93* 96 95   CO2 31 28 31   ANIONGAP 4 6 4   GLC 90 126* 123*   BUN 12 14 14   CR 0.66 0.61* 0.68   GFRESTIMATED >90 >90 >90   GFRESTBLACK >90 >90 >90   KEZIA 8.1* 7.5* 7.6*     Recent Labs   Lab 06/11/19  0625 06/10/19  0715 06/09/19  0610   WBC 10.0 10.0 9.8   HGB 11.2* 10.5* 10.9*   HCT 34.1* 31.6* 32.3*   MCV 88 88 88   * 446 477*      Imaging:   Colonoscopy:  Findings:        Diffuse severe inflammation characterized by congestion (edema),        friability and deep ulcerations was found in the entire colon. Biopsies        were taken with a cold forceps for histology and to exclude CMV.        The terminal ileum appeared normal.                           "                                                           Impression:               - Diffuse severe inflammation was found in the                             entire examined colon consistent with pancolitis                             ulcerative colitis. Biopsied.                             - The examined portion of the ileum was normal.   Recommendation:           - Await pathology results.                             - Refer to a colo-rectal surgeon given lack of                             improvement after two doses of Infliximab      Noam Ortiz MD

## 2019-06-11 NOTE — PROGRESS NOTES
"[facility] Nurse Ostomy Marking and Education         Data:   History: Per provider notes,  Alex Cardona is a 48 year old male with no significant past medical history other than a few previous episodes of diarrhea over the past 2 years. He was admitted on 5/25/2019 with 3 to 4 weeks of diffuse abdominal pain and frequent diarrhea with some hematochezia.  He was also seen in the emergency department on 5/21 and 5/23.  On 5/25 had increased pain and  return to the ED, CT scan showed pancolitis.  GI was consulted, colonoscopy  was limited due to severity of colitis.  He was started on IV Solu-Medrol 3 times daily, then Remicade with second dose on 6/4.    Who is present for marking and education: self only  Type of ostomy surgery:  Ileostomy per Dr. Ruano  Abdomen:  Flat, muscular           Intervention:     Patient's chart evaluated.      Assessments done today:  Pt observed lying, sitting and standing.     Education: Reviewed upcoming surgery, stoma construction,post-op expectations, general ostomy cares/concerns including: differenced between colostomy/ileostomy. Given brochure and shown pouch.    Patient marked with \"x\" using surgical marker and sealed with 3M No Sting Skin Prep and Tegaderm.  Pt marked right lower quadrant just below umbilicus line.       Assessment:       Learning needs/ comprehension: Interested in learning, asked many questions.         Plan:     Plan:   ? Surgery scheduled for: 6-12  ? Will plan to follow patient post operatively.  6-12/or 6-13.       "

## 2019-06-11 NOTE — PLAN OF CARE
Up independently.  Pain well controlled with Ultram every 6 hours.  Two loose stools this shift.  Fluids started for .  Lovenox changed to Heparin.  Pneumatic compression devices on.  Preoperative bowel prep abx started at 1300.  Patient asking pertinent questions about upcoming surgery.

## 2019-06-11 NOTE — PROGRESS NOTES
"SPIRITUAL HEALTH SERVICES Progress Note  [facility]    Saw pt Alex per referral from staff requesting emotional support for pt.  Oriented Alex to  services.  He acknowledged the availability of  support but expressed no needs at this time.  Alex reported that he will have surgery tomorrow \"to remove my colon.\" He described himself as being \"calm\" and \"in good spirits.\"  Alex shared that he lives with his mother, and his sister lives in the area.  His father lives in [place], and Alex plans to call him today to let him know about the surgery.  He shared that he has a history of mental illness but he is managing that well, adding that he becomes stressed when there are other people around him who are stressed.  [employer] or spirituality does not currently play a significant role in his self-care.  Informed him how he can request further  support in case his needs change.    Unit  remains available per pt's request.    Aaron Dickinson M.Div., [facility]  Staff   Pager [phone]    "

## 2019-06-11 NOTE — PROGRESS NOTES
Colon and Rectal Surgery Note          Interval History:   Continues to pass multiple loose stools, non bloody. Tolerating diet but had some bloating which he attributes to dairy. Has good questions about surgery and stoma.            Physical Exam:     Temp:  [97.1  F (36.2  C)-99.1  F (37.3  C)] 99.1  F (37.3  C)  Pulse:  [104-120] 104  Heart Rate:  [102-120] 115  Resp:  [8-25] 18  BP: ()/(61-88) 127/61  SpO2:  [98 %-100 %] 100 %    General:  Pleasant, alert.  In no acute distress.  Abdomen:  Soft, non distended, non tender.           Data:     I/O last 3 completed shifts:  In: 1100 [P.O.:1100]  Out: -     Lab Results   Component Value Date    WBC 10.0 06/11/2019    WBC 10.0 06/10/2019      Lab Results   Component Value Date    HGB 11.2 06/11/2019    HGB 10.5 06/10/2019       Lab Results   Component Value Date     06/11/2019      Lab Results   Component Value Date    POTASSIUM 4.6 06/11/2019     Lab Results   Component Value Date    CHLORIDE 93 06/11/2019     Lab Results   Component Value Date    CO2 31 06/11/2019     Lab Results   Component Value Date    GLC 90 06/11/2019       Lab Results   Component Value Date    BUN 12 06/11/2019     Lab Results   Component Value Date    CR 0.66 06/11/2019     Lab Results   Component Value Date    KEZIA 8.1 06/11/2019            Assessment and Plan:   47 y/o male with ulcerative colitis refractory to medical management.     NPO at midnight for OR tomorrow. Plan for laparoscopic assisted total abdominal colectomy with end ileostomy.   WOCN consultation today for ileostomy education and pre-operative marking.   Continue steroids in juliana-operative period. Will plan for long taper as an outpatient.   Type and screen completed today.     Plan discussed with Dr. Ruano. Please call the office at at [phone] with questions.      Tamra Randolph   Colon and Rectal Surgery Fellow  [facility]   6/11/2019   8:33 AM    Colon and Rectal Surgery  Attending Note    Patient seen and examined independently.  Agree with above assessment and plan.  Ready for surgery. Thinks the stoma bag sound better than the frequent loose stools he has been having.   No bleeding.   Abd; soft, distended.   Plan:  Discussed plan for colectomy and ileostomy. Stoma site marked. Started heparin DVT prophylaxis.   Discussed possible crohn's, and reviewed again the other risk.    NPO at mid night.     Brigitte Ruano MD  [facility]  Office Phone # [phone]

## 2019-06-11 NOTE — PROGRESS NOTES
Pt is alert and oriented, lung sounds clear,up independently.Tolerating combined  low fiber diet.Passing flatus, x3 loose stools with no blood, stomach bloated, denies N/V durirng shift.Pain controlled with prn Ultram given q6hrs. Possible surgery tomorrow. WOC consult. Will continue to monitor.

## 2019-06-11 NOTE — PROGRESS NOTES
"GASTROENTEROLOGY PROGRESS NOTE        SUBJECTIVE:  No abdominal pain, nausea or vomiting. No fever. Plans for surgery tomorrow.      OBJECTIVE:    /61   Pulse 104   Temp 99.1  F (37.3  C) (Oral)   Resp 18   Ht 1.803 m (5' 11\")   Wt 61 kg (134 lb 6.4 oz)   SpO2 100%   BMI 18.74 kg/m    Temp (24hrs), Av.1  F (36.7  C), Min:97.1  F (36.2  C), Max:99.1  F (37.3  C)    Patient Vitals for the past 72 hrs:   Weight   19 0602 61 kg (134 lb 6.4 oz)   06/10/19 0551 61.2 kg (134 lb 14.4 oz)   19 0624 59.9 kg (131 lb 15.4 oz)       Intake/Output Summary (Last 24 hours) at 2019 1023  Last data filed at 2019 0813  Gross per 24 hour   Intake 1600 ml   Output --   Net 1600 ml        PHYSICAL EXAM     Constitutional: NAD  Cardiovascular: RRR, normal S1, S2, no murmur appreciated   Respiratory: good transmission, CTAB    Abdomen: + BS, thin, NTTP      Additional Comments:  ROS, FH, SH: See initial GI consult for details.     I have reviewed the patient's new clinical lab results:     Recent Labs   Lab Test 19  0625 06/10/19  0715 19  0610   WBC 10.0 10.0 9.8   HGB 11.2* 10.5* 10.9*   MCV 88 88 88   * 446 477*     Recent Labs   Lab Test 19  0625 06/10/19  0715 19  0558   POTASSIUM 4.6 4.2 3.8   CHLORIDE 93* 96 95   CO2 31 28 31   BUN 12 14 14   ANIONGAP 4 6 4     Recent Labs   Lab Test 19  0625 19  0505 19  1400 19  1340 19  0533   ALBUMIN 2.0* 2.0*  --  3.0* 3.2*   BILITOTAL  --  0.6  --  0.6 0.7   ALT  --  9  --  16 12   AST  --  11  --  13 12   PROTEIN  --   --  70*  --   --    LIPASE  --  33*  --   --   --         ASSESSMENT/ PLAN    Alex Cardona is a 47 yo male with severe indeterminate colitis, favoring UC although colonoscopy  incomplete due to severity of colitis.  He received Remicade 5 mg/kg on () and 5mg/kg on (). He has received 11 days of IV steroids with transition to prednisone 4 days ago. Biopsies negative " for CMV. C. Diff negative on 5/22 and 5/29.     -- Flexible sigmoidoscopy with ongoing severe pancolitis with deep ulcerations that does not appear improved from initial 5/30 colonoscopy despite IV steroids (appearing refractory) and Remicade x 2 as above.     -- Plan for colectomy with end ileostomy with colorectal surgery tomorrow.      Jaycee Kate PA-C  [facility] ( [facility])

## 2019-06-12 ENCOUNTER — ANESTHESIA EVENT (OUTPATIENT)
Dept: SURGERY | Facility: CLINIC | Age: 49
End: 2019-06-12
Payer: MEDICAID

## 2019-06-12 ENCOUNTER — ANESTHESIA (OUTPATIENT)
Dept: SURGERY | Facility: CLINIC | Age: 49
End: 2019-06-12
Payer: MEDICAID

## 2019-06-12 PROBLEM — K51.911: Status: ACTIVE | Noted: 2019-06-12

## 2019-06-12 LAB
ANION GAP SERPL CALCULATED.3IONS-SCNC: 6 MMOL/L (ref 3–14)
BLOOD BANK CMNT PATIENT-IMP: NORMAL
BUN SERPL-MCNC: 9 MG/DL (ref 7–30)
CALCIUM SERPL-MCNC: 7.5 MG/DL (ref 8.5–10.1)
CHLORIDE SERPL-SCNC: 100 MMOL/L (ref 94–109)
CO2 SERPL-SCNC: 26 MMOL/L (ref 20–32)
COPATH REPORT: NORMAL
CREAT SERPL-MCNC: 0.69 MG/DL (ref 0.66–1.25)
CREAT SERPL-MCNC: 0.77 MG/DL (ref 0.66–1.25)
ERYTHROCYTE [DISTWIDTH] IN BLOOD BY AUTOMATED COUNT: 13.1 % (ref 10–15)
GFR SERPL CREATININE-BSD FRML MDRD: >90 ML/MIN/{1.73_M2}
GFR SERPL CREATININE-BSD FRML MDRD: >90 ML/MIN/{1.73_M2}
GLUCOSE SERPL-MCNC: 78 MG/DL (ref 70–99)
HCT VFR BLD AUTO: 29.7 % (ref 40–53)
HGB BLD-MCNC: 9.7 G/DL (ref 13.3–17.7)
MCH RBC QN AUTO: 29.1 PG (ref 26.5–33)
MCHC RBC AUTO-ENTMCNC: 32.7 G/DL (ref 31.5–36.5)
MCV RBC AUTO: 89 FL (ref 78–100)
PLATELET # BLD AUTO: 287 10E9/L (ref 150–450)
PLATELET # BLD AUTO: 368 10E9/L (ref 150–450)
POTASSIUM SERPL-SCNC: 3.5 MMOL/L (ref 3.4–5.3)
RBC # BLD AUTO: 3.33 10E12/L (ref 4.4–5.9)
SODIUM SERPL-SCNC: 132 MMOL/L (ref 133–144)
WBC # BLD AUTO: 6.7 10E9/L (ref 4–11)

## 2019-06-12 PROCEDURE — 25000132 ZZH RX MED GY IP 250 OP 250 PS 637: Performed by: INTERNAL MEDICINE

## 2019-06-12 PROCEDURE — 25000125 ZZHC RX 250: Performed by: NURSE ANESTHETIST, CERTIFIED REGISTERED

## 2019-06-12 PROCEDURE — 88342 IMHCHEM/IMCYTCHM 1ST ANTB: CPT | Mod: 26 | Performed by: COLON & RECTAL SURGERY

## 2019-06-12 PROCEDURE — 25000131 ZZH RX MED GY IP 250 OP 636 PS 637: Performed by: INTERNAL MEDICINE

## 2019-06-12 PROCEDURE — 25000125 ZZHC RX 250: Performed by: COLON & RECTAL SURGERY

## 2019-06-12 PROCEDURE — 36000093 ZZH SURGERY LEVEL 4 1ST 30 MIN: Performed by: COLON & RECTAL SURGERY

## 2019-06-12 PROCEDURE — 88307 TISSUE EXAM BY PATHOLOGIST: CPT | Performed by: COLON & RECTAL SURGERY

## 2019-06-12 PROCEDURE — 25800030 ZZH RX IP 258 OP 636: Performed by: ANESTHESIOLOGY

## 2019-06-12 PROCEDURE — 40000306 ZZH STATISTIC PRE PROC ASSESS II: Performed by: COLON & RECTAL SURGERY

## 2019-06-12 PROCEDURE — 25000128 H RX IP 250 OP 636: Performed by: NURSE ANESTHETIST, CERTIFIED REGISTERED

## 2019-06-12 PROCEDURE — 25000132 ZZH RX MED GY IP 250 OP 250 PS 637: Performed by: COLON & RECTAL SURGERY

## 2019-06-12 PROCEDURE — 0D1B0Z4 BYPASS ILEUM TO CUTANEOUS, OPEN APPROACH: ICD-10-PCS | Performed by: COLON & RECTAL SURGERY

## 2019-06-12 PROCEDURE — 25800030 ZZH RX IP 258 OP 636: Performed by: INTERNAL MEDICINE

## 2019-06-12 PROCEDURE — 36000063 ZZH SURGERY LEVEL 4 EA 15 ADDTL MIN: Performed by: COLON & RECTAL SURGERY

## 2019-06-12 PROCEDURE — 25000128 H RX IP 250 OP 636: Performed by: COLON & RECTAL SURGERY

## 2019-06-12 PROCEDURE — 37000008 ZZH ANESTHESIA TECHNICAL FEE, 1ST 30 MIN: Performed by: COLON & RECTAL SURGERY

## 2019-06-12 PROCEDURE — 12000000 ZZH R&B MED SURG/OB

## 2019-06-12 PROCEDURE — 25000132 ZZH RX MED GY IP 250 OP 250 PS 637: Performed by: ANESTHESIOLOGY

## 2019-06-12 PROCEDURE — 0DTE0ZZ RESECTION OF LARGE INTESTINE, OPEN APPROACH: ICD-10-PCS | Performed by: COLON & RECTAL SURGERY

## 2019-06-12 PROCEDURE — 88342 IMHCHEM/IMCYTCHM 1ST ANTB: CPT | Performed by: COLON & RECTAL SURGERY

## 2019-06-12 PROCEDURE — 25800030 ZZH RX IP 258 OP 636: Performed by: COLON & RECTAL SURGERY

## 2019-06-12 PROCEDURE — 82565 ASSAY OF CREATININE: CPT | Performed by: COLON & RECTAL SURGERY

## 2019-06-12 PROCEDURE — 85027 COMPLETE CBC AUTOMATED: CPT | Performed by: INTERNAL MEDICINE

## 2019-06-12 PROCEDURE — 25000128 H RX IP 250 OP 636: Performed by: ANESTHESIOLOGY

## 2019-06-12 PROCEDURE — 27210794 ZZH OR GENERAL SUPPLY STERILE: Performed by: COLON & RECTAL SURGERY

## 2019-06-12 PROCEDURE — 36415 COLL VENOUS BLD VENIPUNCTURE: CPT | Performed by: INTERNAL MEDICINE

## 2019-06-12 PROCEDURE — 85049 AUTOMATED PLATELET COUNT: CPT | Performed by: COLON & RECTAL SURGERY

## 2019-06-12 PROCEDURE — 88307 TISSUE EXAM BY PATHOLOGIST: CPT | Mod: 26 | Performed by: COLON & RECTAL SURGERY

## 2019-06-12 PROCEDURE — G0463 HOSPITAL OUTPT CLINIC VISIT: HCPCS

## 2019-06-12 PROCEDURE — 36415 COLL VENOUS BLD VENIPUNCTURE: CPT | Performed by: COLON & RECTAL SURGERY

## 2019-06-12 PROCEDURE — 71000013 ZZH RECOVERY PHASE 1 LEVEL 1 EA ADDTL HR: Performed by: COLON & RECTAL SURGERY

## 2019-06-12 PROCEDURE — 71000012 ZZH RECOVERY PHASE 1 LEVEL 1 FIRST HR: Performed by: COLON & RECTAL SURGERY

## 2019-06-12 PROCEDURE — 25800030 ZZH RX IP 258 OP 636: Performed by: NURSE ANESTHETIST, CERTIFIED REGISTERED

## 2019-06-12 PROCEDURE — 99232 SBSQ HOSP IP/OBS MODERATE 35: CPT | Performed by: INTERNAL MEDICINE

## 2019-06-12 PROCEDURE — 37000009 ZZH ANESTHESIA TECHNICAL FEE, EACH ADDTL 15 MIN: Performed by: COLON & RECTAL SURGERY

## 2019-06-12 PROCEDURE — 80048 BASIC METABOLIC PNL TOTAL CA: CPT | Performed by: INTERNAL MEDICINE

## 2019-06-12 PROCEDURE — C1765 ADHESION BARRIER: HCPCS | Performed by: COLON & RECTAL SURGERY

## 2019-06-12 RX ORDER — NALOXONE HYDROCHLORIDE 0.4 MG/ML
.1-.4 INJECTION, SOLUTION INTRAMUSCULAR; INTRAVENOUS; SUBCUTANEOUS
Status: DISCONTINUED | OUTPATIENT
Start: 2019-06-12 | End: 2019-06-16 | Stop reason: HOSPADM

## 2019-06-12 RX ORDER — BUPIVACAINE HYDROCHLORIDE AND EPINEPHRINE 2.5; 5 MG/ML; UG/ML
INJECTION, SOLUTION EPIDURAL; INFILTRATION; INTRACAUDAL; PERINEURAL PRN
Status: DISCONTINUED | OUTPATIENT
Start: 2019-06-12 | End: 2019-06-12 | Stop reason: HOSPADM

## 2019-06-12 RX ORDER — ACETAMINOPHEN 325 MG/1
975 TABLET ORAL ONCE
Status: COMPLETED | OUTPATIENT
Start: 2019-06-12 | End: 2019-06-12

## 2019-06-12 RX ORDER — HEPARIN SODIUM 5000 [USP'U]/.5ML
5000 INJECTION, SOLUTION INTRAVENOUS; SUBCUTANEOUS ONCE
Status: COMPLETED | OUTPATIENT
Start: 2019-06-12 | End: 2019-06-12

## 2019-06-12 RX ORDER — ONDANSETRON 4 MG/1
4 TABLET, ORALLY DISINTEGRATING ORAL EVERY 30 MIN PRN
Status: DISCONTINUED | OUTPATIENT
Start: 2019-06-12 | End: 2019-06-12

## 2019-06-12 RX ORDER — METOPROLOL TARTRATE 1 MG/ML
1-2 INJECTION, SOLUTION INTRAVENOUS EVERY 5 MIN PRN
Status: DISCONTINUED | OUTPATIENT
Start: 2019-06-12 | End: 2019-06-12 | Stop reason: HOSPADM

## 2019-06-12 RX ORDER — ALBUTEROL SULFATE 0.83 MG/ML
2.5 SOLUTION RESPIRATORY (INHALATION) EVERY 4 HOURS PRN
Status: DISCONTINUED | OUTPATIENT
Start: 2019-06-12 | End: 2019-06-12 | Stop reason: HOSPADM

## 2019-06-12 RX ORDER — ONDANSETRON 2 MG/ML
INJECTION INTRAMUSCULAR; INTRAVENOUS PRN
Status: DISCONTINUED | OUTPATIENT
Start: 2019-06-12 | End: 2019-06-12

## 2019-06-12 RX ORDER — LIDOCAINE HYDROCHLORIDE 10 MG/ML
INJECTION, SOLUTION INFILTRATION; PERINEURAL PRN
Status: DISCONTINUED | OUTPATIENT
Start: 2019-06-12 | End: 2019-06-12

## 2019-06-12 RX ORDER — OXYCODONE HYDROCHLORIDE 5 MG/1
5 TABLET ORAL EVERY 4 HOURS PRN
Status: DISCONTINUED | OUTPATIENT
Start: 2019-06-12 | End: 2019-06-16 | Stop reason: HOSPADM

## 2019-06-12 RX ORDER — ONDANSETRON 2 MG/ML
4 INJECTION INTRAMUSCULAR; INTRAVENOUS EVERY 30 MIN PRN
Status: DISCONTINUED | OUTPATIENT
Start: 2019-06-12 | End: 2019-06-12

## 2019-06-12 RX ORDER — BETA-CAROTENE 7500 MCG
25000 CAPSULE ORAL 2 TIMES DAILY
Status: DISCONTINUED | OUTPATIENT
Start: 2019-06-12 | End: 2019-06-16 | Stop reason: HOSPADM

## 2019-06-12 RX ORDER — CIPROFLOXACIN 2 MG/ML
400 INJECTION, SOLUTION INTRAVENOUS EVERY 12 HOURS
Status: COMPLETED | OUTPATIENT
Start: 2019-06-12 | End: 2019-06-13

## 2019-06-12 RX ORDER — NEOSTIGMINE METHYLSULFATE 1 MG/ML
VIAL (ML) INJECTION PRN
Status: DISCONTINUED | OUTPATIENT
Start: 2019-06-12 | End: 2019-06-12

## 2019-06-12 RX ORDER — HYDROMORPHONE HYDROCHLORIDE 1 MG/ML
.3-.5 INJECTION, SOLUTION INTRAMUSCULAR; INTRAVENOUS; SUBCUTANEOUS
Status: DISCONTINUED | OUTPATIENT
Start: 2019-06-12 | End: 2019-06-15

## 2019-06-12 RX ORDER — SODIUM CHLORIDE, SODIUM LACTATE, POTASSIUM CHLORIDE, CALCIUM CHLORIDE 600; 310; 30; 20 MG/100ML; MG/100ML; MG/100ML; MG/100ML
INJECTION, SOLUTION INTRAVENOUS CONTINUOUS
Status: DISCONTINUED | OUTPATIENT
Start: 2019-06-12 | End: 2019-06-12 | Stop reason: HOSPADM

## 2019-06-12 RX ORDER — CEFOTETAN DISODIUM 1 G/10ML
1 INJECTION, POWDER, FOR SOLUTION INTRAMUSCULAR; INTRAVENOUS
Status: COMPLETED | OUTPATIENT
Start: 2019-06-12 | End: 2019-06-12

## 2019-06-12 RX ORDER — ONDANSETRON 2 MG/ML
4 INJECTION INTRAMUSCULAR; INTRAVENOUS EVERY 30 MIN PRN
Status: DISCONTINUED | OUTPATIENT
Start: 2019-06-12 | End: 2019-06-12 | Stop reason: HOSPADM

## 2019-06-12 RX ORDER — ACETAMINOPHEN 325 MG/1
975 TABLET ORAL EVERY 8 HOURS
Status: COMPLETED | OUTPATIENT
Start: 2019-06-12 | End: 2019-06-15

## 2019-06-12 RX ORDER — FENTANYL CITRATE 50 UG/ML
25-50 INJECTION, SOLUTION INTRAMUSCULAR; INTRAVENOUS
Status: DISCONTINUED | OUTPATIENT
Start: 2019-06-12 | End: 2019-06-12 | Stop reason: HOSPADM

## 2019-06-12 RX ORDER — FENTANYL CITRATE 50 UG/ML
25-50 INJECTION, SOLUTION INTRAMUSCULAR; INTRAVENOUS
Status: DISCONTINUED | OUTPATIENT
Start: 2019-06-12 | End: 2019-06-12

## 2019-06-12 RX ORDER — HYDRALAZINE HYDROCHLORIDE 20 MG/ML
2.5-5 INJECTION INTRAMUSCULAR; INTRAVENOUS EVERY 10 MIN PRN
Status: DISCONTINUED | OUTPATIENT
Start: 2019-06-12 | End: 2019-06-12 | Stop reason: HOSPADM

## 2019-06-12 RX ORDER — CEFOTETAN DISODIUM 1 G/10ML
1 INJECTION, POWDER, FOR SOLUTION INTRAMUSCULAR; INTRAVENOUS SEE ADMIN INSTRUCTIONS
Status: DISCONTINUED | OUTPATIENT
Start: 2019-06-12 | End: 2019-06-12 | Stop reason: HOSPADM

## 2019-06-12 RX ORDER — ONDANSETRON 4 MG/1
4 TABLET, ORALLY DISINTEGRATING ORAL EVERY 30 MIN PRN
Status: DISCONTINUED | OUTPATIENT
Start: 2019-06-12 | End: 2019-06-12 | Stop reason: HOSPADM

## 2019-06-12 RX ORDER — SODIUM CHLORIDE 9 MG/ML
INJECTION, SOLUTION INTRAVENOUS CONTINUOUS
Status: DISCONTINUED | OUTPATIENT
Start: 2019-06-12 | End: 2019-06-15

## 2019-06-12 RX ORDER — MEPERIDINE HYDROCHLORIDE 25 MG/ML
12.5 INJECTION INTRAMUSCULAR; INTRAVENOUS; SUBCUTANEOUS
Status: DISCONTINUED | OUTPATIENT
Start: 2019-06-12 | End: 2019-06-12 | Stop reason: HOSPADM

## 2019-06-12 RX ORDER — DEXAMETHASONE SODIUM PHOSPHATE 4 MG/ML
INJECTION, SOLUTION INTRA-ARTICULAR; INTRALESIONAL; INTRAMUSCULAR; INTRAVENOUS; SOFT TISSUE PRN
Status: DISCONTINUED | OUTPATIENT
Start: 2019-06-12 | End: 2019-06-12

## 2019-06-12 RX ORDER — FENTANYL CITRATE 50 UG/ML
25-50 INJECTION, SOLUTION INTRAMUSCULAR; INTRAVENOUS EVERY 5 MIN PRN
Status: DISCONTINUED | OUTPATIENT
Start: 2019-06-12 | End: 2019-06-12 | Stop reason: HOSPADM

## 2019-06-12 RX ORDER — SODIUM CHLORIDE, SODIUM LACTATE, POTASSIUM CHLORIDE, CALCIUM CHLORIDE 600; 310; 30; 20 MG/100ML; MG/100ML; MG/100ML; MG/100ML
INJECTION, SOLUTION INTRAVENOUS CONTINUOUS
Status: DISCONTINUED | OUTPATIENT
Start: 2019-06-12 | End: 2019-06-13

## 2019-06-12 RX ORDER — NALOXONE HYDROCHLORIDE 0.4 MG/ML
.1-.4 INJECTION, SOLUTION INTRAMUSCULAR; INTRAVENOUS; SUBCUTANEOUS
Status: DISCONTINUED | OUTPATIENT
Start: 2019-06-12 | End: 2019-06-12 | Stop reason: HOSPADM

## 2019-06-12 RX ORDER — KETAMINE HYDROCHLORIDE 10 MG/ML
INJECTION INTRAMUSCULAR; INTRAVENOUS PRN
Status: DISCONTINUED | OUTPATIENT
Start: 2019-06-12 | End: 2019-06-12

## 2019-06-12 RX ORDER — LIDOCAINE 40 MG/G
CREAM TOPICAL
Status: DISCONTINUED | OUTPATIENT
Start: 2019-06-12 | End: 2019-06-16 | Stop reason: HOSPADM

## 2019-06-12 RX ORDER — GLYCOPYRROLATE 0.2 MG/ML
INJECTION, SOLUTION INTRAMUSCULAR; INTRAVENOUS PRN
Status: DISCONTINUED | OUTPATIENT
Start: 2019-06-12 | End: 2019-06-12

## 2019-06-12 RX ORDER — PROPOFOL 10 MG/ML
INJECTION, EMULSION INTRAVENOUS PRN
Status: DISCONTINUED | OUTPATIENT
Start: 2019-06-12 | End: 2019-06-12

## 2019-06-12 RX ORDER — LIDOCAINE 40 MG/G
CREAM TOPICAL
Status: DISCONTINUED | OUTPATIENT
Start: 2019-06-12 | End: 2019-06-12 | Stop reason: HOSPADM

## 2019-06-12 RX ORDER — FENTANYL CITRATE 50 UG/ML
INJECTION, SOLUTION INTRAMUSCULAR; INTRAVENOUS PRN
Status: DISCONTINUED | OUTPATIENT
Start: 2019-06-12 | End: 2019-06-12

## 2019-06-12 RX ORDER — ONDANSETRON 2 MG/ML
4 INJECTION INTRAMUSCULAR; INTRAVENOUS EVERY 6 HOURS PRN
Status: DISCONTINUED | OUTPATIENT
Start: 2019-06-12 | End: 2019-06-16 | Stop reason: HOSPADM

## 2019-06-12 RX ORDER — NALOXONE HYDROCHLORIDE 0.4 MG/ML
.1-.4 INJECTION, SOLUTION INTRAMUSCULAR; INTRAVENOUS; SUBCUTANEOUS
Status: DISCONTINUED | OUTPATIENT
Start: 2019-06-12 | End: 2019-06-12

## 2019-06-12 RX ADMIN — CEFOTETAN DISODIUM 1 G: 1 INJECTION, POWDER, FOR SOLUTION INTRAMUSCULAR; INTRAVENOUS at 13:46

## 2019-06-12 RX ADMIN — Medication 30 MG: at 15:34

## 2019-06-12 RX ADMIN — PROPOFOL 150 MG: 10 INJECTION, EMULSION INTRAVENOUS at 13:54

## 2019-06-12 RX ADMIN — SODIUM CHLORIDE, POTASSIUM CHLORIDE, SODIUM LACTATE AND CALCIUM CHLORIDE: 600; 310; 30; 20 INJECTION, SOLUTION INTRAVENOUS at 19:40

## 2019-06-12 RX ADMIN — NYSTATIN 500000 UNITS: 100000 SUSPENSION ORAL at 08:21

## 2019-06-12 RX ADMIN — ROCURONIUM BROMIDE 20 MG: 10 INJECTION INTRAVENOUS at 17:12

## 2019-06-12 RX ADMIN — HEPARIN SODIUM 5000 UNITS: 5000 INJECTION, SOLUTION INTRAVENOUS; SUBCUTANEOUS at 10:49

## 2019-06-12 RX ADMIN — DEXMEDETOMIDINE HYDROCHLORIDE 20 MCG: 100 INJECTION, SOLUTION INTRAVENOUS at 15:59

## 2019-06-12 RX ADMIN — PREDNISONE 40 MG: 20 TABLET ORAL at 08:21

## 2019-06-12 RX ADMIN — Medication 1 UNITS: at 17:41

## 2019-06-12 RX ADMIN — SODIUM CHLORIDE, POTASSIUM CHLORIDE, SODIUM LACTATE AND CALCIUM CHLORIDE: 600; 310; 30; 20 INJECTION, SOLUTION INTRAVENOUS at 14:42

## 2019-06-12 RX ADMIN — HYDROMORPHONE HYDROCHLORIDE 0.2 MG: 1 INJECTION, SOLUTION INTRAMUSCULAR; INTRAVENOUS; SUBCUTANEOUS at 19:52

## 2019-06-12 RX ADMIN — ROCURONIUM BROMIDE 15 MG: 10 INJECTION INTRAVENOUS at 16:35

## 2019-06-12 RX ADMIN — ROCURONIUM BROMIDE 40 MG: 10 INJECTION INTRAVENOUS at 13:54

## 2019-06-12 RX ADMIN — FENTANYL CITRATE 50 MCG: 50 INJECTION, SOLUTION INTRAMUSCULAR; INTRAVENOUS at 15:59

## 2019-06-12 RX ADMIN — SODIUM CHLORIDE: 9 INJECTION, SOLUTION INTRAVENOUS at 01:40

## 2019-06-12 RX ADMIN — TRAMADOL HYDROCHLORIDE 50 MG: 50 TABLET, COATED ORAL at 03:07

## 2019-06-12 RX ADMIN — ONDANSETRON 4 MG: 2 INJECTION INTRAMUSCULAR; INTRAVENOUS at 18:32

## 2019-06-12 RX ADMIN — HYDROMORPHONE HYDROCHLORIDE 0.5 MG: 1 INJECTION, SOLUTION INTRAMUSCULAR; INTRAVENOUS; SUBCUTANEOUS at 20:12

## 2019-06-12 RX ADMIN — ROCURONIUM BROMIDE 10 MG: 10 INJECTION INTRAVENOUS at 17:39

## 2019-06-12 RX ADMIN — FENTANYL CITRATE 100 MCG: 50 INJECTION, SOLUTION INTRAMUSCULAR; INTRAVENOUS at 13:54

## 2019-06-12 RX ADMIN — PHENYLEPHRINE HYDROCHLORIDE 100 MCG: 10 INJECTION INTRAVENOUS at 17:05

## 2019-06-12 RX ADMIN — Medication 1 UNITS: at 17:53

## 2019-06-12 RX ADMIN — SODIUM CHLORIDE, POTASSIUM CHLORIDE, SODIUM LACTATE AND CALCIUM CHLORIDE: 600; 310; 30; 20 INJECTION, SOLUTION INTRAVENOUS at 15:27

## 2019-06-12 RX ADMIN — Medication 4 MG: at 18:20

## 2019-06-12 RX ADMIN — LIDOCAINE HYDROCHLORIDE 30 MG: 10 INJECTION, SOLUTION INFILTRATION; PERINEURAL at 13:54

## 2019-06-12 RX ADMIN — HYDROCORTISONE SODIUM SUCCINATE 100 MG: 100 INJECTION, POWDER, FOR SOLUTION INTRAMUSCULAR; INTRAVENOUS at 13:31

## 2019-06-12 RX ADMIN — MIDAZOLAM 2 MG: 1 INJECTION INTRAMUSCULAR; INTRAVENOUS at 13:46

## 2019-06-12 RX ADMIN — Medication 25000 UNITS: at 22:23

## 2019-06-12 RX ADMIN — GLYCOPYRROLATE 0.6 MG: 0.2 INJECTION, SOLUTION INTRAMUSCULAR; INTRAVENOUS at 18:20

## 2019-06-12 RX ADMIN — OMEPRAZOLE 20 MG: 20 CAPSULE, DELAYED RELEASE ORAL at 05:40

## 2019-06-12 RX ADMIN — SODIUM CHLORIDE, POTASSIUM CHLORIDE, SODIUM LACTATE AND CALCIUM CHLORIDE: 600; 310; 30; 20 INJECTION, SOLUTION INTRAVENOUS at 17:37

## 2019-06-12 RX ADMIN — HYDROCORTISONE SODIUM SUCCINATE 75 MG: 100 INJECTION, POWDER, FOR SOLUTION INTRAMUSCULAR; INTRAVENOUS at 21:52

## 2019-06-12 RX ADMIN — ROCURONIUM BROMIDE 20 MG: 10 INJECTION INTRAVENOUS at 14:51

## 2019-06-12 RX ADMIN — NYSTATIN 500000 UNITS: 100000 SUSPENSION ORAL at 12:35

## 2019-06-12 RX ADMIN — METRONIDAZOLE 500 MG: 500 INJECTION, SOLUTION INTRAVENOUS at 22:23

## 2019-06-12 RX ADMIN — HYDROMORPHONE HYDROCHLORIDE 1 MG: 1 INJECTION, SOLUTION INTRAMUSCULAR; INTRAVENOUS; SUBCUTANEOUS at 14:51

## 2019-06-12 RX ADMIN — HYDROMORPHONE HYDROCHLORIDE 0.3 MG: 1 INJECTION, SOLUTION INTRAMUSCULAR; INTRAVENOUS; SUBCUTANEOUS at 19:28

## 2019-06-12 RX ADMIN — ACETAMINOPHEN 975 MG: 325 TABLET, FILM COATED ORAL at 13:29

## 2019-06-12 RX ADMIN — SODIUM CHLORIDE, POTASSIUM CHLORIDE, SODIUM LACTATE AND CALCIUM CHLORIDE: 600; 310; 30; 20 INJECTION, SOLUTION INTRAVENOUS at 13:46

## 2019-06-12 RX ADMIN — PHENYLEPHRINE HYDROCHLORIDE 100 MCG: 10 INJECTION INTRAVENOUS at 17:17

## 2019-06-12 RX ADMIN — PHENYLEPHRINE HYDROCHLORIDE 50 MCG: 10 INJECTION INTRAVENOUS at 17:29

## 2019-06-12 RX ADMIN — GLYCOPYRROLATE 0.2 MG: 0.2 INJECTION, SOLUTION INTRAMUSCULAR; INTRAVENOUS at 13:54

## 2019-06-12 RX ADMIN — SODIUM CHLORIDE: 9 INJECTION, SOLUTION INTRAVENOUS at 21:50

## 2019-06-12 RX ADMIN — ROCURONIUM BROMIDE 20 MG: 10 INJECTION INTRAVENOUS at 15:59

## 2019-06-12 RX ADMIN — OXYCODONE HYDROCHLORIDE 5 MG: 5 TABLET ORAL at 23:26

## 2019-06-12 RX ADMIN — ACETAMINOPHEN 975 MG: 325 TABLET, FILM COATED ORAL at 21:50

## 2019-06-12 RX ADMIN — Medication 1 UNITS: at 17:35

## 2019-06-12 RX ADMIN — TRAMADOL HYDROCHLORIDE 50 MG: 50 TABLET, COATED ORAL at 09:10

## 2019-06-12 RX ADMIN — FENTANYL CITRATE 50 MCG: 50 INJECTION INTRAMUSCULAR; INTRAVENOUS at 19:16

## 2019-06-12 RX ADMIN — FENTANYL CITRATE 50 MCG: 50 INJECTION INTRAMUSCULAR; INTRAVENOUS at 20:23

## 2019-06-12 RX ADMIN — PHENYLEPHRINE HYDROCHLORIDE 100 MCG: 10 INJECTION INTRAVENOUS at 17:06

## 2019-06-12 RX ADMIN — DEXAMETHASONE SODIUM PHOSPHATE 4 MG: 4 INJECTION, SOLUTION INTRA-ARTICULAR; INTRALESIONAL; INTRAMUSCULAR; INTRAVENOUS; SOFT TISSUE at 13:54

## 2019-06-12 RX ADMIN — FENTANYL CITRATE 50 MCG: 50 INJECTION INTRAMUSCULAR; INTRAVENOUS at 19:55

## 2019-06-12 RX ADMIN — CIPROFLOXACIN 400 MG: 2 INJECTION, SOLUTION INTRAVENOUS at 23:26

## 2019-06-12 RX ADMIN — PHENYLEPHRINE HYDROCHLORIDE 100 MCG: 10 INJECTION INTRAVENOUS at 17:34

## 2019-06-12 RX ADMIN — PHENYLEPHRINE HYDROCHLORIDE 150 MCG: 10 INJECTION INTRAVENOUS at 16:42

## 2019-06-12 ASSESSMENT — ACTIVITIES OF DAILY LIVING (ADL)
ADLS_ACUITY_SCORE: 12

## 2019-06-12 ASSESSMENT — MIFFLIN-ST. JEOR: SCORE: 1505.38

## 2019-06-12 NOTE — BRIEF OP NOTE
[facility]    Brief Operative Note    Pre-operative diagnosis: ulcerative colitis  Post-operative diagnosis same  Procedure: Procedure(s):  Laparoscopic Assisted Total Abdominal Colectomy with End Ileostomy  Surgeon: Surgeon(s) and Role:     * Brigitte Ruano MD - Primary     * Willow Quintana PA-C - Assisting      * Tamra Randolph MD - Fellow assistant   Anesthesia: General   Estimated blood loss: Less than 50 ml  Drains: None  Specimens:   ID Type Source Tests Collected by Time Destination   A : Abdominal colon and appendix Tissue Colon SURGICAL PATHOLOGY EXAM Brigitte Ruano MD 6/12/2019  5:27 PM      Findings:   grossly thickened colon, no evidence of small bowel disease, normal appearing rectum .  Complications: None.  Implants:  * No implants in log *       ADDENDUM:    PATIENT DATA  Indicate Y or N:  Home O2 N  Hemodialysis N  Transplant patient N  Cirrhosis N  Steroids in last 30 days Y  Immunomodulators in last 30 days Y  Anticoagulation at time of surgery N   List medication N  Prior abdominal surgery N  Pelvic irradiation N    Albumin within 30 days if known 2  Hgb within 30 days if known 9.7  Cr within 30 days if known 0.77    OR DATA  Emergent Y   <24 hours N   <1 week Y  Bowel Prep (Y or N) N  Antibiotics (Y or N) Y  DVT prophylaxis    Heparin Y   SCD Y   None N  Drain N  ASA (1,2,3,4) 2  OR time (min) 236  Stents N  Transfuse >/= 2U N  Anastomosis   Stapled N   Handsewn N  Leak Test    Positive N   Negative N   Not done Y

## 2019-06-12 NOTE — ANESTHESIA CARE TRANSFER NOTE
Patient: Alex Cardona    Procedure(s):  Laparoscopic Assisted Total Abdominal Colectomy with End Ileostomy    Diagnosis: ulcerative colitis  Diagnosis Additional Information: No value filed.    Anesthesia Type:   General, ETT     Note:  Airway :Face Mask  Patient transferred to:PACU  Comments: Miko Report: Identifed the Patient, Identified the Reponsible Provider, Reviewed the pertinent medical history, Discussed the surgical course, Reviewed Intra-OP anesthesia mangement and issues during anesthesia, Set expectations for post-procedure period and Allowed opportunity for questions and acknowledgement of understanding      Vitals: (Last set prior to Anesthesia Care Transfer)    CRNA VITALS  6/12/2019 1805 - 6/12/2019 1841      6/12/2019             Pulse:  64    SpO2:  100 %    Resp Rate (observed):  14                Electronically Signed By: MILTON Charles CRNA  June 12, 2019  6:41 PM

## 2019-06-12 NOTE — ANESTHESIA PREPROCEDURE EVALUATION
Anesthesia Pre-Procedure Evaluation    Patient: Alex Cardona   MRN: [id number] : 1970          Preoperative Diagnosis: ulcerative colitis    Procedure(s):  laparoscopic assisted total abdominal colectomy with end ileostomy    History reviewed. No pertinent past medical history.  Past Surgical History:   Procedure Laterality Date     COLONOSCOPY N/A 2019    Procedure: incomplete COLONOSCOPY with left colon biopsies by cold biopsy forceps;  Surgeon: Herbert Dior DO;  Location:  GI     Anesthesia Evaluation     .             ROS/MED HX    ENT/Pulmonary:  - neg pulmonary ROS     Neurologic:  - neg neurologic ROS     Cardiovascular:         METS/Exercise Tolerance:     Hematologic:  - neg hematologic  ROS       Musculoskeletal:  - neg musculoskeletal ROS       GI/Hepatic:     (+) Inflammatory bowel disease,       Renal/Genitourinary:  - ROS Renal section negative       Endo:         Psychiatric:  - neg psychiatric ROS       Infectious Disease:  - neg infectious disease ROS       Malignancy:      - no malignancy   Other:    - neg other ROS                      Physical Exam      Airway   Mallampati: II  TM distance: >3 FB  Neck ROM: full    Dental     Cardiovascular   Rhythm and rate: regular and normal  (-) no murmur    Pulmonary    breath sounds clear to auscultation    Other findings: Lab Test        06/12/19     06/11/19     06/11/19     06/10/19                       0624          1341          0625          0715          WBC          6.7           --          10.0         10.0          HGB          9.7*          --          11.2*        10.5*         MCV          89            --          88           88            PLT          368          353          485*         446            Lab Test        06/12/19     06/11/19     06/10/19                       0624          0625          0715          NA           132*         128*         130*          POTASSIUM    3.5          4.6          4.2      "      CHLORIDE     100          93*          96            CO2          26           31           28            BUN          9            12           14            CR           0.77         0.66         0.61*         ANIONGAP     6            4            6             KEZIA          7.5*         8.1*         7.5*          GLC          78           90           126*                Lab Results   Component Value Date    WBC 6.7 06/12/2019    HGB 9.7 (L) 06/12/2019    HCT 29.7 (L) 06/12/2019     06/12/2019    CRP 41.8 (H) 06/09/2019     (L) 06/12/2019    POTASSIUM 3.5 06/12/2019    CHLORIDE 100 06/12/2019    CO2 26 06/12/2019    BUN 9 06/12/2019    CR 0.77 06/12/2019    GLC 78 06/12/2019    KEZIA 7.5 (L) 06/12/2019    PHOS 3.1 06/10/2019    MAG 1.7 06/10/2019    ALBUMIN 2.0 (L) 06/11/2019    PROTTOTAL 5.6 (L) 05/25/2019    ALT 9 05/25/2019    AST 11 05/25/2019    ALKPHOS 60 05/25/2019    BILITOTAL 0.6 05/25/2019    LIPASE 33 (L) 05/25/2019       Preop Vitals  BP Readings from Last 3 Encounters:   06/12/19 111/64   05/23/19 128/70   05/21/19 120/79    Pulse Readings from Last 3 Encounters:   06/11/19 104   05/23/19 81   05/21/19 114      Resp Readings from Last 3 Encounters:   06/12/19 16   05/23/19 16   05/21/19 18    SpO2 Readings from Last 3 Encounters:   06/12/19 98%   05/23/19 98%   05/21/19 96%      Temp Readings from Last 1 Encounters:   06/12/19 97.3  F (36.3  C) (Oral)    Ht Readings from Last 1 Encounters:   06/10/19 1.803 m (5' 11\")      Wt Readings from Last 1 Encounters:   06/12/19 61.8 kg (136 lb 4.8 oz)    Estimated body mass index is 19.01 kg/m  as calculated from the following:    Height as of this encounter: 1.803 m (5' 11\").    Weight as of this encounter: 61.8 kg (136 lb 4.8 oz).       Anesthesia Plan      History & Physical Review  History and physical reviewed and following examination; no interval change.    ASA Status:  2 .    NPO Status:  > 8 hours    Plan for General and ETT with " Propofol induction. Maintenance will be Balanced.    PONV prophylaxis:  Ondansetron (or other 5HT-3) and Dexamethasone or Solumedrol       Postoperative Care  Postoperative pain management:  IV analgesics and Oral pain medications.      Consents  Anesthetic plan, risks, benefits and alternatives discussed with:  Patient..                 Juan Thompson MD                    .

## 2019-06-12 NOTE — PLAN OF CARE
Noc RN- Pt NPO at midnight, continues to have liquid loose stools with no blood. Tenderness to abdomen. Vital signs stable.

## 2019-06-12 NOTE — PROGRESS NOTES
[facility] Nurse Inpatient Adult Ostomy Assessment    Initial Assessment   Pt. Scheduled for surgery today at 2:00    HPI:  Alex Cardona is a 48 year old male who we are asked to see for diarrhea and colitis on CT.  Pt states that about 4 wks ago developed diarrhea, with occasional blood/mucous in the stool.  Diarrhea was similar to several month episode about 15 months ago, which slowly resolved.  States over the last several days he's been having 5-15 stools/day, often several times per hour.  There tends to be diffuse abd cramping prior to BM, resolves afterward.  No vomiting, some nausea, thus decreased appetite and intake.  Endorses ~10lb wt loss over the last 4-6wks.     Physical Exam:  Pt. Has been pre op marked to [facility].  He marked on the top of the round of his abdomen with marker pen.  We reassessed placement secondary he wear his waist line there.  To go lower would put the ostomy at his pants waist line, pt agreed to leave it where it is.    Interventions:  Patient's chart evaluated. Abdomen and stoma marking assessed.  New Ileostomy box from [facility] given to pt.  All questions were answered.      Participant of teaching session today patient   Plan:    [facility] Nurse will return: daily for ostomy teaching and instructions      rojelio Sifuentes

## 2019-06-12 NOTE — PLAN OF CARE
Pt independent. VSS. Pain controlled with Ultram. PIV infusing NS 100mL/hr as ordered. Pt reports multiple stools during shift. Planned laparoscopic assisted total abdominal colectomy with end ileostomy on 6/12. Pre procedural antibiotic prep of neomycin and flagyl as ordered 3 times daily. Diet of clear fluids. NPO status to start at midnight.

## 2019-06-12 NOTE — PROGRESS NOTES
COLON & RECTAL SURGERY  PROGRESS NOTE    June 12, 2019    SUBJECTIVE:  No acute events overnight.  Still having multiple non-bloody stools.  Asking appropriate questions regarding upcoming surgery today.    OBJECTIVE:  Temp:  [97.2  F (36.2  C)-97.8  F (36.6  C)] 97.3  F (36.3  C)  Heart Rate:  [] 90  Resp:  [16-18] 16  BP: (101-114)/(59-75) 111/64  SpO2:  [98 %-100 %] 98 %    Intake/Output Summary (Last 24 hours) at 6/12/2019 0919  Last data filed at 6/12/2019 0539  Gross per 24 hour   Intake 1640 ml   Output --   Net 1640 ml       GENERAL:  Awake, alert, no acute distress, lying in bed.  HEAD: Nomocephalic atraumatic  SCLERA: anicteric  EXTREMITIES: warm and well perfused  ABDOMEN:  Soft, non tender, non-distended, no rebound or guarding, no peritoneal signs.    LABS:  Lab Results   Component Value Date    WBC 6.7 06/12/2019     Lab Results   Component Value Date    HGB 9.7 06/12/2019     Lab Results   Component Value Date    HCT 29.7 06/12/2019     Lab Results   Component Value Date     06/12/2019     Last Basic Metabolic Panel:  Lab Results   Component Value Date     06/12/2019      Lab Results   Component Value Date    POTASSIUM 3.5 06/12/2019     Lab Results   Component Value Date    CHLORIDE 100 06/12/2019     Lab Results   Component Value Date    KEZIA 7.5 06/12/2019     Lab Results   Component Value Date    CO2 26 06/12/2019     Lab Results   Component Value Date    BUN 9 06/12/2019     Lab Results   Component Value Date    CR 0.77 06/12/2019     Lab Results   Component Value Date    GLC 78 06/12/2019       ASSESSMENT/PLAN: 49 y/o male with ulcerative colitis refractory to medical management. AVSS.    1. NPO for surgery this afternoon.  Plan for colectomy and ileostomy.  2. Stoma site has been marked.  3. On heparin for DVT ppx.    For questions/paging, please contact the CRS office at [phone].    Willow Quintana PA-C  [facility]  Phone: [phone]

## 2019-06-12 NOTE — PROGRESS NOTES
[facility]  Hospitalist Progress Note    Austin Vicente MD 06/12/2019  Text Page      Reason for Stay (Diagnosis): Colitis         Assessment and Plan:      Summary of Stay: Alex Cardona is a 48 year old male with no significant past medical history other than a few previous episodes of diarrhea over the past 2 years that he was not seen for was admitted on 5/25/2019 with 3 to 4 weeks of diffuse abdominal pain and frequent diarrhea with some hematochezia.  Previously seen in the emergency department on 5/21 and 5/23.  Worsening pain on 5/25 to return to the ED and had a CT scan that showed pancolitis.  GI was consulted and he underwent a colonoscopy that was limited due to severity of colitis.  He was initially started on IV Solu-Medrol 3 times daily.  Due to lack of response he was also given Remicade with second dose on 6/4.  IV Solu-Medrol has been narrowed down to p.o. prednisone.  Has had issues with hyponatremia and hypokalemia likely secondary to ongoing diarrhea.  Overall has been tolerating a low fiber diet and keeping up on his oral fluids, however still having numerous bowel movements per day.  Underwent repeat colonoscopy that shows severe ulceration, actually looking worse than previous colonoscopy despite high-dose steroids and 2 doses of Remicade.  Colorectal surgery consulting and recommend colectomy.     Problem List/Assessment and Plan:   Colitis, suspected ulcerative colitis:   -Presenting with very frequent bloody diarrhea for the past 3 to 4 weeks.  Has had similar episodes a few times over the past 2 years.    -CT scan showed pancolitis.    -Initial colonoscopy incomplete due to severity of colitis, but suggestive of ulcerative colitis.   - He was treated with 3 times daily IV Solu-Medrol then narrowed to prednisone 40 daily on 6/6.  Started on Remicade due to his lack of response to steroids and received his second dose on 6/4.    Despite this he was still having 8-10  "bowel movements per day and CRP also rising.    -Repeat colonoscopy 6/10 showed diffuse ulceration actually looking worse than before despite Remicade and steroids,  and colectomy recommended by Dr. Ruano from colorectal surgery.  -[facility] consulting as well  -Continue prednisone 40 mg p.o. daily, CRS planning for long taper as outpatient.    -No further Remicade plan as no significant response with 2 doses  -Tramadol as needed for pain  -OR today for laparoscopic assisted total abdominal colectomy with end ileostomy       Sinus tachycardia: Suspect secondary to ongoing inflammation.  He has good oral intake including fluids.  Starting IV normal saline for hyponatremia and will be n.p.o. at midnight.     Hyponatremia, hypokalemia: Sodium had been stably low around 130, now down to 128.  Potassium is been replaced.  Electrolyte derangements secondary to ongoing profuse diarrhea.  - NS at 100 ml/hr     Hypocalcemia: Corrected calcium normal.     Oral pharyngeal candidiasis: Nystatin for 7-day course.     Severe malnutrition: Secondary to acute diarrheal illness.  -Dietitian consulted     Anemia: Hemoglobin is remained relatively stable at above 10 despite some intermittent mild hematochezia.  Suspect the inflammatory process from his colitis also preventing improvement in anemia.     DVT Prophylaxis: Hold Lovenox in anticipation for surgery.  PCD's  Code Status: Full Code  Disposition Plan   Expected discharge in several days to prior living arrangement once recovered from surgery and tolerating diet.     Entered: Austin Richardorquidea Vicente 06/12/2019, 2:24 PM       Incidental Findings/ Discharge Issues: None            Interval History (Subjective):      Feels okay.  Not having significant pain but still having diarrhea.  He feels prepared for surgery.                  Physical Exam:      Last Vital Signs:  /72   Pulse 104   Temp 98.7  F (37.1  C) (Temporal)   Resp 16   Ht 1.803 m (5' 11\")   Wt 61.8 kg " (136 lb 4.8 oz)   SpO2 100%   BMI 19.01 kg/m        Vital signs reviewed  General:  Alert, calm, NAD  CV: regular rate and rhythm, no murmurs or rubs  Lungs:  Clear to ascultation bilaterally, normal respiratory effort  Abdomen:  Soft, nontender, nondistended, no masses, normal bowel sounds  Extremities:  No edema  Neuro: normal strength and sensation in all 4 extremities, cranial nerves grossly intact  Psychiatric:  Mood and affect within normal limits             Medications:      All current medications were reviewed with changes reflected in problem list.         Data:      All new lab and imaging data was reviewed.   Labs:  Hemoglobin 9.7

## 2019-06-12 NOTE — PROGRESS NOTES
Pt leaves floor via cart for pre-op. Personal belongings including partial dentures gathered-will take to 5th floor once room number is known for post-op placement.

## 2019-06-13 LAB
ANION GAP SERPL CALCULATED.3IONS-SCNC: 5 MMOL/L (ref 3–14)
BUN SERPL-MCNC: 8 MG/DL (ref 7–30)
CALCIUM SERPL-MCNC: 7.5 MG/DL (ref 8.5–10.1)
CHLORIDE SERPL-SCNC: 96 MMOL/L (ref 94–109)
CO2 SERPL-SCNC: 29 MMOL/L (ref 20–32)
CREAT SERPL-MCNC: 0.64 MG/DL (ref 0.66–1.25)
ERYTHROCYTE [DISTWIDTH] IN BLOOD BY AUTOMATED COUNT: 13.2 % (ref 10–15)
GFR SERPL CREATININE-BSD FRML MDRD: >90 ML/MIN/{1.73_M2}
GLUCOSE SERPL-MCNC: 103 MG/DL (ref 70–99)
HCT VFR BLD AUTO: 29.8 % (ref 40–53)
HGB BLD-MCNC: 9.7 G/DL (ref 13.3–17.7)
MCH RBC QN AUTO: 28.9 PG (ref 26.5–33)
MCHC RBC AUTO-ENTMCNC: 32.6 G/DL (ref 31.5–36.5)
MCV RBC AUTO: 89 FL (ref 78–100)
PLATELET # BLD AUTO: 347 10E9/L (ref 150–450)
POTASSIUM SERPL-SCNC: 3.7 MMOL/L (ref 3.4–5.3)
RBC # BLD AUTO: 3.36 10E12/L (ref 4.4–5.9)
SODIUM SERPL-SCNC: 130 MMOL/L (ref 133–144)
WBC # BLD AUTO: 8.5 10E9/L (ref 4–11)

## 2019-06-13 PROCEDURE — 25000132 ZZH RX MED GY IP 250 OP 250 PS 637: Performed by: COLON & RECTAL SURGERY

## 2019-06-13 PROCEDURE — 99232 SBSQ HOSP IP/OBS MODERATE 35: CPT | Performed by: INTERNAL MEDICINE

## 2019-06-13 PROCEDURE — 25000128 H RX IP 250 OP 636: Performed by: COLON & RECTAL SURGERY

## 2019-06-13 PROCEDURE — 40000904 ZZH STATISTIC WOC PT EDUCATION, 46-60 MIN: Performed by: NURSE PRACTITIONER

## 2019-06-13 PROCEDURE — 25800030 ZZH RX IP 258 OP 636: Performed by: COLON & RECTAL SURGERY

## 2019-06-13 PROCEDURE — 80048 BASIC METABOLIC PNL TOTAL CA: CPT | Performed by: COLON & RECTAL SURGERY

## 2019-06-13 PROCEDURE — 25000132 ZZH RX MED GY IP 250 OP 250 PS 637: Performed by: INTERNAL MEDICINE

## 2019-06-13 PROCEDURE — 36415 COLL VENOUS BLD VENIPUNCTURE: CPT | Performed by: COLON & RECTAL SURGERY

## 2019-06-13 PROCEDURE — 25000132 ZZH RX MED GY IP 250 OP 250 PS 637: Performed by: ANESTHESIOLOGY

## 2019-06-13 PROCEDURE — 85027 COMPLETE CBC AUTOMATED: CPT | Performed by: COLON & RECTAL SURGERY

## 2019-06-13 PROCEDURE — 12000000 ZZH R&B MED SURG/OB

## 2019-06-13 RX ORDER — LIDOCAINE 4 G/G
1 PATCH TOPICAL
Status: DISCONTINUED | OUTPATIENT
Start: 2019-06-13 | End: 2019-06-16 | Stop reason: HOSPADM

## 2019-06-13 RX ADMIN — HYDROMORPHONE HYDROCHLORIDE 0.5 MG: 1 INJECTION, SOLUTION INTRAMUSCULAR; INTRAVENOUS; SUBCUTANEOUS at 08:20

## 2019-06-13 RX ADMIN — OXYCODONE HYDROCHLORIDE 5 MG: 5 TABLET ORAL at 13:55

## 2019-06-13 RX ADMIN — HYDROMORPHONE HYDROCHLORIDE 0.5 MG: 1 INJECTION, SOLUTION INTRAMUSCULAR; INTRAVENOUS; SUBCUTANEOUS at 20:17

## 2019-06-13 RX ADMIN — LIDOCAINE 1 PATCH: 560 PATCH PERCUTANEOUS; TOPICAL; TRANSDERMAL at 08:20

## 2019-06-13 RX ADMIN — ENOXAPARIN SODIUM 40 MG: 40 INJECTION SUBCUTANEOUS at 13:45

## 2019-06-13 RX ADMIN — HYDROCORTISONE SODIUM SUCCINATE 75 MG: 100 INJECTION, POWDER, FOR SOLUTION INTRAMUSCULAR; INTRAVENOUS at 05:04

## 2019-06-13 RX ADMIN — Medication 25000 UNITS: at 20:20

## 2019-06-13 RX ADMIN — SODIUM CHLORIDE: 9 INJECTION, SOLUTION INTRAVENOUS at 12:33

## 2019-06-13 RX ADMIN — HYDROCORTISONE SODIUM SUCCINATE 75 MG: 100 INJECTION, POWDER, FOR SOLUTION INTRAMUSCULAR; INTRAVENOUS at 12:34

## 2019-06-13 RX ADMIN — METRONIDAZOLE 500 MG: 500 INJECTION, SOLUTION INTRAVENOUS at 05:05

## 2019-06-13 RX ADMIN — HYDROMORPHONE HYDROCHLORIDE 0.3 MG: 1 INJECTION, SOLUTION INTRAMUSCULAR; INTRAVENOUS; SUBCUTANEOUS at 05:26

## 2019-06-13 RX ADMIN — Medication 1 LOZENGE: at 16:36

## 2019-06-13 RX ADMIN — ACETAMINOPHEN 975 MG: 325 TABLET, FILM COATED ORAL at 12:33

## 2019-06-13 RX ADMIN — CIPROFLOXACIN 400 MG: 2 INJECTION, SOLUTION INTRAVENOUS at 09:08

## 2019-06-13 RX ADMIN — OMEPRAZOLE 20 MG: 20 CAPSULE, DELAYED RELEASE ORAL at 15:21

## 2019-06-13 RX ADMIN — HYDROMORPHONE HYDROCHLORIDE 0.5 MG: 1 INJECTION, SOLUTION INTRAMUSCULAR; INTRAVENOUS; SUBCUTANEOUS at 22:22

## 2019-06-13 RX ADMIN — NYSTATIN 500000 UNITS: 100000 SUSPENSION ORAL at 15:21

## 2019-06-13 RX ADMIN — ACETAMINOPHEN 975 MG: 325 TABLET, FILM COATED ORAL at 20:20

## 2019-06-13 RX ADMIN — Medication 25000 UNITS: at 08:20

## 2019-06-13 RX ADMIN — Medication 1 LOZENGE: at 20:24

## 2019-06-13 RX ADMIN — NYSTATIN 500000 UNITS: 100000 SUSPENSION ORAL at 08:20

## 2019-06-13 RX ADMIN — OXYCODONE HYDROCHLORIDE 5 MG: 5 TABLET ORAL at 23:32

## 2019-06-13 RX ADMIN — HYDROCORTISONE SODIUM SUCCINATE 50 MG: 100 INJECTION, POWDER, FOR SOLUTION INTRAMUSCULAR; INTRAVENOUS at 20:21

## 2019-06-13 RX ADMIN — NYSTATIN 500000 UNITS: 100000 SUSPENSION ORAL at 19:56

## 2019-06-13 RX ADMIN — HYDROMORPHONE HYDROCHLORIDE 0.5 MG: 1 INJECTION, SOLUTION INTRAMUSCULAR; INTRAVENOUS; SUBCUTANEOUS at 17:23

## 2019-06-13 RX ADMIN — HYDROMORPHONE HYDROCHLORIDE 0.5 MG: 1 INJECTION, SOLUTION INTRAMUSCULAR; INTRAVENOUS; SUBCUTANEOUS at 13:04

## 2019-06-13 RX ADMIN — Medication 1 LOZENGE: at 09:17

## 2019-06-13 RX ADMIN — METRONIDAZOLE 500 MG: 500 INJECTION, SOLUTION INTRAVENOUS at 12:36

## 2019-06-13 RX ADMIN — ACETAMINOPHEN 975 MG: 325 TABLET, FILM COATED ORAL at 05:05

## 2019-06-13 RX ADMIN — HYDROMORPHONE HYDROCHLORIDE 0.3 MG: 1 INJECTION, SOLUTION INTRAMUSCULAR; INTRAVENOUS; SUBCUTANEOUS at 02:12

## 2019-06-13 RX ADMIN — OXYCODONE HYDROCHLORIDE 5 MG: 5 TABLET ORAL at 09:07

## 2019-06-13 RX ADMIN — Medication 1 LOZENGE: at 13:55

## 2019-06-13 RX ADMIN — OXYCODONE HYDROCHLORIDE 5 MG: 5 TABLET ORAL at 18:56

## 2019-06-13 RX ADMIN — Medication 1 LOZENGE: at 11:08

## 2019-06-13 RX ADMIN — HYDROMORPHONE HYDROCHLORIDE 0.5 MG: 1 INJECTION, SOLUTION INTRAMUSCULAR; INTRAVENOUS; SUBCUTANEOUS at 11:02

## 2019-06-13 RX ADMIN — OXYCODONE HYDROCHLORIDE 5 MG: 5 TABLET ORAL at 03:42

## 2019-06-13 RX ADMIN — HYDROMORPHONE HYDROCHLORIDE 0.5 MG: 1 INJECTION, SOLUTION INTRAMUSCULAR; INTRAVENOUS; SUBCUTANEOUS at 15:13

## 2019-06-13 RX ADMIN — OMEPRAZOLE 20 MG: 20 CAPSULE, DELAYED RELEASE ORAL at 05:06

## 2019-06-13 RX ADMIN — NYSTATIN 500000 UNITS: 100000 SUSPENSION ORAL at 11:05

## 2019-06-13 ASSESSMENT — ACTIVITIES OF DAILY LIVING (ADL)
ADLS_ACUITY_SCORE: 14

## 2019-06-13 NOTE — PLAN OF CARE
"Ambulatory Status:  Pt in bed since arrival to floor around 2030.  Pain:  6/10. Has not had any PRN pain medications. Has had Tylenol and is utilizing ice.  Resp: LS clear.  GI:  Denies nausea.  Tolerating clear liquid diet.  BS not audible.  Passing flatus.  Ileostomy has had no output as of yet.  :  Huddleston in place.  Skin:  Lap sites closed with Dermabond.  Tx:  IV Flagyl and Cipro. Capno 35 and 10.  Consults:  CRS.  Disposition:  TBD.  Vital signs:  Temp: 96.2  F (35.7  C) Temp src: Axillary BP: 110/62 Pulse: 80 Heart Rate: 84 Resp: 14 SpO2: 100 % O2 Device: None (Room air) Oxygen Delivery: 2 LPM Height: 180.3 cm (5' 11\") Weight: 61.8 kg (136 lb 4.8 oz)  Estimated body mass index is 19.01 kg/m  as calculated from the following:    Height as of this encounter: 1.803 m (5' 11\").    Weight as of this encounter: 61.8 kg (136 lb 4.8 oz).         "

## 2019-06-13 NOTE — PROGRESS NOTES
[facility] Nurse Inpatient Ostomy Assessment      Assessment of ostomy and needs for:  Ileostomy      Data:   History:  Per MD note, Alex Cardona is a 49 yo male with severe indeterminate colitis, favoring UC although colonoscopy 5/26 incomplete due to severity of colitis.  He received Remicade 5/30 and 6/4 with no change.  Had  colectomy with end ileostomy with colorectal surgery 6/12.    Type of ostomy:  Ileostomy  Stoma assessment:   ? Size of stoma:  About 1 5/8, edematous, translucent. Small amount serosanguinous fluid.  ? A bridge in place?: No  ? Stent(s) in place?: No   Mucocutaneous Junction (MCJ):  Unable to visualize, pouch not removed.  Peristomal skin:  Not visualized.  Abdominal assessment: soft  ? N/G still in place?:  No  Output:       I/O last 3 completed shifts:  In: 5030 [P.O.:400; I.V.:4630]  Out: 2225 [Urine:2225]  Current pouching system:  [address] 1 piece applied in surgery.  Pain:  Dull ache   ? Is pt still on a PCA? Yes  ? Diet:         Orders Placed This Encounter        Clear Liquid Diet      Labs:   Recent Labs   Lab Test 06/13/19  0750  06/11/19  0625  06/09/19  0610   ALBUMIN  --   --  2.0*  --   --    HGB 9.7*   < > 11.2*   < > 10.9*   WBC 8.5   < > 10.0   < > 9.8   CRP  --   --   --   --  41.8*    < > = values in this interval not displayed.           Intervention:     Patient's chart evaluated.      Assessments done today:  Stoma visualized through pouch, pouch intact.    Education: discussed diet, exercise, showering.     Prepared for discharge by: No discharge preparations started    Pt registered for ostomy supply samples? No         Assessment:     Stoma assessment: viable, healthy, moist and edematous, translucent    Learning needs/ comprehension: Interested in learning and asked many questions. He has read through Ileostomy brochure.    Effectiveness of current pouching/ supply plan: Intact since surgery.         Plan:     Plan:   ? Current pouching  system: [employer].    Education:  ? Education completed:  Pouch Emptying, Diet and hydration / fluid balance , exercise. Contacting WOC after discharge.  ? Is pt able to demonstrate: 1. how to empty their pouch?  No:   2. How to read a measurement on a graduated cylinder?  No:   3. How to write down correct I and O's?   No  ? Educational needs: Use of clamp, How to cuff and clean pouch, Use of tape, How to empty pouch, Removal of pouch, Preparation of new pouch, Cutting out or evaluating a pattern, Applying paste or rings, Applying appliance to abdomen, Peristomal skin care and Lifestyle Adjustments  ? Continue to prepare for discharge: Discussed making a WOC Nurse outpatient appointment upon discharge and Discussed when to follow up with a WOC Nurse in the future  o Supply company: TBD  o Do WOC Nurse recommend home care  TBD

## 2019-06-13 NOTE — PROGRESS NOTES
[facility] CHART UPDATE ( patient not seen)    Summary:  Alex Cardona is a 47 yo male with severe indeterminate colitis, favoring UC although colonoscopy 5/26 incomplete due to severity of colitis.  He received Remicade 5 mg/kg on (5/30) and 5mg/kg on (6/4). He has received 11 days of IV steroids with transition to prednisone 4 days ago. Biopsies negative for CMV. C. Diff negative on 5/22 and 5/29. Flexible sigmoidoscopy with ongoing severe pancolitis with deep ulcerations that does not appear improved from initial 5/30 colonoscopy.     -- S/p colectomy with end ileostomy with colorectal surgery 6/12.  -- Awaiting surgical pathology  -- Will follow peripherally.        Discussed with Dr. Javed Kate PA-C  [facility] ( [facility])

## 2019-06-13 NOTE — ANESTHESIA POSTPROCEDURE EVALUATION
Patient: Alex Cardona    Procedure(s):  Laparoscopic Assisted Total Abdominal Colectomy with End Ileostomy    Diagnosis:ulcerative colitis  Diagnosis Additional Information: No value filed.    Anesthesia Type:  General, ETT    Note:  Anesthesia Post Evaluation    Patient location during evaluation: PACU  Patient participation: Able to fully participate in evaluation  Level of consciousness: awake  Pain management: adequate  Airway patency: patent  Cardiovascular status: acceptable  Respiratory status: acceptable  Hydration status: acceptable  PONV: controlled     Anesthetic complications: None          Last vitals:  Vitals:    06/12/19 1845 06/12/19 1850 06/12/19 1855   BP: 100/63 99/65 106/65   Pulse: 68 73 75   Resp: 10 14 13   Temp:      SpO2: 100%           Electronically Signed By: Kirk Palmer MD  June 12, 2019  7:13 PM

## 2019-06-13 NOTE — PLAN OF CARE
Neuro: A&O  x  4  Resp: Using IS. Lung sounds clear  Cardiac: Edema  BLE- legs  elevated  GI/: passing flatus via ileostomy. Denies nausea  Mobility: Ax 1  Pain:  IV dilaudid and PO oxycodone  IV: NS at 75  ml/hr

## 2019-06-13 NOTE — PROGRESS NOTES
[facility]  Hospitalist Progress Note    Austin Vicente MD 06/13/2019  Text Page      Reason for Stay (Diagnosis): Colitis         Assessment and Plan:      Summary of Stay: Alex Cardona is a 48 year old male with no significant past medical history other than a few previous episodes of diarrhea over the past 2 years that he was not seen for was admitted on 5/25/2019 with 3 to 4 weeks of diffuse abdominal pain and frequent diarrhea with some hematochezia.  Previously seen in the emergency department on 5/21 and 5/23.  Worsening pain on 5/25 to return to the ED and had a CT scan that showed pancolitis.  GI was consulted and he underwent a colonoscopy that was limited due to severity of colitis.  He was initially started on IV Solu-Medrol 3 times daily.  Due to lack of response he was also given Remicade with second dose on 6/4.  IV Solu-Medrol has been narrowed down to p.o. prednisone.  Has had issues with hyponatremia and hypokalemia likely secondary to ongoing diarrhea.  Overall has been tolerating a low fiber diet and keeping up on his oral fluids, however still having numerous bowel movements per day.  Underwent repeat colonoscopy that shows severe ulceration, actually looking worse than previous colonoscopy despite high-dose steroids and 2 doses of Remicade.  Colorectal surgery consulting and recommend colectomy.     Problem List/Assessment and Plan:   Colitis, suspected ulcerative colitis:   -Presenting with very frequent bloody diarrhea for the past 3 to 4 weeks.  Has had similar episodes a few times over the past 2 years.    -CT scan showed pancolitis.    -Initial colonoscopy incomplete due to severity of colitis, but suggestive of ulcerative colitis.   - He was treated with 3 times daily IV Solu-Medrol then narrowed to prednisone 40 daily on 6/6.  Started on Remicade due to his lack of response to steroids and received his second dose on 6/4.    Despite this he was still having 8-10  bowel movements per day and CRP also rising.    -Repeat colonoscopy 6/10 showed diffuse ulceration actually looking worse than before despite Remicade and steroids,  and colectomy recommended by Dr. Ruano from colorectal surgery.  -He underwent colectomy and ileostomy placement yesterday without complication.  He is having some expected pain but otherwise feels well.  -[facility] consulting as well  -Currently on hydrocortisone per colorectal surgery recommendations.  He will have a prolonged steroid taper.  -No further Remicade plan as no significant response with 2 doses  -Tramadol as needed for pain       Sinus tachycardia: Suspect secondary to ongoing inflammation.       Hyponatremia, hypokalemia: Sodium had been stably low around 130.  Potassium is been replaced.  Electrolyte derangements secondary to ongoing profuse diarrhea.  - NS at 100 ml/hr     Hypocalcemia: Corrected calcium normal.     Oral pharyngeal candidiasis: Nystatin for 7-day course.     Severe malnutrition: Secondary to acute diarrheal illness.  -Dietitian consulted  -On IV fluids and clear liquid diet for now to advance per surgery recommendations.     Anemia: Hemoglobin is remained relatively stable at around 10 despite some intermittent mild hematochezia.  Suspect the inflammatory process from his colitis also preventing improvement in anemia.     DVT Prophylaxis: Hold Lovenox in anticipation for surgery.  PCD's  Code Status: Full Code  Disposition Plan   Expected discharge in several days to prior living arrangement once recovered from surgery and tolerating diet.     Entered: Austin Vicente 06/13/2019, 2:10 PM       Incidental Findings/ Discharge Issues: None            Interval History (Subjective):      Having some incisional pain postoperatively.  He had difficulty sleeping last night but the pain is now better controlled.  He is in good spirits.                  Physical Exam:      Last Vital Signs:  /70 (BP Location:  "Right arm)   Pulse 79   Temp 97.3  F (36.3  C) (Oral)   Resp 15   Ht 1.803 m (5' 11\")   Wt 61.8 kg (136 lb 4.8 oz)   SpO2 100%   BMI 19.01 kg/m        Vital signs reviewed  General:  Alert, calm, NAD  CV: regular rate and rhythm, no murmurs or rubs  Lungs:  Clear to ascultation bilaterally, normal respiratory effort  Abdomen: Ileostomy is in place him a bag is empty.  Abdomen soft with some bowel sounds.  Surgical wounds are clean and intact.  Extremities:  No edema  Neuro: normal strength and sensation in all 4 extremities, cranial nerves grossly intact  Psychiatric:  Mood and affect within normal limits             Medications:      All current medications were reviewed with changes reflected in problem list.         Data:      All new lab and imaging data was reviewed.   Labs:  Hemoglobin 9.7  Creatinine 0.6  "

## 2019-06-13 NOTE — PLAN OF CARE
POD 0 from lap assisted total colectomy with ileostomy   Vital signs stable, on RA, capnography in place, denies any nausea  IV Dilaudid x 2 and Oxycodone x 1 for abdominal pain  PIV /hr, continues IV Flagyl, Cipro and solu-cortef   No BS, lap sites x 3, DENNY, no ileostomy output, not passing flatus   Huddleston with good urine output, cath care done  Pt has not dangled or been out of bed yet

## 2019-06-14 LAB
CREAT SERPL-MCNC: 0.62 MG/DL (ref 0.66–1.25)
GFR SERPL CREATININE-BSD FRML MDRD: >90 ML/MIN/{1.73_M2}
PLATELET # BLD AUTO: 398 10E9/L (ref 150–450)

## 2019-06-14 PROCEDURE — 36415 COLL VENOUS BLD VENIPUNCTURE: CPT | Performed by: COLON & RECTAL SURGERY

## 2019-06-14 PROCEDURE — 82565 ASSAY OF CREATININE: CPT | Performed by: COLON & RECTAL SURGERY

## 2019-06-14 PROCEDURE — 25000132 ZZH RX MED GY IP 250 OP 250 PS 637: Performed by: COLON & RECTAL SURGERY

## 2019-06-14 PROCEDURE — 25000132 ZZH RX MED GY IP 250 OP 250 PS 637: Performed by: ANESTHESIOLOGY

## 2019-06-14 PROCEDURE — 12000000 ZZH R&B MED SURG/OB

## 2019-06-14 PROCEDURE — 85049 AUTOMATED PLATELET COUNT: CPT | Performed by: COLON & RECTAL SURGERY

## 2019-06-14 PROCEDURE — 25000132 ZZH RX MED GY IP 250 OP 250 PS 637: Performed by: INTERNAL MEDICINE

## 2019-06-14 PROCEDURE — 99232 SBSQ HOSP IP/OBS MODERATE 35: CPT | Performed by: INTERNAL MEDICINE

## 2019-06-14 PROCEDURE — 25000128 H RX IP 250 OP 636: Performed by: COLON & RECTAL SURGERY

## 2019-06-14 PROCEDURE — 25800030 ZZH RX IP 258 OP 636: Performed by: COLON & RECTAL SURGERY

## 2019-06-14 PROCEDURE — 40000904 ZZH STATISTIC WOC PT EDUCATION, 46-60 MIN: Performed by: NURSE PRACTITIONER

## 2019-06-14 RX ADMIN — ACETAMINOPHEN 975 MG: 325 TABLET, FILM COATED ORAL at 04:46

## 2019-06-14 RX ADMIN — LIDOCAINE 1 PATCH: 560 PATCH PERCUTANEOUS; TOPICAL; TRANSDERMAL at 08:08

## 2019-06-14 RX ADMIN — Medication 1 LOZENGE: at 10:59

## 2019-06-14 RX ADMIN — OXYCODONE HYDROCHLORIDE 5 MG: 5 TABLET ORAL at 16:21

## 2019-06-14 RX ADMIN — ACETAMINOPHEN 975 MG: 325 TABLET, FILM COATED ORAL at 12:14

## 2019-06-14 RX ADMIN — ENOXAPARIN SODIUM 40 MG: 40 INJECTION SUBCUTANEOUS at 15:42

## 2019-06-14 RX ADMIN — ACETAMINOPHEN 975 MG: 325 TABLET, FILM COATED ORAL at 21:18

## 2019-06-14 RX ADMIN — HYDROMORPHONE HYDROCHLORIDE 0.5 MG: 1 INJECTION, SOLUTION INTRAMUSCULAR; INTRAVENOUS; SUBCUTANEOUS at 09:36

## 2019-06-14 RX ADMIN — Medication 1 LOZENGE: at 07:28

## 2019-06-14 RX ADMIN — Medication 1 LOZENGE: at 21:23

## 2019-06-14 RX ADMIN — NYSTATIN 500000 UNITS: 100000 SUSPENSION ORAL at 12:14

## 2019-06-14 RX ADMIN — SODIUM CHLORIDE: 9 INJECTION, SOLUTION INTRAVENOUS at 02:34

## 2019-06-14 RX ADMIN — NYSTATIN 500000 UNITS: 100000 SUSPENSION ORAL at 08:07

## 2019-06-14 RX ADMIN — NYSTATIN 500000 UNITS: 100000 SUSPENSION ORAL at 16:21

## 2019-06-14 RX ADMIN — OMEPRAZOLE 20 MG: 20 CAPSULE, DELAYED RELEASE ORAL at 06:30

## 2019-06-14 RX ADMIN — HYDROMORPHONE HYDROCHLORIDE 0.5 MG: 1 INJECTION, SOLUTION INTRAMUSCULAR; INTRAVENOUS; SUBCUTANEOUS at 06:32

## 2019-06-14 RX ADMIN — Medication 25000 UNITS: at 21:18

## 2019-06-14 RX ADMIN — OXYCODONE HYDROCHLORIDE 5 MG: 5 TABLET ORAL at 12:14

## 2019-06-14 RX ADMIN — Medication 25000 UNITS: at 08:07

## 2019-06-14 RX ADMIN — OXYCODONE HYDROCHLORIDE 5 MG: 5 TABLET ORAL at 20:38

## 2019-06-14 RX ADMIN — NYSTATIN 500000 UNITS: 100000 SUSPENSION ORAL at 21:19

## 2019-06-14 RX ADMIN — OMEPRAZOLE 20 MG: 20 CAPSULE, DELAYED RELEASE ORAL at 16:21

## 2019-06-14 RX ADMIN — HYDROCORTISONE SODIUM SUCCINATE 50 MG: 100 INJECTION, POWDER, FOR SOLUTION INTRAMUSCULAR; INTRAVENOUS at 04:47

## 2019-06-14 RX ADMIN — OXYCODONE HYDROCHLORIDE 5 MG: 5 TABLET ORAL at 08:07

## 2019-06-14 RX ADMIN — HYDROCORTISONE SODIUM SUCCINATE 50 MG: 100 INJECTION, POWDER, FOR SOLUTION INTRAMUSCULAR; INTRAVENOUS at 12:14

## 2019-06-14 RX ADMIN — HYDROCORTISONE SODIUM SUCCINATE 25 MG: 100 INJECTION, POWDER, FOR SOLUTION INTRAMUSCULAR; INTRAVENOUS at 21:19

## 2019-06-14 RX ADMIN — OXYCODONE HYDROCHLORIDE 5 MG: 5 TABLET ORAL at 03:32

## 2019-06-14 ASSESSMENT — ACTIVITIES OF DAILY LIVING (ADL)
ADLS_ACUITY_SCORE: 14

## 2019-06-14 NOTE — PROGRESS NOTES
[facility] Nurse Inpatient Ostomy Assessment      Assessment of ostomy and needs for:  Ileostomy      Data:   History:  Per MD note, Alex Cardona is a 47 yo male with severe indeterminate colitis, favoring UC although colonoscopy 5/26 incomplete due to severity of colitis.  He received Remicade 5/30 and 6/4 with no change.  Had  colectomy with end ileostomy with colorectal surgery 6/12.       Type of ostomy:  Ileostomy    Stoma assessment:     Size of stoma:  Between 1 1/4 / 1 5/8, edematous, translucent. Small amount thin watery fluid.Passing gas.    A bridge in place?: No    Stent(s) in place?: No     Mucocutaneous Junction (MCJ): Intact    Peristomal skin:  Intact, tiny incision about 10 o'clock    Abdominal assessment: soft    N/G still in place?:  No    Current pouching system:  [place] 1 piece     Pain:  Dull ache, continues with oral pain meds     Is pt still on a PCA? No    Diet:       ? Orders Placed This Encounter  ?     Full Liquid Diet  ?    Labs:           Recent Labs   Lab Test 06/13/19  0750   06/11/19  0625   06/09/19  0610   ALBUMIN  --   --  2.0*  --   --    HGB 9.7*   < > 11.2*   < > 10.9*   WBC 8.5   < > 10.0   < > 9.8   CRP  --   --   --   --  41.8*    < > = values in this interval not displayed.             Intervention:     Patient's chart evaluated.      Assessments done today:  Pouch intact x 2 days, stoma edematous.    Education: discussed pouch change and emptying, where to obtain supplies.     Prepared for discharge by: Where  To purchase supplies, how to contact [facility] after discharge.    Pt registered for ostomy supply samples? No          Assessment:     Stoma assessment: viable, healthy, moist and edematous, translucent    Learning needs/ comprehension: Interested in learning, asked many questions, remembered what was discussed yesterday. He has read through Ileostomy brochure.    Effectiveness of current pouching/ supply plan: Intact since surgery.          Plan:      Plan:     Current pouching system: [employer] 1-piece.    Education:    Education completed:  Pouch Emptying, Diet and hydration / fluid balance , exercise. Contacting WOC after discharge. Pouch change.    Is pt able to demonstrate: 1. how to empty their pouch?  Yes.  2. How to read a measurement on a graduated cylinder?  No:   3. How to write down correct I and O's?   No    Educational needs:  Applying paste or rings, Lifestyle Adjustments    Continue to prepare for discharge: Discussed making a WOC Nurse outpatient appointment upon discharge and Discussed when to follow up with a WOC Nurse in the future  ? Supply company: [employer]  ? Do WOC Nurse recommend home care : yes, for a short time to reinforce teaching

## 2019-06-14 NOTE — PLAN OF CARE
Ambulatory Status:  Pt up with SBA, ambulating in room.  VS:  VSS, afebrile  Pain:  6/10, oxycodone x2 iv dilaudid x1  Resp: LS clear  GI:  Denies nausea.  good appetite and on low fiber diet.  BS active.  Passing flatus in bag.  Stool out in ostomy bag green liquid. Penrose drain draining some blood  :  massey out this am, voiding well  Skin:  lap sites wnl, ostomy site wnl  Tx:  iv solucortef  Labs:  unremarkable  Consults:  colorectal  Disposition:  home when tolerating diet

## 2019-06-14 NOTE — PROGRESS NOTES
CLINICAL NUTRITION SERVICES - REASSESSMENT NOTE      Malnutrition: (6/14/2019)  % Weight Loss:> 5% in 1 month (severe malnutrition) --> PTA and during admit  % Intake:  Decreased intake does not meet criteria for malnutrition ---> overt malabsorptive component pre-op, meeting <50% needs since surgery though does not meet criteria at this time  Subcutaneous Fat Loss:Orbital region moderate-severe depletion and Upper arm region moderate-severe depletion  Muscle Loss:Temporal region moderate-severe depletion, Clavicle bone region severe depletion, Acromion bone region severe depletion and Dorsal hand region moderate depletion  Fluid Retention:  None noted     Malnutrition Diagnosis: Severe malnutrition  In Context of:  Acute illness or injury       EVALUATION OF PROGRESS TOWARD GOALS   Diet: Fulls  Shake + 2 pkts beneprotein at 10 and 2    Intake/Tolerance:    Per previous RD documentation, diet originally advanced to low-fiber on 5/31 and patient with good appetite/intake, emphasizing calories/protein and eating 100% of meals/snacks when with a diet order (6804-7889 kcal/day, 150-330 g protein/day, per system review), though with wt and stooling trends/overall clinical picture, obviously malabsorptive component.     Since last RD reassessment, made NPO 6/12 for colectomy with ileostomy.  Clears that evening, fulls the evening of 6/13.  Not having nausea or pain with eating.  Some bloating.  He is very aware of sx and also the recommendation for small/frequent amounts as able. He would like to continue with his supplements as above.  Overall motivated to improve.  He is able to verbalize a low-fiber diet and also high calorie/high protein w/in these restrictions.        ASSESSED NUTRITION NEEDS:  Dosing Weight 68 kg - admit  Estimated Energy Needs: 9483-8928 kcals (40-45 Kcal/Kg)  Justification: repletion, suspected malabsorption   Estimated Protein Needs: 105-136 grams protein (1.5-2 g  pro/Kg)  Justification: repletion/maintenance, suspected malabsorption   Estimated Fluid Needs: >1 mL/kcal  Justification: maintenance      NEW FINDINGS:   - Passing flatus.  - Ileostomy output: 50 ml this AM.  - Medications and labs reviewed.  - Based on wt trending, severe wt loss during admission (at least 5%):  Vitals:    06/07/19 0344 06/09/19 0624 06/10/19 0551 06/11/19 0602   Weight: 60.3 kg (132 lb 14.4 oz) 59.9 kg (131 lb 15.4 oz) 61.2 kg (134 lb 14.4 oz) 61 kg (134 lb 6.4 oz)    06/12/19 0537   Weight: 61.8 kg (136 lb 4.8 oz)       Previous Goals:   Ongoing consumption of at least 75% meals TID + 2-3 high protein supplements daily.   No further weight loss <131# during review.   Evaluation: Not met d/t diet restriction    Previous Nutrition Diagnosis:   Increased nutrient needs (pro/energy) related to acute illness resulting in acute weight loss as evidenced by assessed needs as above, e/o fat and muscle loss, with up to 6% mass loss in just one week.   Evaluation: No change though updated to more pertinent      CURRENT NUTRITION DIAGNOSIS  Inadequate oral intake related to altered GI function with malabsorption and now post-op as evidenced by meeting <50% needs since Surgery.    INTERVENTIONS  Recommendations / Nutrition Prescription  Diet per CRS, high calorie/high protein component as able.     Continue supplements BID.    Add chewable MVI/M to start tomorrow.    Implementation  Multivitamin/Mineral: As above.    Collaboration and Referral of Nutrition care: discussed POC with team during rounds and with RN.    Nutrition Education: Emphasized small/frequent meals, protein focus as able.  He has no questions relating to low-fiber if this is the next step in diet advancement.    Goals  Diet advancement to solids w/in 24 hrs.    Patient able to consume 100% of meals TID minimum + 1 supplement over the next 24-48 hrs to show improvement.      MONITORING AND EVALUATION:  Progress towards goals will be  monitored and evaluated per protocol and Practice Guidelines      Eileen Kaiser RD, LD  Clinical Dietitian  3rd floor/ICU: [phone]  All other floors: [phone]  Weekend/holiday: [phone]

## 2019-06-14 NOTE — PLAN OF CARE
POD 2 for lap assisted total colectomy with ileostomy   Vital signs stable, on RA, denies any nausea  Oxycodone x 1, IV Dilaudid x 1 and scheduled Tylenol for abdominal pain  Adequate massey output 475, massey removed this morning @ 0618  Ileostomy output 50mL, passing flatus  PIV NS 75/hr, continues IV Solu-cortef  Up with assist x 1, not out of bed this shift  BS hypo, lap sites DENNY, derma bond

## 2019-06-14 NOTE — OP NOTE
Procedure Date: 06/12/2019      PREOPERATIVE DIAGNOSIS:  Medically refractory ulcerative colitis.      POSTOPERATIVE DIAGNOSIS:  Medically refractory ulcerative colitis.      PROCEDURE:  Laparoscopic-assisted total abdominal colectomy and end ileostomy.      SURGEON:  Brigitte Ruano MD      ASSISTANTS:  KATIE Pleitez and Tamra Randolph, Colon and Rectal Surgery fellow.      INDICATIONS:  Alex is a 48-year-old man who was admitted to the hospital on 05/25.  At that point, he had had several trips to the emergency room that prior week and a long history of intermittent bloody diarrhea.  In the emergency room, he was found to have pancolitis on the computerized tomography scan and was started on steroids and given IV fluids to treat his profound hyponatremia and hypocalcemia.  Gastroenterology was involved and a partial colonoscopy revealed severe colitis to the extent that could be seen.  He was treated with a dose of 5 mg/kg of Remicade and roughly 5 days later had a second dose of 10 mg/kg of Remicade.  Over the subsequent 5 days, he continued to have anywhere from 10-15 loose bowel movements, some with blood.  He had an increase in his CRP from 26-41  during that time frame.  We were consulted after 16 days in the hospital with a consensus that he was failing medical management.  He was not toxic, per se, but was hypoalbuminemic and mildly tachycardic with frequent loose bowel movements.  We had opportunity to discuss the plan for a total abdominal colectomy and ileostomy as a first step with options for a J-pouch reconstruction if he chose.  He was briefed on the risks of bleeding, infection, both superficial and deep, possible stump leak, possible damage to surrounding structures, other cardiopulmonary events and even death.  We discussed the possibility of a diagnosis of Crohn's disease that would preclude reconstruction in some cases.  He seemed to understand and wished to proceed.      FINDINGS:  Upon  entering the abdomen, there was a small amount of clear ascites.  The colon itself was notable for a thickened hyperemic wall that was a bit difficult to grasp.  No evidence of perforation.  The terminal ileum and remainder of the small bowel appeared normal.  Upon removing the specimen, there was cobblestoning with bloody mucoid luminal contents throughout.  The resected portion of the terminal ileum which was about 1 cm was normal as was the ileocecal valve.  There were no skipped lesions.      DESCRIPTION OF PROCEDURE:  After informed consent was obtained, the patient was taken to the operating room, positioned on the operating table in supine position and was intubated.  Huddleston catheter and orogastric tube were placed.  He was positioned in modified lithotomy position with both arms padded and comfortably tucked at his side.  He was secured to the bed with wide strapping tape.  Tilt test confirmed he was stable on the bed.      The abdomen was then shaved, prepped and draped in the usual fashion and after appropriate timeout, we began by making a 1 cm incision below the umbilicus.  Dissection was carried down through the fascia and the peritoneal cavity was entered without difficulty.  An 0 Vicryl suture was placed at the level of fascia.  The Sherine port was inserted.  Inspection revealed a very small amount of clear fluid.  No evidence of perforation.  No evidence of creeping fat.  We did a tap block at that point injecting 7.5 mL of 0.25% Marcaine into each of the 4 quadrants in the transversalis plane.  We then placed two 5 mm ports on each side of the abdomen for a total of 5 ports at this point.  We then began by incising the lateral attachments of the sigmoid colon and carrying the dissection up to and around the splenic flexure.  We are able to enter into the lesser sac and take the omentum off of the left side of the colon.  In the mid portion of the colon, it became quite fused and we turned our  attention to the right colon.  The lateral attachments of the cecum and right colon were incised.  The terminal ileum was somewhat congenitally adhesed to the right pelvis and these adhesions were taken down.  Once we were able to medialize the colon, we came up to and around the hepatic flexure and then joined our plane from where we had taken the omentum off of the transverse colon.  Once we had done this, the colon was nicely mobilized and we turned our attention down to where we intended to divide the colon at the rectosigmoid junction.  We made a window in the mesentery and then near the colon where we intended to divide and then divided the remaining of the mesentery relatively near the bowel  without taking the mesentery.  We came up the descending colon around the transverse colon and then down on the ascending colon until we came into the small bowel mesentery.  At this point, we assessed that we could bring the specimen out through a small Pfannenstiel incision, both the upper rectum and sigmoid as well as the terminal ileum.  We grasped the rectosigmoid junction and then opened a roughly 4 cm Pfannenstiel incision transversely.  We incised the fascia.  We spread the muscle in the midline and easily entered into the peritoneal cavity.  A 5-9 Andrew was placed and we could bring the rectosigmoid up.  We cleared off the remaining fat from this area and found the bowel to be relatively thickened but suitable for a staple line.  A single fire of the 75 blue load was used to come across the very distal sigmoid colon where it came straight up off of the sacral promontory.  We then oversewed this with a running 2-0 Prolene leaving a single strand on the patient's right and 3 strands on the patient's left.  This was dropped back into the abdomen.  We were able to bring the specimen up, including the cecum and appendix and terminal ileum.  We then made a window in the mesentery of the small bowel about a centimeter  proximal to the ileocecal valve.  The remaining mesentery was then divided.  The bowel was divided with another fire of the 75 blue load and the specimen was passed off the field and opened.  There was confluent cobblestone type inflammation with copious bloody fluid.  There were no skip lesions.  No perforation was noted.  The ileocecal valve was opened; it was soft and compliant and the ileum was also without signs of inflammation.      We then removed a disk of skin at the previously marked ileostomy site, incised the fascia in a cruciate fashion and spread the muscle and were able to easily see that the cut edge of the mesentery was straight in the cephalad position and the ileum was brought up without difficulty.  This was secured after again confirming the orientation with the mesentery cephalad.  We then irrigated the abdomen with several liters of warm saline and found the effluent to be clear.  We put a single sheet of Seprafilm that we had cut in half  over the anterior abdominal contents.  We did bring down the omentum and saw that a part of it since it was so thin was detached from one side and we amputated anything that we thought might have poor perfusion.  The remainder of the omentum was brought down over the left abdominal contents.  We then reapproximated the peritoneum with a running 2-0 Vicryl and the fascia was closed transversely with a 0 looped PDS.  Each layer was irrigated and the skin edges of the 5 port sites and the Pfannenstiel were reapproximated with 4-0 Monocryl and liquid glue.  Of note, prior to closing the Pfannenstiel, we did close the 10 mm infraumbilical port with the previously placed 0 Vicryl suture.  This came together nicely.  Once we had all the incisions closed, we then matured the stoma in a standard Adrienne fashion using 3-0 Vicryl sutures.  A stoma appliance was placed.  He was awakened from anesthesia and taken to recovery in stable condition.  Sponge and needle counts  were correct at the end of the case.      ESTIMATED BLOOD LOSS:  Less than 50 mL.      SPECIMENS:  The abdominal colon.         BRIGITTE PEREZ MD             D: 2019   T: 2019   MT: [email or web address]      Name:     PHAN CONNOLLY   MRN:      [id number]        Account:        [id number]   :      1970           Procedure Date: 2019      Document: [id number]       cc: [facility]        Brigitte Perez MD

## 2019-06-14 NOTE — PROGRESS NOTES
"GASTROENTEROLOGY PROGRESS NOTE        SUBJECTIVE:  Doing well. Pain 6/10. Passing gas. Tolerating full liquids.      OBJECTIVE:    /71   Pulse 93   Temp 96.2  F (35.7  C) (Oral)   Resp 16   Ht 1.803 m (5' 11\")   Wt 61.8 kg (136 lb 4.8 oz)   SpO2 98%   BMI 19.01 kg/m    Temp (24hrs), Av.1  F (36.7  C), Min:97.1  F (36.2  C), Max:99.1  F (37.3  C)    Patient Vitals for the past 72 hrs:   Weight   19 0537 61.8 kg (136 lb 4.8 oz)       Intake/Output Summary (Last 24 hours) at 2019 1023  Last data filed at 2019 0813  Gross per 24 hour   Intake 1600 ml   Output --   Net 1600 ml        PHYSICAL EXAM     Constitutional: NAD  Cardiovascular: RRR, normal S1, S2, no murmur appreciated   Respiratory: good transmission, CTAB  Abdomen: + BS, +ostomy pink/moist; flatus in ostomy bag with small amount of green stool.       Additional Comments:  ROS, FH, SH: See initial GI consult for details.     I have reviewed the patient's new clinical lab results:     Recent Labs   Lab Test 19  0818 19  0750 19  2201 19  0624  19  0625   WBC  --  8.5  --  6.7  --  10.0   HGB  --  9.7*  --  9.7*  --  11.2*   MCV  --  89  --  89  --  88    347 287 368   < > 485*    < > = values in this interval not displayed.     Recent Labs   Lab Test 19  0750 19  0624 19  0625   POTASSIUM 3.7 3.5 4.6   CHLORIDE 96 100 93*   CO2 29 26 31   BUN 8 9 12   ANIONGAP 5 6 4     Recent Labs   Lab Test 19  0625 19  0505 19  1400 19  1340 19  0533   ALBUMIN 2.0* 2.0*  --  3.0* 3.2*   BILITOTAL  --  0.6  --  0.6 0.7   ALT  --  9  --  16 12   AST  --  11  --  13 12   PROTEIN  --   --  70*  --   --    LIPASE  --  33*  --   --   --         ASSESSMENT/ PLAN    Alex Cardona is a 47 yo male with severe indeterminate colitis, favoring UC although colonoscopy  incomplete due to severity of colitis.  He received Remicade 5 mg/kg on () and 5mg/kg on (). " He has received 11 days of IV steroids with transition to prednisone 4 days ago. Biopsies negative for CMV. C. Diff negative on 5/22 and 5/29.    Flexible sigmoidoscopy with ongoing severe pancolitis with deep ulcerations that does not appear improved from initial 5/30 colonoscopy despite IV steroids (appearing refractory) and Remicade x 2 as above.     Undersent laparoscopic total colectomy with end ileostomy on  6/12/19    Plan:  -Continue cares per colorectal surgery   -DVT prophylaxis  -GI will sign off. Still awaiting pathology and we will follow up on this. If there is evidence of Crohn's disease, he will need IBD follow up in clinic. Please call with any questions.    Natasha Pandya, CNP   [facility] ( [facility])  Cell: [phone]

## 2019-06-14 NOTE — PROGRESS NOTES
COLON & RECTAL SURGERY  PROGRESS NOTE    June 14, 2019  Post-op Day # 2    SUBJECTIVE:  Huddleston removed this morning.  He has not urinated yet.  He denies any nausea with full liquids.  Pain is controlled.  Up moving around the room intermittently.  Patient concerned about recurrence of thrush after discharge.      OBJECTIVE:  Temp:  [96.6  F (35.9  C)-97.3  F (36.3  C)] 96.6  F (35.9  C)  Pulse:  [81-93] 93  Heart Rate:  [74-79] 74  Resp:  [15-17] 16  BP: (105-130)/(59-71) 119/71  SpO2:  [98 %-100 %] 98 %    Intake/Output Summary (Last 24 hours) at 6/14/2019 1027  Last data filed at 6/14/2019 0919  Gross per 24 hour   Intake 4137 ml   Output 1300 ml   Net 2837 ml       GENERAL:  Awake, alert, no acute distress, lying in bed  HEAD: Nomocephalic atraumatic  SCLERA: anicteric  EXTREMITIES: warm and well perfused  ABDOMEN:  Soft, appropriately tender, non-distended, no rebound or guarding, no peritoneal signs.  Stoma viable with gas and green liquid in bag.  INCISION:  C/d/i, no sign of infection.    LABS:  Lab Results   Component Value Date    WBC 8.5 06/13/2019     Lab Results   Component Value Date    HGB 9.7 06/13/2019     Lab Results   Component Value Date    HCT 29.8 06/13/2019     Lab Results   Component Value Date     06/14/2019     Last Basic Metabolic Panel:  Lab Results   Component Value Date     06/13/2019      Lab Results   Component Value Date    POTASSIUM 3.7 06/13/2019     Lab Results   Component Value Date    CHLORIDE 96 06/13/2019     Lab Results   Component Value Date    KEZIA 7.5 06/13/2019     Lab Results   Component Value Date    CO2 29 06/13/2019     Lab Results   Component Value Date    BUN 8 06/13/2019     Lab Results   Component Value Date    CR 0.62 06/14/2019     Lab Results   Component Value Date     06/13/2019       ASSESSMENT/PLAN: 47 y/o male with ulcerative colitis refractory to medical management now s/p laparoscopic total abdominal colectomy with end ileostomy on  6/12/19.  AVSS.    1. Ok to advance diet to low fiber.  2. Saline lock IV.  3. Continue IV steroids.  If tolerates diet, ok to transition to oral steroids.  4. Encourage ambulation.  5. Pain meds prn.  6. Appreciate stoma care and teaching.  7. Lovenox for DVT ppx.  Will need a 30 day course given IBD.  8. Consider discharging patient with prophylactic treatment for thrush as patient is concerned about recurrence after discharge.    For questions/paging, please contact the CRS office at [phone].    Willow Quintana PA-C  [facility]  Phone: [phone]      Colon and Rectal Surgery Attending Note    Patient seen and examined independently.  Agree with above assessment and plan.  Doing well. No nausea. + flatus and stoma out   abd soft, stoma pink, incision CDI  Plan as above.   Prednisone 20mg daily later today if tolerating orals.  Lovenox for discharge.  May be ready for discharge tomorrow if tolerating PO, able to care for the stoma and pain controlled.    Brigitte Ruano MD  [facility]  Office Phone # [phone]

## 2019-06-14 NOTE — PROGRESS NOTES
[facility]  Hospitalist Progress Note    Austin Vicente MD 06/14/2019  Text Page      Reason for Stay (Diagnosis): Colitis         Assessment and Plan:      Summary of Stay: Alex Cardona is a 48 year old male with no significant past medical history other than a few previous episodes of diarrhea over the past 2 years that he was not seen for was admitted on 5/25/2019 with 3 to 4 weeks of diffuse abdominal pain and frequent diarrhea with some hematochezia.  Previously seen in the emergency department on 5/21 and 5/23.  Worsening pain on 5/25 to return to the ED and had a CT scan that showed pancolitis.  GI was consulted and he underwent a colonoscopy that was limited due to severity of colitis.  He was initially started on IV Solu-Medrol 3 times daily.  Due to lack of response he was also given Remicade with second dose on 6/4.  IV Solu-Medrol has been narrowed down to p.o. prednisone.  Has had issues with hyponatremia and hypokalemia likely secondary to ongoing diarrhea.  Overall has been tolerating a low fiber diet and keeping up on his oral fluids, however still having numerous bowel movements per day.  Underwent repeat colonoscopy that shows severe ulceration, actually looking worse than previous colonoscopy despite high-dose steroids and 2 doses of Remicade.  Colorectal surgery consulting and recommended colectomy.     Problem List/Assessment and Plan:   Colitis, suspected ulcerative colitis:   -Presented with very frequent bloody diarrhea for the past 3 to 4 weeks.  Has had similar episodes a few times over the past 2 years.    -CT scan showed pancolitis.    -Initial colonoscopy incomplete due to severity of colitis, but suggestive of ulcerative colitis.   - He was treated with 3 times daily IV Solu-Medrol then narrowed to prednisone 40 daily on 6/6.  Started on Remicade due to his lack of response to steroids and received his second dose on 6/4.    Despite this he was still having 8-10  bowel movements per day and CRP also rising.    -Repeat colonoscopy 6/10 showed diffuse ulceration actually looking worse than before despite Remicade and steroids,  and colectomy recommended by Dr. Ruano from colorectal surgery.  -He underwent colectomy and ileostomy placement on 6/12 without complication.  He is having some expected pain but otherwise feels well.  -Doing very well and diet being advanced to full liquids today.  -[facility] as well  -Currently on hydrocortisone per colorectal surgery recommendations.  He will have a prolonged steroid taper.  -No further Remicade plan as no significant response with 2 doses  -Tramadol as needed for pain       Sinus tachycardia: Suspect secondary to ongoing inflammation.       Hyponatremia, hypokalemia: Sodium had been stably low around 130.  Potassium is been replaced.  Electrolyte derangements secondary to ongoing profuse diarrhea.  - NS at 100 ml/hr     Hypocalcemia: Corrected calcium normal.     Oral pharyngeal candidiasis: Nystatin for 7-day course.     Severe malnutrition: Secondary to acute diarrheal illness.  -Dietitian consulted  -On IV fluids and clear liquid diet for now to advance per surgery recommendations.     Anemia: Hemoglobin is remained relatively stable at around 10 despite some intermittent mild hematochezia.  Suspect the inflammatory process from his colitis also preventing improvement in anemia.     DVT Prophylaxis: Lovenox.  PCD's  Code Status: Full Code  Disposition Plan   Expected discharge in 2 to prior living arrangement once recovered from surgery and tolerating diet.     Entered: Austin Vicente 06/14/2019, 1:17 PM       Incidental Findings/ Discharge Issues: None        Interval History (Subjective):      Having some incisional pain postoperatively which is improving.  He is still having some difficulty sleeping but he is in good spirits.                  Physical Exam:      Last Vital Signs:  /71   Pulse 93    "Temp 96.2  F (35.7  C) (Oral)   Resp 16   Ht 1.803 m (5' 11\")   Wt 61.8 kg (136 lb 4.8 oz)   SpO2 98%   BMI 19.01 kg/m        Vital signs reviewed  General:  Alert, calm, NAD  CV: regular rate and rhythm, no murmurs or rubs  Lungs:  Clear to ascultation bilaterally, normal respiratory effort  Abdomen: Ileostomy is in place him a bag is empty.  Abdomen soft with some bowel sounds.  Surgical wounds are clean and intact.  Extremities:  No edema  Neuro: normal strength and sensation in all 4 extremities, cranial nerves grossly intact  Psychiatric:  Mood and affect within normal limits             Medications:      All current medications were reviewed with changes reflected in problem list.         Data:      All new lab and imaging data was reviewed.   Labs:  Creatinine 0.6  "

## 2019-06-15 LAB
BLD PROD TYP BPU: NORMAL
BLD PROD TYP BPU: NORMAL
BLD UNIT ID BPU: 0
BLD UNIT ID BPU: 0
BLOOD PRODUCT CODE: NORMAL
BLOOD PRODUCT CODE: NORMAL
BPU ID: NORMAL
BPU ID: NORMAL
PLATELET # BLD AUTO: 280 10E9/L (ref 150–450)
TRANSFUSION STATUS PATIENT QL: NORMAL

## 2019-06-15 PROCEDURE — 25000132 ZZH RX MED GY IP 250 OP 250 PS 637: Performed by: COLON & RECTAL SURGERY

## 2019-06-15 PROCEDURE — 85049 AUTOMATED PLATELET COUNT: CPT | Performed by: COLON & RECTAL SURGERY

## 2019-06-15 PROCEDURE — 25000128 H RX IP 250 OP 636: Performed by: COLON & RECTAL SURGERY

## 2019-06-15 PROCEDURE — 25000132 ZZH RX MED GY IP 250 OP 250 PS 637: Performed by: ANESTHESIOLOGY

## 2019-06-15 PROCEDURE — 25000131 ZZH RX MED GY IP 250 OP 636 PS 637: Performed by: COLON & RECTAL SURGERY

## 2019-06-15 PROCEDURE — 36415 COLL VENOUS BLD VENIPUNCTURE: CPT | Performed by: COLON & RECTAL SURGERY

## 2019-06-15 PROCEDURE — 12000000 ZZH R&B MED SURG/OB

## 2019-06-15 PROCEDURE — 99207 ZZC MOONLIGHTING INDICATOR: CPT | Performed by: INTERNAL MEDICINE

## 2019-06-15 PROCEDURE — 99233 SBSQ HOSP IP/OBS HIGH 50: CPT | Performed by: INTERNAL MEDICINE

## 2019-06-15 RX ORDER — NYSTATIN 100000/ML
500000 SUSPENSION, ORAL (FINAL DOSE FORM) ORAL 4 TIMES DAILY
Qty: 300 ML | Refills: 0 | Status: SHIPPED | OUTPATIENT
Start: 2019-06-15 | End: 2019-11-08

## 2019-06-15 RX ORDER — PREDNISONE 10 MG/1
10 TABLET ORAL DAILY
Status: DISCONTINUED | OUTPATIENT
Start: 2019-06-29 | End: 2019-06-16 | Stop reason: HOSPADM

## 2019-06-15 RX ORDER — PREDNISONE 5 MG/1
15 TABLET ORAL DAILY
Qty: 21 TABLET | Refills: 0 | Status: SHIPPED | OUTPATIENT
Start: 2019-06-22 | End: 2019-11-08

## 2019-06-15 RX ORDER — PREDNISONE 5 MG/1
5 TABLET ORAL DAILY
Status: DISCONTINUED | OUTPATIENT
Start: 2019-07-06 | End: 2019-06-16 | Stop reason: HOSPADM

## 2019-06-15 RX ORDER — BETA-CAROTENE 7500 MCG
25000 CAPSULE ORAL 2 TIMES DAILY
Qty: 60 CAPSULE | Refills: 1 | Status: SHIPPED | OUTPATIENT
Start: 2019-06-15 | End: 2019-11-08

## 2019-06-15 RX ORDER — PREDNISONE 20 MG/1
20 TABLET ORAL DAILY
Status: DISCONTINUED | OUTPATIENT
Start: 2019-06-15 | End: 2019-06-16 | Stop reason: HOSPADM

## 2019-06-15 RX ORDER — PREDNISONE 5 MG/1
5 TABLET ORAL DAILY
Qty: 7 TABLET | Refills: 0 | Status: SHIPPED | OUTPATIENT
Start: 2019-07-06 | End: 2019-11-08

## 2019-06-15 RX ORDER — OXYCODONE HYDROCHLORIDE 5 MG/1
5 TABLET ORAL EVERY 4 HOURS PRN
Qty: 20 TABLET | Refills: 0 | Status: SHIPPED | OUTPATIENT
Start: 2019-06-15 | End: 2019-11-08

## 2019-06-15 RX ORDER — PREDNISONE 20 MG/1
20 TABLET ORAL DAILY
Qty: 7 TABLET | Refills: 0 | Status: SHIPPED | OUTPATIENT
Start: 2019-06-15 | End: 2019-11-08

## 2019-06-15 RX ORDER — PREDNISONE 10 MG/1
10 TABLET ORAL DAILY
Qty: 7 TABLET | Refills: 0 | Status: SHIPPED | OUTPATIENT
Start: 2019-06-29 | End: 2019-11-08

## 2019-06-15 RX ORDER — PREDNISONE 20 MG/1
20 TABLET ORAL DAILY
Status: DISCONTINUED | OUTPATIENT
Start: 2019-06-15 | End: 2019-06-15

## 2019-06-15 RX ADMIN — OXYCODONE HYDROCHLORIDE 5 MG: 5 TABLET ORAL at 00:28

## 2019-06-15 RX ADMIN — ACETAMINOPHEN 975 MG: 325 TABLET, FILM COATED ORAL at 05:07

## 2019-06-15 RX ADMIN — ACETAMINOPHEN 975 MG: 325 TABLET, FILM COATED ORAL at 12:26

## 2019-06-15 RX ADMIN — OMEPRAZOLE 20 MG: 20 CAPSULE, DELAYED RELEASE ORAL at 16:13

## 2019-06-15 RX ADMIN — NYSTATIN 500000 UNITS: 100000 SUSPENSION ORAL at 20:00

## 2019-06-15 RX ADMIN — NYSTATIN 500000 UNITS: 100000 SUSPENSION ORAL at 08:43

## 2019-06-15 RX ADMIN — ENOXAPARIN SODIUM 40 MG: 40 INJECTION SUBCUTANEOUS at 14:09

## 2019-06-15 RX ADMIN — Medication 25000 UNITS: at 20:00

## 2019-06-15 RX ADMIN — Medication 25000 UNITS: at 08:43

## 2019-06-15 RX ADMIN — OXYCODONE HYDROCHLORIDE 5 MG: 5 TABLET ORAL at 16:13

## 2019-06-15 RX ADMIN — OXYCODONE HYDROCHLORIDE 5 MG: 5 TABLET ORAL at 08:59

## 2019-06-15 RX ADMIN — LIDOCAINE 1 PATCH: 560 PATCH PERCUTANEOUS; TOPICAL; TRANSDERMAL at 08:42

## 2019-06-15 RX ADMIN — OXYCODONE HYDROCHLORIDE 5 MG: 5 TABLET ORAL at 20:00

## 2019-06-15 RX ADMIN — NYSTATIN 500000 UNITS: 100000 SUSPENSION ORAL at 12:26

## 2019-06-15 RX ADMIN — OMEPRAZOLE 20 MG: 20 CAPSULE, DELAYED RELEASE ORAL at 05:24

## 2019-06-15 RX ADMIN — OXYCODONE HYDROCHLORIDE 5 MG: 5 TABLET ORAL at 05:07

## 2019-06-15 RX ADMIN — PREDNISONE 20 MG: 20 TABLET ORAL at 10:36

## 2019-06-15 RX ADMIN — NYSTATIN 500000 UNITS: 100000 SUSPENSION ORAL at 16:13

## 2019-06-15 RX ADMIN — HYDROCORTISONE SODIUM SUCCINATE 25 MG: 100 INJECTION, POWDER, FOR SOLUTION INTRAMUSCULAR; INTRAVENOUS at 05:06

## 2019-06-15 ASSESSMENT — ACTIVITIES OF DAILY LIVING (ADL)
ADLS_ACUITY_SCORE: 12
ADLS_ACUITY_SCORE: 14
ADLS_ACUITY_SCORE: 14
ADLS_ACUITY_SCORE: 12

## 2019-06-15 NOTE — PLAN OF CARE
"VS /69 (BP Location: Right arm)   Pulse 93   Temp 98.4  F (36.9  C) (Oral)   Resp 18   Ht 1.803 m (5' 11\")   Wt 61.8 kg (136 lb 4.8 oz)   SpO2 96%   BMI 19.01 kg/m    Lung sounds Clear and equal bilaterally  O2 room air  Tele none  Bowel sounds active/hypoactive in all quadrants  Tolerating Low Fiber Diet  IVF Saline Locked  Dressings None  Tests none   CMS intact  Drains Ostomy draining and patent  Activity Independent  Pain 5/10 pain w/ oxycodone 5 mg given x2  Patient/family centered care Support given and questions answered  Discharge plan 1-2 days home. Continue POC.    "

## 2019-06-15 NOTE — PROGRESS NOTES
[facility]  Hospitalist Progress Note    Jessica Saini MD 06/15/2019  Text Page      Reason for Stay (Diagnosis): Colitis         Assessment and Plan:      Summary of Stay: Alex Cardona is a 48 year old male with no significant past medical history other than a few previous episodes of diarrhea over the past 2 years that he was not seen for was admitted on 5/25/2019 with 3 to 4 weeks of diffuse abdominal pain and frequent diarrhea with some hematochezia.  Previously seen in the emergency department on 5/21 and 5/23.  Worsening pain on 5/25 to return to the ED and had a CT scan that showed pancolitis.  GI was consulted and he underwent a colonoscopy that was limited due to severity of colitis.  He was initially started on IV Solu-Medrol 3 times daily.  Due to lack of response he was also given Remicade with second dose on 6/4.  IV Solu-Medrol has been narrowed down to p.o. prednisone.  Has had issues with hyponatremia and hypokalemia likely secondary to ongoing diarrhea.  Overall has been tolerating a low fiber diet and keeping up on his oral fluids, however still having numerous bowel movements per day.  Underwent repeat colonoscopy that shows severe ulceration, actually looking worse than previous colonoscopy despite high-dose steroids and 2 doses of Remicade.  Colorectal surgery consulting and recommended colectomy.     Problem List/Assessment and Plan:   Colitis, suspected ulcerative colitis:   -Presented with very frequent bloody diarrhea for the past 3 to 4 weeks.  Has had similar episodes a few times over the past 2 years.    -CT scan showed pancolitis.    -Initial colonoscopy incomplete due to severity of colitis, but suggestive of ulcerative colitis.   - He was treated with 3 times daily IV Solu-Medrol then narrowed to prednisone 40 daily on 6/6.  Started on Remicade due to his lack of response to steroids and received his second dose on 6/4.    Despite this he was still having 8-10 bowel  "movements per day and CRP also rising.    -Repeat colonoscopy 6/10 showed diffuse ulceration actually looking worse than before despite Remicade and steroids,  and colectomy recommended by Dr. Ruano from colorectal surgery.  -He underwent colectomy and ileostomy placement on 6/12 without complication.  He is having some expected pain but otherwise feels well.  -Doing very well and diet being advanced to full liquids today.  -[facility] as well  -Currently on hydrocortisone per colorectal surgery recommendations.  He will have a prolonged steroid taper.  -No further Remicade plan as no significant response with 2 doses  -Tramadol as needed for pain  -postop day 3.        Sinus tachycardia: Suspect secondary to ongoing inflammation.       Hyponatremia, hypokalemia: Sodium had been stably low around 130.  Potassium is been replaced.  Electrolyte derangements secondary to ongoing profuse diarrhea.  - NS at 100 ml/hr     Hypocalcemia: Corrected calcium normal.     Oral pharyngeal candidiasis: Nystatin for 7-day course.     Severe malnutrition: Secondary to acute diarrheal illness.  -Dietitian consulted  -On IV fluids and clear liquid diet for now to advance per surgery recommendations.     Anemia: Hemoglobin is remained relatively stable at around 10 despite some intermittent mild hematochezia.  Suspect the inflammatory process from his colitis also preventing improvement in anemia.     DVT Prophylaxis: Lovenox.  PCD's  Code Status: Full Code  Disposition Plan   Expected discharge in 2 to prior living arrangement once recovered from surgery and tolerating diet.     Entered: Jessica Saini 06/15/2019, 11:23 AM       Incidental Findings/ Discharge Issues: None        Interval History (Subjective):      Doing better today.                   Physical Exam:      Last Vital Signs:  /76 (BP Location: Right arm)   Pulse 93   Temp 97.6  F (36.4  C) (Oral)   Resp 16   Ht 1.803 m (5' 11\")   Wt 61.8 kg (136 lb " 4.8 oz)   SpO2 97%   BMI 19.01 kg/m        Vital signs reviewed  General:  Alert, calm, NAD  CV: regular rate and rhythm, no murmurs or rubs  Lungs:  Clear to ascultation bilaterally, normal respiratory effort  Abdomen: Ileostomy is in place him a bag is empty.  Abdomen soft with some bowel sounds.  Surgical wounds are clean and intact.  Extremities:  No edema  Neuro: normal strength and sensation in all 4 extremities, cranial nerves grossly intact  Psychiatric:  Mood and affect within normal limits             Medications:      All current medications were reviewed with changes reflected in problem list.         Data:      All new lab and imaging data was reviewed.   Labs:  Creatinine 0.6

## 2019-06-15 NOTE — PLAN OF CARE
Pt POD 3. VSS. Passing loose/liquid stool + gas through ostomy. Emptying ostomy independently with supervision. Low fiber diet, tolerating well. Steroids changed to PO. Discharge meds in drawer. Possible discharge later today? Or home tomorrow. Up independently, needs encouragement to walk in halls.

## 2019-06-15 NOTE — PLAN OF CARE
Pt A&O, up in room SBA-independent at times.  VSS, afebrile & sats maintained on RA.  C/o moderate abd pain, oxycodone x2 and scheduled Tylenol.  BS hypoactive, +flatus and small amount of liquid stool per ostomy.  Penrose drain fell out this shift, large amount of brown/serosanguinous drainage in brief.  Abd incisions dermabonded and DENNY. No drainage.  Low fiber diet, fair PO intake. Adequate UOP.  CRS and WOC following. Discharge home - ?tomorrow.

## 2019-06-15 NOTE — PROGRESS NOTES
Writer spoke with pt regarding recommendations for [facility]. He is in agreement that this would be helpful, he would like to use [facility] as he intends to establish PCP with [place]. Writer spoke with HC and notified them of this. They are concerned that PCP is not yet established however pt has appt for Monday 5/17 11am. Writer confirmed with Pt that he intends to go to that appt as long as he is d/c'd from the hospital by then. Writer called [facility] and notified them of this.     Writer will notify [facility] upon discharge.

## 2019-06-15 NOTE — PROGRESS NOTES
"Colon & Rectal Surgery Progress Note             Interval History:   Postop Day #: 3  Pain controlled with oral oxy. Up walking, tolerating a low residue diet.  Emptying stoma and changed it yesterday.                Medications:   I have reviewed this patient's current medications               Physical Exam:   Blood pressure 117/76, pulse 93, temperature 97.6  F (36.4  C), temperature source Oral, resp. rate 16, height 1.803 m (5' 11\"), weight 61.8 kg (136 lb 4.8 oz), SpO2 97 %.    Intake/Output Summary (Last 24 hours) at 6/15/2019 0935  Last data filed at 6/15/2019 0800  Gross per 24 hour   Intake 1083 ml   Output 2785 ml   Net -1702 ml     GEN:  alert  ABD:  Soft, stoma pink and productive         Data:        Lab Results   Component Value Date     06/13/2019    Lab Results   Component Value Date    CHLORIDE 96 06/13/2019    Lab Results   Component Value Date    BUN 8 06/13/2019      Lab Results   Component Value Date    POTASSIUM 3.7 06/13/2019    Lab Results   Component Value Date    CO2 29 06/13/2019    Lab Results   Component Value Date    CR 0.62 06/14/2019    CR 0.64 06/13/2019        Lab Results   Component Value Date    HGB 9.7 (L) 06/13/2019    HGB 9.7 (L) 06/12/2019     Lab Results   Component Value Date     06/15/2019     06/14/2019     Lab Results   Component Value Date    WBC 8.5 06/13/2019    WBC 6.7 06/12/2019            Assessment and Plan:   Doing well.  Stoma care.  Possible discharge later today with Lovenox.   Oral steroids and oxy.     Brigitte Ruano MD  [facility]  Office Phone # [phone]  "

## 2019-06-16 VITALS
DIASTOLIC BLOOD PRESSURE: 82 MMHG | WEIGHT: 136.3 LBS | BODY MASS INDEX: 19.08 KG/M2 | HEART RATE: 93 BPM | OXYGEN SATURATION: 97 % | TEMPERATURE: 97.3 F | SYSTOLIC BLOOD PRESSURE: 118 MMHG | HEIGHT: 71 IN | RESPIRATION RATE: 14 BRPM

## 2019-06-16 LAB
ALBUMIN SERPL-MCNC: 1.6 G/DL (ref 3.4–5)
ALP SERPL-CCNC: 105 U/L (ref 40–150)
ALT SERPL W P-5'-P-CCNC: 63 U/L (ref 0–70)
ANION GAP SERPL CALCULATED.3IONS-SCNC: 5 MMOL/L (ref 3–14)
AST SERPL W P-5'-P-CCNC: 49 U/L (ref 0–45)
BILIRUB SERPL-MCNC: 0.2 MG/DL (ref 0.2–1.3)
BUN SERPL-MCNC: 10 MG/DL (ref 7–30)
CALCIUM SERPL-MCNC: 7.1 MG/DL (ref 8.5–10.1)
CHLORIDE SERPL-SCNC: 99 MMOL/L (ref 94–109)
CO2 SERPL-SCNC: 30 MMOL/L (ref 20–32)
CREAT SERPL-MCNC: 0.62 MG/DL (ref 0.66–1.25)
ERYTHROCYTE [DISTWIDTH] IN BLOOD BY AUTOMATED COUNT: 13.4 % (ref 10–15)
GFR SERPL CREATININE-BSD FRML MDRD: >90 ML/MIN/{1.73_M2}
GLUCOSE SERPL-MCNC: 96 MG/DL (ref 70–99)
HCT VFR BLD AUTO: 26.9 % (ref 40–53)
HGB BLD-MCNC: 9 G/DL (ref 13.3–17.7)
MCH RBC QN AUTO: 29.5 PG (ref 26.5–33)
MCHC RBC AUTO-ENTMCNC: 33.5 G/DL (ref 31.5–36.5)
MCV RBC AUTO: 88 FL (ref 78–100)
PLATELET # BLD AUTO: 289 10E9/L (ref 150–450)
POTASSIUM SERPL-SCNC: 3.5 MMOL/L (ref 3.4–5.3)
PROT SERPL-MCNC: 4.5 G/DL (ref 6.8–8.8)
RBC # BLD AUTO: 3.05 10E12/L (ref 4.4–5.9)
SODIUM SERPL-SCNC: 134 MMOL/L (ref 133–144)
WBC # BLD AUTO: 10.1 10E9/L (ref 4–11)

## 2019-06-16 PROCEDURE — 36415 COLL VENOUS BLD VENIPUNCTURE: CPT | Performed by: INTERNAL MEDICINE

## 2019-06-16 PROCEDURE — 25000132 ZZH RX MED GY IP 250 OP 250 PS 637: Performed by: COLON & RECTAL SURGERY

## 2019-06-16 PROCEDURE — 80053 COMPREHEN METABOLIC PANEL: CPT | Performed by: INTERNAL MEDICINE

## 2019-06-16 PROCEDURE — 25000131 ZZH RX MED GY IP 250 OP 636 PS 637: Performed by: COLON & RECTAL SURGERY

## 2019-06-16 PROCEDURE — 25000132 ZZH RX MED GY IP 250 OP 250 PS 637: Performed by: ANESTHESIOLOGY

## 2019-06-16 PROCEDURE — 25000128 H RX IP 250 OP 636: Performed by: COLON & RECTAL SURGERY

## 2019-06-16 PROCEDURE — 99239 HOSP IP/OBS DSCHRG MGMT >30: CPT | Performed by: INTERNAL MEDICINE

## 2019-06-16 PROCEDURE — 85027 COMPLETE CBC AUTOMATED: CPT | Performed by: INTERNAL MEDICINE

## 2019-06-16 RX ADMIN — OXYCODONE HYDROCHLORIDE 5 MG: 5 TABLET ORAL at 04:16

## 2019-06-16 RX ADMIN — OXYCODONE HYDROCHLORIDE 5 MG: 5 TABLET ORAL at 08:47

## 2019-06-16 RX ADMIN — OXYCODONE HYDROCHLORIDE 5 MG: 5 TABLET ORAL at 12:52

## 2019-06-16 RX ADMIN — NYSTATIN 500000 UNITS: 100000 SUSPENSION ORAL at 15:50

## 2019-06-16 RX ADMIN — Medication 25000 UNITS: at 08:48

## 2019-06-16 RX ADMIN — NYSTATIN 500000 UNITS: 100000 SUSPENSION ORAL at 08:48

## 2019-06-16 RX ADMIN — OMEPRAZOLE 20 MG: 20 CAPSULE, DELAYED RELEASE ORAL at 06:25

## 2019-06-16 RX ADMIN — OMEPRAZOLE 20 MG: 20 CAPSULE, DELAYED RELEASE ORAL at 15:50

## 2019-06-16 RX ADMIN — PREDNISONE 20 MG: 20 TABLET ORAL at 08:48

## 2019-06-16 RX ADMIN — ENOXAPARIN SODIUM 40 MG: 40 INJECTION SUBCUTANEOUS at 14:38

## 2019-06-16 RX ADMIN — NYSTATIN 500000 UNITS: 100000 SUSPENSION ORAL at 12:48

## 2019-06-16 RX ADMIN — LIDOCAINE 1 PATCH: 560 PATCH PERCUTANEOUS; TOPICAL; TRANSDERMAL at 08:48

## 2019-06-16 RX ADMIN — OXYCODONE HYDROCHLORIDE 5 MG: 5 TABLET ORAL at 00:04

## 2019-06-16 ASSESSMENT — ACTIVITIES OF DAILY LIVING (ADL)
ADLS_ACUITY_SCORE: 12

## 2019-06-16 NOTE — PLAN OF CARE
"  [place]: A&O  VS: /66 (BP Location: Right arm)   Pulse 93   Temp 98.3  F (36.8  C) (Oral)   Resp 12   Ht 1.803 m (5' 11\")   Wt 61.8 kg (136 lb 4.8 oz)   SpO2 97%   BMI 19.01 kg/m    LS: clear on RA, denies SOB  GI: ileostomy, output 325, denies nausea  : BR w/o difficulty  Skin: abd incision WNL, ileostomy site  Activity: ind in room   Diet: low fiber   Pain: c/o 4-5/10 abdominal pain, taking oxy x2 PRN, lidocaine patches  Plan: discharge later today  Pt demonstrated stoma appliance change and lovenox injection.  Pt ambulated stairs w/writer, tolerated  well.  Education handouts for lovenox and ostomy given to pt.  Questions answered.           "

## 2019-06-16 NOTE — PLAN OF CARE
Pt A&O, slept on and off throughout evening. Up in room independently.  VSS, afebrile & sats maintained on RA.  C/o moderate abd pain, unchanged with oxycodone x2.  Abd incisions dermabonded and CDI.  Healthy stoma with good stool output & gas.  Probable discharge home today with HCRN.

## 2019-06-16 NOTE — DISCHARGE SUMMARY
[facility]    Discharge Summary  Hospitalist    Date of Admission:  5/25/2019  Date of Discharge:  6/16/2019  Provider:  Giselle Larry, DO    Discharge Diagnoses   1.   Ulcerative colitis  2.  S/p lap colectomy with ileostomy  3.  Severe protein calorie malnutrition  4.  Oral candidiasis  5.  Hyponatremia and hypokalemia, resolved  6.  Dehydration, resolved  7.  Acute blood loss anemia    Other medical issues:  History reviewed. No pertinent past medical history.    History of Present Illness   Alex Cardona is an 48 year old male who presented with several weeks of abd pain and hematochezia.  Please see the admission history and physical for full details.    Hospital Course   Alex Cardona was admitted on 5/25/2019.  The following problems were addressed during his hospitalization:    1.  Ulcerative colitis  Mr. Cardona is a 49 yo male patient who was admitted to the the hospital with a h/o of 3-4 weeks of diffuse abd pain and watery stools alternating with hematochezia.  CT scan revealed pancolitis.  GI was consulted and colonoscopy was attempted but limited secondary to severe colitis.    He was initially started on IV Solu-Medrol 3 times daily.  Due to lack of response he was also given Remicade with second dose on 6/4.    He continued to have watery stools and abdominal pain and underwent repeat colonoscopy that shows severe ulceration, actually looking worse than previous colonoscopy despite high-dose steroids and 2 doses of Remicade.  Colorectal surgery consulting and recommended colectomy.  He underwent lap-assisted total abd colectomy with ileostomy on 6/12/19.    Surgical path consistent with ulcerative colitis.  He was discharged on a prednisone taper and oxycodone.  He will have close f/u with CRS after hospital discharge.      2.  Acute dehydration and electrolyte abnormalities  He was given IVF in the setting of significant volume loss d/t diarrhea. His electrolytes required  supplementation and had normalized at time of hospital discharge.       Significant Results and Procedures   Lap colectomy with ileostomy - 6/12/19    Pending Results   Unresulted Labs Ordered in the Past 30 Days of this Admission     No orders found from 3/26/2019 to 5/26/2019.          Code Status   FULL CODES       Primary Care Physician   [facility]    Physical Exam   Temp: 98.3  F (36.8  C) Temp src: Oral BP: 106/66   Heart Rate: 77 Resp: 12 SpO2: 97 % O2 Device: None (Room air)    Vitals:    06/10/19 0551 06/11/19 0602 06/12/19 0537   Weight: 61.2 kg (134 lb 14.4 oz) 61 kg (134 lb 6.4 oz) 61.8 kg (136 lb 4.8 oz)     Vital Signs with Ranges  Temp:  [97  F (36.1  C)-98.3  F (36.8  C)] 98.3  F (36.8  C)  Heart Rate:  [77-87] 77  Resp:  [12-16] 12  BP: (101-106)/(59-67) 106/66  SpO2:  [97 %-98 %] 97 %  I/O last 3 completed shifts:  In: 1320 [P.O.:1320]  Out: 3150 [Urine:2375; Stool:775]    PT SEEN AND EXAMINED ON DAY OF D/C  GEN:  Alert, oriented x 3, comfortable, NAD.   HEENT:  Normocephalic/atraumatic, PERRL, no scleral icterus, no nasal discharge, mouth moist, no oral ulcers, mild thrush noted.  NECK:  No clear thyromegaly of clear JVD  CV:  Regular rate and rhythm, no loud murmur to ausc.  S1 + S2 noted, no S3 or S4.  LUNGS:  Clear to auscultation ant/alt bilaterally.  No rales/rhonchi/wheezing auscultated bilaterally.  No costal retractions bilaterally.  Symmetric chest rise on inhalation noted.  ABD:  Active bowel sounds, soft, non-tender/non-distended.  Ileostomy site clean and dry - no erythema around site.  Somewhat formed stool in bag. No clear rebound/guarding/rigidity.  No masses palpated.  No obvious HSM to exam.  EXT:  No edema or cyanosis bilaterally. No joint synovitis noted.  No calf-tenderness or asymmetry noted.  SKIN:  Dry to touch, no rashes or jaundice noted.  PSYCH:  Mood somewhat anxious, Not tearful or depressed.  Maintains direct eye contact.    Discharge Disposition    Discharged to home    Consultations This Hospital Stay   GASTROENTEROLOGY IP CONSULT  CARE COORDINATOR IP CONSULT  SOCIAL WORK IP CONSULT  PHARMACY LIAISON FOR MEDICATION COVERAGE CONSULT  NUTRITION SERVICES ADULT IP CONSULT  COLORECTAL SURGERY IP CONSULT  WOUND OSTOMY CONTINENCE NURSE  IP CONSULT  NUTRITION SERVICES ADULT IP CONSULT  WOUND OSTOMY CONTINENCE NURSE  IP CONSULT  PHARMACY DISCHARGE EDUCATION BY PHARMACIST    Time Spent on this Encounter   I, Giselle Larry, personally saw the patient today and spent greater than 30 minutes discharging this patient.    Discharge Orders      Home care nursing referral      MD face to face encounter    Documentation of Face to Face and Certification for Home Health Services    I certify that patient: Alex Cardona is under my care and that I, or a nurse practitioner or physician's assistant working with me, had a face-to-face encounter that meets the physician face-to-face encounter requirements with this patient on: 6/15/2019.    This encounter with the patient was in whole, or in part, for the following medical condition, which is the primary reason for home health care: stoma care and teaching.    I certify that, based on my findings, the following services are medically necessary home health services: Nursing.    My clinical findings support the need for the above services because: new stoma    Further, I certify that my clinical findings support that this patient is homebound (i.e. absences from home require considerable and taxing effort and are for medical reasons or [employer] services or infrequently or of short duration when for other reasons) because: Leaving home is medically contraindicated for the following reason(s): new stoma..    Based on the above findings. I certify that this patient is confined to the home and needs intermittent skilled nursing care, physical therapy and/or speech therapy.  The patient is under my care, and I have  initiated the establishment of the plan of care.  This patient will be followed by a physician who will periodically review the plan of care.  Physician/Provider to provide follow up care: Clinic, [facility]    Attending hospital physician ([facility] Brigitte Ruano MD  Physician Signature: See electronic signature associated with these discharge orders.  Date: 6/15/2019     Reason for your hospital stay    You were admitted for acute colitis and underwent total colectomy and ileostomy.  You will need close f/u with PCP and CRS.     Follow-up and recommended labs and tests     Establish care with Dr. Garcia in the clinic, 6/17/19  F/u with CRS in 2-3 weeks for hospital f/u.     Activity    Your activity upon discharge: activity as tolerated; no driving while taking narcotics     Full Code     Diet    Follow this diet upon discharge:      Snacks/Supplements Adult: Other; chocolate shake + benepro; Between Meals      Low Fiber Diet     Discharge Medications   Current Discharge Medication List      START taking these medications    Details   beta carotene 25487 UNIT capsule Take 1 capsule (25,000 Units) by mouth 2 times daily  Qty: 60 capsule, Refills: 1    Associated Diagnoses: Colitis      enoxaparin (LOVENOX) 40 MG/0.4ML syringe Inject 0.4 mLs (40 mg) Subcutaneous every 24 hours  Qty: 27 Syringe, Refills: 0    Associated Diagnoses: Colitis      nystatin (MYCOSTATIN) 349835 UNIT/ML suspension Swish and spit 5 mLs (500,000 Units) in mouth 4 times daily  Qty: 300 mL, Refills: 0    Associated Diagnoses: Colitis      oxyCODONE (ROXICODONE) 5 MG tablet Take 1 tablet (5 mg) by mouth every 4 hours as needed for moderate to severe pain  Qty: 20 tablet, Refills: 0    Associated Diagnoses: Colitis      !! predniSONE (DELTASONE) 10 MG tablet Take 1 tablet (10 mg) by mouth daily  Qty: 7 tablet, Refills: 0    Associated Diagnoses: Colitis      !! predniSONE (DELTASONE) 20 MG tablet  Take 1 tablet (20 mg) by mouth daily  Qty: 7 tablet, Refills: 0    Associated Diagnoses: Colitis      !! predniSONE (DELTASONE) 5 MG tablet Take 3 tablets (15 mg) by mouth daily  Qty: 21 tablet, Refills: 0    Comments: Okay to adjust prescription for steroid for equivalent dose.  Associated Diagnoses: Colitis      !! predniSONE (DELTASONE) 5 MG tablet Take 1 tablet (5 mg) by mouth daily  Qty: 7 tablet, Refills: 0    Associated Diagnoses: Colitis       !! - Potential duplicate medications found. Please discuss with provider.      STOP taking these medications       bismuth subsalicylate (PEPTO-BISMOL) 262 MG TABS Comments:   Reason for Stopping:         dicyclomine (BENTYL) 20 MG tablet Comments:   Reason for Stopping:         diphenoxylate-atropine (LOMOTIL) 2.5-0.025 MG tablet Comments:   Reason for Stopping:         ondansetron (ZOFRAN-ODT) 4 MG ODT tab Comments:   Reason for Stopping:             Allergies   Allergies   Allergen Reactions     Blood Transfusion Related (Informational Only) Other (See Comments)     Patient has a history of a clinically significant antibody against RBC antigens.  A delay in compatible RBCs may occur.     Data   Recent Labs   Lab 06/16/19  0713 06/15/19  0705 06/14/19 0818 06/13/19  0750 06/12/19 0624   WBC 10.1  --   --  8.5  --  6.7   HGB 9.0*  --   --  9.7*  --  9.7*   HCT 26.9*  --   --  29.8*  --  29.7*   MCV 88  --   --  89  --  89    280 398 347   < > 368    < > = values in this interval not displayed.     Recent Labs   Lab 06/16/19  0713 06/14/19  0818 06/13/19 0750 06/12/19 0624     --  130*  --  132*   POTASSIUM 3.5  --  3.7  --  3.5   CHLORIDE 99  --  96  --  100   CO2 30  --  29  --  26   ANIONGAP 5  --  5  --  6   GLC 96  --  103*  --  78   BUN 10  --  8  --  9   CR 0.62* 0.62* 0.64*   < > 0.77   GFRESTIMATED >90 >90 >90   < > >90   GFRESTBLACK >90 >90 >90   < > >90   KEZIA 7.1*  --  7.5*  --  7.5*    < > = values in this interval not displayed.     No  results for input(s): CULT in the last 168 hours.  Recent Labs   Lab 06/16/19  0713 06/14/19  0818 06/13/19  0750  06/12/19  0624 06/11/19  0625 06/10/19  0715     --  130*  --  132* 128* 130*   POTASSIUM 3.5  --  3.7  --  3.5 4.6 4.2   CHLORIDE 99  --  96  --  100 93* 96   CO2 30  --  29  --  26 31 28   ANIONGAP 5  --  5  --  6 4 6   GLC 96  --  103*  --  78 90 126*   BUN 10  --  8  --  9 12 14   CR 0.62* 0.62* 0.64*   < > 0.77 0.66 0.61*   GFRESTIMATED >90 >90 >90   < > >90 >90 >90   GFRESTBLACK >90 >90 >90   < > >90 >90 >90   KEZIA 7.1*  --  7.5*  --  7.5* 8.1* 7.5*   MAG  --   --   --   --   --   --  1.7   PHOS  --   --   --   --   --   --  3.1   PROTTOTAL 4.5*  --   --   --   --   --   --    ALBUMIN 1.6*  --   --   --   --  2.0*  --    BILITOTAL 0.2  --   --   --   --   --   --    ALKPHOS 105  --   --   --   --   --   --    AST 49*  --   --   --   --   --   --    ALT 63  --   --   --   --   --   --     < > = values in this interval not displayed.     Results for orders placed or performed during the hospital encounter of 05/23/19   CT Abdomen Pelvis w Contrast    Narrative    CT ABDOMEN AND PELVIS WITH CONTRAST 5/23/2019 4:15 PM     HISTORY: Abdominal pain, diarrhea x 3 weeks     TECHNIQUE: Axial images from the lung bases to the symphysis are  performed with additional coronal reformatted images. 74 mL of Isovue  370 are given intravenously.  Radiation dose for this scan was reduced  using automated exposure control, adjustment of the mA and/or kV  according to patient size, or iterative reconstruction technique.    FINDINGS: The lung bases are clear.    Abdomen: The upper abdominal organs are within normal limits including  the liver, spleen, gallbladder, pancreas, adrenal glands and kidneys.  No hydronephrosis or obvious renal calculi. No enlarged lymph nodes.  The bowel is normal in caliber without obstruction, but there is  significant colonic wall thickening throughout the entire colon from  the cecum  to the sigmoid colon consistent with an infectious or  inflammatory colitis. Small bowel is unremarkable. Appendix is  unremarkable. Aorta demonstrates scattered calcified plaque.    Pelvis: The bladder, prostate and rectum are otherwise unremarkable.  Only mild rectal wall thickening is noted. Small amount of free pelvic  fluid is present probably related to the colonic inflammation. No  enlarged lymph nodes. Bone window examination is within normal limits.      Impression    IMPRESSION: Infectious or inflammatory pancolitis. Minimal apparent  involvement of the rectum. Abdominal and pelvic organs are otherwise  within normal limits. Appendix and terminal ileum appear normal.    LAURIE CAPELLAN MD

## 2019-06-16 NOTE — PROGRESS NOTES
Pt discharged home with family to transport. VSS at time of discharge. Reviewed AVS + home medication schedule, no questions at this time. Verbalizes understanding of recommended follow-up. Sent with all personal belongings, additional ostomy supplies + filled Rx x5: prednisone, oxycodone, nystatin, Lovenox and beta carotene.

## 2019-06-16 NOTE — PLAN OF CARE
Pt A&O, up in halls with SBA otherwise independent.  VSS, afebrile & sats maintained on RA.  C/o moderate abd pain, minimal change with oxycodone x2.  Low fiber diet, good appetite, -n/v. +gas and loose stool per stoma.  Abd incisions dermabonded and DENNY.  Small amount of brown drainage per rectum.  Continues PO prednisone, nystatin for thrush.  CRS and SW following. Probable discharge tomorrow.

## 2019-06-17 ENCOUNTER — OFFICE VISIT (OUTPATIENT)
Dept: INTERNAL MEDICINE | Facility: CLINIC | Age: 49
End: 2019-06-17
Payer: MEDICAID

## 2019-06-17 VITALS
SYSTOLIC BLOOD PRESSURE: 124 MMHG | OXYGEN SATURATION: 100 % | HEART RATE: 110 BPM | HEIGHT: 71 IN | RESPIRATION RATE: 17 BRPM | BODY MASS INDEX: 20.43 KG/M2 | WEIGHT: 145.9 LBS | TEMPERATURE: 98.2 F | DIASTOLIC BLOOD PRESSURE: 80 MMHG

## 2019-06-17 DIAGNOSIS — R60.9 EDEMA, UNSPECIFIED TYPE: ICD-10-CM

## 2019-06-17 DIAGNOSIS — Z90.49 S/P LAPAROSCOPIC COLECTOMY: ICD-10-CM

## 2019-06-17 DIAGNOSIS — Z93.2 ILEOSTOMY STATUS (H): ICD-10-CM

## 2019-06-17 DIAGNOSIS — E77.8 HYPOPROTEINEMIA (H): ICD-10-CM

## 2019-06-17 DIAGNOSIS — K51.911 ULCERATIVE COLITIS, ACUTE, WITH RECTAL BLEEDING (H): Primary | ICD-10-CM

## 2019-06-17 LAB — COPATH REPORT: NORMAL

## 2019-06-17 PROCEDURE — 99495 TRANSJ CARE MGMT MOD F2F 14D: CPT | Performed by: INTERNAL MEDICINE

## 2019-06-17 RX ORDER — BETA-CAROTENE 7500 MCG
25000 CAPSULE ORAL 2 TIMES DAILY
Qty: 60 CAPSULE | Refills: 1 | Status: CANCELLED | OUTPATIENT
Start: 2019-06-17

## 2019-06-17 ASSESSMENT — MIFFLIN-ST. JEOR: SCORE: 1548.93

## 2019-06-17 NOTE — PROGRESS NOTES
Subjective     Alex Cardona is a 48 year old male who presents to clinic today for the following health issues:    Hospitals in [place]       Hospital Follow-up Visit:    Hospital/Nursing Home/IP Rehab Facility: [facility]  Date of Admission: 5/25/19  Date of Discharge: 6/16/19  Reason(s) for Admission: ulcerative Colitis, S/P laparoscopic-assisted total colectomy with end ileostomy on 06/12/2019, protein energy malnutrition.            Problems taking medications regularly:  None       Medication changes since discharge: Lovenox, nystatin, roxicodone, prednisone, beta carotene       Problems adhering to non-medication therapy:  None    Summary of hospitalization:  [facility] discharge summary reviewed  Diagnostic Tests/Treatments reviewed.  Follow up needed: CRS in 2 wks   Other Healthcare Providers Involved in Patient s Care:         Homecare and Specialist appointment - Follow-up with colorectal surgery in 2 weeks  Update since discharge: improved, complains of bilateral leg edema.  No shortness of breath, no leg pains    Post Discharge Medication Reconciliation: discharge medications reconciled, continue medications without change.  Plan of care communicated with patient     Coding guidelines for this visit:  Type of Medical   Decision Making Face-to-Face Visit       within 7 Days of discharge Face-to-Face Visit        within 14 days of discharge   Moderate Complexity [id number]   High Complexity [id number]          Brief  hospital summary    Ulcerative colitis  Mr. Cardona is a 47 yo male patient who was admitted to the the hospital with a h/o of 3-4 weeks of diffuse abd pain and watery stools alternating with hematochezia.  CT scan revealed pancolitis.  GI was consulted and colonoscopy was attempted but limited secondary to severe colitis.  He was initially started on IV Solu-Medrol 3 times daily.  Due to lack of response he was also given Remicade with second dose on 6/4.  He continued to have watery  stools and abdominal pain and underwent repeat colonoscopy that shows severe ulceration, actually looking worse than previous colonoscopy despite high-dose steroids and 2 doses of Remicade.  Colorectal surgery consulting and recommended colectomy.  He underwent lap-assisted total abd colectomy with ileostomy on 6/12/19.  Surgical path consistent with ulcerative colitis.  He was discharged on a prednisone taper and oxycodone.  He will have close f/u with CRS after hospital discharge.           Patient Active Problem List   Diagnosis     Diarrhea     Colitis     Ulcerative colitis, acute, with rectal bleeding (H)     Past Surgical History:   Procedure Laterality Date     COLONOSCOPY N/A 5/26/2019    Procedure: incomplete COLONOSCOPY with left colon biopsies by cold biopsy forceps;  Surgeon: Herbert Dior DO;  Location:  GI     LAPAROSCOPIC ASSISTED COLECTOMY N/A 6/12/2019    Procedure: Laparoscopic Assisted Total Abdominal Colectomy with End Ileostomy;  Surgeon: Brigitte Ruano MD;  Location:  OR       Social History     Tobacco Use     Smoking status: Former Smoker     Smokeless tobacco: Never Used     Tobacco comment: quit 4 years ago   Substance Use Topics     Alcohol use: Yes     Family History   Problem Relation Age of Onset     Colon Cancer No family hx of          Current Outpatient Medications   Medication Sig Dispense Refill     beta carotene 23113 UNIT capsule Take 1 capsule (25,000 Units) by mouth 2 times daily 60 capsule 1     enoxaparin (LOVENOX) 40 MG/0.4ML syringe Inject 0.4 mLs (40 mg) Subcutaneous every 24 hours 27 Syringe 0     nystatin (MYCOSTATIN) 382496 UNIT/ML suspension Swish and spit 5 mLs (500,000 Units) in mouth 4 times daily 300 mL 0     oxyCODONE (ROXICODONE) 5 MG tablet Take 1 tablet (5 mg) by mouth every 4 hours as needed for moderate to severe pain 20 tablet 0     [START ON 6/29/2019] predniSONE (DELTASONE) 10 MG tablet Take 1 tablet (10 mg) by mouth daily 7 tablet 0      "predniSONE (DELTASONE) 20 MG tablet Take 1 tablet (20 mg) by mouth daily 7 tablet 0     [START ON 6/22/2019] predniSONE (DELTASONE) 5 MG tablet Take 3 tablets (15 mg) by mouth daily 21 tablet 0     [START ON 7/6/2019] predniSONE (DELTASONE) 5 MG tablet Take 1 tablet (5 mg) by mouth daily 7 tablet 0          Reviewed and updated as needed this visit by Provider         Review of Systems   ROS COMP: CONSTITUTIONAL: NEGATIVE for fever, chills, change in weight  EYES: NEGATIVE for vision changes or irritation  ENT/MOUTH: NEGATIVE for ear, mouth and throat problems  RESP: NEGATIVE for significant cough or SOB  CV: NEGATIVE for chest pain, palpitations or peripheral edema  GI: ileostomy   MUSCULOSKELETAL: NEGATIVE for significant arthralgias or myalgia  HEME/ALLERGY/IMMUNE: NEGATIVE for bleeding problems  ROS otherwise negative        Objective    /80   Pulse 110   Temp 98.2  F (36.8  C) (Oral)   Resp 17   Ht 1.803 m (5' 11\")   Wt 66.2 kg (145 lb 14.4 oz)   SpO2 100%   BMI 20.35 kg/m    Body mass index is 20.35 kg/m .  Physical Exam   GENERAL: healthy, alert and no distress  EYES: Eyes grossly normal to inspection, PERRL and conjunctivae and sclerae normal  NECK: no adenopathy, no asymmetry, masses, or scars and thyroid normal to palpation  RESP: lungs clear to auscultation - no rales, rhonchi or wheezes  CV: regular rate and rhythm, normal S1 S2, no S3 or S4, no murmur, click or rub, no peripheral edema and peripheral pulses strong  ABDOMEN: soft, ileostomy bag noted, stoma healthy  MS: 1+ LE  Edema, no erythema calf tenderness bilaterally.       Assessment & Plan     (K51.911) Ulcerative colitis, acute, with rectal bleeding (H)  (primary encounter diagnosis)  (Z90.49) S/P laparoscopic colectomy  Plan: stable, making appt with CRS in 2 wks.     (Z93.2) Ileostomy status (H)  Plan: stable, has home care coming in tomorrow       (E77.8) Hypoproteinemia (H)  Plan: Patient has a started increasing protein in his " diet will recheck serum protein in 3 to 4 weeks    (R60.9) Edema, unspecified type  Plan: Patient was explained that his swelling could be the result ofHypoproteinemia , increase protein in the diet also advised to follow low-sodium diet elevate lower extremity. Call or return to clinic prn if these symtoms worsen, fail to improve as anticipated, or if new symptoms develop.       Kati Garcia MD  [facility]

## 2019-06-17 NOTE — NURSING NOTE
"/80   Pulse 110   Temp 98.2  F (36.8  C) (Oral)   Resp 17   Ht 1.803 m (5' 11\")   Wt 66.2 kg (145 lb 14.4 oz)   SpO2 100%   BMI 20.35 kg/m    Patient in for hospital follow up.  Trish Alberto CMA    "

## 2019-06-19 ENCOUNTER — TELEPHONE (OUTPATIENT)
Dept: INTERNAL MEDICINE | Facility: CLINIC | Age: 49
End: 2019-06-19

## 2019-06-19 NOTE — TELEPHONE ENCOUNTER
Fax received from [facility] for review and signature.  Put in Dr. Garcia's in basket.

## 2019-06-21 ENCOUNTER — DOCUMENTATION ONLY (OUTPATIENT)
Dept: INTERNAL MEDICINE | Facility: CLINIC | Age: 49
End: 2019-06-21

## 2019-06-21 NOTE — PROGRESS NOTES
[facility] now requests orders and shares plan of care/discharge summaries for some patients through [employer].  Please REPLY TO THIS MESSAGE OR ROUTE BACK TO THE AUTHOR in order to give authorization for orders when needed.  This is considered a verbal order, you will still receive a faxed copy of orders for signature.  Thank you for your assistance in improving collaboration for our patients.    ORDER PT home care for 2 x week for 2 weeks    MD SUMMARY/PLAN OF CARE PT plan of care for strengthening, stamina, balance training, safety in the home

## 2019-06-24 ENCOUNTER — TELEPHONE (OUTPATIENT)
Dept: INTERNAL MEDICINE | Facility: CLINIC | Age: 49
End: 2019-06-24

## 2019-06-24 NOTE — TELEPHONE ENCOUNTER
[facility] now requests orders and shares plan of care/discharge summaries for some patients through [employer].  Please REPLY TO THIS MESSAGE OR ROUTE BACK TO THE AUTHOR in order to give authorization for orders when needed.  This is considered a verbal order, you will still receive a faxed copy of orders for signature.  Thank you for your assistance in improving collaboration for our patients.    ORDER Requesting PT home care orders of 2 x week for 2 weeks for strengthening, balance training, safety in the home,

## 2019-06-26 ENCOUNTER — TELEPHONE (OUTPATIENT)
Dept: INTERNAL MEDICINE | Facility: CLINIC | Age: 49
End: 2019-06-26

## 2019-06-26 NOTE — TELEPHONE ENCOUNTER
Ok for PT home care orders of 2 x week for 2 weeks for strengthening, balance training, safety in the home,

## 2019-06-26 NOTE — TELEPHONE ENCOUNTER
Fax received from [employer] for review and signature.  Put in Dr. Garcia's in basket.

## 2019-06-27 ENCOUNTER — TELEPHONE (OUTPATIENT)
Dept: INTERNAL MEDICINE | Facility: CLINIC | Age: 49
End: 2019-06-27

## 2019-07-03 ENCOUNTER — TELEPHONE (OUTPATIENT)
Dept: INTERNAL MEDICINE | Facility: CLINIC | Age: 49
End: 2019-07-03

## 2019-07-03 NOTE — TELEPHONE ENCOUNTER
Fax received from [facility] 06/24/19 for review and signature.  Put in Dr. Garcia's in basket.

## 2019-07-05 ENCOUNTER — TELEPHONE (OUTPATIENT)
Dept: INTERNAL MEDICINE | Facility: CLINIC | Age: 49
End: 2019-07-05

## 2019-07-05 DIAGNOSIS — Z53.9 DIAGNOSIS NOT YET DEFINED: Primary | ICD-10-CM

## 2019-07-05 PROCEDURE — 99207 C MD CERTIFICATION HHA PATIENT: CPT | Performed by: INTERNAL MEDICINE

## 2019-07-05 NOTE — TELEPHONE ENCOUNTER
Fax received from [facility] 06/18/19 for review and signature.  Put in Dr. Garcia's in basket.

## 2019-07-10 ENCOUNTER — TRANSFERRED RECORDS (OUTPATIENT)
Dept: HEALTH INFORMATION MANAGEMENT | Facility: CLINIC | Age: 49
End: 2019-07-10

## 2019-07-16 DIAGNOSIS — K51.911 ACUTE ULCERATIVE COLITIS WITH RECTAL BLEEDING (H): Primary | ICD-10-CM

## 2019-07-16 LAB — HGB BLD-MCNC: 10.3 G/DL (ref 13.3–17.7)

## 2019-07-16 PROCEDURE — 85018 HEMOGLOBIN: CPT | Performed by: COLON & RECTAL SURGERY

## 2019-07-16 PROCEDURE — 36415 COLL VENOUS BLD VENIPUNCTURE: CPT | Performed by: COLON & RECTAL SURGERY

## 2019-09-09 ENCOUNTER — TRANSFERRED RECORDS (OUTPATIENT)
Dept: HEALTH INFORMATION MANAGEMENT | Facility: CLINIC | Age: 49
End: 2019-09-09

## 2019-09-29 ENCOUNTER — HEALTH MAINTENANCE LETTER (OUTPATIENT)
Age: 49
End: 2019-09-29

## 2019-11-08 ENCOUNTER — OFFICE VISIT (OUTPATIENT)
Dept: INTERNAL MEDICINE | Facility: CLINIC | Age: 49
End: 2019-11-08
Payer: COMMERCIAL

## 2019-11-08 VITALS
OXYGEN SATURATION: 99 % | TEMPERATURE: 97.5 F | RESPIRATION RATE: 19 BRPM | DIASTOLIC BLOOD PRESSURE: 72 MMHG | WEIGHT: 152.6 LBS | BODY MASS INDEX: 21.36 KG/M2 | SYSTOLIC BLOOD PRESSURE: 110 MMHG | HEART RATE: 102 BPM | HEIGHT: 71 IN

## 2019-11-08 DIAGNOSIS — D64.9 LOW HEMOGLOBIN: ICD-10-CM

## 2019-11-08 DIAGNOSIS — K51.911 ULCERATIVE COLITIS, ACUTE, WITH RECTAL BLEEDING (H): ICD-10-CM

## 2019-11-08 DIAGNOSIS — Z00.00 ROUTINE HISTORY AND PHYSICAL EXAMINATION OF ADULT: Primary | ICD-10-CM

## 2019-11-08 LAB
ALBUMIN SERPL-MCNC: 3.9 G/DL (ref 3.4–5)
ALP SERPL-CCNC: 59 U/L (ref 40–150)
ALT SERPL W P-5'-P-CCNC: 24 U/L (ref 0–70)
ANION GAP SERPL CALCULATED.3IONS-SCNC: 7 MMOL/L (ref 3–14)
AST SERPL W P-5'-P-CCNC: 14 U/L (ref 0–45)
BASOPHILS # BLD AUTO: 0.1 10E9/L (ref 0–0.2)
BASOPHILS NFR BLD AUTO: 1.1 %
BILIRUB SERPL-MCNC: 0.3 MG/DL (ref 0.2–1.3)
BUN SERPL-MCNC: 15 MG/DL (ref 7–30)
CALCIUM SERPL-MCNC: 9.3 MG/DL (ref 8.5–10.1)
CHLORIDE SERPL-SCNC: 104 MMOL/L (ref 94–109)
CHOLEST SERPL-MCNC: 203 MG/DL
CO2 SERPL-SCNC: 28 MMOL/L (ref 20–32)
CREAT SERPL-MCNC: 1.07 MG/DL (ref 0.66–1.25)
DIFFERENTIAL METHOD BLD: ABNORMAL
EOSINOPHIL # BLD AUTO: 0.2 10E9/L (ref 0–0.7)
EOSINOPHIL NFR BLD AUTO: 4.1 %
ERYTHROCYTE [DISTWIDTH] IN BLOOD BY AUTOMATED COUNT: 17.1 % (ref 10–15)
GFR SERPL CREATININE-BSD FRML MDRD: 80 ML/MIN/{1.73_M2}
GLUCOSE SERPL-MCNC: 91 MG/DL (ref 70–99)
HCT VFR BLD AUTO: 38.4 % (ref 40–53)
HDLC SERPL-MCNC: 52 MG/DL
HGB BLD-MCNC: 12.1 G/DL (ref 13.3–17.7)
LDLC SERPL CALC-MCNC: 122 MG/DL
LYMPHOCYTES # BLD AUTO: 1 10E9/L (ref 0.8–5.3)
LYMPHOCYTES NFR BLD AUTO: 19 %
MCH RBC QN AUTO: 22.5 PG (ref 26.5–33)
MCHC RBC AUTO-ENTMCNC: 31.5 G/DL (ref 31.5–36.5)
MCV RBC AUTO: 72 FL (ref 78–100)
MONOCYTES # BLD AUTO: 0.4 10E9/L (ref 0–1.3)
MONOCYTES NFR BLD AUTO: 6.8 %
NEUTROPHILS # BLD AUTO: 3.7 10E9/L (ref 1.6–8.3)
NEUTROPHILS NFR BLD AUTO: 69 %
NONHDLC SERPL-MCNC: 151 MG/DL
PLATELET # BLD AUTO: 328 10E9/L (ref 150–450)
POTASSIUM SERPL-SCNC: 4.2 MMOL/L (ref 3.4–5.3)
PROT SERPL-MCNC: 7.6 G/DL (ref 6.8–8.8)
RBC # BLD AUTO: 5.37 10E12/L (ref 4.4–5.9)
SODIUM SERPL-SCNC: 139 MMOL/L (ref 133–144)
TRIGL SERPL-MCNC: 145 MG/DL
WBC # BLD AUTO: 5.4 10E9/L (ref 4–11)

## 2019-11-08 PROCEDURE — 36415 COLL VENOUS BLD VENIPUNCTURE: CPT | Performed by: INTERNAL MEDICINE

## 2019-11-08 PROCEDURE — 83540 ASSAY OF IRON: CPT | Performed by: INTERNAL MEDICINE

## 2019-11-08 PROCEDURE — 90715 TDAP VACCINE 7 YRS/> IM: CPT | Performed by: INTERNAL MEDICINE

## 2019-11-08 PROCEDURE — 99396 PREV VISIT EST AGE 40-64: CPT | Mod: 25 | Performed by: INTERNAL MEDICINE

## 2019-11-08 PROCEDURE — 85025 COMPLETE CBC W/AUTO DIFF WBC: CPT | Performed by: INTERNAL MEDICINE

## 2019-11-08 PROCEDURE — 90471 IMMUNIZATION ADMIN: CPT | Performed by: INTERNAL MEDICINE

## 2019-11-08 PROCEDURE — 80053 COMPREHEN METABOLIC PANEL: CPT | Performed by: INTERNAL MEDICINE

## 2019-11-08 PROCEDURE — 83550 IRON BINDING TEST: CPT | Performed by: INTERNAL MEDICINE

## 2019-11-08 PROCEDURE — 80061 LIPID PANEL: CPT | Performed by: INTERNAL MEDICINE

## 2019-11-08 ASSESSMENT — ENCOUNTER SYMPTOMS
DIARRHEA: 0
PARESTHESIAS: 0
HEARTBURN: 0
COUGH: 0
JOINT SWELLING: 0
CHILLS: 0
FEVER: 0
ARTHRALGIAS: 0
FREQUENCY: 0
PARESTHESIAS: 1
HEADACHES: 0
ABDOMINAL PAIN: 0
CONSTIPATION: 0
EYE PAIN: 0
HEMATOCHEZIA: 1
HEMATURIA: 0
NERVOUS/ANXIOUS: 0
DIZZINESS: 0

## 2019-11-08 ASSESSMENT — MIFFLIN-ST. JEOR: SCORE: 1574.32

## 2019-11-08 NOTE — NURSING NOTE
"/72   Pulse 129   Temp 97.5  F (36.4  C) (Oral)   Resp 19   Ht 1.803 m (5' 11\")   Wt 69.2 kg (152 lb 9.6 oz)   SpO2 99%   BMI 21.28 kg/m    Patient in for annual Male Physical.  Trish Alberto CMA    "

## 2019-11-08 NOTE — PROGRESS NOTES
SUBJECTIVE:   CC: Alex Cardona is an 49 year old male who presents for preventative health visit.     Healthy Habits:     Getting at least 3 servings of Calcium per day:  Yes    Bi-annual eye exam:  NO    Dental care twice a year:  NO    Sleep apnea or symptoms of sleep apnea:  None    Diet:  Regular (no restrictions)    Frequency of exercise:  6-7 days/week    Duration of exercise:  45-60 minutes    Taking medications regularly:  Yes    Medication side effects:  Not applicable    PHQ-2 Total Score: 0    Additional concerns today:  No    Patient has history of ulcerative colitis and has been seeing colorectal surgeon but patient has noticed some rectal discharge and was taking mesalamine suppositories, has not G seen gastroenterologist.  Requesting referral.    Patient also has history of low hemoglobin and low protein here to recheck.      Today's PHQ-2 Score:   PHQ-2 ( 1999 [employer]) 11/8/2019   Q1: Little interest or pleasure in doing things 0   Q2: Feeling down, depressed or hopeless 0   PHQ-2 Score 0   Q1: Little interest or pleasure in doing things Not at all   Q2: Feeling down, depressed or hopeless Not at all   PHQ-2 Score 0       Abuse: Current or Past(Physical, Sexual or Emotional)- No  Do you feel safe in your environment? Yes    Past Medical History:   Diagnosis Date     Ulcerative colitis (H)        Past Surgical History:   Procedure Laterality Date     COLONOSCOPY N/A 5/26/2019    Procedure: incomplete COLONOSCOPY with left colon biopsies by cold biopsy forceps;  Surgeon: Herbert Dior DO;  Location:  GI     LAPAROSCOPIC ASSISTED COLECTOMY N/A 6/12/2019    Procedure: Laparoscopic Assisted Total Abdominal Colectomy with End Ileostomy;  Surgeon: Brigitte Ruano MD;  Location:  OR       No current outpatient medications on file.       Family History   Problem Relation Age of Onset     Colon Cancer No family hx of          Social History     Tobacco Use     Smoking status: Former Smoker      "Packs/day: 0.00     Smokeless tobacco: Never Used     Tobacco comment: quit 4 years ago   Substance Use Topics     Alcohol use: Not Currently     Comment: none  since 05/19      If you drink alcohol do you typically have >3 drinks per day or >7 drinks per week? No    Alcohol Use 11/8/2019   Prescreen: >3 drinks/day or >7 drinks/week? No   No flowsheet data found.    Last PSA: No results found for: PSA    Reviewed orders with patient. Reviewed health maintenance and updated orders accordingly - Yes      Reviewed and updated as needed this visit by clinical staff  Tobacco  Allergies  Meds  Med Hx  Surg Hx  Fam Hx  Soc Hx        Reviewed and updated as needed this visit by Provider            Review of Systems   Constitutional: Negative for chills and fever.   HENT: Negative for congestion, ear pain and hearing loss.    Eyes: Negative for pain.   Respiratory: Negative for cough.    Cardiovascular: Positive for peripheral edema. Negative for chest pain.   Gastrointestinal: Positive for hematochezia. Negative for abdominal pain, constipation, diarrhea and heartburn.   Genitourinary: Negative for frequency, genital sores and hematuria.   Musculoskeletal: Negative for arthralgias and joint swelling.   Skin: Negative for rash.   Neurological: Negative for dizziness, headaches and paresthesias.   Psychiatric/Behavioral: The patient is not nervous/anxious.        OBJECTIVE:   /72   Pulse 129   Temp 97.5  F (36.4  C) (Oral)   Resp 19   Ht 1.803 m (5' 11\")   Wt 69.2 kg (152 lb 9.6 oz)   SpO2 99%   BMI 21.28 kg/m      Physical Exam  GENERAL: healthy, alert and no distress  EYES: Eyes grossly normal to inspection, PERRL and conjunctivae and sclerae normal  HENT: ear canals and TM's normal, nose and mouth without ulcers or lesions  NECK: no adenopathy, no asymmetry, masses, or scars and thyroid normal to palpation  RESP: lungs clear to auscultation - no rales, rhonchi or wheezes  CV: regular rate and rhythm, " "normal S1 S2, no S3 or S4, no murmur, click or rub, no peripheral edema and peripheral pulses strong  ABDOMEN: soft, nontender, ileostomy noted bowel sounds normal  MS: no gross musculoskeletal defects noted, no edema currently, no calf tenderness  NEURO: Normal strength and tone, mentation intact and speech normal  PSYCH: mentation appears normal, affect normal/bright       ASSESSMENT/PLAN:       (Z00.00) Routine history and physical examination of adult  (primary encounter diagnosis)  Plan: Lipid panel reflex to direct LDL Non-fasting,         CBC with platelets differential, Comprehensive         metabolic panel            (D64.9) Low hemoglobin  Plan: CBC with platelets differential, Iron and iron         binding capacity.pt was told I will contact her after results and proceed accordingly.      Currently no lower extremity edema probably secondary to low protein will check serum protein.            (K51.911) Ulcerative colitis, acute, with rectal bleeding (H)  Plan: GASTROENTEROLOGY ADULT REF CONSULT ONLY            COUNSELING:   Reviewed preventive health counseling, as reflected in patient instructions       Regular exercise       Healthy diet/nutrition       Immunizations    Vaccinated for: TDAP          Estimated body mass index is 20.35 kg/m  as calculated from the following:    Height as of 6/17/19: 1.803 m (5' 11\").    Weight as of 6/17/19: 66.2 kg (145 lb 14.4 oz).          reports that he has quit smoking. He has never used smokeless tobacco.      Counseling Resources:  ATP IV Guidelines  Pooled Cohorts Equation Calculator  FRAX Risk Assessment  ICSI Preventive Guidelines  Dietary Guidelines for Americans, 2010  USDA's MyPlate  ASA Prophylaxis  Lung CA Screening    Kati Garcia MD  [facility]  "

## 2019-11-09 LAB
IRON SATN MFR SERPL: 10 % (ref 15–46)
IRON SERPL-MCNC: 44 UG/DL (ref 35–180)
TIBC SERPL-MCNC: 430 UG/DL (ref 240–430)

## 2019-11-26 ENCOUNTER — TRANSFERRED RECORDS (OUTPATIENT)
Dept: HEALTH INFORMATION MANAGEMENT | Facility: CLINIC | Age: 49
End: 2019-11-26

## 2019-11-29 ENCOUNTER — HOSPITAL ENCOUNTER (OUTPATIENT)
Dept: MRI IMAGING | Facility: CLINIC | Age: 49
Discharge: HOME | End: 2019-11-29
Attending: INTERNAL MEDICINE | Admitting: INTERNAL MEDICINE
Payer: COMMERCIAL

## 2019-11-29 DIAGNOSIS — R19.8 ANAL DISCHARGE: ICD-10-CM

## 2019-11-29 DIAGNOSIS — K62.89 ANAL PAIN: ICD-10-CM

## 2019-11-29 PROCEDURE — 25500064 ZZH RX 255 OP 636: Performed by: INTERNAL MEDICINE

## 2019-11-29 PROCEDURE — A9585 GADOBUTROL INJECTION: HCPCS | Performed by: INTERNAL MEDICINE

## 2019-11-29 PROCEDURE — 72197 MRI PELVIS W/O & W/DYE: CPT

## 2019-11-29 RX ORDER — GADOBUTROL 604.72 MG/ML
7.5 INJECTION INTRAVENOUS ONCE
Status: COMPLETED | OUTPATIENT
Start: 2019-11-29 | End: 2019-11-29

## 2019-11-29 RX ADMIN — GADOBUTROL 7 ML: 604.72 INJECTION INTRAVENOUS at 07:40

## 2019-12-02 ENCOUNTER — TRANSFERRED RECORDS (OUTPATIENT)
Dept: HEALTH INFORMATION MANAGEMENT | Facility: CLINIC | Age: 49
End: 2019-12-02

## 2020-01-06 ENCOUNTER — TRANSFERRED RECORDS (OUTPATIENT)
Dept: HEALTH INFORMATION MANAGEMENT | Facility: CLINIC | Age: 50
End: 2020-01-06

## 2020-01-13 ENCOUNTER — HOSPITAL ENCOUNTER (OUTPATIENT)
Facility: CLINIC | Age: 50
Discharge: HOME | End: 2020-01-13
Attending: COLON & RECTAL SURGERY | Admitting: COLON & RECTAL SURGERY
Payer: COMMERCIAL

## 2020-01-13 VITALS
SYSTOLIC BLOOD PRESSURE: 105 MMHG | OXYGEN SATURATION: 97 % | HEIGHT: 70 IN | BODY MASS INDEX: 21.19 KG/M2 | HEART RATE: 66 BPM | DIASTOLIC BLOOD PRESSURE: 75 MMHG | WEIGHT: 148 LBS | RESPIRATION RATE: 12 BRPM

## 2020-01-13 LAB — FLEXIBLE SIGMOIDOSCOPY: NORMAL

## 2020-01-13 PROCEDURE — 40000104 ZZH STATISTIC MODERATE SEDATION < 10 MIN: Performed by: COLON & RECTAL SURGERY

## 2020-01-13 PROCEDURE — 45331 SIGMOIDOSCOPY AND BIOPSY: CPT | Performed by: COLON & RECTAL SURGERY

## 2020-01-13 PROCEDURE — 88342 IMHCHEM/IMCYTCHM 1ST ANTB: CPT | Mod: 26 | Performed by: COLON & RECTAL SURGERY

## 2020-01-13 PROCEDURE — 88341 IMHCHEM/IMCYTCHM EA ADD ANTB: CPT | Mod: 26 | Performed by: COLON & RECTAL SURGERY

## 2020-01-13 PROCEDURE — 25000128 H RX IP 250 OP 636: Performed by: COLON & RECTAL SURGERY

## 2020-01-13 PROCEDURE — 88341 IMHCHEM/IMCYTCHM EA ADD ANTB: CPT | Performed by: COLON & RECTAL SURGERY

## 2020-01-13 PROCEDURE — 88305 TISSUE EXAM BY PATHOLOGIST: CPT | Performed by: COLON & RECTAL SURGERY

## 2020-01-13 PROCEDURE — 88342 IMHCHEM/IMCYTCHM 1ST ANTB: CPT | Performed by: COLON & RECTAL SURGERY

## 2020-01-13 PROCEDURE — 88305 TISSUE EXAM BY PATHOLOGIST: CPT | Mod: 26 | Performed by: COLON & RECTAL SURGERY

## 2020-01-13 RX ORDER — FLUMAZENIL 0.1 MG/ML
0.2 INJECTION, SOLUTION INTRAVENOUS
Status: DISCONTINUED | OUTPATIENT
Start: 2020-01-13 | End: 2020-01-13 | Stop reason: HOSPADM

## 2020-01-13 RX ORDER — FENTANYL CITRATE 50 UG/ML
INJECTION, SOLUTION INTRAMUSCULAR; INTRAVENOUS PRN
Status: DISCONTINUED | OUTPATIENT
Start: 2020-01-13 | End: 2020-01-13 | Stop reason: HOSPADM

## 2020-01-13 RX ORDER — ONDANSETRON 2 MG/ML
4 INJECTION INTRAMUSCULAR; INTRAVENOUS EVERY 6 HOURS PRN
Status: DISCONTINUED | OUTPATIENT
Start: 2020-01-13 | End: 2020-01-13 | Stop reason: HOSPADM

## 2020-01-13 RX ORDER — ONDANSETRON 2 MG/ML
4 INJECTION INTRAMUSCULAR; INTRAVENOUS
Status: DISCONTINUED | OUTPATIENT
Start: 2020-01-13 | End: 2020-01-13 | Stop reason: HOSPADM

## 2020-01-13 RX ORDER — LIDOCAINE 40 MG/G
CREAM TOPICAL
Status: DISCONTINUED | OUTPATIENT
Start: 2020-01-13 | End: 2020-01-13 | Stop reason: HOSPADM

## 2020-01-13 RX ORDER — ONDANSETRON 4 MG/1
4 TABLET, ORALLY DISINTEGRATING ORAL EVERY 6 HOURS PRN
Status: DISCONTINUED | OUTPATIENT
Start: 2020-01-13 | End: 2020-01-13 | Stop reason: HOSPADM

## 2020-01-13 RX ORDER — NALOXONE HYDROCHLORIDE 0.4 MG/ML
.1-.4 INJECTION, SOLUTION INTRAMUSCULAR; INTRAVENOUS; SUBCUTANEOUS
Status: DISCONTINUED | OUTPATIENT
Start: 2020-01-13 | End: 2020-01-13 | Stop reason: HOSPADM

## 2020-01-13 ASSESSMENT — MIFFLIN-ST. JEOR: SCORE: 1537.57

## 2020-01-13 NOTE — H&P
Pre-Endoscopy History and Physical     Alex Cardona MRN# [id number]   YOB: 1970 Age: 49 year old     Date of Procedure: 1/13/2020  Primary care provider: Kati Garcia  Type of Endoscopy: sigmoidoscopy  Reason for Procedure: preoperative evaluation  Type of Anesthesia Anticipated: possible Moderate Sedation    HPI:    Alex is a 49 year old male who will be undergoing the above procedure.      A history and physical has been performed. The patient's medications and allergies have been reviewed. The risks and benefits of the procedure and the sedation options and risks were discussed with the patient.  All questions were answered and informed consent was obtained.      He denies a personal or family history of anesthesia complications or bleeding disorders.     Allergies   Allergen Reactions     Blood Transfusion Related (Informational Only) Other (See Comments)     Patient has a history of a clinically significant antibody against RBC antigens.  A delay in compatible RBCs may occur.        Prior to Admission Medications   Prescriptions Last Dose Informant Patient Reported? Taking?   Melatonin 10 MG TABS tablet Past Week at Unknown time  Yes Yes   Sig: Take 5 mg by mouth nightly as needed for sleep      Facility-Administered Medications: None       Patient Active Problem List   Diagnosis     Diarrhea     Colitis     Ulcerative colitis, acute, with rectal bleeding (H)     Ileostomy status (H)     S/P laparoscopic colectomy     Hypoproteinemia (H)        Past Medical History:   Diagnosis Date     Ulcerative colitis (H)         Past Surgical History:   Procedure Laterality Date     COLONOSCOPY N/A 5/26/2019    Procedure: incomplete COLONOSCOPY with left colon biopsies by cold biopsy forceps;  Surgeon: Herbert Dior DO;  Location: [facility]     LAPAROSCOPIC ASSISTED COLECTOMY N/A 6/12/2019    Procedure: Laparoscopic Assisted Total Abdominal Colectomy with End Ileostomy;  Surgeon: Alden  "Brigitte Dior MD;  Location: [facility] OR     SIGMOIDOSCOPY FLEXIBLE  01/13/2020    Dr. Ruano [facility]       Social History     Tobacco Use     Smoking status: Former Smoker     Packs/day: 0.00     Smokeless tobacco: Never Used     Tobacco comment: quit 4 years ago   Substance Use Topics     Alcohol use: Not Currently     Comment: none  since 05/19        Family History   Problem Relation Age of Onset     Colon Cancer No family hx of        REVIEW OF SYSTEMS:     5 point ROS negative except as noted above in HPI, including Gen., Resp., CV, GI &  system review.      PHYSICAL EXAM:   Ht 1.778 m (5' 10\")   Wt 67.1 kg (148 lb)   BMI 21.24 kg/m   Estimated body mass index is 21.24 kg/m  as calculated from the following:    Height as of this encounter: 1.778 m (5' 10\").    Weight as of this encounter: 67.1 kg (148 lb).   GENERAL APPEARANCE: healthy and alert  MENTAL STATUS: alert  AIRWAY EXAM: Mallampatti Class II (visualization of the soft palate, fauces, and uvula)  RESP: lungs clear to auscultation - no rales, rhonchi or wheezes  CV: regular rates and rhythm      DIAGNOSTICS:    Not indicated      IMPRESSION   ASA Class 2 - Mild systemic disease        PLAN:       Plan for colonoscopy. We discussed the risks, benefits and alternatives and the patient wished to proceed.    The above has been forwarded to the consulting provider.      Signed Electronically by: Brigitte Ruano MD, MD  January 13, 2020    "

## 2020-01-15 LAB — COPATH REPORT: NORMAL

## 2020-01-21 ENCOUNTER — OFFICE VISIT (OUTPATIENT)
Dept: INTERNAL MEDICINE | Facility: CLINIC | Age: 50
End: 2020-01-21
Payer: COMMERCIAL

## 2020-01-21 VITALS
DIASTOLIC BLOOD PRESSURE: 60 MMHG | HEART RATE: 88 BPM | RESPIRATION RATE: 20 BRPM | OXYGEN SATURATION: 100 % | WEIGHT: 156.8 LBS | TEMPERATURE: 97.6 F | BODY MASS INDEX: 22.45 KG/M2 | SYSTOLIC BLOOD PRESSURE: 110 MMHG | HEIGHT: 70 IN

## 2020-01-21 DIAGNOSIS — Z01.818 PREOP GENERAL PHYSICAL EXAM: Primary | ICD-10-CM

## 2020-01-21 LAB
ERYTHROCYTE [DISTWIDTH] IN BLOOD BY AUTOMATED COUNT: 15.8 % (ref 10–15)
HCT VFR BLD AUTO: 37.6 % (ref 40–53)
HGB BLD-MCNC: 11.8 G/DL (ref 13.3–17.7)
MCH RBC QN AUTO: 23.1 PG (ref 26.5–33)
MCHC RBC AUTO-ENTMCNC: 31.4 G/DL (ref 31.5–36.5)
MCV RBC AUTO: 74 FL (ref 78–100)
PLATELET # BLD AUTO: 313 10E9/L (ref 150–450)
RBC # BLD AUTO: 5.11 10E12/L (ref 4.4–5.9)
WBC # BLD AUTO: 4.8 10E9/L (ref 4–11)

## 2020-01-21 PROCEDURE — 85027 COMPLETE CBC AUTOMATED: CPT | Performed by: NURSE PRACTITIONER

## 2020-01-21 PROCEDURE — 36415 COLL VENOUS BLD VENIPUNCTURE: CPT | Performed by: NURSE PRACTITIONER

## 2020-01-21 PROCEDURE — 99214 OFFICE O/P EST MOD 30 MIN: CPT | Performed by: NURSE PRACTITIONER

## 2020-01-21 PROCEDURE — 80048 BASIC METABOLIC PNL TOTAL CA: CPT | Performed by: NURSE PRACTITIONER

## 2020-01-21 ASSESSMENT — MIFFLIN-ST. JEOR: SCORE: 1577.49

## 2020-01-21 NOTE — PROGRESS NOTES
[place] [address]  [facility] [address]  [phone]  Dept: [phone]    PRE-OP EVALUATION:  Today's date: 2020    Alex Cardona (: 1970) presents for pre-operative evaluation assessment as requested by Dr. Brigitte Ruano.  He requires evaluation and anesthesia risk assessment prior to undergoing surgery/procedure for treatment of ulcerative colitis.    Fax number for surgical facility: [facility] fax [phone]  Primary Physician: Kati Garcia  Type of Anesthesia Anticipated: General    Patient has a Health Care Directive or Living Will:  NO    Preop Questions 1/15/2020   Who is doing your surgery? Brigitte Ruano   What are you having done? J Pouch surgery   Date of Surgery/Procedure: 20   Facility or Hospital where procedure/surgery will be performed: [facility]   1.  Do you have a history of Heart attack, stroke, stent, coronary bypass surgery, or other heart surgery? No   2.  Do you ever have any pain or discomfort in your chest? No   3.  Do you have a history of  Heart Failure? No   4.   Are you troubled by shortness of breath when:  walking on a level surface, or up a slight hill, or at night? No   5.  Do you currently have a cold, bronchitis or other respiratory infection? No   6.  Do you have a cough, shortness of breath, or wheezing? No   7.  Do you sometimes get pains in the calves of your legs when you walk? No   8. Do you or anyone in your family have previous history of blood clots? No   9.  Do you or does anyone in your family have a serious bleeding problem such as prolonged bleeding following surgeries or cuts? No   10. Have you ever had problems with anemia or been told to take iron pills? YES -hgb at 9, 10,  no iron pills   11. Have you had any abnormal blood loss such as black, tarry or bloody stools? YES - colon ulcerative colitis rectal bleed   12. Have you ever had a blood transfusion? No   13. Have you or any of  your relatives ever had problems with anesthesia? No   14. Do you have sleep apnea, excessive snoring or daytime drowsiness? No    15. Do you have any prosthetic heart valves? No   16. Do you have prosthetic joints? No         HPI:     HPI related to upcoming procedure: ulcerative colitis      See problem list for active medical problems.  Problems all longstanding and stable, except as noted/documented.  See ROS for pertinent symptoms related to these conditions.      MEDICAL HISTORY:     Patient Active Problem List    Diagnosis Date Noted     Ileostomy status (H) 06/17/2019     Priority: Medium     S/P laparoscopic colectomy 06/17/2019     Priority: Medium     Hypoproteinemia (H) 06/17/2019     Priority: Medium     Ulcerative colitis, acute, with rectal bleeding (H) 06/12/2019     Priority: Medium     Diarrhea 05/25/2019     Priority: Medium     Colitis 05/25/2019     Priority: Medium      Past Medical History:   Diagnosis Date     Ulcerative colitis (H)      Past Surgical History:   Procedure Laterality Date     COLONOSCOPY N/A 5/26/2019    Procedure: incomplete COLONOSCOPY with left colon biopsies by cold biopsy forceps;  Surgeon: Herbert Dior DO;  Location:  GI     LAPAROSCOPIC ASSISTED COLECTOMY N/A 6/12/2019    Procedure: Laparoscopic Assisted Total Abdominal Colectomy with End Ileostomy;  Surgeon: Brigitte Ruano MD;  Location:  OR     SIGMOIDOSCOPY FLEXIBLE  01/13/2020    Dr. Ruano Pending sale to [facility]     Current Outpatient Medications   Medication Sig Dispense Refill     Melatonin 10 MG TABS tablet Take 5 mg by mouth nightly as needed for sleep       OTC products: None, except as noted above    Allergies   Allergen Reactions     Blood Transfusion Related (Informational Only) Other (See Comments)     Patient has a history of a clinically significant antibody against RBC antigens.  A delay in compatible RBCs may occur.      Latex Allergy: NO    Social History     Tobacco Use     Smoking status: Former Smoker  "    Packs/day: 0.00     Smokeless tobacco: Never Used     Tobacco comment: quit 4 years ago   Substance Use Topics     Alcohol use: Not Currently     Comment: none  since 05/19      History   Drug Use     Types: Marijuana       REVIEW OF SYSTEMS:   CONSTITUTIONAL: NEGATIVE for fever, chills, change in weight  INTEGUMENTARY/SKIN: NEGATIVE for worrisome rashes, moles or lesions  ENT/MOUTH: NEGATIVE for ear, mouth and throat problems  RESP: NEGATIVE for significant cough or SOB  CV: NEGATIVE for chest pain, palpitations or peripheral edema  GI: NEGATIVE for nausea, abdominal pain, heartburn, or change in bowel habits  : NEGATIVE for frequency, dysuria, or hematuria  MUSCULOSKELETAL: NEGATIVE for significant arthralgias or myalgia  NEURO: NEGATIVE for weakness, dizziness or paresthesias  ENDOCRINE: NEGATIVE for temperature intolerance, skin/hair changes  HEME: NEGATIVE for bleeding problems  PSYCHIATRIC: NEGATIVE for changes in mood or affect    EXAM:   /60 (BP Location: Right arm, Patient Position: Sitting, Cuff Size: Adult Large)   Pulse 88   Temp 97.6  F (36.4  C) (Oral)   Resp 20   Ht 1.778 m (5' 10\")   Wt 71.1 kg (156 lb 12.8 oz)   SpO2 100%   BMI 22.50 kg/m      GENERAL APPEARANCE: healthy, alert and no distress     RESP: lungs clear to auscultation - no rales, rhonchi or wheezes     CV: regular rates and rhythm, normal S1 S2, no S3 or S4 and no murmur, click or rub     ABDOMEN:  soft, nontender, no HSM or masses and bowel sounds normal     MS: extremities normal- no gross deformities noted, no evidence of inflammation in joints, FROM in all extremities.     NEURO: Normal strength and tone, sensory exam grossly normal, mentation intact and speech normal     PSYCH: mentation appears normal. and affect normal/bright    DIAGNOSTICS:   EKG: Not indicated due to non-vascular surgery and low risk of event (age <65 and without cardiac risk factors)    Recent Labs   Lab Test 11/08/19  1344 07/16/19  1334 " 06/16/19  0713   HGB 12.1* 10.3* 9.0*     --  289     --  134   POTASSIUM 4.2  --  3.5   CR 1.07  --  0.62*        IMPRESSION:   Reason for surgery/procedure: ulcerative colitis    The proposed surgical procedure is considered INTERMEDIATE risk.    REVISED CARDIAC RISK INDEX  The patient has the following serious cardiovascular risks for perioperative complications such as (MI, PE, VFib and 3  AV Block):  No serious cardiac risks  INTERPRETATION: 0 risks: Class I (very low risk - 0.4% complication rate)    The patient has the following additional risks for perioperative complications:  No identified additional risks      ICD-10-CM    1. Preop general physical exam Z01.818      (Z01.818) Preop general physical exam  (primary encounter diagnosis)  Comment:   Plan: CBC with platelets, Basic metabolic panel            RECOMMENDATIONS:     --Consult hospital rounder / IM to assist post-op medical management    --Patient is to take all scheduled medications on the day of surgery EXCEPT for modifications listed below.    APPROVAL GIVEN to proceed with proposed procedure, without further diagnostic evaluation       Signed Electronically by: Erika Bragg NP    Copy of this evaluation report is provided to requesting physician.    [facility] Preop Guidelines    Revised Cardiac Risk Index

## 2020-01-22 LAB
ANION GAP SERPL CALCULATED.3IONS-SCNC: 3 MMOL/L (ref 3–14)
BUN SERPL-MCNC: 12 MG/DL (ref 7–30)
CALCIUM SERPL-MCNC: 9 MG/DL (ref 8.5–10.1)
CHLORIDE SERPL-SCNC: 105 MMOL/L (ref 94–109)
CO2 SERPL-SCNC: 30 MMOL/L (ref 20–32)
CREAT SERPL-MCNC: 0.91 MG/DL (ref 0.66–1.25)
GFR SERPL CREATININE-BSD FRML MDRD: >90 ML/MIN/{1.73_M2}
GLUCOSE SERPL-MCNC: 87 MG/DL (ref 70–99)
POTASSIUM SERPL-SCNC: 4.2 MMOL/L (ref 3.4–5.3)
SODIUM SERPL-SCNC: 138 MMOL/L (ref 133–144)

## 2020-02-06 NOTE — PHARMACY-ADMISSION MEDICATION HISTORY
Admission medication history interview status for this patient is complete. See [facility] admission navigator for allergy information, prior to admission medications and immunization status.     PTA meds completed by pre-admitting nurse Cristina Styles and reviewed by pharmacy       Prior to Admission medications    Medication Sig Last Dose Taking? Auth Provider   Melatonin 10 MG TABS tablet Take 5 mg by mouth nightly as needed for sleep 2/4/2020 at Unknown time Yes Reported, Patient

## 2020-02-09 ENCOUNTER — HOSPITAL ENCOUNTER (OUTPATIENT)
Dept: LAB | Facility: CLINIC | Age: 50
Discharge: HOME | End: 2020-02-09
Attending: COLON & RECTAL SURGERY | Admitting: COLON & RECTAL SURGERY
Payer: COMMERCIAL

## 2020-02-09 DIAGNOSIS — Z01.812 PRE-OPERATIVE LABORATORY EXAMINATION: ICD-10-CM

## 2020-02-09 LAB
ABO + RH BLD: ABNORMAL
ABO + RH BLD: ABNORMAL
BLD GP AB SCN SERPL QL: ABNORMAL
BLD PROD TYP BPU: ABNORMAL
BLOOD BANK CMNT PATIENT-IMP: ABNORMAL
BLOOD BANK CMNT PATIENT-IMP: ABNORMAL
NUM BPU REQUESTED: 2
SPECIMEN EXP DATE BLD: ABNORMAL

## 2020-02-09 PROCEDURE — 40000342 ZZHCL STATISTIC ABO: Performed by: COLON & RECTAL SURGERY

## 2020-02-09 PROCEDURE — 86880 COOMBS TEST DIRECT: CPT | Performed by: COLON & RECTAL SURGERY

## 2020-02-09 PROCEDURE — 86870 RBC ANTIBODY IDENTIFICATION: CPT | Performed by: COLON & RECTAL SURGERY

## 2020-02-09 PROCEDURE — 86922 COMPATIBILITY TEST ANTIGLOB: CPT | Performed by: COLON & RECTAL SURGERY

## 2020-02-09 PROCEDURE — 86904 BLOOD TYPING PATIENT SERUM: CPT | Performed by: COLON & RECTAL SURGERY

## 2020-02-09 PROCEDURE — 86900 BLOOD TYPING SEROLOGIC ABO: CPT | Performed by: COLON & RECTAL SURGERY

## 2020-02-09 PROCEDURE — 36415 COLL VENOUS BLD VENIPUNCTURE: CPT | Performed by: COLON & RECTAL SURGERY

## 2020-02-09 PROCEDURE — 40000343 ZZHCL STATISTIC RH: Performed by: COLON & RECTAL SURGERY

## 2020-02-09 PROCEDURE — 86901 BLOOD TYPING SEROLOGIC RH(D): CPT | Performed by: COLON & RECTAL SURGERY

## 2020-02-09 PROCEDURE — 86850 RBC ANTIBODY SCREEN: CPT | Performed by: COLON & RECTAL SURGERY

## 2020-02-10 LAB — BLOOD BANK CMNT PATIENT-IMP: NORMAL

## 2020-02-11 ENCOUNTER — ANESTHESIA (OUTPATIENT)
Dept: SURGERY | Facility: CLINIC | Age: 50
End: 2020-02-11
Payer: COMMERCIAL

## 2020-02-11 ENCOUNTER — ANESTHESIA EVENT (OUTPATIENT)
Dept: SURGERY | Facility: CLINIC | Age: 50
End: 2020-02-11
Payer: COMMERCIAL

## 2020-02-11 ENCOUNTER — HOSPITAL ENCOUNTER (INPATIENT)
Facility: CLINIC | Age: 50
LOS: 4 days | Discharge: HOME HEALTH | End: 2020-02-15
Attending: COLON & RECTAL SURGERY | Admitting: COLON & RECTAL SURGERY
Payer: COMMERCIAL

## 2020-02-11 DIAGNOSIS — K51.919 ULCERATIVE COLITIS WITH COMPLICATION, UNSPECIFIED LOCATION (H): Primary | ICD-10-CM

## 2020-02-11 PROBLEM — K51.90 ULCERATIVE COLITIS (H): Status: ACTIVE | Noted: 2020-02-11

## 2020-02-11 LAB
CREAT SERPL-MCNC: 0.91 MG/DL (ref 0.66–1.25)
GFR SERPL CREATININE-BSD FRML MDRD: >90 ML/MIN/{1.73_M2}
PLATELET # BLD AUTO: 270 10E9/L (ref 150–450)

## 2020-02-11 PROCEDURE — 88304 TISSUE EXAM BY PATHOLOGIST: CPT | Mod: 26 | Performed by: COLON & RECTAL SURGERY

## 2020-02-11 PROCEDURE — 27210794 ZZH OR GENERAL SUPPLY STERILE: Performed by: COLON & RECTAL SURGERY

## 2020-02-11 PROCEDURE — 25000128 H RX IP 250 OP 636: Performed by: NURSE ANESTHETIST, CERTIFIED REGISTERED

## 2020-02-11 PROCEDURE — 37000008 ZZH ANESTHESIA TECHNICAL FEE, 1ST 30 MIN: Performed by: COLON & RECTAL SURGERY

## 2020-02-11 PROCEDURE — 25800030 ZZH RX IP 258 OP 636: Performed by: COLON & RECTAL SURGERY

## 2020-02-11 PROCEDURE — 88304 TISSUE EXAM BY PATHOLOGIST: CPT | Performed by: COLON & RECTAL SURGERY

## 2020-02-11 PROCEDURE — 25800030 ZZH RX IP 258 OP 636: Performed by: ANESTHESIOLOGY

## 2020-02-11 PROCEDURE — 71000013 ZZH RECOVERY PHASE 1 LEVEL 1 EA ADDTL HR: Performed by: COLON & RECTAL SURGERY

## 2020-02-11 PROCEDURE — 25000132 ZZH RX MED GY IP 250 OP 250 PS 637: Performed by: COLON & RECTAL SURGERY

## 2020-02-11 PROCEDURE — 40000306 ZZH STATISTIC PRE PROC ASSESS II: Performed by: COLON & RECTAL SURGERY

## 2020-02-11 PROCEDURE — 71000012 ZZH RECOVERY PHASE 1 LEVEL 1 FIRST HR: Performed by: COLON & RECTAL SURGERY

## 2020-02-11 PROCEDURE — 25000128 H RX IP 250 OP 636: Performed by: COLON & RECTAL SURGERY

## 2020-02-11 PROCEDURE — 25000125 ZZHC RX 250: Performed by: NURSE ANESTHETIST, CERTIFIED REGISTERED

## 2020-02-11 PROCEDURE — 25000125 ZZHC RX 250: Performed by: COLON & RECTAL SURGERY

## 2020-02-11 PROCEDURE — 36415 COLL VENOUS BLD VENIPUNCTURE: CPT | Performed by: COLON & RECTAL SURGERY

## 2020-02-11 PROCEDURE — 12000000 ZZH R&B MED SURG/OB

## 2020-02-11 PROCEDURE — 36000063 ZZH SURGERY LEVEL 4 EA 15 ADDTL MIN: Performed by: COLON & RECTAL SURGERY

## 2020-02-11 PROCEDURE — 25000132 ZZH RX MED GY IP 250 OP 250 PS 637: Performed by: ANESTHESIOLOGY

## 2020-02-11 PROCEDURE — 82565 ASSAY OF CREATININE: CPT | Performed by: COLON & RECTAL SURGERY

## 2020-02-11 PROCEDURE — 85049 AUTOMATED PLATELET COUNT: CPT | Performed by: COLON & RECTAL SURGERY

## 2020-02-11 PROCEDURE — 0D1B0Z4 BYPASS ILEUM TO CUTANEOUS, OPEN APPROACH: ICD-10-PCS | Performed by: COLON & RECTAL SURGERY

## 2020-02-11 PROCEDURE — 88307 TISSUE EXAM BY PATHOLOGIST: CPT | Performed by: COLON & RECTAL SURGERY

## 2020-02-11 PROCEDURE — 37000009 ZZH ANESTHESIA TECHNICAL FEE, EACH ADDTL 15 MIN: Performed by: COLON & RECTAL SURGERY

## 2020-02-11 PROCEDURE — C1765 ADHESION BARRIER: HCPCS | Performed by: COLON & RECTAL SURGERY

## 2020-02-11 PROCEDURE — 0DTP0ZZ RESECTION OF RECTUM, OPEN APPROACH: ICD-10-PCS | Performed by: COLON & RECTAL SURGERY

## 2020-02-11 PROCEDURE — 88307 TISSUE EXAM BY PATHOLOGIST: CPT | Mod: 26 | Performed by: COLON & RECTAL SURGERY

## 2020-02-11 PROCEDURE — 36000093 ZZH SURGERY LEVEL 4 1ST 30 MIN: Performed by: COLON & RECTAL SURGERY

## 2020-02-11 RX ORDER — GLYCOPYRROLATE 0.2 MG/ML
INJECTION, SOLUTION INTRAMUSCULAR; INTRAVENOUS PRN
Status: DISCONTINUED | OUTPATIENT
Start: 2020-02-11 | End: 2020-02-11

## 2020-02-11 RX ORDER — MEPERIDINE HYDROCHLORIDE 50 MG/ML
12.5 INJECTION INTRAMUSCULAR; INTRAVENOUS; SUBCUTANEOUS
Status: DISCONTINUED | OUTPATIENT
Start: 2020-02-11 | End: 2020-02-11 | Stop reason: HOSPADM

## 2020-02-11 RX ORDER — HYDROMORPHONE HYDROCHLORIDE 1 MG/ML
.3-.5 INJECTION, SOLUTION INTRAMUSCULAR; INTRAVENOUS; SUBCUTANEOUS EVERY 10 MIN PRN
Status: DISCONTINUED | OUTPATIENT
Start: 2020-02-11 | End: 2020-02-11 | Stop reason: HOSPADM

## 2020-02-11 RX ORDER — ONDANSETRON 2 MG/ML
INJECTION INTRAMUSCULAR; INTRAVENOUS PRN
Status: DISCONTINUED | OUTPATIENT
Start: 2020-02-11 | End: 2020-02-11

## 2020-02-11 RX ORDER — ONDANSETRON 2 MG/ML
4 INJECTION INTRAMUSCULAR; INTRAVENOUS EVERY 6 HOURS PRN
Status: DISCONTINUED | OUTPATIENT
Start: 2020-02-11 | End: 2020-02-15 | Stop reason: HOSPADM

## 2020-02-11 RX ORDER — HEPARIN SODIUM 5000 [USP'U]/.5ML
5000 INJECTION, SOLUTION INTRAVENOUS; SUBCUTANEOUS
Status: COMPLETED | OUTPATIENT
Start: 2020-02-11 | End: 2020-02-11

## 2020-02-11 RX ORDER — KETAMINE HYDROCHLORIDE 10 MG/ML
INJECTION, SOLUTION INTRAMUSCULAR; INTRAVENOUS PRN
Status: DISCONTINUED | OUTPATIENT
Start: 2020-02-11 | End: 2020-02-11

## 2020-02-11 RX ORDER — SODIUM CHLORIDE, SODIUM LACTATE, POTASSIUM CHLORIDE, CALCIUM CHLORIDE 600; 310; 30; 20 MG/100ML; MG/100ML; MG/100ML; MG/100ML
INJECTION, SOLUTION INTRAVENOUS CONTINUOUS
Status: DISCONTINUED | OUTPATIENT
Start: 2020-02-11 | End: 2020-02-14

## 2020-02-11 RX ORDER — ONDANSETRON 4 MG/1
4 TABLET, ORALLY DISINTEGRATING ORAL EVERY 30 MIN PRN
Status: DISCONTINUED | OUTPATIENT
Start: 2020-02-11 | End: 2020-02-11 | Stop reason: HOSPADM

## 2020-02-11 RX ORDER — CIPROFLOXACIN 2 MG/ML
400 INJECTION, SOLUTION INTRAVENOUS SEE ADMIN INSTRUCTIONS
Status: DISCONTINUED | OUTPATIENT
Start: 2020-02-11 | End: 2020-02-11 | Stop reason: HOSPADM

## 2020-02-11 RX ORDER — ALBUTEROL SULFATE 0.83 MG/ML
2.5 SOLUTION RESPIRATORY (INHALATION) EVERY 4 HOURS PRN
Status: DISCONTINUED | OUTPATIENT
Start: 2020-02-11 | End: 2020-02-11 | Stop reason: HOSPADM

## 2020-02-11 RX ORDER — DEXAMETHASONE SODIUM PHOSPHATE 4 MG/ML
INJECTION, SOLUTION INTRA-ARTICULAR; INTRALESIONAL; INTRAMUSCULAR; INTRAVENOUS; SOFT TISSUE PRN
Status: DISCONTINUED | OUTPATIENT
Start: 2020-02-11 | End: 2020-02-11

## 2020-02-11 RX ORDER — FENTANYL CITRATE 50 UG/ML
INJECTION, SOLUTION INTRAMUSCULAR; INTRAVENOUS PRN
Status: DISCONTINUED | OUTPATIENT
Start: 2020-02-11 | End: 2020-02-11

## 2020-02-11 RX ORDER — ONDANSETRON 2 MG/ML
4 INJECTION INTRAMUSCULAR; INTRAVENOUS EVERY 30 MIN PRN
Status: DISCONTINUED | OUTPATIENT
Start: 2020-02-11 | End: 2020-02-11 | Stop reason: HOSPADM

## 2020-02-11 RX ORDER — HYDRALAZINE HYDROCHLORIDE 20 MG/ML
2.5-5 INJECTION INTRAMUSCULAR; INTRAVENOUS EVERY 10 MIN PRN
Status: DISCONTINUED | OUTPATIENT
Start: 2020-02-11 | End: 2020-02-11 | Stop reason: HOSPADM

## 2020-02-11 RX ORDER — NALOXONE HYDROCHLORIDE 0.4 MG/ML
.1-.4 INJECTION, SOLUTION INTRAMUSCULAR; INTRAVENOUS; SUBCUTANEOUS
Status: DISCONTINUED | OUTPATIENT
Start: 2020-02-11 | End: 2020-02-15 | Stop reason: HOSPADM

## 2020-02-11 RX ORDER — METOPROLOL TARTRATE 1 MG/ML
1-2 INJECTION, SOLUTION INTRAVENOUS EVERY 5 MIN PRN
Status: DISCONTINUED | OUTPATIENT
Start: 2020-02-11 | End: 2020-02-11 | Stop reason: HOSPADM

## 2020-02-11 RX ORDER — BUPIVACAINE HYDROCHLORIDE AND EPINEPHRINE 2.5; 5 MG/ML; UG/ML
30 INJECTION, SOLUTION EPIDURAL; INFILTRATION; INTRACAUDAL; PERINEURAL ONCE
Status: DISCONTINUED | OUTPATIENT
Start: 2020-02-11 | End: 2020-02-11 | Stop reason: HOSPADM

## 2020-02-11 RX ORDER — PROPOFOL 10 MG/ML
INJECTION, EMULSION INTRAVENOUS PRN
Status: DISCONTINUED | OUTPATIENT
Start: 2020-02-11 | End: 2020-02-11

## 2020-02-11 RX ORDER — ACETAMINOPHEN 325 MG/1
975 TABLET ORAL EVERY 8 HOURS
Status: COMPLETED | OUTPATIENT
Start: 2020-02-11 | End: 2020-02-14

## 2020-02-11 RX ORDER — LIDOCAINE 40 MG/G
CREAM TOPICAL
Status: DISCONTINUED | OUTPATIENT
Start: 2020-02-11 | End: 2020-02-15 | Stop reason: HOSPADM

## 2020-02-11 RX ORDER — SODIUM CHLORIDE, SODIUM LACTATE, POTASSIUM CHLORIDE, CALCIUM CHLORIDE 600; 310; 30; 20 MG/100ML; MG/100ML; MG/100ML; MG/100ML
INJECTION, SOLUTION INTRAVENOUS CONTINUOUS
Status: DISCONTINUED | OUTPATIENT
Start: 2020-02-11 | End: 2020-02-11 | Stop reason: HOSPADM

## 2020-02-11 RX ORDER — LIDOCAINE 40 MG/G
CREAM TOPICAL
Status: DISCONTINUED | OUTPATIENT
Start: 2020-02-11 | End: 2020-02-13

## 2020-02-11 RX ORDER — ACETAMINOPHEN 325 MG/1
975 TABLET ORAL ONCE
Status: COMPLETED | OUTPATIENT
Start: 2020-02-11 | End: 2020-02-11

## 2020-02-11 RX ORDER — BUPIVACAINE HYDROCHLORIDE AND EPINEPHRINE 2.5; 5 MG/ML; UG/ML
INJECTION, SOLUTION EPIDURAL; INFILTRATION; INTRACAUDAL; PERINEURAL PRN
Status: DISCONTINUED | OUTPATIENT
Start: 2020-02-11 | End: 2020-02-11 | Stop reason: HOSPADM

## 2020-02-11 RX ORDER — FENTANYL CITRATE 50 UG/ML
25-50 INJECTION, SOLUTION INTRAMUSCULAR; INTRAVENOUS EVERY 5 MIN PRN
Status: DISCONTINUED | OUTPATIENT
Start: 2020-02-11 | End: 2020-02-11 | Stop reason: HOSPADM

## 2020-02-11 RX ORDER — NEOSTIGMINE METHYLSULFATE 1 MG/ML
VIAL (ML) INJECTION PRN
Status: DISCONTINUED | OUTPATIENT
Start: 2020-02-11 | End: 2020-02-11

## 2020-02-11 RX ORDER — EPHEDRINE SULFATE 50 MG/ML
INJECTION, SOLUTION INTRAMUSCULAR; INTRAVENOUS; SUBCUTANEOUS PRN
Status: DISCONTINUED | OUTPATIENT
Start: 2020-02-11 | End: 2020-02-11

## 2020-02-11 RX ORDER — OXYCODONE HYDROCHLORIDE 5 MG/1
5 TABLET ORAL EVERY 4 HOURS PRN
Status: DISCONTINUED | OUTPATIENT
Start: 2020-02-11 | End: 2020-02-12

## 2020-02-11 RX ORDER — ALVIMOPAN 12 MG/1
12 CAPSULE ORAL ONCE
Status: COMPLETED | OUTPATIENT
Start: 2020-02-11 | End: 2020-02-11

## 2020-02-11 RX ORDER — NALOXONE HYDROCHLORIDE 0.4 MG/ML
.1-.4 INJECTION, SOLUTION INTRAMUSCULAR; INTRAVENOUS; SUBCUTANEOUS
Status: DISCONTINUED | OUTPATIENT
Start: 2020-02-11 | End: 2020-02-11 | Stop reason: HOSPADM

## 2020-02-11 RX ORDER — KETOROLAC TROMETHAMINE 30 MG/ML
30 INJECTION, SOLUTION INTRAMUSCULAR; INTRAVENOUS EVERY 6 HOURS PRN
Status: DISCONTINUED | OUTPATIENT
Start: 2020-02-11 | End: 2020-02-11 | Stop reason: HOSPADM

## 2020-02-11 RX ORDER — FENTANYL CITRATE 50 UG/ML
25-50 INJECTION, SOLUTION INTRAMUSCULAR; INTRAVENOUS
Status: DISCONTINUED | OUTPATIENT
Start: 2020-02-11 | End: 2020-02-11 | Stop reason: HOSPADM

## 2020-02-11 RX ORDER — ALVIMOPAN 12 MG/1
12 CAPSULE ORAL 2 TIMES DAILY
Status: DISCONTINUED | OUTPATIENT
Start: 2020-02-12 | End: 2020-02-14 | Stop reason: CLARIF

## 2020-02-11 RX ORDER — CIPROFLOXACIN 2 MG/ML
400 INJECTION, SOLUTION INTRAVENOUS EVERY 12 HOURS
Status: COMPLETED | OUTPATIENT
Start: 2020-02-11 | End: 2020-02-12

## 2020-02-11 RX ORDER — LIDOCAINE HYDROCHLORIDE 20 MG/ML
INJECTION, SOLUTION INFILTRATION; PERINEURAL PRN
Status: DISCONTINUED | OUTPATIENT
Start: 2020-02-11 | End: 2020-02-11

## 2020-02-11 RX ORDER — CIPROFLOXACIN 2 MG/ML
400 INJECTION, SOLUTION INTRAVENOUS
Status: COMPLETED | OUTPATIENT
Start: 2020-02-11 | End: 2020-02-11

## 2020-02-11 RX ADMIN — SODIUM CHLORIDE, POTASSIUM CHLORIDE, SODIUM LACTATE AND CALCIUM CHLORIDE: 600; 310; 30; 20 INJECTION, SOLUTION INTRAVENOUS at 11:47

## 2020-02-11 RX ADMIN — ROCURONIUM BROMIDE 10 MG: 10 INJECTION INTRAVENOUS at 14:45

## 2020-02-11 RX ADMIN — OXYCODONE HYDROCHLORIDE 5 MG: 5 TABLET ORAL at 23:07

## 2020-02-11 RX ADMIN — GLYCOPYRROLATE 0.6 MG: 0.2 INJECTION, SOLUTION INTRAMUSCULAR; INTRAVENOUS at 15:52

## 2020-02-11 RX ADMIN — ROCURONIUM BROMIDE 10 MG: 10 INJECTION INTRAVENOUS at 14:11

## 2020-02-11 RX ADMIN — DEXAMETHASONE SODIUM PHOSPHATE 4 MG: 4 INJECTION, SOLUTION INTRA-ARTICULAR; INTRALESIONAL; INTRAMUSCULAR; INTRAVENOUS; SOFT TISSUE at 10:19

## 2020-02-11 RX ADMIN — Medication 4 MG: at 15:50

## 2020-02-11 RX ADMIN — CIPROFLOXACIN 400 MG: 2 INJECTION, SOLUTION INTRAVENOUS at 10:41

## 2020-02-11 RX ADMIN — PROPOFOL 200 MG: 10 INJECTION, EMULSION INTRAVENOUS at 10:19

## 2020-02-11 RX ADMIN — GLYCOPYRROLATE 0.2 MG: 0.2 INJECTION, SOLUTION INTRAMUSCULAR; INTRAVENOUS at 10:19

## 2020-02-11 RX ADMIN — Medication 5 MG: at 14:20

## 2020-02-11 RX ADMIN — METRONIDAZOLE 500 MG: 500 INJECTION, SOLUTION INTRAVENOUS at 10:28

## 2020-02-11 RX ADMIN — SODIUM CHLORIDE, POTASSIUM CHLORIDE, SODIUM LACTATE AND CALCIUM CHLORIDE: 600; 310; 30; 20 INJECTION, SOLUTION INTRAVENOUS at 10:13

## 2020-02-11 RX ADMIN — KETAMINE HYDROCHLORIDE 50 MG: 10 INJECTION, SOLUTION INTRAMUSCULAR; INTRAVENOUS at 10:19

## 2020-02-11 RX ADMIN — ONDANSETRON HYDROCHLORIDE 4 MG: 2 INJECTION, SOLUTION INTRAVENOUS at 15:22

## 2020-02-11 RX ADMIN — ACETAMINOPHEN 975 MG: 325 TABLET, FILM COATED ORAL at 19:02

## 2020-02-11 RX ADMIN — ROCURONIUM BROMIDE 20 MG: 10 INJECTION INTRAVENOUS at 12:45

## 2020-02-11 RX ADMIN — FENTANYL CITRATE 50 MCG: 50 INJECTION, SOLUTION INTRAMUSCULAR; INTRAVENOUS at 10:46

## 2020-02-11 RX ADMIN — ROCURONIUM BROMIDE 10 MG: 10 INJECTION INTRAVENOUS at 13:30

## 2020-02-11 RX ADMIN — ROCURONIUM BROMIDE 30 MG: 10 INJECTION INTRAVENOUS at 10:54

## 2020-02-11 RX ADMIN — ONDANSETRON 4 MG: 2 INJECTION INTRAMUSCULAR; INTRAVENOUS at 18:03

## 2020-02-11 RX ADMIN — CIPROFLOXACIN 400 MG: 2 INJECTION, SOLUTION INTRAVENOUS at 21:17

## 2020-02-11 RX ADMIN — Medication 5 MG: at 11:28

## 2020-02-11 RX ADMIN — METRONIDAZOLE 500 MG: 500 INJECTION, SOLUTION INTRAVENOUS at 18:30

## 2020-02-11 RX ADMIN — ROCURONIUM BROMIDE 10 MG: 10 INJECTION INTRAVENOUS at 11:49

## 2020-02-11 RX ADMIN — ACETAMINOPHEN 975 MG: 325 TABLET, FILM COATED ORAL at 09:20

## 2020-02-11 RX ADMIN — LIDOCAINE HYDROCHLORIDE 50 MG: 20 INJECTION, SOLUTION INFILTRATION; PERINEURAL at 10:19

## 2020-02-11 RX ADMIN — MIDAZOLAM 2 MG: 1 INJECTION INTRAMUSCULAR; INTRAVENOUS at 10:15

## 2020-02-11 RX ADMIN — HYDROMORPHONE HYDROCHLORIDE 1 MG: 1 INJECTION, SOLUTION INTRAMUSCULAR; INTRAVENOUS; SUBCUTANEOUS at 13:08

## 2020-02-11 RX ADMIN — ALVIMOPAN 12 MG: 12 CAPSULE ORAL at 09:19

## 2020-02-11 RX ADMIN — SODIUM CHLORIDE, POTASSIUM CHLORIDE, SODIUM LACTATE AND CALCIUM CHLORIDE: 600; 310; 30; 20 INJECTION, SOLUTION INTRAVENOUS at 18:36

## 2020-02-11 RX ADMIN — FENTANYL CITRATE 100 MCG: 50 INJECTION, SOLUTION INTRAMUSCULAR; INTRAVENOUS at 10:19

## 2020-02-11 RX ADMIN — ROCURONIUM BROMIDE 10 MG: 10 INJECTION INTRAVENOUS at 14:15

## 2020-02-11 RX ADMIN — HYDROMORPHONE HYDROCHLORIDE 1 MG: 1 INJECTION, SOLUTION INTRAMUSCULAR; INTRAVENOUS; SUBCUTANEOUS at 10:33

## 2020-02-11 RX ADMIN — Medication 1 LOZENGE: at 23:49

## 2020-02-11 RX ADMIN — OXYCODONE HYDROCHLORIDE 5 MG: 5 TABLET ORAL at 18:36

## 2020-02-11 RX ADMIN — HEPARIN SODIUM 5000 UNITS: 5000 INJECTION, SOLUTION INTRAVENOUS; SUBCUTANEOUS at 09:22

## 2020-02-11 RX ADMIN — ROCURONIUM BROMIDE 10 MG: 10 INJECTION INTRAVENOUS at 12:15

## 2020-02-11 RX ADMIN — ROCURONIUM BROMIDE 50 MG: 10 INJECTION INTRAVENOUS at 10:19

## 2020-02-11 ASSESSMENT — ACTIVITIES OF DAILY LIVING (ADL)
ADLS_ACUITY_SCORE: 13
SWALLOWING: 0-->SWALLOWS FOODS/LIQUIDS WITHOUT DIFFICULTY
RETIRED_COMMUNICATION: 0-->UNDERSTANDS/COMMUNICATES WITHOUT DIFFICULTY
AMBULATION: 0-->INDEPENDENT
BATHING: 0-->INDEPENDENT
DRESS: 0-->INDEPENDENT
RETIRED_EATING: 0-->INDEPENDENT
FALL_HISTORY_WITHIN_LAST_SIX_MONTHS: NO
TRANSFERRING: 0-->INDEPENDENT
COGNITION: 0 - NO COGNITION ISSUES REPORTED
TOILETING: 0-->INDEPENDENT

## 2020-02-11 ASSESSMENT — MIFFLIN-ST. JEOR: SCORE: 1560.25

## 2020-02-11 ASSESSMENT — LIFESTYLE VARIABLES: TOBACCO_USE: 1

## 2020-02-11 NOTE — BRIEF OP NOTE
[facility]    Brief Operative Note    Pre-operative diagnosis: Ulcerative colitis (H) [K51.90]  Post-operative diagnosis Same as pre-operative diagnosis    Procedure: Procedure(s):  laparoscopic assisted completion proctectomy with ileo anal pouch and diverting loop ileostomy  Surgeon: Surgeon(s) and Role:     * Brigitte Ruano MD - Primary     * Aleshia Pittman MD - Resident - Assisting     * Rodney Carrero MD - Fellow - Assisting  Anesthesia: General   Estimated blood loss: Less than 50 ml  Drains: Matthew-Parrish  Specimens:   ID Type Source Tests Collected by Time Destination   A : Rectum Tissue Large Intestine, Rectum SURGICAL PATHOLOGY EXAM Brigitte Ruano MD 2/11/2020  2:26 PM    B : Ileostomy trim and anastomotic rings Tissue Other SURGICAL PATHOLOGY EXAM Brigitte Ruano MD 2/11/2020  2:30 PM      Findings:   None.  Complications: None.  Implants: * No implants in log *    Condition on discharge from OR: Satisfactory    Rodney Carrero MD   [facility]   [phone].        ADDENDUM:    PATIENT DATA  Indicate Y or N:  Home O2 No  Hemodialysis  No  Transplant patient  No  Cirrhosis  No  Steroids in last 30 days  No  Immunomodulators in last 30 days  No  Anticoagulation at time of surgery  No   List medication   Prior abdominal surgery  Yes  Pelvic irradiation  No    Albumin within 30 days if known    Hgb within 30 days if known    Hemoglobin   Date Value Ref Range Status   01/21/2020 11.8 (L) 13.3 - 17.7 g/dL Final   ]  Cr within 30 days if known    Creatinine   Date Value Ref Range Status   01/21/2020 0.91 0.66 - 1.25 mg/dL Final   ]  Body mass index is 21.95 kg/m .      OR DATA  Emergent  No   <24 hours  No   <1 week  No  Bowel Prep No  Antibiotics  Yes  DVT prophylaxis    Heparin  Yes   SCD  Yes   None  No  Drain  Yes  ASA (1,2,3,4) 3  OR time (min) 308  Stents  No  Transfuse >/= 2U  No  Anastomosis   Stapled  Yes   Handsewn   No  Leak Test    Positive  NO   Negative  No   Not done  No

## 2020-02-11 NOTE — ANESTHESIA POSTPROCEDURE EVALUATION
Patient: Alex Cardona    Procedure(s):  laparoscopic assisted completion proctectomy with ileo anal pouch and diverting loop ileostomy    Diagnosis:Ulcerative colitis (H) [K51.90]  Diagnosis Additional Information: No value filed.    Anesthesia Type:  General, ETT    Note:  Anesthesia Post Evaluation    Patient location during evaluation: PACU  Patient participation: Able to fully participate in evaluation  Level of consciousness: awake  Pain management: adequate  Airway patency: patent  Cardiovascular status: acceptable  Respiratory status: acceptable  Hydration status: acceptable  PONV: none             Last vitals:  Vitals:    02/11/20 1620 02/11/20 1625 02/11/20 1630   BP: 122/77 124/75 118/77   Pulse: 70 75 70   Resp: 15 14 15   Temp:      SpO2: 98% 100% 100%         Electronically Signed By: Gerardo Knapp MD  February 11, 2020  4:35 PM

## 2020-02-11 NOTE — ANESTHESIA PREPROCEDURE EVALUATION
Anesthesia Pre-Procedure Evaluation    Patient: Alex Cardona   MRN: [id number] : 1970          Preoperative Diagnosis: Ulcerative colitis (H) [K51.90]    Procedure(s):  laparoscopic assisted completion proctectomy with ileo anal pouch and diverting loop ileostomy    Past Medical History:   Diagnosis Date     Ulcerative colitis (H)      Past Surgical History:   Procedure Laterality Date     COLONOSCOPY N/A 2019    Procedure: incomplete COLONOSCOPY with left colon biopsies by cold biopsy forceps;  Surgeon: Herbert Dior DO;  Location: [facility]     LAPAROSCOPIC ASSISTED COLECTOMY N/A 2019    Procedure: Laparoscopic Assisted Total Abdominal Colectomy with End Ileostomy;  Surgeon: Brigitte Ruano MD;  Location: [facility]     SIGMOIDOSCOPY FLEXIBLE  2020    Dr. Ruano [facility]     Anesthesia Evaluation     . Pt has had prior anesthetic.            ROS/MED HX    ENT/Pulmonary:     (+)tobacco use, Past use , . .   (-) sleep apnea   Neurologic:       Cardiovascular:  - neg cardiovascular ROS       METS/Exercise Tolerance:     Hematologic:     (+) Anemia, -      Musculoskeletal:         GI/Hepatic:     (+) Other GI/Hepatic ulcerative colitis     (-) GERD   Renal/Genitourinary:         Endo:         Psychiatric:         Infectious Disease:         Malignancy:         Other:                          Physical Exam      Airway   Mallampati: II  TM distance: >3 FB  Neck ROM: full    Dental     Cardiovascular   Rhythm and rate: regular and normal      Pulmonary    breath sounds clear to auscultation            Lab Results   Component Value Date    WBC 4.8 2020    HGB 11.8 (L) 2020    HCT 37.6 (L) 2020     2020    CRP 41.8 (H) 2019     2020    POTASSIUM 4.2 2020    CHLORIDE 105 2020    CO2 30 2020    BUN 12 2020    CR 0.91 2020    GLC 87 2020    KEZIA 9.0 2020    PHOS 3.1 06/10/2019    MAG 1.7 06/10/2019    ALBUMIN 3.9  "11/08/2019    PROTTOTAL 7.6 11/08/2019    ALT 24 11/08/2019    AST 14 11/08/2019    ALKPHOS 59 11/08/2019    BILITOTAL 0.3 11/08/2019    LIPASE 33 (L) 05/25/2019       Preop Vitals  BP Readings from Last 3 Encounters:   02/11/20 130/88   01/21/20 110/60   01/13/20 105/75    Pulse Readings from Last 3 Encounters:   01/21/20 88   01/13/20 66   11/08/19 102      Resp Readings from Last 3 Encounters:   02/11/20 16   01/21/20 20   01/13/20 12    SpO2 Readings from Last 3 Encounters:   02/11/20 98%   01/21/20 100%   01/13/20 97%      Temp Readings from Last 1 Encounters:   02/11/20 97.2  F (36.2  C) (Temporal)    Ht Readings from Last 1 Encounters:   02/11/20 1.778 m (5' 10\")      Wt Readings from Last 1 Encounters:   02/11/20 69.4 kg (153 lb)    Estimated body mass index is 21.95 kg/m  as calculated from the following:    Height as of this encounter: 1.778 m (5' 10\").    Weight as of this encounter: 69.4 kg (153 lb).       Anesthesia Plan      History & Physical Review  History and physical reviewed and following examination; no interval change.    ASA Status:  3 .    NPO Status:  > 8 hours    Plan for General and ETT with Intravenous and Propofol induction. Maintenance will be Balanced.    PONV prophylaxis:  Ondansetron (or other 5HT-3) and Dexamethasone or Solumedrol       Postoperative Care  Postoperative pain management:  IV analgesics, Oral pain medications and Multi-modal analgesia.      Consents  Anesthetic plan, risks, benefits and alternatives discussed with:  Patient..                 Kirk Palmer MD                    .  "

## 2020-02-11 NOTE — ANESTHESIA CARE TRANSFER NOTE
Patient: Alex Cardona    Procedure(s):  laparoscopic assisted completion proctectomy with ileo anal pouch and diverting loop ileostomy    Diagnosis: Ulcerative colitis (H) [K51.90]  Diagnosis Additional Information: No value filed.    Anesthesia Type:   General, ETT     Note:    Patient transferred to:PACU  Comments: Pt spont resps oral airway suctioned ETT removed to Pa[facility] VSS report to RNHandoff Report: Identifed the Patient, Identified the Reponsible Provider, Reviewed the pertinent medical history, Discussed the surgical course, Reviewed Intra-OP anesthesia mangement and issues during anesthesia, Set expectations for post-procedure period and Allowed opportunity for questions and acknowledgement of understanding      Vitals: (Last set prior to Anesthesia Care Transfer)    CRNA VITALS  2/11/2020 1533 - 2/11/2020 1607      2/11/2020             Pulse:  83    SpO2:  98 %    Resp Rate (observed):  22                Electronically Signed By: MILTON Vasquez CRNA  February 11, 2020  4:07 PM

## 2020-02-12 LAB
ANION GAP SERPL CALCULATED.3IONS-SCNC: 4 MMOL/L (ref 3–14)
BUN SERPL-MCNC: 9 MG/DL (ref 7–30)
CALCIUM SERPL-MCNC: 8.5 MG/DL (ref 8.5–10.1)
CHLORIDE SERPL-SCNC: 104 MMOL/L (ref 94–109)
CO2 SERPL-SCNC: 29 MMOL/L (ref 20–32)
CREAT SERPL-MCNC: 0.92 MG/DL (ref 0.66–1.25)
GFR SERPL CREATININE-BSD FRML MDRD: >90 ML/MIN/{1.73_M2}
GLUCOSE SERPL-MCNC: 126 MG/DL (ref 70–99)
HGB BLD-MCNC: 11 G/DL (ref 13.3–17.7)
POTASSIUM SERPL-SCNC: 3.9 MMOL/L (ref 3.4–5.3)
SODIUM SERPL-SCNC: 137 MMOL/L (ref 133–144)

## 2020-02-12 PROCEDURE — 36415 COLL VENOUS BLD VENIPUNCTURE: CPT | Performed by: COLON & RECTAL SURGERY

## 2020-02-12 PROCEDURE — 80048 BASIC METABOLIC PNL TOTAL CA: CPT | Performed by: COLON & RECTAL SURGERY

## 2020-02-12 PROCEDURE — 25000128 H RX IP 250 OP 636: Performed by: COLON & RECTAL SURGERY

## 2020-02-12 PROCEDURE — 40000903 ZZH STATISTIC WOC PT EDUCATION, 31-45 MIN

## 2020-02-12 PROCEDURE — 85018 HEMOGLOBIN: CPT | Performed by: COLON & RECTAL SURGERY

## 2020-02-12 PROCEDURE — 25000132 ZZH RX MED GY IP 250 OP 250 PS 637: Performed by: ANESTHESIOLOGY

## 2020-02-12 PROCEDURE — 12000000 ZZH R&B MED SURG/OB

## 2020-02-12 PROCEDURE — 25000132 ZZH RX MED GY IP 250 OP 250 PS 637: Performed by: COLON & RECTAL SURGERY

## 2020-02-12 PROCEDURE — 25000128 H RX IP 250 OP 636: Performed by: PHYSICIAN ASSISTANT

## 2020-02-12 PROCEDURE — 25800030 ZZH RX IP 258 OP 636: Performed by: COLON & RECTAL SURGERY

## 2020-02-12 RX ORDER — HYDROMORPHONE HYDROCHLORIDE 1 MG/ML
0.5 INJECTION, SOLUTION INTRAMUSCULAR; INTRAVENOUS; SUBCUTANEOUS
Status: DISCONTINUED | OUTPATIENT
Start: 2020-02-12 | End: 2020-02-15 | Stop reason: HOSPADM

## 2020-02-12 RX ADMIN — HYDROMORPHONE HYDROCHLORIDE 0.5 MG: 1 INJECTION, SOLUTION INTRAMUSCULAR; INTRAVENOUS; SUBCUTANEOUS at 12:46

## 2020-02-12 RX ADMIN — ONDANSETRON 4 MG: 2 INJECTION INTRAMUSCULAR; INTRAVENOUS at 02:19

## 2020-02-12 RX ADMIN — HYDROMORPHONE HYDROCHLORIDE 0.5 MG: 1 INJECTION, SOLUTION INTRAMUSCULAR; INTRAVENOUS; SUBCUTANEOUS at 22:14

## 2020-02-12 RX ADMIN — SODIUM CHLORIDE, POTASSIUM CHLORIDE, SODIUM LACTATE AND CALCIUM CHLORIDE: 600; 310; 30; 20 INJECTION, SOLUTION INTRAVENOUS at 14:13

## 2020-02-12 RX ADMIN — ALVIMOPAN 12 MG: 12 CAPSULE ORAL at 08:11

## 2020-02-12 RX ADMIN — HYDROMORPHONE HYDROCHLORIDE 0.5 MG: 1 INJECTION, SOLUTION INTRAMUSCULAR; INTRAVENOUS; SUBCUTANEOUS at 09:17

## 2020-02-12 RX ADMIN — HYDROMORPHONE HYDROCHLORIDE 0.5 MG: 1 INJECTION, SOLUTION INTRAMUSCULAR; INTRAVENOUS; SUBCUTANEOUS at 15:51

## 2020-02-12 RX ADMIN — ACETAMINOPHEN 975 MG: 325 TABLET, FILM COATED ORAL at 01:18

## 2020-02-12 RX ADMIN — ALVIMOPAN 12 MG: 12 CAPSULE ORAL at 20:47

## 2020-02-12 RX ADMIN — ACETAMINOPHEN 975 MG: 325 TABLET, FILM COATED ORAL at 17:54

## 2020-02-12 RX ADMIN — ENOXAPARIN SODIUM 40 MG: 40 INJECTION SUBCUTANEOUS at 09:17

## 2020-02-12 RX ADMIN — OXYCODONE HYDROCHLORIDE 5 MG: 5 TABLET ORAL at 03:16

## 2020-02-12 RX ADMIN — METRONIDAZOLE 500 MG: 500 INJECTION, SOLUTION INTRAVENOUS at 09:25

## 2020-02-12 RX ADMIN — OXYCODONE HYDROCHLORIDE 5 MG: 5 TABLET ORAL at 07:22

## 2020-02-12 RX ADMIN — Medication 1 LOZENGE: at 01:19

## 2020-02-12 RX ADMIN — CIPROFLOXACIN 400 MG: 2 INJECTION, SOLUTION INTRAVENOUS at 08:11

## 2020-02-12 RX ADMIN — METRONIDAZOLE 500 MG: 500 INJECTION, SOLUTION INTRAVENOUS at 02:13

## 2020-02-12 RX ADMIN — HYDROMORPHONE HYDROCHLORIDE 0.5 MG: 1 INJECTION, SOLUTION INTRAMUSCULAR; INTRAVENOUS; SUBCUTANEOUS at 19:00

## 2020-02-12 RX ADMIN — ACETAMINOPHEN 975 MG: 325 TABLET, FILM COATED ORAL at 09:16

## 2020-02-12 ASSESSMENT — ACTIVITIES OF DAILY LIVING (ADL)
ADLS_ACUITY_SCORE: 16
ADLS_ACUITY_SCORE: 15
ADLS_ACUITY_SCORE: 16
ADLS_ACUITY_SCORE: 15

## 2020-02-12 NOTE — OP NOTE
Procedure Date: 02/11/2020      PREOPERATIVE DIAGNOSIS:  Ulcerative colitis.      POSTOPERATIVE DIAGNOSIS:  Ulcerative colitis.      PROCEDURES:  Completion proctectomy and ileal pouch anal anastomosis with diverting loop ileostomy.      ANESTHESIA:  General endotracheal anesthesia plus a TAP block done laparoscopically.      SURGEON:  Brigitte Ruano MD      ASSISTANTS:  Aleshia Pittman MD, [facility] Colorectal Surgery fellow and also Rodney Carrero MD, [facility] Colorectal fellow.      INDICATIONS:  Alex is a 49-year-old man who presented to the hospital in 06/2019 with severe bloody diarrhea.  He was diagnosed with ulcerative colitis and went on to have treatment with IV steroids and biologics.  He failed to improve and continued to have ongoing colitis.  At that point, he was recommended to have a total abdominal colectomy.  We proceeded with that on 06/14/2019, where a laparoscopic total abdominal colectomy was done.  He had a relatively uncomplicated course and has had some moderate mucoid drainage.  He presented with plans for a completion proctectomy and J-pouch with diverting loop ileostomy.  He was briefed on the risks of bleeding, infection, anastomotic leak that might require reoperation or possible change in pouch function with infections.  We discussed the risks of bleeding, both superficial and deep, possible anastomotic leak, other cardiopulmonary events and risks associated with major abdominal surgery and general anesthesia.  He understood and wished to proceed.      FINDINGS:  There was expected anatomy of an end ileostomy.  There was a thickened rectal mesentery.  We created a 20 cm J pouch and an anastomosis at the pelvic floor using a 28 EEA stapler.  He has a pelvic drain and a transrectal drain.      DESCRIPTION OF PROCEDURE:  After informed consent was obtained, the patient was taken to the operating room, positioned on the operating table in supine position.  He  was intubated.  Huddleston catheter and orogastric tube were placed.  His right arm was tucked at his side and his arm was comfortably placed on an arm board.  He was then positioned in modified lithotomy.  The abdomen was shaved, prepped and draped in the usual fashion.  After appropriate timeout, we toweled off the field and took down his ileostomy.  This was freed up circumferentially and released from the fascial attachments deeper.  We were then able to bring it up and lavern the stoma and staple across that area.  This was then dropped back into the abdomen and a GelPort mini was placed.  We then inflated the abdomen and inspected around.  Minimal additional adhesions were noted.  We then performed a tap block injecting 7.5 mL of 0.25% Marcaine in each of the 4 quadrants.  We then placed an additional 5 mm port in the right lower abdomen and 2 on the left side.  With these instruments, we then could easily identify the rectal stump.  The peritoneum was incised on each side and the remaining mesentery was incised.  The vascular pedicle was then divided using the LigaSure and we were able to enter into the presacral plane.  The rectum was then dissected around circumferentially.  This was a bit tedious as there was some inflammation in the mesorectum, but we were able to free this up circumferentially down to the pelvic floor.  At this point, Dr. Carrero performed a rectal exam, we could see that we were very low in the pelvic floor.  We then turned our attention to mobilizing the small bowel and the cut edge of the mesentery was elevated up off of the retroperitoneum and the duodenum, so that we could see straight up the small bowel mesentery.  We then opened a roughly 5 cm Pfannenstiel incision, incising the fascia transversely through his old scar and dividing the muscle in the midline.  A 5-9 Andrew was placed.  We were able to bring up the rectum and pack the small bowel into the upper abdomen.  A Contour green  load was used to staple at the pelvic floor after checking that no additional tissue was caught in the staple line and that we were very low.  The specimen was then removed and passed off the field.  We found the pelvis to be nicely hemostatic.  We turned our attention to the small bowel and we were able to bring this out through our incision and selected a point roughly 20 cm from our prior staple line and felt that we had good reach, but proceeded to incise the peritoneum on both the anterior and posterior surface of the small bowel mesentery overlying the vascular pedicle so that we had nice length down towards the pelvis and that our bowel comfortably passed the pelvic brim.  We then placed an Allis at the antimesenteric side of the small bowel where we intended to place our anvil and we then created a pouch using 2 firings of the Endo-PHUONG 100 blue load.  This was completed without difficulty and we were sure that the mesentery was not caught in there.  We inspected the staple line, which was nicely hemostatic.  We then used a 2-0 Prolene to create a pursestring around our enterotomy and the anvil was secured without difficulty.  This was nicely positioned on the antimesenteric side and was nicely hemostatic.  We then dropped the J-pouch back into the abdomen and we could see that our cut edge of our mesentery was straight up towards the duodenum and there was no overlying bowel.  Dr. Carrero then went below and performed a gentle digital rectal exam and then advanced the stapling device in through the anal opening.  The spike was deployed slightly anterior to the staple line and the 2 ends of the staple line were merged.  Again, careful to exclude any surrounding tissue with the renal vein clamps, we brought down the stapling device and 2 intact anastomotic rings were noted after we fired.  A leak test was performed after submerging the pelvis in water and this was negative.  We brought up a diverting loop  ileostomy through his old ileostomy site roughly 20 cm proximal to the pouch.  This was wrapped in Seprafilm.  This was snapped and then we closed the peritoneum and then we closed the fascia transversely using #1 PDS.  Each layer was irrigated and the skin edges were reapproximated with 4-0 Monocryl and Dermabond.  Of note, we did put a 19 round channel drain through the left lower quadrant port site and this was covered with a drain sponge.  The half-inch Penrose drain was placed in through the anus and secured to the skin.      The patient was awakened from anesthesia and taken to the recovery area in stable condition.  Sponge and needle counts were correct at the end of the case.      ESTIMATED BLOOD LOSS:  50 mL.         DANIEL PEREZ MD             D: 2020   T: 2020   MT: MARK      Name:     PHAN CONNOLLY   MRN:      [id number]        Account:        [id number]   :      1970           Procedure Date: 2020      Document: [id number]       cc: Chau Perez MD

## 2020-02-12 NOTE — CONSULTS
"CLINICAL NUTRITION SERVICES  -  ASSESSMENT NOTE    Recommendations Ordered by Registered Dietitian (RD):   Diet advancement per CRS   Malnutrition:   % Weight Loss:  Weight loss does not meet criteria for malnutrition   % Intake:  Decreased intake does not meet criteria for malnutrition   Subcutaneous Fat Loss:  None observed  Muscle Loss:  Clavicle bone region mild depletion, Acromion bone region mild depletion and Scapular bone region mild depletion  Fluid Retention:  None noted    Malnutrition Diagnosis: Patient does not meet two of the above criteria necessary for diagnosing malnutrition      REASON FOR ASSESSMENT  Alex Cardona is a 49 year old male seen by Registered Dietitian for Provider Order - Low residue diet teaching    NUTRITION HISTORY  - Information obtained from patient and chart   - Pt admitted for procedure of a completion proctectomy and ileal pouch anal anastomosis with diverting loop ileostomy  - History of ulcerative colitis  - Prior to admission pt states that he was eating very well, 3 meals a day was normal for him. Breakfast: peanut butter toast with cherrios or pancake with scrambled eggs. Snack: banana. Lunch: 1/2 turkey sandwich. Dinner: chicken and mashed potatoes, spaghetti, burger, etc.   - Pt unable to eat anything but clear liquids since Monday in preparation for surgery.   - Pt notes that he focuses on protein items due to his history of anemia.   - Allergies: NKFA     CURRENT NUTRITION ORDERS  Diet Order:     Clear Liquid Diet     Current Intake/Tolerance:  No information recorded in the flowsheets as pt is only on clear liquids.     NUTRITION FOCUSED PHYSICAL ASSESSMENT FOR DIAGNOSING MALNUTRITION)  Yes              Observed:    Muscle wasting (refer to documentation in Malnutrition section)    Obtained from Chart/Interdisciplinary Team:  - No BM via ileostomy, pt experiencing burping sensation     ANTHROPOMETRICS  Height: 5' 10\"  Weight: 69.4 kg ( 153 lbs 0 oz)   Body mass " index is 21.95 kg/m .  Weight Status:  Normal BMI  Weight History: weight appears to fluctuate, more steady the last ~3 months. Pt has not noticed any weight loss, his normal body weight is around 150 lbs.   Wt Readings from Last 10 Encounters:   02/11/20 69.4 kg (153 lb)   01/21/20 71.1 kg (156 lb 12.8 oz)   01/13/20 67.1 kg (148 lb)   11/08/19 69.2 kg (152 lb 9.6 oz)   06/17/19 66.2 kg (145 lb 14.4 oz)   06/12/19 61.8 kg (136 lb 4.8 oz)   05/23/19 67.5 kg (148 lb 13 oz)     LABS  Labs reviewed    Electrolytes  Potassium (mmol/L)   Date Value   02/12/2020 3.9   01/21/2020 4.2   11/08/2019 4.2     Phosphorus (mg/dL)   Date Value   06/10/2019 3.1   06/06/2019 2.4 (L)   06/02/2019 2.8   05/26/2019 2.7   05/25/2019 1.9 (L)    Blood Glucose  Glucose (mg/dL)   Date Value   02/12/2020 126 (H)   01/21/2020 87   11/08/2019 91   06/16/2019 96   06/13/2019 103 (H)    Inflammatory Markers  CRP Inflammation (mg/L)   Date Value   06/09/2019 41.8 (H)   06/06/2019 25.8 (H)     WBC (10e9/L)   Date Value   01/21/2020 4.8   11/08/2019 5.4   06/16/2019 10.1     Albumin (g/dL)   Date Value   11/08/2019 3.9   06/16/2019 1.6 (L)   06/11/2019 2.0 (L)      Magnesium (mg/dL)   Date Value   06/10/2019 1.7   06/06/2019 2.4 (H)   06/02/2019 1.9     Sodium (mmol/L)   Date Value   02/12/2020 137   01/21/2020 138   11/08/2019 139    Renal  Urea Nitrogen (mg/dL)   Date Value   02/12/2020 9   01/21/2020 12   11/08/2019 15     Creatinine (mg/dL)   Date Value   02/12/2020 0.92   02/11/2020 0.91   01/21/2020 0.91     Additional  Triglycerides (mg/dL)   Date Value   11/08/2019 145     Ketones Urine (mg/dL)   Date Value   05/23/2019 >150 (A)        MEDICATIONS  Medications reviewed      acetaminophen  975 mg Oral Q8H     alvimopan  12 mg Oral BID     ciprofloxacin  400 mg Intravenous Q12H     enoxaparin ANTICOAGULANT  40 mg Subcutaneous Q24H     metroNIDAZOLE  500 mg Intravenous Q8H     sodium chloride (PF)  3 mL Intracatheter Q8H     sodium chloride  (PF)  3 mL Intracatheter Q8H        lactated ringers 75 mL/hr at 02/11/20 1836      ASSESSED NUTRITION NEEDS PER APPROVED PRACTICE GUIDELINES:    Dosing Weight 69.4 kg   Estimated Energy Needs: 2718-6242 kcals (25-30 Kcal/Kg)  Justification: maintenance  Estimated Protein Needs:  grams protein (1.2-1.5 g pro/Kg)  Justification: post-op  Estimated Fluid Needs: per MD    MALNUTRITION:  % Weight Loss:  Weight loss does not meet criteria for malnutrition   % Intake:  Decreased intake does not meet criteria for malnutrition   Subcutaneous Fat Loss:  None observed  Muscle Loss:  Clavicle bone region mild depletion, Acromion bone region mild depletion and Scapular bone region mild depletion  Fluid Retention:  None noted    Malnutrition Diagnosis: Patient does not meet two of the above criteria necessary for diagnosing malnutrition    NUTRITION DIAGNOSIS:  Increased nutrient needs (protein) related to post-op day #0 from completion proctectomy and ileal pouch anal anastomosis with diverting loop ileostomy as evidenced by pt requiring a minimum need of 1.2-1.5 g/kg of protein     NUTRITION INTERVENTIONS  Recommendations / Nutrition Prescription  Diet advancement per CRS    Implementation  Nutrition education: deferred diet education until diet advances further   Collaboration and Referral of Nutrition care: discussed pt during interdisciplinary rounds this morning     Nutrition Goals  Diet to advance >NPO/clear liquids within the next 24-48 hours.     MONITORING AND EVALUATION:  Progress towards goals will be monitored and evaluated per protocol and Practice Guidelines    Shasha Banuelos RD, LD  Clinical Dietitian

## 2020-02-12 NOTE — PROGRESS NOTES
"[facility] Nurse Inpatient Ostomy Assessment      Assessment of ostomy and needs for:  Loop Ileostomy      Data:   History:      Per MD note(s):  49-year-old man who presented to the hospital in 06/2019 with severe bloody diarrhea.  He was diagnosed with ulcerative colitis and went on to have treatment with IV steroids and biologics.  He failed to improve and continued to have ongoing colitis.  At that point, he was recommended to have a total abdominal colectomy.  We proceeded with that on 06/14/2019, where a laparoscopic total abdominal colectomy was done.  He had a relatively uncomplicated course and has had some moderate mucoid drainage.  He presented with plans for a completion proctectomy and J-pouch with diverting loop ileostomy.       Type of ostomy:  Ileostomy  Stoma assessment:   ? Size of stoma:  Seen thru pouch ~ 1 5/8\" Oval,  pink-red, moist, edematous and protruberant  ? A bridge in place?: No  Mucocutaneous Junction (MCJ):  not visualized (barrier in place)  Peristomal skin:  not visualized (barrier in place)  Abdominal assessment: intact incision  ? N/G still in place?:  no  Output:  scant , liquid, brown-green,     I/O last 3 completed shifts:  In: 1960 [P.O.:60; I.V.:1900]  Out: 1515 [Urine:1300; Drains:165; Blood:50]  Current pouching system:  Piedad,  one piece, flat and placed 2/11 in OR   Pain:  Cramping and tender    Diet:         Orders Placed This Encounter        Clear Liquid Diet      Labs:   Recent Labs   Lab Test 02/12/20  0657 01/21/20  1605 11/08/19  1344  06/09/19  0610   ALBUMIN  --   --  3.9   < >  --    HGB 11.0* 11.8* 12.1*   < > 10.9*   WBC  --  4.8 5.4   < > 9.8   CRP  --   --   --   --  41.8*    < > = values in this interval not displayed.           Intervention:     Patient's chart evaluated.      Assessments done today:  Stoma, pouch    Education: intro to [facility], discussion about past experience    Prepared for discharge by: Supplies ordered and " Prescriptions or note left on chart for MD to sign/complete    Pt registered for ostomy supply samples? On discharge          Assessment:     Stoma assessment: viable, moist, edematous and beginning to function    Learning needs/ comprehension: pt experienced ostomate; likes Coloplast 1 pc flat LOS with Filer barrier ring, understands all cares and procedures    Effectiveness of current pouching/ supply plan: TBD         Plan:     Plan:   ? Current pouching system: Piedad OR flat 1 pc    Education:  ? Education reviewed:  Pouch Replacement, Removal of pouch, Preparation of new pouch, Cutting out or evaluating a pattern, Applying paste or rings, Applying appliance to abdomen, Peristomal skin care, Diet and hydration / fluid balance, Odor / flatus management and Lifestyle Adjustments. Pt is very knowledgeable, may need to review minimally with him.   ? Is pt able to demonstrate: 1. how to empty their pouch?  yes  2. How to read a measurement on a graduated cylinder?  yes  3. How to write down correct I and O's?   yes  o Supply company: [employer]  o Do [facility] Nurse recommend home care ? Suspect pt may need some support. Lives with his mother who does not assist with ostomy cares.

## 2020-02-12 NOTE — PLAN OF CARE
Patient admitted to floor at approximately 1800.Alert and oriented. VSS. Oxycodone x1. Zofran x1. ЕЛЕНА with small amount of output. Incisions CDI. Ostomy with no output. Rectal tube with small amount of blood drainage, dressing changed. Clear liquids. IVF. Cipro, flagyl. IS encouraged. PCDs on. Catheter in.

## 2020-02-12 NOTE — PLAN OF CARE
VSS. Rating abdominal and rectal pain 6-7\10. Scheduled tylenol, prn oxy q4. ЕЛЕНА w/ bloody output, ostomy w/ gas but no stool, gauze beneath penrose drain changed x2. Faint bowel sounds. Tolerating clears. Prn zofran for nausea x1. On IVF, flagyl and cipro. Stood up at bedside with assist of 2.

## 2020-02-12 NOTE — PROGRESS NOTES
COLON [facility] RECTAL [facility]  PROGRESS NOTE    February 12, 2020  Post-op Day # 1    SUBJECTIVE:  Having a lot of pain.  Was not able to sleep much secondary to pain.  Rating this 6-7/10.  Taking Tylenol and Oxycodone 5mg q4hrs.  Feeling nauseated at times.  Some belching.  Taking sips of water.  Stood at bedside this morning.  Has not ambulated. Urine  Output 1,300mL.  Drain output 165mL    OBJECTIVE:  Temp:  [97.2  F (36.2  C)-98.9  F (37.2  C)] 98.9  F (37.2  C)  Pulse:  [67-80] 73  Heart Rate:  [68-82] 79  Resp:  [11-25] 18  BP: (117-134)/(64-88) 121/70  SpO2:  [96 %-100 %] 96 %    Intake/Output Summary (Last 24 hours) at 2/12/2020 0828  Last data filed at 2/12/2020 0819  Gross per 24 hour   Intake 1960 ml   Output 1565 ml   Net 395 ml       GENERAL:  Awake, alert, no acute distress, resting in bed  HEAD: Nomocephalic atraumatic  RESPIRATORY: unlabored breathing on room air   EXTREMITIES: warm and well perfused  ABDOMEN:  Soft, appropriately tender, non-distended, no rebound or guarding, no peritoneal signs; ileostomy pink with scan clear drainage in bag.  ЕЛЕНА drain with minimal serosanguinous drainage  INCISION:  C/D/I    LABS:  Lab Results   Component Value Date    WBC 4.8 01/21/2020     Lab Results   Component Value Date    HGB 11.0 02/12/2020     Lab Results   Component Value Date    HCT 37.6 01/21/2020     Lab Results   Component Value Date     02/11/2020     Last Basic Metabolic Panel:  Lab Results   Component Value Date     02/12/2020      Lab Results   Component Value Date    POTASSIUM 3.9 02/12/2020     Lab Results   Component Value Date    CHLORIDE 104 02/12/2020     Lab Results   Component Value Date    KEZIA 8.5 02/12/2020     Lab Results   Component Value Date    CO2 29 02/12/2020     Lab Results   Component Value Date    BUN 9 02/12/2020     Lab Results   Component Value Date    CR 0.92 02/12/2020     Lab Results   Component Value Date     02/12/2020       ASSESSMENT/PLAN: POD #1 s/p  completion proctectomy and ileal pouch anal anastomosis with diverting loop ileostomy   - Lovenox for DVT prophylaxis   - will add in Dilaudid for pain   - stick with sips of water for now; if nausea improves can have clear liquid diet later today   - continue massey for at least 4 days   - has rectal drain in place  - will discuss with Dr. Ruano      For questions/paging, please contact the CRS office at [phone].    Ana Rosa Wallace PA-C  [facility]  Phone: [phone]      [facility] Attending Note    Patient seen and examined independently.  Agree with above assessment and plan.  mild nausea, no vomiting. Poor pain control overnight, better now with IV.  abd soft, incisions cdi, ЕЛЕНА serous.  Stoma pink with slight green fluid in the bag with gas.  Plan  Clears likely fulls later if feeling better.  Iv dilaudid  Stoma care and teaching.  Lovenox now and at discharge    Brigitte Ruano MD  [facility]  Office Phone # [phone]

## 2020-02-12 NOTE — PLAN OF CARE
"/63 (BP Location: Right arm)   Pulse 73   Temp 97.4  F (36.3  C) (Oral)   Resp 16   Ht 1.778 m (5' 10\")   Wt 69.4 kg (153 lb)   SpO2 98%   BMI 21.95 kg/m      A/Ox4, VSS on RA. Pain adequately controlled with IV dilaudid. Tolerating clear liquids this afternoon while up in chair. Huddleston with adequate output. Passing gas via ileostomy. BS hypoactive. ЕЛЕНА with serosang drainage. Rectal penrose with small amount of output. Plan for another walk this evening. Will continue to monitor per POC  "

## 2020-02-12 NOTE — PLAN OF CARE
Full code.  POD #0.  VSS on room air. Afebrile. Capno monitoring.  Tolerating clear liquids.  Penrose tube to rectum.  Continue LR @ 75.  ЕЛЕНА x1.  Continue iv Cipro, Flagyl.  New ileostomy, WOC consulted.  Continue massey.  Colorectal following.  Alert and oriented.  Discharge pending.

## 2020-02-13 LAB
BLD PROD TYP BPU: NORMAL
BLD PROD TYP BPU: NORMAL
BLD UNIT ID BPU: 0
BLD UNIT ID BPU: 0
BLOOD PRODUCT CODE: NORMAL
BLOOD PRODUCT CODE: NORMAL
BPU ID: NORMAL
BPU ID: NORMAL
COPATH REPORT: NORMAL
TRANSFUSION STATUS PATIENT QL: NORMAL

## 2020-02-13 PROCEDURE — 25800030 ZZH RX IP 258 OP 636: Performed by: COLON & RECTAL SURGERY

## 2020-02-13 PROCEDURE — 12000000 ZZH R&B MED SURG/OB

## 2020-02-13 PROCEDURE — 25000132 ZZH RX MED GY IP 250 OP 250 PS 637: Performed by: COLON & RECTAL SURGERY

## 2020-02-13 PROCEDURE — G0463 HOSPITAL OUTPT CLINIC VISIT: HCPCS

## 2020-02-13 PROCEDURE — 40000902 ZZH STATISTIC WOC PT EDUCATION, 16-30 MIN

## 2020-02-13 PROCEDURE — 25000128 H RX IP 250 OP 636: Performed by: PHYSICIAN ASSISTANT

## 2020-02-13 PROCEDURE — 25000128 H RX IP 250 OP 636: Performed by: COLON & RECTAL SURGERY

## 2020-02-13 RX ADMIN — ACETAMINOPHEN 975 MG: 325 TABLET, FILM COATED ORAL at 17:47

## 2020-02-13 RX ADMIN — HYDROMORPHONE HYDROCHLORIDE 0.5 MG: 1 INJECTION, SOLUTION INTRAMUSCULAR; INTRAVENOUS; SUBCUTANEOUS at 10:35

## 2020-02-13 RX ADMIN — ACETAMINOPHEN 975 MG: 325 TABLET, FILM COATED ORAL at 02:01

## 2020-02-13 RX ADMIN — HYDROMORPHONE HYDROCHLORIDE 0.5 MG: 1 INJECTION, SOLUTION INTRAMUSCULAR; INTRAVENOUS; SUBCUTANEOUS at 06:25

## 2020-02-13 RX ADMIN — ACETAMINOPHEN 975 MG: 325 TABLET, FILM COATED ORAL at 09:19

## 2020-02-13 RX ADMIN — HYDROMORPHONE HYDROCHLORIDE 0.5 MG: 1 INJECTION, SOLUTION INTRAMUSCULAR; INTRAVENOUS; SUBCUTANEOUS at 15:05

## 2020-02-13 RX ADMIN — ALVIMOPAN 12 MG: 12 CAPSULE ORAL at 09:19

## 2020-02-13 RX ADMIN — SODIUM CHLORIDE, POTASSIUM CHLORIDE, SODIUM LACTATE AND CALCIUM CHLORIDE: 600; 310; 30; 20 INJECTION, SOLUTION INTRAVENOUS at 02:01

## 2020-02-13 RX ADMIN — HYDROMORPHONE HYDROCHLORIDE 0.5 MG: 1 INJECTION, SOLUTION INTRAMUSCULAR; INTRAVENOUS; SUBCUTANEOUS at 18:22

## 2020-02-13 RX ADMIN — HYDROMORPHONE HYDROCHLORIDE 0.5 MG: 1 INJECTION, SOLUTION INTRAMUSCULAR; INTRAVENOUS; SUBCUTANEOUS at 22:42

## 2020-02-13 RX ADMIN — HYDROMORPHONE HYDROCHLORIDE 0.5 MG: 1 INJECTION, SOLUTION INTRAMUSCULAR; INTRAVENOUS; SUBCUTANEOUS at 02:01

## 2020-02-13 RX ADMIN — ENOXAPARIN SODIUM 40 MG: 40 INJECTION SUBCUTANEOUS at 09:19

## 2020-02-13 ASSESSMENT — ACTIVITIES OF DAILY LIVING (ADL)
ADLS_ACUITY_SCORE: 15

## 2020-02-13 NOTE — PROGRESS NOTES
"[facility] Nurse Inpatient Ostomy Assessment      Assessment of ostomy and needs for:  Loop Ileostomy      Data:   History:      Per MD note(s):  49-year-old man who presented to the hospital in 06/2019 with severe bloody diarrhea.  He was diagnosed with ulcerative colitis and went on to have treatment with IV steroids and biologics.  He failed to improve and continued to have ongoing colitis.  At that point, he was recommended to have a total abdominal colectomy.  We proceeded with that on 06/14/2019, where a laparoscopic total abdominal colectomy was done.  He had a relatively uncomplicated course and has had some moderate mucoid drainage.  He presented with plans for a completion proctectomy and J-pouch with diverting loop ileostomy.       Type of ostomy:  Ileostomy  Stoma assessment:   ? Size of stoma:  1 1/4\" by 1 1/2\" Oval,  pink-red, moist, edematous and protruberant  ? A bridge in place?: No  Mucocutaneous Junction (MCJ):  intact  Peristomal skin:  Intact, currently peristomal skin forming a divot around stoma, especially at 3 and 9 O'clock  Abdominal assessment: intact incision  ? N/G still in place?:  no  Output:  scant , liquid, brown-green,     I/O last 3 completed shifts:  In: 3948 [P.O.:810; I.V.:3138]  Out: 3155 [Urine:2725; Drains:330; Stool:100]  Current pouching system:  Piedad,  one piece, flat and placed 2/11 in OR   Pain:  Cramping and tender    Diet:       Orders Placed This Encounter      Low Fiber Diet    Labs:     Recent Labs   Lab Test 02/12/20  0657 01/21/20  1605 11/08/19  1344  06/09/19  0610   ALBUMIN  --   --  3.9   < >  --    HGB 11.0* 11.8* 12.1*   < > 10.9*   WBC  --  4.8 5.4   < > 9.8   CRP  --   --   --   --  41.8*    < > = values in this interval not displayed.           Intervention:     Patient's chart evaluated.      Assessments done today:  Stoma, pouch changed due to pouch coming off at 3 and 9 O'clock    Education: intro to [facility], discussion about past " experience, product types and various options    Prepared for discharge by: discusses Supplies once discharged home     Pt registered for ostomy supply samples? On discharge          Assessment:     Stoma assessment: viable, moist, edematous and beginning to function    Learning needs/ comprehension: pt experienced ostomate; likes Coloplast 1 pc flat LOS with Ronks barrier ring, understands all cares and procedures    Effectiveness of current pouching/ supply plan: TBD         Plan:     Plan:   ? Current pouching system: Ronks 57mm flat 2 piece with high output bag    Education:  ? Education reviewed:  Pouch Replacement, Removal of pouch, Preparation of new pouch, Cutting out or evaluating a pattern, Applying paste or rings, Applying appliance to abdomen, Peristomal skin care, Diet and hydration / fluid balance, Odor / flatus management and Lifestyle Adjustments. Pt is very knowledgeable, may need to review minimally with him.   ? Is pt able to demonstrate: 1. how to empty their pouch?  yes  2. How to read a measurement on a graduated cylinder?  yes  3. How to write down correct I and O's?   yes  o Supply company: [employer]  o Do WO Nurse recommend home care ? Yes, with WOC nurse if possible

## 2020-02-13 NOTE — PROGRESS NOTES
"[facility] Progress Note             Interval History:   Postop Day #: 2  No nausea, some burping. Up walking some, pain better controlled.                Medications:   I have reviewed this patient's current medications               Physical Exam:   Blood pressure 125/78, pulse 71, temperature 97.4  F (36.3  C), temperature source Axillary, resp. rate 14, height 1.778 m (5' 10\"), weight 69.4 kg (153 lb), SpO2 99 %.    Intake/Output Summary (Last 24 hours) at 2/13/2020 0827  Last data filed at 2/13/2020 0702  Gross per 24 hour   Intake 3598 ml   Output 4465 ml   Net -867 ml     GEN:  alert  ABD:  Soft, incisions tender as expected, stoma pink green liquid in the bag.  ЕЛНЕА serosang  Rectal drain in place with scant drainage         Data:        Lab Results   Component Value Date     02/12/2020    Lab Results   Component Value Date    CHLORIDE 104 02/12/2020    Lab Results   Component Value Date    BUN 9 02/12/2020      Lab Results   Component Value Date    POTASSIUM 3.9 02/12/2020    Lab Results   Component Value Date    CO2 29 02/12/2020    Lab Results   Component Value Date    CR 0.92 02/12/2020    CR 0.91 02/11/2020        Lab Results   Component Value Date    HGB 11.0 (L) 02/12/2020    HGB 11.8 (L) 01/21/2020     Lab Results   Component Value Date     02/11/2020     01/21/2020     Lab Results   Component Value Date    WBC 4.8 01/21/2020    WBC 5.4 11/08/2019            Assessment and Plan:   Doing well  Low residue diet  Oral pain meds  Encourage ambulation  lovenox now and at discharge  Discontinue ЕЛЕНА and rectal drain prior to discharge home.  Discontinue massey on sat. Likely discharge to home if voiding on sat.       Brigitte Ruano MD  [facility]  Office Phone # [phone]    "

## 2020-02-13 NOTE — PROGRESS NOTES
Clinical Nutrition Brief Note     Pleas refer to full RD note completed on 2/12.     Provided low fiber education to the pt.     Nutrition Education:     Assessed learning needs, learning preferences, and willingness to learn    Nutrition Education (Content):  a) Provided handout Low Fiber (13g) Education from the Nutrition Care Manual   b) Discussed low fiber foods allowed on this diet as well as high fiber foods to avoid.     Nutrition Education (Application):  a) Discussed eating habits and recommended alternative food choices    Patient verbalizes understanding of diet by engaging in conversation and asking questions. Pt has been on a low fiber diet in the past and was able to relay correct information to the writer.     Anticipate good compliance    Diet Education - refer to Education Flowsheet    Shasha Banuelos, RD, LD  Clinical Dietitian

## 2020-02-13 NOTE — PLAN OF CARE
VSS. Rating pain consistently 6-7/10 in abdomen. Prn dilaudid q3. Around ostomy very tender to even the lightest touch, though not red or swollen. Ostomy patent w/ small amount of brown/green liquid. ЕЛЕНА drain w/ bloody, serosang output. Huddleston with clear yellow urine. Tolerating full liquids-drinking plenty of water and eating a few puddings overnight, continues on IVF. Needs encouragement to empty ostomy or get out of bed.

## 2020-02-13 NOTE — CONSULTS
Care Transition Initial Assessment - RN        Met with: Patient.  DATA   Active Problems:    Ulcerative colitis (H)       Cognitive Status: awake, alert and oriented.  Primary Care Clinic Name: [facility]   Primary Care MD Name: Dr. Garcia   Contact information and PCP information verified: Yes  Lives With: parent(s)      Quality of Family Relationships: supportive  Description of Support System: Supportive   Who is your support system?: Parent(s)   Support Assessment: Adequate family and caregiver support   Insurance concerns: No Insurance issues identified  ASSESSMENT  Patient currently receives the following services:  None. Pt lives at home w/ his mother, he is independent at baseline.         Identified issues/concerns regarding health management: Per WO note, pt may benefit from additional support at discharge. Met w/ pt at bedside to discuss, he reports that when he discharged in June he was open to [facility] for WOC RN. Pt feels he would benefit from these services again upon discharge. Pt would like to use [facility] again. Referral sent, AVS updated.     Pt/family was offered but declined the Medicare Compare list for Home Care, with associated star ratings to assist with choice for referrals/discharge planning Yes    Education was given to pt/family that star ratings are updated/maintained by Medicare and can be reviewed by visiting www.medicare.gov Yes    PLAN  Patient given options and choices for discharge yes.  Patient/family is agreeable to the plan?  Yes  Patient anticipates discharging to home w/ mother and Home RN.  Resources List: Home Care     Patient anticipates needs for home equipment: No  Transportation/person available to transport on day of discharge is: Mother, per pt.   Plan/Disposition: Home   Appointments: Pt reports that he will see his surgeon on March 9th. Also scheduled hospital follow-up for Monday 2/24 at 4:20pm w/ Dr. Garcia, AVS updated.     Care Transition  Specialist (CTS) will continue to follow as needed.    Ольга Gonzalez RN BSN   Inpatient Care Coordination  [facility]   Phone [phone]

## 2020-02-13 NOTE — DISCHARGE INSTRUCTIONS
Your home care referral was sent to [facility]  If you haven't heard from them within the next 24-48 hours,  Please call them at [phone]    Your hospital follow up appointment has been scheduled for you with Dr. Garcia at [facility] for Monday 2/24 at 4:20pm. Please bring your hospital discharge instructions, a photo ID, and insurance information with you to your appointment. Please call the clinic at [phone] if you need to reschedule.

## 2020-02-13 NOTE — PLAN OF CARE
"/78 (BP Location: Right arm)   Pulse 71   Temp 97.4  F (36.3  C) (Axillary)   Resp 14   Ht 1.778 m (5' 10\")   Wt 69.4 kg (153 lb)   SpO2 99%   BMI 21.95 kg/m    A/Ox4, VSS on RA. Pain controlled with IV dilaudid. Tolerating low fiber diet. Denies nausea. ЕЛЕНА with serosang output. Huddleston with adequate output. Ileostomy with small amount of brown liquid output. Rectal penrose drain with small amount of output. ABD incision dermabonded and CDI. Will continue to monitor per POC.   "

## 2020-02-14 ENCOUNTER — APPOINTMENT (OUTPATIENT)
Dept: PHYSICAL THERAPY | Facility: CLINIC | Age: 50
End: 2020-02-14
Attending: COLON & RECTAL SURGERY
Payer: COMMERCIAL

## 2020-02-14 LAB — PLATELET # BLD AUTO: 257 10E9/L (ref 150–450)

## 2020-02-14 PROCEDURE — 97116 GAIT TRAINING THERAPY: CPT | Mod: GP | Performed by: PHYSICAL THERAPIST

## 2020-02-14 PROCEDURE — 12000000 ZZH R&B MED SURG/OB

## 2020-02-14 PROCEDURE — 97530 THERAPEUTIC ACTIVITIES: CPT | Mod: GP | Performed by: PHYSICAL THERAPIST

## 2020-02-14 PROCEDURE — 97161 PT EVAL LOW COMPLEX 20 MIN: CPT | Mod: GP | Performed by: PHYSICAL THERAPIST

## 2020-02-14 PROCEDURE — 25000128 H RX IP 250 OP 636: Performed by: COLON & RECTAL SURGERY

## 2020-02-14 PROCEDURE — 40000904 ZZH STATISTIC WOC PT EDUCATION, 46-60 MIN

## 2020-02-14 PROCEDURE — 25000132 ZZH RX MED GY IP 250 OP 250 PS 637: Performed by: PHYSICIAN ASSISTANT

## 2020-02-14 PROCEDURE — 25800030 ZZH RX IP 258 OP 636: Performed by: COLON & RECTAL SURGERY

## 2020-02-14 PROCEDURE — 25000128 H RX IP 250 OP 636: Performed by: PHYSICIAN ASSISTANT

## 2020-02-14 PROCEDURE — 25000132 ZZH RX MED GY IP 250 OP 250 PS 637: Performed by: COLON & RECTAL SURGERY

## 2020-02-14 PROCEDURE — 36415 COLL VENOUS BLD VENIPUNCTURE: CPT | Performed by: COLON & RECTAL SURGERY

## 2020-02-14 PROCEDURE — 85049 AUTOMATED PLATELET COUNT: CPT | Performed by: COLON & RECTAL SURGERY

## 2020-02-14 RX ORDER — OXYCODONE HYDROCHLORIDE 5 MG/1
5-10 TABLET ORAL EVERY 4 HOURS PRN
Status: DISCONTINUED | OUTPATIENT
Start: 2020-02-14 | End: 2020-02-15 | Stop reason: HOSPADM

## 2020-02-14 RX ORDER — OXYCODONE HYDROCHLORIDE 5 MG/1
5-10 TABLET ORAL EVERY 4 HOURS PRN
Qty: 20 TABLET | Refills: 0 | Status: SHIPPED | OUTPATIENT
Start: 2020-02-14 | End: 2020-05-11

## 2020-02-14 RX ADMIN — OXYCODONE HYDROCHLORIDE 5 MG: 5 TABLET ORAL at 14:53

## 2020-02-14 RX ADMIN — OXYCODONE HYDROCHLORIDE 5 MG: 5 TABLET ORAL at 15:32

## 2020-02-14 RX ADMIN — ACETAMINOPHEN 975 MG: 325 TABLET, FILM COATED ORAL at 10:02

## 2020-02-14 RX ADMIN — ENOXAPARIN SODIUM 40 MG: 40 INJECTION SUBCUTANEOUS at 10:03

## 2020-02-14 RX ADMIN — OXYCODONE HYDROCHLORIDE 5 MG: 5 TABLET ORAL at 10:02

## 2020-02-14 RX ADMIN — ACETAMINOPHEN 975 MG: 325 TABLET, FILM COATED ORAL at 01:49

## 2020-02-14 RX ADMIN — OXYCODONE HYDROCHLORIDE 10 MG: 5 TABLET ORAL at 19:52

## 2020-02-14 RX ADMIN — SODIUM CHLORIDE, POTASSIUM CHLORIDE, SODIUM LACTATE AND CALCIUM CHLORIDE: 600; 310; 30; 20 INJECTION, SOLUTION INTRAVENOUS at 02:00

## 2020-02-14 RX ADMIN — HYDROMORPHONE HYDROCHLORIDE 0.5 MG: 1 INJECTION, SOLUTION INTRAMUSCULAR; INTRAVENOUS; SUBCUTANEOUS at 07:44

## 2020-02-14 RX ADMIN — HYDROMORPHONE HYDROCHLORIDE 0.5 MG: 1 INJECTION, SOLUTION INTRAMUSCULAR; INTRAVENOUS; SUBCUTANEOUS at 01:51

## 2020-02-14 ASSESSMENT — ACTIVITIES OF DAILY LIVING (ADL)
ADLS_ACUITY_SCORE: 15

## 2020-02-14 NOTE — PLAN OF CARE
VSS, A/O. SBA.  IVF infusing.  Ls-clear.  IV dilaudid q3h for abdominal pain.  Hypoactive  bowel sounds, 140ml out via ileostomy, green.  ЕЛЕНА drain to bulb suction, serosanguinous output.  Penrose rectal drain, small amount output, collected in ABD, changed prn. Ambulated halls a few times today.  Tolerating low fiber diet.

## 2020-02-14 NOTE — DISCHARGE SUMMARY
[facility] Discharge Summary      Alex Cardona MRN# [id number]   Age: 49 year old YOB: 1970     Date of Admission:  2/11/2020  Date of Discharge::  2/15/2020  Admitting Physician:  Brigitte Ruano MD  Discharge Physician:  Brigitte Ruano MD     PCP:  Kati Garcia    Disposition: Patient discharged from [facility] to home with home care in stable condition.        Primary Diagnosis:   Ulcerative Colitis             Discharge Medications:     Current Discharge Medication List      START taking these medications    Details   enoxaparin ANTICOAGULANT (LOVENOX) 40 MG/0.4ML syringe Inject 0.4 mLs (40 mg) Subcutaneous every 24 hours for 24 days  Qty: 9.6 mL, Refills: 0    Associated Diagnoses: Ulcerative colitis with complication, unspecified location (H)      oxyCODONE (ROXICODONE) 5 MG tablet Take 1-2 tablets (5-10 mg) by mouth every 4 hours as needed for moderate to severe pain  Qty: 20 tablet, Refills: 0    Associated Diagnoses: Ulcerative colitis with complication, unspecified location (H)         CONTINUE these medications which have NOT CHANGED    Details   Melatonin 10 MG TABS tablet Take 5 mg by mouth nightly as needed for sleep                    Follow Up, Special Instructions:     Discharge diet: Low residue diet    Discharge activity: No heavy lifting or straining.    Discharge follow-up: Follow up in 3 weeks in our clinic.              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   - Acute and chronic inflammation involves proximal and distal margins.   - Separate segment of small bowel with superficial  ischemic-type mucosal   ulceration and serosal adhesions with   uninvolved stapled end.   - Benign reactive lymph nodes (x10).   - Negative for dysplasia and malignancy.     B: Ileostomy trimming and anastomotic rings, ileal pouch anal anastomosis   with diverting loop ileostomy -   - Active inflammation with superficial ulceration involving one tissue   portion.   - Two small bowel tissue portions without diagnostic pathologic   alteration.   - Negative for dysplasia and malignancy.            Brief Hospital Summary:     Patient is a 49 year old male who underwent completion proctectomy with ileal pouch anal anastomosis with diverting loop ileostomy on February 11, 2020 by Dr. Ruano.   There were no immediate complications during this procedure.  Please refer to the full operative summary for details.  The patient's hospital course was unremarkable.  he recovered as anticipated and experienced no post-operative complications.  Diet was advanced and he had return of bowel function. His abdominal drain and transanal drain were removed on the day of discharge. Patient was discharged home with a prescription for Oxycodone 5mg, #20 tablets as well as Lovenox for DVT prophylaxis.             Ana Rosa Wallace PA-C  [facility]  [phone]        ADDENDUM:  Length of stay: 4 days  Indicate Y or N for the following:  UTI n   C diff n  PNA n  SSI n  DVT n  PE n  CVA n  MI n  Enterocutaneous fistula n  Peripheral nerve injury n  Abscess (not adjacent to anastomosis) n  Leak n     Death within 30 days n  Reintubation n  Reoperation n

## 2020-02-14 NOTE — DISCHARGE SUMMARY
[facility] Discharge Summary      Alex Cardona MRN# [id number]   Age: 49 year old YOB: 1970     Date of Admission:  2/11/2020  Date of Discharge::  2/14/2020  Admitting Physician:  Brigitte Ruano MD  Discharge Physician:  Brigitte Ruano MD     PCP:  Kati Garcia    Disposition: Patient discharged from [facility] to home with home care in stable condition.        Primary Diagnosis:   Ulcerative Colitis             Discharge Medications:     Current Discharge Medication List      START taking these medications    Details   enoxaparin ANTICOAGULANT (LOVENOX) 40 MG/0.4ML syringe Inject 0.4 mLs (40 mg) Subcutaneous every 24 hours for 24 days  Qty: 9.6 mL, Refills: 0    Associated Diagnoses: Ulcerative colitis with complication, unspecified location (H)      oxyCODONE (ROXICODONE) 5 MG tablet Take 1-2 tablets (5-10 mg) by mouth every 4 hours as needed for moderate to severe pain  Qty: 20 tablet, Refills: 0    Associated Diagnoses: Ulcerative colitis with complication, unspecified location (H)         CONTINUE these medications which have NOT CHANGED    Details   Melatonin 10 MG TABS tablet Take 5 mg by mouth nightly as needed for sleep                    Follow Up, Special Instructions:     Discharge diet: Low residue diet    Discharge activity: No heavy lifting or straining.    Discharge follow-up: Follow up in 3 weeks in our clinic.              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   - Acute and chronic inflammation involves proximal and distal margins.   - Separate segment of small bowel with superficial  ischemic-type mucosal   ulceration and serosal adhesions with   uninvolved stapled end.   - Benign reactive lymph nodes (x10).   - Negative for dysplasia and malignancy.     B: Ileostomy trimming and anastomotic rings, ileal pouch anal anastomosis   with diverting loop ileostomy -   - Active inflammation with superficial ulceration involving one tissue   portion.   - Two small bowel tissue portions without diagnostic pathologic   alteration.   - Negative for dysplasia and malignancy.            Brief Hospital Summary:     Patient is a 49 year old male who underwent completion proctectomy with ileal pouch anal anastomosis with diverting loop ileostomy on February 11, 2020 by Dr. Ruano.   There were no immediate complications during this procedure.  Please refer to the full operative summary for details.  The patient's hospital course was unremarkable.  he recovered as anticipated and experienced no post-operative complications.  Diet was advanced and he had return of bowel function.  Patient was discharged home with a prescription for Oxycodone 5mg, #20 tablets as well as Lovenox for DVT prophylaxis             Ana Rosa Wallace PA-C  [facility]  [phone]        ADDENDUM:  Length of stay: 3 days  Indicate Y or N for the following:  UTI n   C diff n  PNA n  SSI n  DVT n  PE n  CVA n  MI n  Enterocutaneous fistula n  Peripheral nerve injury n  Abscess (not adjacent to anastomosis) n  Leak n     Death within 30 days n  Reintubation n  Reoperation n

## 2020-02-14 NOTE — PROGRESS NOTES
02/14/20 1014   Quick Adds   Type of Visit Initial PT Evaluation   Living Environment   Living Environment Comment Pt lives with mother in 2nd level of apartment, underground parking with 2 flights of stairs with railings. Mother able to help out with laundry, cooking, and cleaning. Independent at baseline and walks 4 miles/day.   Self-Care   Usual Activity Tolerance excellent   Current Activity Tolerance moderate   Functional Level Prior   Ambulation 0-->independent   Transferring 0-->independent   Toileting 0-->independent   Bathing 0-->independent   Fall history within last six months no   General Information   Onset of Illness/Injury or Date of Surgery - Date 02/11/20   Referring Physician Brigitte Ruano MD   Patient/Family Goals Statement To be confident with stairs   Pertinent History of Current Problem (include personal factors and/or comorbidities that impact the POC) Pt is a 49 year old male presenting with ulcerative colitis s/p completion proctectomy and ileal pouch anal anastomosis with diverting loop ileostomy    Cognitive Status Examination   Orientation orientation to person, place and time   Level of Consciousness alert   Follows Commands and Answers Questions able to follow multistep instructions   Personal Safety and Judgment intact   Memory intact   Pain Assessment   Patient Currently in Pain Yes, see Vital Sign flowsheet  (6/10)   Range of Motion (ROM)   ROM Comment WFL   Strength   Strength Comments B LE at least 4/5, limited d/t pain   Bed Mobility   Bed Mobility Comments Ind   Transfer Skills   Transfer Comments Ind   Gait   Gait Comments Ind   Balance   Balance Comments good seated, good standing w/o AD   General Therapy Interventions   Planned Therapy Interventions gait training;bed mobility training;balance training;home program guidelines;progressive activity/exercise   Clinical Impression   Criteria for Skilled Therapeutic Intervention yes, treatment indicated   PT Diagnosis  "impaired functional mobility   Influenced by the following impairments pain    Functional limitations due to impairments impaired bed mobility, transfers, ambulation, stairs   Clinical Presentation Stable/Uncomplicated   Clinical Presentation Rationale pt is medically stable   Clinical Decision Making (Complexity) Low complexity   Therapy Frequency Daily   Predicted Duration of Therapy Intervention (days/wks) 1 day   Anticipated Discharge Disposition Home with Assist   Risk & Benefits of therapy have been explained Yes   Patient, Family & other staff in agreement with plan of care Yes   [facility] TM \"6 Clicks\"   2016, Trustees of [facility], under license to [employer].  All rights reserved.   6 Clicks Short Forms Basic Mobility Inpatient Short Form   [facility] AM-PAC  \"6 Clicks\" V.2 Basic Mobility Inpatient Short Form   1. Turning from your back to your side while in a flat bed without using bedrails? 4 - None   2. Moving from lying on your back to sitting on the side of a flat bed without using bedrails? 4 - None   3. Moving to and from a bed to a chair (including a wheelchair)? 4 - None   4. Standing up from a chair using your arms (e.g., wheelchair, or bedside chair)? 4 - None   5. To walk in hospital room? 4 - None   6. Climbing 3-5 steps with a railing? 3 - A Little   Basic Mobility Raw Score (Score out of 24.Lower scores equate to lower levels of function) 23   Total Evaluation Time   Total Evaluation Time (Minutes) 6     "

## 2020-02-14 NOTE — PROGRESS NOTES
"[facility] Nurse Inpatient Ostomy Assessment      Assessment of ostomy and needs for:  Loop Ileostomy      Data:   History:      Per MD note(s):  49-year-old man who presented to the hospital in 06/2019 with severe bloody diarrhea.  He was diagnosed with ulcerative colitis and went on to have treatment with IV steroids and biologics.  He failed to improve and continued to have ongoing colitis.  At that point, he was recommended to have a total abdominal colectomy.  We proceeded with that on 06/14/2019, where a laparoscopic total abdominal colectomy was done.  He had a relatively uncomplicated course and has had some moderate mucoid drainage.  He presented with plans for a completion proctectomy and J-pouch with diverting loop ileostomy.       Type of ostomy:  Loop Ileostomy  Stoma assessment: assessed 2/13/20 with pouch change, pouch intact 2/14  ? Size of stoma:  1 1/4\" by 1 1/2\" Oval,  pink-red, moist, edematous and protruberant  Mucocutaneous Junction (MCJ):  intact  Peristomal skin:  Intact, currently peristomal skin forming a divot around stoma, especially at 3 and 9 O'clock  Abdominal assessment: intact incision  Output:    Intake/Output Summary (Last 24 hours) at 2/14/2020 1549  Last data filed at 2/14/2020 1328  Gross per 24 hour   Intake 2494 ml   Output 4285 ml   Net -1791 ml     Current pouching system:   [place] 2 pc   Pain:  Cramping and tender    Diet:       Orders Placed This Encounter      Low Fiber Diet      Diet    Labs:   Recent Labs   Lab Test 02/12/20  0657 01/21/20  1605 11/08/19  1344  06/09/19  0610   ALBUMIN  --   --  3.9   < >  --    HGB 11.0* 11.8* 12.1*   < > 10.9*   WBC  --  4.8 5.4   < > 9.8   CRP  --   --   --   --  41.8*    < > = values in this interval not displayed.           Intervention:     Patient's chart evaluated.      Assessments done today:  Stoma, pouch intact; plan for discharge today or tomorrow am.     Education completed: pt experienced ostomate and " will order appropriate to stoma shape and needs.     Prepared for discharge by: discusses Supplies in place    Pt registered for ostomy supply samples? On discharge with [employer]          Assessment:     Stoma assessment: viable, moist, edematous and functioning    Learning needs/ comprehension: pt experienced ostomate; used [employer] 1 pc flat LOS but will need soft convex with Kaushik ring due to oval shape and divots.     Effectiveness of current pouching/ supply plan: TBD         Plan:     Plan:   ? Current pouching system: [place] 57mm flat 2 piece with high output bag    Education:  ? Education reviewed:  Pouch Replacement, Removal of pouch, Preparation of new pouch, Cutting out or evaluating a pattern, Applying paste or rings, Applying appliance to abdomen, Peristomal skin care, Diet and hydration / fluid balance, Odor / flatus management and Lifestyle Adjustments. Pt is very knowledgeable, may need to review minimally with him.   ? Is pt able to demonstrate: 1. how to empty their pouch?  yes  2. How to read a measurement on a graduated cylinder?  yes  3. How to write down correct I and O's?   yes  o Supply company: [employer] in place  o Do WOC Nurse recommend home care ? Yes, with WOC nurse if possible

## 2020-02-14 NOTE — PROGRESS NOTES
[facility]  Referral received  from care transitions team.  Met with pt to discuss potential plans for HC.  Discharge date pending. Patient care support center processing referral. Pt has 24 hour phone number for [facility] for any questions or concerns.  [facility] will follow for final discharge plans.  Home care orders and face to face documentation needed at time of discharge. Anticipate need for RN/WOC order only.

## 2020-02-14 NOTE — PLAN OF CARE
PT: Order received and evaluation completed. Pt is a 49 year old male presenting with ulcerative colitis s/p completion proctectomy and ileal pouch anal anastomosis with diverting loop ileostomy. Pt lives with mother in 2nd level of apartment, underground parking with 2 flights of stairs with railings. Pt reports ind at baseline, walks 4 miles everyday. Mother able to help out with laundry, cooking, and cleaning until recovered.    Discharge Planner PT   Patient plan for discharge: Home with assist  Current status: Pt found supine in bed, willing to participate in PT. Educated patient on bed mobility techniques to decrease strain on surgical site. Patient able to perform bed mobility, supine <> sit, and sit <> stand transfers independently, requiring increased time d/t pain. Educated patient on techniques for car transfers and squatting to pick objects off the floor.   Instructed patient to ambulate without AD, patient with increased DWIGHT and decreased arm swing d/t hesitation to increase pain. Patient ambulated ~ 750 feet ind with good stability and no LOB. Pt worried about stairs, has to perform 2 flights to enter apartment. Patient able to ambulate 2 flights with mod ind. Instructed patient to perform with step to patterning and to make sure feet are stable while descending for increased safety. Patient with no instability throughout.  Barriers to return to prior living situation: None  Recommendations for discharge: Home with assist  Rationale for recommendations: Patient demonstrated at least mod independence with all mobility with safe techniques and no instability.        Entered by: Jorge Starr 02/14/2020 10:57 AM      Physical Therapy Discharge Summary    Reason for therapy discharge:    All goals and outcomes met, no further needs identified.    Progress towards therapy goal(s). See goals on Care Plan in [facility] electronic health record for goal details.  Goals met    Therapy recommendation(s):     Continued therapy is recommended.  Rationale/Recommendations:  walking program as tolerated.

## 2020-02-14 NOTE — PLAN OF CARE
Patient alert and oriented, up SBA. Ileostomy is stooling, output is green and liquid. Patient tolerating a low fiber diet, denies nausea. PRN dilaudid given q3hr, as well as scheduled tylenol. Huddleston patent, ЕЛЕНА draining serosanguinous fluid, penrose drain in rectum has a small amount of bloody drainage, collected on ABD pad. Encouraged patient to ambulate the halls as tolerated. Continue with plan of care.

## 2020-02-14 NOTE — PROGRESS NOTES
"Colon & Rectal Surgery Progress Note             Interval History:   Postop Day #: 3  No nausea, 1L stoma output. Up walking.   Minimal pain                Medications:   I have reviewed this patient's current medications               Physical Exam:   Blood pressure 114/74, pulse 73, temperature 96.8  F (36  C), temperature source Oral, resp. rate 16, height 1.778 m (5' 10\"), weight 69.4 kg (153 lb), SpO2 99 %.    Intake/Output Summary (Last 24 hours) at 2/14/2020 0827  Last data filed at 2/14/2020 0611  Gross per 24 hour   Intake 994 ml   Output 4115 ml   Net -3121 ml     GEN:  alert  ABD:  Soft, incisions CDI, ЕЛЕНА serous,          Data:        Lab Results   Component Value Date     02/12/2020    Lab Results   Component Value Date    CHLORIDE 104 02/12/2020    Lab Results   Component Value Date    BUN 9 02/12/2020      Lab Results   Component Value Date    POTASSIUM 3.9 02/12/2020    Lab Results   Component Value Date    CO2 29 02/12/2020    Lab Results   Component Value Date    CR 0.92 02/12/2020    CR 0.91 02/11/2020        Lab Results   Component Value Date    HGB 11.0 (L) 02/12/2020    HGB 11.8 (L) 01/21/2020     Lab Results   Component Value Date     02/14/2020     02/11/2020     Lab Results   Component Value Date    WBC 4.8 01/21/2020    WBC 5.4 11/08/2019            Assessment and Plan:   Doing well.  lovenox at discharge  Okay to shower  Oral pain meds  Discontinue rectal and ЕЛЕНА drain  Discontinue massey.   Pt to help with stair confidence.     Brigitte Ruano MD  [facility]  Office Phone # [phone]  "

## 2020-02-15 VITALS
WEIGHT: 153 LBS | HEART RATE: 76 BPM | TEMPERATURE: 96.2 F | DIASTOLIC BLOOD PRESSURE: 65 MMHG | SYSTOLIC BLOOD PRESSURE: 112 MMHG | BODY MASS INDEX: 21.9 KG/M2 | HEIGHT: 70 IN | OXYGEN SATURATION: 98 % | RESPIRATION RATE: 16 BRPM

## 2020-02-15 PROCEDURE — 25000128 H RX IP 250 OP 636: Performed by: COLON & RECTAL SURGERY

## 2020-02-15 PROCEDURE — 25000132 ZZH RX MED GY IP 250 OP 250 PS 637: Performed by: PHYSICIAN ASSISTANT

## 2020-02-15 RX ADMIN — OXYCODONE HYDROCHLORIDE 10 MG: 5 TABLET ORAL at 00:31

## 2020-02-15 RX ADMIN — ENOXAPARIN SODIUM 40 MG: 40 INJECTION SUBCUTANEOUS at 10:35

## 2020-02-15 RX ADMIN — OXYCODONE HYDROCHLORIDE 10 MG: 5 TABLET ORAL at 09:01

## 2020-02-15 RX ADMIN — OXYCODONE HYDROCHLORIDE 10 MG: 5 TABLET ORAL at 05:06

## 2020-02-15 ASSESSMENT — ACTIVITIES OF DAILY LIVING (ADL)
ADLS_ACUITY_SCORE: 15

## 2020-02-15 NOTE — PROGRESS NOTES
Updated [facility] of patient discharge today with home care RN orders.    Brigitte Rey MA/RN Case Manager  Inpatient Care Coordination  [facility]   [phone]

## 2020-02-15 NOTE — PROGRESS NOTES
"Colon & Rectal Surgery Progress Note             Interval History:   Postop Day #:   Pain controlled. Good ostomy output. Ambulating in halls. Tolerating low fiber diet.                 Medications:   I have reviewed this patient's current medications               Physical Exam:   Blood pressure 112/65, pulse 76, temperature 96.2  F (35.7  C), temperature source Oral, resp. rate 16, height 1.778 m (5' 10\"), weight 69.4 kg (153 lb), SpO2 98 %.    Intake/Output Summary (Last 24 hours) at 2/14/2020 0827  Last data filed at 2/14/2020 0611  Gross per 24 hour   Intake 994 ml   Output 4115 ml   Net -3121 ml     GEN:  alert  ABD:  Soft, incisions CDI, ЕЛЕНА serous - abdominal ЕЛЕНА and transanal drain removed at bedside          Data:        Lab Results   Component Value Date     02/12/2020    Lab Results   Component Value Date    CHLORIDE 104 02/12/2020    Lab Results   Component Value Date    BUN 9 02/12/2020      Lab Results   Component Value Date    POTASSIUM 3.9 02/12/2020    Lab Results   Component Value Date    CO2 29 02/12/2020    Lab Results   Component Value Date    CR 0.92 02/12/2020    CR 0.91 02/11/2020        Lab Results   Component Value Date    HGB 11.0 (L) 02/12/2020    HGB 11.8 (L) 01/21/2020     Lab Results   Component Value Date     02/14/2020     02/11/2020     Lab Results   Component Value Date    WBC 4.8 01/21/2020    WBC 5.4 11/08/2019            Assessment and Plan:   Low fiber diet. Off IVF.  Huddleston removed and voiding.  Encourage ambulation.  Lovenox for SVT ppx. Will need 28 days total.  Home care for WOCN assistance.   Discharge home today. Follow-up with Dr. Ruano in 2-3 weeks.      Tamra Randolph MD  [facility]  Office Phone # [phone]  "

## 2020-02-15 NOTE — PLAN OF CARE
Pt continues to experience abd pains of 6/10 and taking PRN oxycodone 10mg Q4H as available, VSS, ambulating indpendently in halls x2, ice pack to incision, rectal penrose with small amount out, ЕЛЕНА with serosang out and to bulb suction, pt ind with ostomy cares and continues with mod amounts, voiding adequately, PIV saline locked, plans for discharge home today.

## 2020-02-15 NOTE — PROGRESS NOTES
Pt to D/C to home.  Pt provided with d/c instructions, including new medications, when medications were last given, and when to take them again.  Pt also informed to f/u with PCP & colorectal (appointments already scheduled). Pt verbalized understanding of all d/c and f/u instructions.  All questions were answered at this time.  Copy of paperwork sent with pt.  Medications sent with patient.  Mother present during discharge instructions & to provide transport.  All personal belongings sent with pt (clothing, shoes, duffel bag, cellphone, wallet).

## 2020-02-15 NOTE — PLAN OF CARE
[id number]: Pt up SBA. Ambulating in halls. VSS. Transitioned from dilaudid to oxycodone for abdominal pain. +BS, passing gas, 525 ml out of ileostomy. 50 ml out of ЕЛЕНА drain. Huddleston removed, voiding well. Tolerating diet.

## 2020-02-17 ENCOUNTER — TELEPHONE (OUTPATIENT)
Dept: INTERNAL MEDICINE | Facility: CLINIC | Age: 50
End: 2020-02-17

## 2020-02-17 NOTE — TELEPHONE ENCOUNTER
"Hospital/TCU/ED for chronic condition Discharge Protocol    \"Hi, my name is Debbie Johnson RN, a registered nurse, and I am calling from [facility].  I am calling to follow up and see how things are going for you after your recent emergency visit/hospital/TCU stay.\"    Tell me how you are doing now that you are home?\" doing fine taking medications and controlling pain.       Discharge Instructions    \"Let's review your discharge instructions.  What is/are the follow-up recommendations?  Pt. Response: Follow-up and recommended labs and tests  You have a follow up appointment scheduled with Dr. Ruano on March 9, 2020 at 1:45pm.    \"Has an appointment with your primary care provider been scheduled?\"   Yes. (confirm)     Next 5 appointments (look out 90 days)    Feb 24, 2020  4:20 PM CST  Office Visit with Kati Garcia MD  [facility] [address] [place] [id number] [phone]          \"When you see the provider, I would recommend that you bring your medications with you.\"    Medications    \"Tell me what changed about your medicines when you discharged?\"    Changes to chronic meds?    0-1    \"What questions do you have about your medications?\"    None     New diagnoses of heart failure, COPD, diabetes, or MI?    No              Post Discharge Medication Reconciliation Status: discharge medications reconciled and changed, per note/orders (see AVS).    Was MTM referral placed (*Make sure to put transitions as reason for referral)?   No    Call Summary    \"What questions or concerns do you have about your recent visit and your follow-up care?\"     none    \"If you have questions or things don't continue to improve, we encourage you contact us through the main clinic number (give number).  Even if the clinic is not open, triage nurses are available 24/7 to help you.     We would like you to know that our clinic has extended hours " "(provide information).  We also have urgent care (provide details on closest location and hours/contact info)\"      \"Thank you for your time and take care!\"             "

## 2020-02-17 NOTE — TELEPHONE ENCOUNTER
IP F/U    Date: 2/15/20  Diagnosis: Ulcerative Colitis (H), Ulcerative Colitis (H)  Is patient active in care coordination? No  Was patient in TCU? No    Next 5 appointments (look out 90 days)    Feb 24, 2020  4:20 PM CST  [facility] [address] [facility] [id number] [phone]

## 2020-02-18 ENCOUNTER — TELEPHONE (OUTPATIENT)
Dept: CARE COORDINATION | Facility: CLINIC | Age: 50
End: 2020-02-18

## 2020-02-18 NOTE — TELEPHONE ENCOUNTER
Dr. Garcia,  We are in need of a verbal order to have our nurse go out to see Alex for a completion of the homecare admission process.   Please reply to this note and we will take as a verbal order, which will be included in the homecare certification sent for your signature.  Thank you in advance.    SN 1 visit for completion of OASIS assessment, to occur between 2\18 and 2\21.    Sincerely,  Edna Dozier RN, BSN  [facility].  
Home care was ordered by his colorectal surgery - and post operative ileostomy care per colorectal . Please call colorectal surgery   
Pt seen by SLP on 1/31 recommending dysphagia 1 pureed with honey thick liquids/difficulty feeding self/difficulty chewing

## 2020-02-20 ENCOUNTER — DOCUMENTATION ONLY (OUTPATIENT)
Dept: INTERNAL MEDICINE | Facility: CLINIC | Age: 50
End: 2020-02-20

## 2020-02-21 NOTE — PROGRESS NOTES
[facility] now requests orders and shares plan of care/discharge summaries for some patients through [employer].  Please REPLY TO THIS MESSAGE OR ROUTE BACK TO THE AUTHOR in order to give authorization for orders when needed.  This is considered a verbal order, you will still receive a faxed copy of orders for signature.  Thank you for your assistance in improving collaboration for our patients.    ORDER  Visits to begin the week of 2/23/2020. SN 2x/wk for 1 wk, 1x/wk for 2 wks to perform assessment with focus on incision healing and care, medication management and reconciliation, pain management, mental health status and need for referral or change in treatment plan, skin integrity, falls risk.  Instruct on disease process, signs/symptoms of complications to report to agency/physician/911, emergency/safety and falls prevention plan, medication effects/SE, new/high risk/changed medications, diet, and activity. Implement interventions to monitor and mitigate pain, prevent pressure ulcers and confirm proper foot care. 3 prn visits for specialty nursing, falls assessments, lab draws, supervisory visits per agency protocol, recertification assessments.      MD SUMMARY/PLAN OF CARE  SITUATION : Pt is a 49 year old male who is being assessed for home care following a hospitalization for completion proctectomy with ileal pouch anal anastomosis with diverting loop ileostomy on February 11, 2020 by Dr. Ruano. Pt has three incisions and a ЕЛЕНА drain site. All abdominal incisions are CDI and covered with surgical glue.  The ЕЛЕНА drain site is CDI and covered with a bandaide, pt states it is not draining since Sunday 2/16/2020. Ostomy has intact pouch inplace with no leaks noted. Pt plans to change pouch again tomorrow. He has not concerns and feels comfortable changing this independently. He is currently using a flat bag with an eiken ring.  He is able to shower and complete ADLs without difficulty. He is up walking  without AD and has a steady gait. He has his HEP from the last ostomy surgery and plans to continue to utilize these now.   BACKGROUND : Pt was hospitalized from 2/11 to 2/15/2020 due to Ulcerative Colitis.   ANALYSIS : Pt in need of education and assessment of incisional care and assessment of ostomy care after recent surgery.       Thank you,   Adrienne Modi RN  [facility]  [email or web address]   Office: [phone]  Cell: [phone]

## 2020-02-24 ENCOUNTER — OFFICE VISIT (OUTPATIENT)
Dept: INTERNAL MEDICINE | Facility: CLINIC | Age: 50
End: 2020-02-24
Payer: COMMERCIAL

## 2020-02-24 VITALS
SYSTOLIC BLOOD PRESSURE: 114 MMHG | BODY MASS INDEX: 21.69 KG/M2 | RESPIRATION RATE: 21 BRPM | TEMPERATURE: 98.6 F | HEIGHT: 70 IN | HEART RATE: 99 BPM | DIASTOLIC BLOOD PRESSURE: 68 MMHG | WEIGHT: 151.5 LBS | OXYGEN SATURATION: 98 %

## 2020-02-24 DIAGNOSIS — Z90.49: ICD-10-CM

## 2020-02-24 DIAGNOSIS — Z93.2 ILEOSTOMY STATUS (H): Primary | ICD-10-CM

## 2020-02-24 LAB
ERYTHROCYTE [DISTWIDTH] IN BLOOD BY AUTOMATED COUNT: 16.5 % (ref 10–15)
HCT VFR BLD AUTO: 32.5 % (ref 40–53)
HGB BLD-MCNC: 10.1 G/DL (ref 13.3–17.7)
MCH RBC QN AUTO: 23.2 PG (ref 26.5–33)
MCHC RBC AUTO-ENTMCNC: 31.1 G/DL (ref 31.5–36.5)
MCV RBC AUTO: 75 FL (ref 78–100)
PLATELET # BLD AUTO: 477 10E9/L (ref 150–450)
RBC # BLD AUTO: 4.36 10E12/L (ref 4.4–5.9)
WBC # BLD AUTO: 6.2 10E9/L (ref 4–11)

## 2020-02-24 PROCEDURE — 85027 COMPLETE CBC AUTOMATED: CPT | Performed by: INTERNAL MEDICINE

## 2020-02-24 PROCEDURE — 99495 TRANSJ CARE MGMT MOD F2F 14D: CPT | Performed by: INTERNAL MEDICINE

## 2020-02-24 PROCEDURE — 36415 COLL VENOUS BLD VENIPUNCTURE: CPT | Performed by: INTERNAL MEDICINE

## 2020-02-24 ASSESSMENT — MIFFLIN-ST. JEOR: SCORE: 1553.45

## 2020-02-24 NOTE — NURSING NOTE
"/68   Pulse 99   Temp 98.6  F (37  C) (Oral)   Resp 21   Ht 1.778 m (5' 10\")   Wt 68.7 kg (151 lb 8 oz)   SpO2 98%   BMI 21.74 kg/m    Patient in for hospital follow up.  Trish Alberto CMA    "

## 2020-02-24 NOTE — PROGRESS NOTES
Subjective     Alex Cardona is a 49 year old male who presents to clinic today for the following health issues:    [facility]       Hospital Follow-up Visit:    Hospital/Nursing Home/IP Rehab Facility: [facility]  Date of Admission: 2/11/2020  Date of Discharge: 2/15/2020  Reason(s) for Admission: completion proctectomy with ileal pouch anal anastomosis with diverting loop ileostomy on February 11, 2020           Problems taking medications regularly:  None       Medication changes since discharge: Lovenox, oxycodone       Problems adhering to non-medication therapy:  None    Summary of hospitalization:  [facility] discharge summary reviewed  Diagnostic Tests/Treatments reviewed.  Follow up needed: CBC  Other Healthcare Providers Involved in Patient s Care:         Specialist appointment - Dr Ruano 03/09/20  Update since discharge: improved.     Post Discharge Medication Reconciliation: discharge medications reconciled, continue medications without change.  Plan of care communicated with patient     Coding guidelines for this visit:  Type of Medical   Decision Making Face-to-Face Visit       within 7 Days of discharge Face-to-Face Visit        within 14 days of discharge   Moderate Complexity [id number]   High Complexity 95677 [id number]          Brief hospital summary  Patient is a 49 year old male who underwent completion proctectomy with ileal pouch anal anastomosis with diverting loop ileostomy on February 11, 2020 by Dr. Ruano.   There were no immediate complications during this procedure.  Please refer to the full operative summary for details.  The patient's hospital course was unremarkable.  he recovered as anticipated and experienced no post-operative complications.  Diet was advanced and he had return of bowel function. His abdominal drain and transanal drain were removed on the day of discharge. Patient was discharged home with a prescription for Oxycodone 5mg, #20 tablets as well as Lovenox  for DVT prophylaxis.         Patient Active Problem List   Diagnosis     Diarrhea     Colitis     Ulcerative colitis, acute, with rectal bleeding (H)     Ileostomy status (H)     S/P laparoscopic colectomy     Hypoproteinemia (H)     Ulcerative colitis (H)     Past Surgical History:   Procedure Laterality Date     COLONOSCOPY N/A 2019    Procedure: incomplete COLONOSCOPY with left colon biopsies by cold biopsy forceps;  Surgeon: Herbert Dior DO;  Location:  GI     LAPAROSCOPIC ASSISTED COLECTOMY N/A 2019    Procedure: Laparoscopic Assisted Total Abdominal Colectomy with End Ileostomy;  Surgeon: Brigitte Ruano MD;  Location: [facility] OR     LAPAROSCOPIC ASSISTED COMPLETION PROCTOCOLECTOMY N/A 2020    Procedure: Completion proctectomy and ileal pouch anal anastomosis with diverting loop ileostomy.;  Surgeon: Brigitte Ruano MD;  Location:  OR     SIGMOIDOSCOPY FLEXIBLE  2020    Dr. Ruano [facility]       Social History     Tobacco Use     Smoking status: Former Smoker     Packs/day: 0.00     Types: Cigarettes     Last attempt to quit: 2009     Years since quittin.0     Smokeless tobacco: Never Used   Substance Use Topics     Alcohol use: Not Currently     Comment: none  since       Family History   Problem Relation Age of Onset     Heart Disease Mother      Chronic Obstructive Pulmonary Disease Mother      Cancer Father      No Known Problems Sister      No Known Problems Son      Heart Failure Other      Colon Cancer No family hx of          Current Outpatient Medications   Medication Sig Dispense Refill     enoxaparin ANTICOAGULANT (LOVENOX) 40 MG/0.4ML syringe Inject 0.4 mLs (40 mg) Subcutaneous every 24 hours for 24 days 9.6 mL 0     oxyCODONE (ROXICODONE) 5 MG tablet Take 1-2 tablets (5-10 mg) by mouth every 4 hours as needed for moderate to severe pain 20 tablet 0     Melatonin 10 MG TABS tablet Take 5 mg by mouth nightly as needed for sleep           Reviewed and  "updated as needed this visit by Provider         Review of Systems   ROS COMP: CONSTITUTIONAL: NEGATIVE for fever, chills, change in weight  ENT/MOUTH: NEGATIVE for ear, mouth and throat problems  RESP: NEGATIVE for significant cough or SOB  CV: NEGATIVE for chest pain, palpitations or peripheral edema  GI: NEGATIVE for nausea, abdominal pain, heartburn   MUSCULOSKELETAL: NEGATIVE for significant arthralgias or myalgia  NEURO: NEGATIVE for weakness, dizziness or paresthesias  PSYCHIATRIC: NEGATIVE for changes in mood or affect      Objective    /68   Pulse 99   Temp 98.6  F (37  C) (Oral)   Resp 21   Ht 1.778 m (5' 10\")   Wt 68.7 kg (151 lb 8 oz)   SpO2 98%   BMI 21.74 kg/m    Body mass index is 21.74 kg/m .  Physical Exam   GENERAL: healthy, alert and no distress  EYES: Eyes grossly normal to inspection, PERRL and conjunctivae and sclerae normal  HENT: ear canals and TM's normal, nose and mouth without ulcers or lesions  NECK: no adenopathy, no asymmetry, masses, or scars and thyroid normal to palpation  RESP: lungs clear to auscultation - no rales, rhonchi or wheezes  CV: regular rate and rhythm,   ABDOMEN: soft, ileostomy pouch noted, surgical incisions healing well.bowel sounds normal   MS: no gross musculoskeletal defects noted, no edema, no calf tenderness  NEURO: Normal strength and tone, mentation intact and speech normal           Assessment & Plan     (Z93.2) Ileostomy status (H)  (primary encounter diagnosis)  Plan: For ulcerative colitis-stable    (Z90.49) History of resection of rectum  Plan:  completion proctectomy with ileal pouch anal anastomosis with diverting loop ileostomy  , follows up with colorectal surgery        Kati Garcia MD  [facility]        "

## 2020-02-28 NOTE — PROGRESS NOTES
Colon and Rectal Surgery Progress Note          Assessment and Plan:   S/p lap colectomy with ileostomy for UC    Doing well.  Ostomy output appropriate.  Has follow up with PCP tomorrow.    OK to discharge to home.  Home meds are written for.    Cisco Amin MD  Colorectal Surgery  [phone] (office)  [phone] (pager)  [email or web address]             Interval History:   Tolerating diet.  Minimal pain.  Walking.  Managing stoma well.              Physical Exam:   Vitals were reviewed  Patient Vitals for the past 24 hrs:   BP Temp Temp src Heart Rate Resp SpO2   06/16/19 0626 106/66 98.3  F (36.8  C) Oral 77 12 97 %   06/15/19 2245 106/67 98.1  F (36.7  C) Oral 87 14 98 %   06/15/19 1611 101/59 97  F (36.1  C) Oral 87 16 97 %         Intake/Output Summary (Last 24 hours) at 6/16/2019 0805  Last data filed at 6/16/2019 0700  Gross per 24 hour   Intake 1320 ml   Output 2500 ml   Net -1180 ml       Head - Nomocephalic atraumatic  Sclera anicteric  Extremities warm and well perfused  Lungs - breathing non labored,   Abdomen - soft, non distnedned, non tender, wounds look good.  Rectal exam - deferredSkin - no rash  Psych - affect appropriate  Neuro - no focal deficits             Data:   All laboratory and imaging data in the past 24 hours reviewed    CBC  Lab Results   Component Value Date    WBC 10.1 06/16/2019    WBC 8.5 06/13/2019    WBC 6.7 06/12/2019    HGB 9.0 (L) 06/16/2019    HGB 9.7 (L) 06/13/2019    HGB 9.7 (L) 06/12/2019    HCT 26.9 (L) 06/16/2019    HCT 29.8 (L) 06/13/2019    HCT 29.7 (L) 06/12/2019     06/16/2019     06/15/2019     06/14/2019       BMP  Recent Labs   Lab Test 06/16/19  0713 06/14/19  0818 06/13/19  0750     --  130*   POTASSIUM 3.5  --  3.7   CHLORIDE 99  --  96   CO2 30  --  29   ANIONGAP 5  --  5   GLC 96  --  103*   BUN 10  --  8   CR 0.62* 0.62* 0.64*   KEZIA 7.1*  --  7.5*       Liver Function Studies -   Recent Labs   Lab Test 06/16/19  8087   PROTTOTAL  4.5*   ALBUMIN 1.6*   BILITOTAL 0.2   ALKPHOS 105   AST 49*   ALT 63                      Medications:     Current Facility-Administered Medications Ordered in Epic   Medication Dose Route Frequency Last Rate Last Dose     benzocaine-menthol (CHLORASEPTIC) 6-10 MG lozenge 1 lozenge  1 lozenge Buccal Q1H PRN   1 lozenge at 06/14/19 2123     beta carotene capsule 25,000 Units  25,000 Units Oral BID   25,000 Units at 06/15/19 2000     enoxaparin (LOVENOX) injection 40 mg  40 mg Subcutaneous Q24H   40 mg at 06/15/19 1409     lactated ringers BOLUS 500 mL  500 mL Intravenous Q4H PRN         Lidocaine (LIDOCARE) 4 % Patch 1 patch  1 patch Transdermal Q24H   1 patch at 06/15/19 0842    And     lidocaine patch REMOVAL   Transdermal Q24H        And     lidocaine patch in PLACE   Transdermal Q8H         lidocaine (LMX4) cream   Topical Q1H PRN         lidocaine 1 % 1 mL  1 mL Other Q1H PRN         magnesium sulfate 4 g in 100 mL sterile water (premade)  4 g Intravenous Q4H PRN         naloxone (NARCAN) injection 0.1-0.4 mg  0.1-0.4 mg Intravenous Q2 Min PRN         nitroGLYcerin (NITROSTAT) sublingual tablet 0.4 mg  0.4 mg Sublingual Q5 Min PRN         nystatin (MYCOSTATIN) suspension 500,000 Units  500,000 Units Swish & Spit 4x Daily   500,000 Units at 06/15/19 2000     omeprazole (priLOSEC) CR capsule 20 mg  20 mg Oral BID AC   20 mg at 06/16/19 0625     ondansetron (ZOFRAN) injection 4 mg  4 mg Intravenous Q6H PRN         oxyCODONE (ROXICODONE) tablet 5 mg  5 mg Oral Q4H PRN   5 mg at 06/16/19 0416     potassium chloride (KLOR-CON) Packet 20-40 mEq  20-40 mEq Oral or Feeding Tube Q2H PRN         potassium chloride 10 mEq in 100 mL intermittent infusion with 10 mg lidocaine  10 mEq Intravenous Q1H  mL/hr at 05/26/19 1527 10 mEq at 05/26/19 1527     potassium chloride 10 mEq in 100 mL sterile water intermittent infusion (premix)  10 mEq Intravenous Q1H PRN         potassium chloride 20 mEq in 50 mL intermittent  infusion  20 mEq Intravenous Q1H PRN         potassium chloride ER (K-DUR/KLOR-CON M) CR tablet 20-40 mEq  20-40 mEq Oral Q2H PRN         potassium phosphate 15 mmol in D5W 250 mL intermittent infusion  15 mmol Intravenous Daily PRN         potassium phosphate 20 mmol in D5W 250 mL intermittent infusion  20 mmol Intravenous Q6H PRN         potassium phosphate 20 mmol in D5W 500 mL intermittent infusion  20 mmol Intravenous Q6H PRN   20 mmol at 05/26/19 0012     potassium phosphate 25 mmol in D5W 500 mL intermittent infusion  25 mmol Intravenous Q8H PRN         predniSONE (DELTASONE) tablet 20 mg  20 mg Oral Daily   20 mg at 06/15/19 1036    Followed by     [START ON 6/22/2019] predniSONE (DELTASONE) tablet 15 mg  15 mg Oral Daily        Followed by     [START ON 6/29/2019] predniSONE (DELTASONE) tablet 10 mg  10 mg Oral Daily        Followed by     [START ON 7/6/2019] predniSONE (DELTASONE) tablet 5 mg  5 mg Oral Daily         sodium chloride (PF) 0.9% PF flush 3 mL  3 mL Intracatheter Q1H PRN         sodium chloride (PF) 0.9% PF flush 3 mL  3 mL Intracatheter Q8H   3 mL at 06/15/19 2300     Current Outpatient Medications Ordered in Epic   Medication     beta carotene 08181 UNIT capsule     enoxaparin (LOVENOX) 40 MG/0.4ML syringe     nystatin (MYCOSTATIN) 948678 UNIT/ML suspension     oxyCODONE (ROXICODONE) 5 MG tablet     [START ON 6/29/2019] predniSONE (DELTASONE) 10 MG tablet     predniSONE (DELTASONE) 20 MG tablet     [START ON 6/22/2019] predniSONE (DELTASONE) 5 MG tablet     [START ON 7/6/2019] predniSONE (DELTASONE) 5 MG tablet                ADDENDUM:  Length of stay: 4 days  Indicate Y or N for the following:  UTI  No  C diff  No  PNA  No  SSI No  DVT No  PE  No  CVA No  MI No  Enterocutaneous fistula  No  Peripheral nerve injury  No  Abscess (not adjacent to anastomosis)  No  Leak No    Treated with:   Antibiotics N/A   Drain  N/A   Reoperation  N/A  Death within 30 days No  Reintubation  No  Reoperation   No   Procedure N/A    FOR CANCER CASES: N/A    Willow Quintana PA-C on 7/11/2019 at 4:22 PM               PRE-OP DIAGNOSIS:  Colon adenocarcinoma 28-Feb-2020 12:36:58  Lalo Alves

## 2020-03-09 ENCOUNTER — TRANSFERRED RECORDS (OUTPATIENT)
Dept: HEALTH INFORMATION MANAGEMENT | Facility: CLINIC | Age: 50
End: 2020-03-09

## 2020-03-17 ENCOUNTER — TELEPHONE (OUTPATIENT)
Dept: INTERNAL MEDICINE | Facility: CLINIC | Age: 50
End: 2020-03-17

## 2020-03-17 NOTE — TELEPHONE ENCOUNTER
Fax received from [facility] for review and signature.  Put in Dr. Garcia's in basket.

## 2020-04-09 ENCOUNTER — HOSPITAL ENCOUNTER (OUTPATIENT)
Dept: GENERAL RADIOLOGY | Facility: CLINIC | Age: 50
Discharge: HOME | End: 2020-04-09
Attending: COLON & RECTAL SURGERY | Admitting: COLON & RECTAL SURGERY
Payer: COMMERCIAL

## 2020-04-09 DIAGNOSIS — K51.911 ULCERATIVE COLITIS WITH RECTAL BLEEDING (H): ICD-10-CM

## 2020-04-09 PROCEDURE — 74270 X-RAY XM COLON 1CNTRST STD: CPT

## 2020-04-09 RX ADMIN — DIATRIZOATE MEGLUMINE AND DIATRIZOATE SODIUM 480 ML: 660; 100 SOLUTION ORAL; RECTAL at 12:35

## 2020-04-10 ENCOUNTER — TRANSFERRED RECORDS (OUTPATIENT)
Dept: HEALTH INFORMATION MANAGEMENT | Facility: CLINIC | Age: 50
End: 2020-04-10

## 2020-05-08 DIAGNOSIS — Z11.59 ENCOUNTER FOR SCREENING FOR OTHER VIRAL DISEASES: Primary | ICD-10-CM

## 2020-05-11 ENCOUNTER — OFFICE VISIT (OUTPATIENT)
Dept: INTERNAL MEDICINE | Facility: CLINIC | Age: 50
End: 2020-05-11
Payer: COMMERCIAL

## 2020-05-11 VITALS
WEIGHT: 150.4 LBS | RESPIRATION RATE: 18 BRPM | SYSTOLIC BLOOD PRESSURE: 118 MMHG | HEART RATE: 118 BPM | TEMPERATURE: 98.2 F | OXYGEN SATURATION: 96 % | DIASTOLIC BLOOD PRESSURE: 75 MMHG | HEIGHT: 70 IN | BODY MASS INDEX: 21.53 KG/M2

## 2020-05-11 DIAGNOSIS — Z01.818 PRE-OP EXAM: Primary | ICD-10-CM

## 2020-05-11 DIAGNOSIS — K51.919 ULCERATIVE COLITIS WITH COMPLICATION, UNSPECIFIED LOCATION (H): ICD-10-CM

## 2020-05-11 LAB
ERYTHROCYTE [DISTWIDTH] IN BLOOD BY AUTOMATED COUNT: 15.6 % (ref 10–15)
HCT VFR BLD AUTO: 36.9 % (ref 40–53)
HGB BLD-MCNC: 11.7 G/DL (ref 13.3–17.7)
MCH RBC QN AUTO: 23.1 PG (ref 26.5–33)
MCHC RBC AUTO-ENTMCNC: 31.7 G/DL (ref 31.5–36.5)
MCV RBC AUTO: 73 FL (ref 78–100)
PLATELET # BLD AUTO: 252 10E9/L (ref 150–450)
RBC # BLD AUTO: 5.06 10E12/L (ref 4.4–5.9)
WBC # BLD AUTO: 4.3 10E9/L (ref 4–11)

## 2020-05-11 PROCEDURE — 36415 COLL VENOUS BLD VENIPUNCTURE: CPT | Performed by: INTERNAL MEDICINE

## 2020-05-11 PROCEDURE — 85027 COMPLETE CBC AUTOMATED: CPT | Performed by: INTERNAL MEDICINE

## 2020-05-11 PROCEDURE — 99214 OFFICE O/P EST MOD 30 MIN: CPT | Performed by: INTERNAL MEDICINE

## 2020-05-11 PROCEDURE — 93000 ELECTROCARDIOGRAM COMPLETE: CPT | Performed by: INTERNAL MEDICINE

## 2020-05-11 ASSESSMENT — MIFFLIN-ST. JEOR: SCORE: 1548.46

## 2020-05-11 NOTE — PROGRESS NOTES
"[place] [address] [address]  [facility] [address]  [phone]  Dept: [phone]    PRE-OP EVALUATION:  Today's date: 2020    Alex Cardona (: 1970) presents for pre-operative evaluation assessment as requested by Dr. Brigitte Dior.  He requires evaluation and anesthesia risk assessment prior to undergoing surgery/procedure for treatment of Open ileostomy closure; introaperative sigmoidoscopy, Flexible    Proposed Surgery/ Procedure: Open ileostomy closure; introaperative sigmoidoscopy, Flexible  Date of Surgery/ Procedure: 2020  Time of Surgery/ Procedure: 10:00  Hospital/Surgical Facility: ***  {SURGERY FAX NUMBER:[id number]::\"Fax number for surgical facility: ***\"}  Primary Physician: Kati Garcia  Type of Anesthesia Anticipated: {ANESTHESIA:707211}    Patient has a Health Care Directive or Living Will:  {YES/NO:418117::\"NO\"}    {PREOP QUESTIONNAIRE OPTIONS(by MA):615538::\"1. NO - Do you have a history of heart attack, stroke, stent, bypass or surgery on an artery in the head, neck, heart or legs?\",\"2. NO - Do you ever have any pain or discomfort in your chest?\",\"3. NO - Do you have a history of  Heart Failure?\",\"4. NO - Are you troubled by shortness of breath when: walking on the level, up a slight hill or at night?\",\"5. NO - Do you currently have a cold, bronchitis or other respiratory infection?\",\"6. NO - Do you have a cough, shortness of breath or wheezing?\",\"7. NO - Do you sometimes get pains in the calves of your legs when you walk?\",\"8. NO - Do you or anyone in your family have previous history of blood clots?\",\"9. NO - Do you or does anyone in your family have a serious bleeding problem such as prolonged bleeding following surgeries or cuts?\",\"10. NO - Have you ever had problems with anemia or been told to take iron pills?\",\"11. NO - Have you had any abnormal blood loss such as black, tarry or bloody stools, or abnormal vaginal bleeding?\",\"12. NO " "- Have you ever had a blood transfusion?\",\"13. NO - Have you or any of your relatives ever had problems with anesthesia?\",\"14. NO - Do you have sleep apnea, excessive snoring or daytime drowsiness?\",\"15. NO - Do you have any prosthetic heart valves?\",\"16. NO - Do you have prosthetic joints?\",\"17. NO - Is there any chance that you may be pregnant?\"}      HPI:     HPI related to upcoming procedure: ***      {. Problems:667043}    MEDICAL HISTORY:     Patient Active Problem List    Diagnosis Date Noted     Ulcerative colitis (H) 02/11/2020     Priority: Medium     Ileostomy status (H) 06/17/2019     Priority: Medium     S/P laparoscopic colectomy 06/17/2019     Priority: Medium     Hypoproteinemia (H) 06/17/2019     Priority: Medium     Ulcerative colitis, acute, with rectal bleeding (H) 06/12/2019     Priority: Medium     Diarrhea 05/25/2019     Priority: Medium     Colitis 05/25/2019     Priority: Medium      Past Medical History:   Diagnosis Date     Ulcerative colitis (H)      Past Surgical History:   Procedure Laterality Date     COLONOSCOPY N/A 5/26/2019    Procedure: incomplete COLONOSCOPY with left colon biopsies by cold biopsy forceps;  Surgeon: Herbert Dior DO;  Location:  GI     LAPAROSCOPIC ASSISTED COLECTOMY N/A 6/12/2019    Procedure: Laparoscopic Assisted Total Abdominal Colectomy with End Ileostomy;  Surgeon: Brigitte Ruano MD;  Location:  OR     LAPAROSCOPIC ASSISTED COMPLETION PROCTOCOLECTOMY N/A 2/11/2020    Procedure: Completion proctectomy and ileal pouch anal anastomosis with diverting loop ileostomy.;  Surgeon: Brigitte Ruano MD;  Location:  OR     SIGMOIDOSCOPY FLEXIBLE  01/13/2020    Dr. Ruano [facility]     Current Outpatient Medications   Medication Sig Dispense Refill     Melatonin 10 MG TABS tablet Take 5 mg by mouth nightly as needed for sleep       oxyCODONE (ROXICODONE) 5 MG tablet Take 1-2 tablets (5-10 mg) by mouth every 4 hours as needed for moderate to severe pain " "20 tablet 0     OTC products: {OTC ANALGESICS:096619}    Allergies   Allergen Reactions     Blood Transfusion Related (Informational Only) Other (See Comments)     Patient has a history of a clinically significant antibody against RBC antigens.  A delay in compatible RBCs may occur.      Latex Allergy: {YES/NO WITH DEFAULT:422569::\"NO\"}    Social History     Tobacco Use     Smoking status: Former Smoker     Packs/day: 0.00     Types: Cigarettes     Last attempt to quit: 2009     Years since quittin.2     Smokeless tobacco: Never Used   Substance Use Topics     Alcohol use: Not Currently     Comment: none  since       History   Drug Use Unknown     Comment: none since 2019       REVIEW OF SYSTEMS:   {ROS Preop Choices:791333}    EXAM:   There were no vitals taken for this visit.  {EXAM Preop Choices:981126}    DIAGNOSTICS:   {DIAGNOSTIC FOR PREOP:392207}    Recent Labs   Lab Test 20  1650 20  0557 20  0657 20  1803 20  1605   HGB 10.1*  --  11.0*  --  11.8*   * 257  --  270 313   NA  --   --  137  --  138   POTASSIUM  --   --  3.9  --  4.2   CR  --   --  0.92 0.91 0.91        IMPRESSION:   {PREOP REASONS:567698::\"Reason for surgery/procedure: ***\",\"Diagnosis/reason for consult: ***\"}    The proposed surgical procedure is considered {HIGH=major cardiovascular or procedures requiring prolonged anesthesia >4 hours or large fluid shifts;    INTERMEDIATE=abdominal, most orthopedic and intrathoracic surgery; LOW= endoscopy, cataract and breast surgery:739352} risk.    REVISED CARDIAC RISK INDEX  The patient has the following serious cardiovascular risks for perioperative complications such as (MI, PE, VFib and 3  AV Block):  {PREOP REVISED CARDIAC INDEX (RCI):053753:p:\"No serious cardiac risks\"}  INTERPRETATION: {REVISED CARDIAC RISK INTERPRETATION:778789}    The patient has the following additional risks for perioperative complications:  {Additional perioperative " "risks:460374:p:\"No identified additional risks\"}    No diagnosis found.    RECOMMENDATIONS:     {IMPORTANT - Conditions - complete carefully!!:925842}    {IMPORTANT - Medications:322630::\"--Patient is to take all scheduled medications on the day of surgery EXCEPT for modifications listed below.\"}    {IMPORTANT - Approval:388515:p:\"APPROVAL GIVEN to proceed with proposed procedure, without further diagnostic evaluation\"}       Signed Electronically by: Christina Hutchison MD    Copy of this evaluation report is provided to requesting physician.    [facility] Preop Guidelines    Revised Cardiac Risk Index  "

## 2020-05-11 NOTE — PROGRESS NOTES
[place] [address]  [facility] [address]  [phone]  Dept: [phone]    PRE-OP EVALUATION:  Today's date: 2020    Alex Cardona (: 1970) presents for pre-operative evaluation assessment as requested by Dr. Ruano.  He requires evaluation and anesthesia risk assessment prior to undergoing surgery/procedure for treatment of Open ileostomy closure; introaperative sigmoidoscopy, Flexible .    Fax number for surgical facility: [facility]  Primary Physician: aKti Garcia  Type of Anesthesia Anticipated: General    Patient has a Health Care Directive or Living Will:  NO    Preop Questions 2020   Who is doing your surgery? Dr. Brigitte Ruano   What are you having done? Takedown surgery for J-Pouch   Date of Surgery/Procedure: 20 9:40 am   Facility or Hospital where procedure/surgery will be performed: [facility]   1.  Do you have a history of Heart attack, stroke, stent, coronary bypass surgery, or other heart surgery? No   2.  Do you ever have any pain or discomfort in your chest? No   3.  Do you have a history of  Heart Failure? No   4.   Are you troubled by shortness of breath when:  walking on a level surface, or up a slight hill, or at night? No   5.  Do you currently have a cold, bronchitis or other respiratory infection? No   6.  Do you have a cough, shortness of breath, or wheezing? No   7.  Do you sometimes get pains in the calves of your legs when you walk? No   8. Do you or anyone in your family have previous history of blood clots? No   9.  Do you or does anyone in your family have a serious bleeding problem such as prolonged bleeding following surgeries or cuts? No   10. Have you ever had problems with anemia or been told to take iron pills? YES - previous transfusions   11. Have you had any abnormal blood loss such as black, tarry or bloody stools? No   12. Have you ever had a blood transfusion? No   13. Have you or  any of your relatives ever had problems with anesthesia? No   14. Do you have sleep apnea, excessive snoring or daytime drowsiness? No   15. Do you have any prosthetic heart valves? No   16. Do you have prosthetic joints? No         HPI:     HPI related to upcoming procedure: Is ulcerative colitis, had placement of an ileostomy pouch, will now undergo takedown.      See problem list for active medical problems.  Problems all longstanding and stable, except as noted/documented.  See ROS for pertinent symptoms related to these conditions.      MEDICAL HISTORY:     Patient Active Problem List    Diagnosis Date Noted     Ulcerative colitis (H) 02/11/2020     Priority: Medium     Ileostomy status (H) 06/17/2019     Priority: Medium     S/P laparoscopic colectomy 06/17/2019     Priority: Medium     Hypoproteinemia (H) 06/17/2019     Priority: Medium     Ulcerative colitis, acute, with rectal bleeding (H) 06/12/2019     Priority: Medium     Diarrhea 05/25/2019     Priority: Medium     Colitis 05/25/2019     Priority: Medium      Past Medical History:   Diagnosis Date     Ulcerative colitis (H)      Past Surgical History:   Procedure Laterality Date     COLONOSCOPY N/A 5/26/2019    Procedure: incomplete COLONOSCOPY with left colon biopsies by cold biopsy forceps;  Surgeon: Herbert Dior DO;  Location:  GI     LAPAROSCOPIC ASSISTED COLECTOMY N/A 6/12/2019    Procedure: Laparoscopic Assisted Total Abdominal Colectomy with End Ileostomy;  Surgeon: Brigitte Ruano MD;  Location:  OR     LAPAROSCOPIC ASSISTED COMPLETION PROCTOCOLECTOMY N/A 2/11/2020    Procedure: Completion proctectomy and ileal pouch anal anastomosis with diverting loop ileostomy.;  Surgeon: Brigitte Ruano MD;  Location:  OR     SIGMOIDOSCOPY FLEXIBLE  01/13/2020    Dr. Ruano [facility]     Current Outpatient Medications   Medication Sig Dispense Refill     Melatonin 10 MG TABS tablet Take 5 mg by mouth nightly as needed for sleep       OTC  "products: None, except as noted above    Allergies   Allergen Reactions     Blood Transfusion Related (Informational Only) Other (See Comments)     Patient has a history of a clinically significant antibody against RBC antigens.  A delay in compatible RBCs may occur.      Latex Allergy: NO    Social History     Tobacco Use     Smoking status: Former Smoker     Packs/day: 0.00     Types: Cigarettes     Last attempt to quit: 2009     Years since quittin.2     Smokeless tobacco: Never Used   Substance Use Topics     Alcohol use: Not Currently     Comment: none  since       History   Drug Use Unknown     Comment: none since 2019       REVIEW OF SYSTEMS:   GENERAL: negative for, fever, chills, weight loss, weight gain  EYES: negative  ENT: negative  RESPIRATORY: No dyspnea on exertion and No cough  CARDIOVASCULAR: negative for, palpitations, tachycardia, irregular heart beat and chest pain  GI: negative for, nausea, vomiting, abdominal pain, melena and hematochezia  : negative  MUSCULOSKELETAL: Mild left ankle pain  NEUROLOGIC: negative for, headaches, local weakness, numbness or tingling of hands and numbness or tingling of feet  SKIN: negative  ENDOCRINE: negative         EXAM:     Patient is alert, oriented, cooperative in no acute distress.  /75 (BP Location: Right arm, Patient Position: Sitting, Cuff Size: Adult Regular)   Pulse 118   Temp 98.2  F (36.8  C) (Oral)   Resp 18   Ht 1.778 m (5' 10\")   Wt 68.2 kg (150 lb 6.4 oz)   SpO2 96%   BMI 21.58 kg/m      HEENT: PERRL, EOMI, TM's are normal. Oropharynx is clear.  NECK: No lymphadenopathy or thyromegaly. Carotid pulses full without bruits.  LUNGS: clear  CV: normal S1, S2 without murmur, S3 or S4 present. Pulses are 2/2 throughout. No JVD.  ABDOMEN: Bowel sounds present, nontender without hepatosplenomegaly. Liver is normal size to percussion.  EXTREMITIES: no edema present, unremarkable joints  NEUROLOGIC: Cranial nerves II-XII " intact, reflexes 2/4 throughout, strength 5/5, sensation grossly intact, gait normal.  SKIN: without rashes or significant lesions     DIAGNOSTICS:   EKG: normal axis, normal intervals, no acute ST/T changes c/w ischemia, no LVH by voltage criteria, unchanged from previous tracings  Lab: see [facility]    Recent Labs   Lab Test 02/24/20  1650 02/14/20  0557 02/12/20  0657 02/11/20  1803 01/21/20  1605   HGB 10.1*  --  11.0*  --  11.8*   * 257  --  270 313   NA  --   --  137  --  138   POTASSIUM  --   --  3.9  --  4.2   CR  --   --  0.92 0.91 0.91        IMPRESSION:   Reason for surgery/procedure: ulcerative colitis, ileostomy  Diagnosis/reason for consult: preop    The proposed surgical procedure is considered INTERMEDIATE risk.    REVISED CARDIAC RISK INDEX  The patient has the following serious cardiovascular risks for perioperative complications such as (MI, PE, VFib and 3  AV Block):  No serious cardiac risks  INTERPRETATION: 0 risks: Class I (very low risk - 0.4% complication rate)    The patient has the following additional risks for perioperative complications:  No identified additional risks      ICD-10-CM    1. Pre-op exam  Z01.818 EKG 12-lead complete w/read - Clinics     CBC with platelets   2. Ulcerative colitis with complication, unspecified location (H)  K51.919        RECOMMENDATIONS:         --Patient is on no chronic medications    APPROVAL GIVEN to proceed with proposed procedure, without further diagnostic evaluation       Signed Electronically by: Christina Hutchison MD    Copy of this evaluation report is provided to requesting physician.    [facility] Preop Guidelines    Revised Cardiac Risk Index

## 2020-05-12 RX ORDER — MULTIPLE VITAMINS W/ MINERALS TAB 9MG-400MCG
1 TAB ORAL DAILY
COMMUNITY

## 2020-05-13 NOTE — PHARMACY-ADMISSION MEDICATION HISTORY
Admission medication history interview status for this patient is complete. See [facility] admission navigator for allergy information, prior to admission medications and immunization status.     Med rec completed by preadmitting RN     Reviewed by Pallavi Smith RN (Registered Nurse) on 05/12/20 at 1417      No further clarifications noted      Prior to Admission medications    Medication Sig Last Dose Taking? Auth Provider   Melatonin 10 MG TABS tablet Take 5 mg by mouth nightly as needed for sleep  Yes Reported, Patient   multivitamin w/minerals (MULTI-VITAMIN) tablet Take 1 tablet by mouth daily  Yes Reported, Patient

## 2020-05-17 ENCOUNTER — NURSE TRIAGE (OUTPATIENT)
Dept: NURSING | Facility: CLINIC | Age: 50
End: 2020-05-17

## 2020-05-17 ENCOUNTER — HOSPITAL ENCOUNTER (OUTPATIENT)
Dept: LAB | Facility: CLINIC | Age: 50
Discharge: HOME | End: 2020-05-17
Attending: COLON & RECTAL SURGERY | Admitting: COLON & RECTAL SURGERY
Payer: COMMERCIAL

## 2020-05-17 DIAGNOSIS — Z01.812 PRE-OPERATIVE LABORATORY EXAMINATION: ICD-10-CM

## 2020-05-17 PROCEDURE — 86850 RBC ANTIBODY SCREEN: CPT | Performed by: COLON & RECTAL SURGERY

## 2020-05-17 PROCEDURE — 86900 BLOOD TYPING SEROLOGIC ABO: CPT | Performed by: COLON & RECTAL SURGERY

## 2020-05-17 PROCEDURE — 86922 COMPATIBILITY TEST ANTIGLOB: CPT | Performed by: COLON & RECTAL SURGERY

## 2020-05-17 PROCEDURE — 86901 BLOOD TYPING SEROLOGIC RH(D): CPT | Performed by: COLON & RECTAL SURGERY

## 2020-05-17 PROCEDURE — 36415 COLL VENOUS BLD VENIPUNCTURE: CPT | Performed by: COLON & RECTAL SURGERY

## 2020-05-17 NOTE — TELEPHONE ENCOUNTER
Alex calling to find out if Covid had resulted?  Has surgical procedure tomorrow, took test for operation, has no s/s of Covid.          Coronavirus (COVID-19) Notification    Lab Result   Lab test 2019-nCoV rRt-PCR OR SARS-COV-2 PCR    Nasopharyngeal AND/OR Oropharyngeal swab is NEGATIVE for 2019-nCoV RNA [OR] SARS-COV-2 RNA (COVID-19) RNA    Your result was negative. This means that we didn't find the virus that causes COVID-19 in your sample.   A test may show negative when you do actually have the virus. This can happen when the virus is in the early stages of infection, before you feel illness symptoms.    Even if you don't have symptoms, they may still appear. For safety, it's very important to follow these rules.  Keep yourself away from others (self-isolation):    Stay home. Don't go to work, school or anywhere else.     Stay in your own room (and use your own bathroom), if you can.    Stay away from others in your home. No hugging, kissing or shaking hands. No visitors.    Clean  high touch  surfaces often (doorknobs, counters, handles, etc.). Use a household cleaning spray or wipes.    Cover your mouth and nose with a mask, tissue or washcloth to avoid spreading germs.    Wash your hands and face often with soap and water.    Stay in self-isolation until you meet ALL of the guidelines below:  1. You have had no fever for at least 72 hours (that is 3 full days of no fever without the use of medicine that reduces fevers), AND  2. other symptoms (such as cough, shortness of breath) have gotten better, AND  3. at least 10 days have passed since your symptoms first appeared.    Going back to work  Check with your employer for any guidelines to follow for going back to work.  Employers: This document serves as formal notice that your employee tested negative for COVID-19, as of the testing date shown above.    If your symptoms worsen or other concerning symptoms, contact PCP, oncare or consider returning to  Emergency Dept.    For questions regarding COVID19, Patient can access more information at:    https://www.cdc.gov/coronavirus/2019-ncov/faq.html    [email or web address]/diseases/coronavirus/index.html    [facility] Hotline (201-433-6050)    Elmira Louis RN    [facility] [place]   Lung Nodule and ED Lab Results F/U RN  Epic pool (ED late result f/u RN) : P [id number]   # [phone]    Additional Information    [1] Follow-up call to recent contact AND [2] information only call, no triage required    Protocols used: INFORMATION ONLY CALL-A-AH

## 2020-05-18 ENCOUNTER — ANESTHESIA (OUTPATIENT)
Dept: SURGERY | Facility: CLINIC | Age: 50
End: 2020-05-18
Payer: COMMERCIAL

## 2020-05-18 ENCOUNTER — HOSPITAL ENCOUNTER (INPATIENT)
Facility: CLINIC | Age: 50
LOS: 2 days | Discharge: HOME | End: 2020-05-20
Attending: COLON & RECTAL SURGERY | Admitting: COLON & RECTAL SURGERY
Payer: COMMERCIAL

## 2020-05-18 ENCOUNTER — ANESTHESIA EVENT (OUTPATIENT)
Dept: SURGERY | Facility: CLINIC | Age: 50
End: 2020-05-18
Payer: COMMERCIAL

## 2020-05-18 DIAGNOSIS — K51.018 ULCERATIVE PANCOLITIS WITH OTHER COMPLICATION (H): Primary | ICD-10-CM

## 2020-05-18 LAB
CREAT SERPL-MCNC: 0.88 MG/DL (ref 0.66–1.25)
GFR SERPL CREATININE-BSD FRML MDRD: >90 ML/MIN/{1.73_M2}
PLATELET # BLD AUTO: 254 10E9/L (ref 150–450)

## 2020-05-18 PROCEDURE — 71000012 ZZH RECOVERY PHASE 1 LEVEL 1 FIRST HR: Performed by: COLON & RECTAL SURGERY

## 2020-05-18 PROCEDURE — 12000000 ZZH R&B MED SURG/OB

## 2020-05-18 PROCEDURE — 0WQF0ZZ REPAIR ABDOMINAL WALL, OPEN APPROACH: ICD-10-PCS | Performed by: COLON & RECTAL SURGERY

## 2020-05-18 PROCEDURE — 71000013 ZZH RECOVERY PHASE 1 LEVEL 1 EA ADDTL HR: Performed by: COLON & RECTAL SURGERY

## 2020-05-18 PROCEDURE — 25000128 H RX IP 250 OP 636: Performed by: ANESTHESIOLOGY

## 2020-05-18 PROCEDURE — 88304 TISSUE EXAM BY PATHOLOGIST: CPT | Mod: 26 | Performed by: COLON & RECTAL SURGERY

## 2020-05-18 PROCEDURE — 25800030 ZZH RX IP 258 OP 636: Performed by: ANESTHESIOLOGY

## 2020-05-18 PROCEDURE — 40000306 ZZH STATISTIC PRE PROC ASSESS II: Performed by: COLON & RECTAL SURGERY

## 2020-05-18 PROCEDURE — 25000128 H RX IP 250 OP 636: Performed by: COLON & RECTAL SURGERY

## 2020-05-18 PROCEDURE — 25000132 ZZH RX MED GY IP 250 OP 250 PS 637: Performed by: COLON & RECTAL SURGERY

## 2020-05-18 PROCEDURE — 27210794 ZZH OR GENERAL SUPPLY STERILE: Performed by: COLON & RECTAL SURGERY

## 2020-05-18 PROCEDURE — 25000128 H RX IP 250 OP 636: Performed by: NURSE ANESTHETIST, CERTIFIED REGISTERED

## 2020-05-18 PROCEDURE — 36000063 ZZH SURGERY LEVEL 4 EA 15 ADDTL MIN: Performed by: COLON & RECTAL SURGERY

## 2020-05-18 PROCEDURE — 88304 TISSUE EXAM BY PATHOLOGIST: CPT | Performed by: COLON & RECTAL SURGERY

## 2020-05-18 PROCEDURE — 37000008 ZZH ANESTHESIA TECHNICAL FEE, 1ST 30 MIN: Performed by: COLON & RECTAL SURGERY

## 2020-05-18 PROCEDURE — 40000440 ZZH STATISTIC FLEX SIG (OR PROCEDURE): Performed by: COLON & RECTAL SURGERY

## 2020-05-18 PROCEDURE — 25000125 ZZHC RX 250: Performed by: COLON & RECTAL SURGERY

## 2020-05-18 PROCEDURE — 37000009 ZZH ANESTHESIA TECHNICAL FEE, EACH ADDTL 15 MIN: Performed by: COLON & RECTAL SURGERY

## 2020-05-18 PROCEDURE — 85049 AUTOMATED PLATELET COUNT: CPT | Performed by: COLON & RECTAL SURGERY

## 2020-05-18 PROCEDURE — 36415 COLL VENOUS BLD VENIPUNCTURE: CPT | Performed by: COLON & RECTAL SURGERY

## 2020-05-18 PROCEDURE — 82565 ASSAY OF CREATININE: CPT | Performed by: COLON & RECTAL SURGERY

## 2020-05-18 PROCEDURE — 25000125 ZZHC RX 250: Performed by: NURSE ANESTHETIST, CERTIFIED REGISTERED

## 2020-05-18 PROCEDURE — 36000093 ZZH SURGERY LEVEL 4 1ST 30 MIN: Performed by: COLON & RECTAL SURGERY

## 2020-05-18 RX ORDER — CIPROFLOXACIN 2 MG/ML
400 INJECTION, SOLUTION INTRAVENOUS SEE ADMIN INSTRUCTIONS
Status: DISCONTINUED | OUTPATIENT
Start: 2020-05-18 | End: 2020-05-18 | Stop reason: HOSPADM

## 2020-05-18 RX ORDER — HYDROMORPHONE HYDROCHLORIDE 1 MG/ML
.3-.5 INJECTION, SOLUTION INTRAMUSCULAR; INTRAVENOUS; SUBCUTANEOUS
Status: DISCONTINUED | OUTPATIENT
Start: 2020-05-18 | End: 2020-05-20

## 2020-05-18 RX ORDER — LIDOCAINE 40 MG/G
CREAM TOPICAL
Status: DISCONTINUED | OUTPATIENT
Start: 2020-05-18 | End: 2020-05-20 | Stop reason: HOSPADM

## 2020-05-18 RX ORDER — ONDANSETRON 2 MG/ML
4 INJECTION INTRAMUSCULAR; INTRAVENOUS EVERY 30 MIN PRN
Status: DISCONTINUED | OUTPATIENT
Start: 2020-05-18 | End: 2020-05-18 | Stop reason: HOSPADM

## 2020-05-18 RX ORDER — ALVIMOPAN 12 MG/1
12 CAPSULE ORAL 2 TIMES DAILY
Status: DISCONTINUED | OUTPATIENT
Start: 2020-05-19 | End: 2020-05-20 | Stop reason: CLARIF

## 2020-05-18 RX ORDER — ALVIMOPAN 12 MG/1
12 CAPSULE ORAL ONCE
Status: COMPLETED | OUTPATIENT
Start: 2020-05-18 | End: 2020-05-18

## 2020-05-18 RX ORDER — HYDRALAZINE HYDROCHLORIDE 20 MG/ML
2.5-5 INJECTION INTRAMUSCULAR; INTRAVENOUS EVERY 10 MIN PRN
Status: DISCONTINUED | OUTPATIENT
Start: 2020-05-18 | End: 2020-05-18 | Stop reason: HOSPADM

## 2020-05-18 RX ORDER — NEOSTIGMINE METHYLSULFATE 1 MG/ML
VIAL (ML) INJECTION PRN
Status: DISCONTINUED | OUTPATIENT
Start: 2020-05-18 | End: 2020-05-18

## 2020-05-18 RX ORDER — MEPERIDINE HYDROCHLORIDE 50 MG/ML
12.5 INJECTION INTRAMUSCULAR; INTRAVENOUS; SUBCUTANEOUS
Status: DISCONTINUED | OUTPATIENT
Start: 2020-05-18 | End: 2020-05-18 | Stop reason: HOSPADM

## 2020-05-18 RX ORDER — ONDANSETRON 2 MG/ML
4 INJECTION INTRAMUSCULAR; INTRAVENOUS EVERY 6 HOURS PRN
Status: DISCONTINUED | OUTPATIENT
Start: 2020-05-18 | End: 2020-05-20 | Stop reason: HOSPADM

## 2020-05-18 RX ORDER — FENTANYL CITRATE 50 UG/ML
INJECTION, SOLUTION INTRAMUSCULAR; INTRAVENOUS PRN
Status: DISCONTINUED | OUTPATIENT
Start: 2020-05-18 | End: 2020-05-18

## 2020-05-18 RX ORDER — OXYCODONE HYDROCHLORIDE 5 MG/1
5 TABLET ORAL EVERY 4 HOURS PRN
Status: DISCONTINUED | OUTPATIENT
Start: 2020-05-18 | End: 2020-05-20 | Stop reason: HOSPADM

## 2020-05-18 RX ORDER — LIDOCAINE HYDROCHLORIDE 10 MG/ML
INJECTION, SOLUTION INFILTRATION; PERINEURAL PRN
Status: DISCONTINUED | OUTPATIENT
Start: 2020-05-18 | End: 2020-05-18

## 2020-05-18 RX ORDER — LIDOCAINE 40 MG/G
CREAM TOPICAL
Status: DISCONTINUED | OUTPATIENT
Start: 2020-05-18 | End: 2020-05-18 | Stop reason: HOSPADM

## 2020-05-18 RX ORDER — CIPROFLOXACIN 2 MG/ML
400 INJECTION, SOLUTION INTRAVENOUS EVERY 12 HOURS
Status: COMPLETED | OUTPATIENT
Start: 2020-05-18 | End: 2020-05-19

## 2020-05-18 RX ORDER — PROPOFOL 10 MG/ML
INJECTION, EMULSION INTRAVENOUS PRN
Status: DISCONTINUED | OUTPATIENT
Start: 2020-05-18 | End: 2020-05-18

## 2020-05-18 RX ORDER — ONDANSETRON 2 MG/ML
INJECTION INTRAMUSCULAR; INTRAVENOUS PRN
Status: DISCONTINUED | OUTPATIENT
Start: 2020-05-18 | End: 2020-05-18

## 2020-05-18 RX ORDER — GLYCOPYRROLATE 0.2 MG/ML
INJECTION, SOLUTION INTRAMUSCULAR; INTRAVENOUS PRN
Status: DISCONTINUED | OUTPATIENT
Start: 2020-05-18 | End: 2020-05-18

## 2020-05-18 RX ORDER — FENTANYL CITRATE 50 UG/ML
25-50 INJECTION, SOLUTION INTRAMUSCULAR; INTRAVENOUS
Status: DISCONTINUED | OUTPATIENT
Start: 2020-05-18 | End: 2020-05-18 | Stop reason: HOSPADM

## 2020-05-18 RX ORDER — ACETAMINOPHEN 325 MG/1
975 TABLET ORAL ONCE
Status: COMPLETED | OUTPATIENT
Start: 2020-05-18 | End: 2020-05-18

## 2020-05-18 RX ORDER — ONDANSETRON 4 MG/1
4 TABLET, ORALLY DISINTEGRATING ORAL EVERY 30 MIN PRN
Status: DISCONTINUED | OUTPATIENT
Start: 2020-05-18 | End: 2020-05-18 | Stop reason: HOSPADM

## 2020-05-18 RX ORDER — SODIUM CHLORIDE, SODIUM LACTATE, POTASSIUM CHLORIDE, CALCIUM CHLORIDE 600; 310; 30; 20 MG/100ML; MG/100ML; MG/100ML; MG/100ML
INJECTION, SOLUTION INTRAVENOUS CONTINUOUS
Status: DISCONTINUED | OUTPATIENT
Start: 2020-05-18 | End: 2020-05-20

## 2020-05-18 RX ORDER — HYDROMORPHONE HYDROCHLORIDE 1 MG/ML
.3-.5 INJECTION, SOLUTION INTRAMUSCULAR; INTRAVENOUS; SUBCUTANEOUS EVERY 10 MIN PRN
Status: DISCONTINUED | OUTPATIENT
Start: 2020-05-18 | End: 2020-05-18 | Stop reason: HOSPADM

## 2020-05-18 RX ORDER — DEXAMETHASONE SODIUM PHOSPHATE 4 MG/ML
INJECTION, SOLUTION INTRA-ARTICULAR; INTRALESIONAL; INTRAMUSCULAR; INTRAVENOUS; SOFT TISSUE PRN
Status: DISCONTINUED | OUTPATIENT
Start: 2020-05-18 | End: 2020-05-18

## 2020-05-18 RX ORDER — NALOXONE HYDROCHLORIDE 0.4 MG/ML
.1-.4 INJECTION, SOLUTION INTRAMUSCULAR; INTRAVENOUS; SUBCUTANEOUS
Status: DISCONTINUED | OUTPATIENT
Start: 2020-05-18 | End: 2020-05-20 | Stop reason: HOSPADM

## 2020-05-18 RX ORDER — BUPIVACAINE HYDROCHLORIDE AND EPINEPHRINE 2.5; 5 MG/ML; UG/ML
INJECTION, SOLUTION EPIDURAL; INFILTRATION; INTRACAUDAL; PERINEURAL PRN
Status: DISCONTINUED | OUTPATIENT
Start: 2020-05-18 | End: 2020-05-18 | Stop reason: HOSPADM

## 2020-05-18 RX ORDER — SODIUM CHLORIDE, SODIUM LACTATE, POTASSIUM CHLORIDE, CALCIUM CHLORIDE 600; 310; 30; 20 MG/100ML; MG/100ML; MG/100ML; MG/100ML
INJECTION, SOLUTION INTRAVENOUS CONTINUOUS
Status: DISCONTINUED | OUTPATIENT
Start: 2020-05-18 | End: 2020-05-18 | Stop reason: HOSPADM

## 2020-05-18 RX ORDER — NALOXONE HYDROCHLORIDE 0.4 MG/ML
.1-.4 INJECTION, SOLUTION INTRAMUSCULAR; INTRAVENOUS; SUBCUTANEOUS
Status: DISCONTINUED | OUTPATIENT
Start: 2020-05-18 | End: 2020-05-18 | Stop reason: HOSPADM

## 2020-05-18 RX ORDER — ACETAMINOPHEN 325 MG/1
975 TABLET ORAL EVERY 8 HOURS
Status: DISCONTINUED | OUTPATIENT
Start: 2020-05-18 | End: 2020-05-20 | Stop reason: HOSPADM

## 2020-05-18 RX ORDER — CIPROFLOXACIN 2 MG/ML
400 INJECTION, SOLUTION INTRAVENOUS
Status: COMPLETED | OUTPATIENT
Start: 2020-05-18 | End: 2020-05-18

## 2020-05-18 RX ORDER — MAGNESIUM HYDROXIDE 1200 MG/15ML
LIQUID ORAL PRN
Status: DISCONTINUED | OUTPATIENT
Start: 2020-05-18 | End: 2020-05-18 | Stop reason: HOSPADM

## 2020-05-18 RX ADMIN — ACETAMINOPHEN 975 MG: 325 TABLET, FILM COATED ORAL at 22:18

## 2020-05-18 RX ADMIN — ROCURONIUM BROMIDE 10 MG: 10 INJECTION INTRAVENOUS at 10:44

## 2020-05-18 RX ADMIN — CIPROFLOXACIN 400 MG: 2 INJECTION, SOLUTION INTRAVENOUS at 22:18

## 2020-05-18 RX ADMIN — GLYCOPYRROLATE 0.4 MG: 0.2 INJECTION, SOLUTION INTRAMUSCULAR; INTRAVENOUS at 11:00

## 2020-05-18 RX ADMIN — ACETAMINOPHEN 975 MG: 325 TABLET, FILM COATED ORAL at 09:08

## 2020-05-18 RX ADMIN — OXYCODONE HYDROCHLORIDE 5 MG: 5 TABLET ORAL at 13:25

## 2020-05-18 RX ADMIN — SODIUM CHLORIDE, POTASSIUM CHLORIDE, SODIUM LACTATE AND CALCIUM CHLORIDE: 600; 310; 30; 20 INJECTION, SOLUTION INTRAVENOUS at 09:52

## 2020-05-18 RX ADMIN — HYDROMORPHONE HYDROCHLORIDE 0.5 MG: 1 INJECTION, SOLUTION INTRAMUSCULAR; INTRAVENOUS; SUBCUTANEOUS at 14:59

## 2020-05-18 RX ADMIN — FENTANYL CITRATE 50 MCG: 50 INJECTION, SOLUTION INTRAMUSCULAR; INTRAVENOUS at 12:59

## 2020-05-18 RX ADMIN — FENTANYL CITRATE 100 MCG: 50 INJECTION, SOLUTION INTRAMUSCULAR; INTRAVENOUS at 10:00

## 2020-05-18 RX ADMIN — FENTANYL CITRATE 50 MCG: 50 INJECTION, SOLUTION INTRAMUSCULAR; INTRAVENOUS at 12:44

## 2020-05-18 RX ADMIN — GLYCOPYRROLATE 0.2 MG: 0.2 INJECTION, SOLUTION INTRAMUSCULAR; INTRAVENOUS at 10:00

## 2020-05-18 RX ADMIN — LIDOCAINE HYDROCHLORIDE 40 MG: 10 INJECTION, SOLUTION INFILTRATION; PERINEURAL at 10:00

## 2020-05-18 RX ADMIN — FENTANYL CITRATE 50 MCG: 50 INJECTION, SOLUTION INTRAMUSCULAR; INTRAVENOUS at 12:37

## 2020-05-18 RX ADMIN — CIPROFLOXACIN 400 MG: 2 INJECTION, SOLUTION INTRAVENOUS at 10:29

## 2020-05-18 RX ADMIN — ACETAMINOPHEN 975 MG: 325 TABLET, FILM COATED ORAL at 15:04

## 2020-05-18 RX ADMIN — METRONIDAZOLE 500 MG: 500 INJECTION, SOLUTION INTRAVENOUS at 19:23

## 2020-05-18 RX ADMIN — METRONIDAZOLE 500 MG: 500 INJECTION, SOLUTION INTRAVENOUS at 10:12

## 2020-05-18 RX ADMIN — HYDROMORPHONE HYDROCHLORIDE 0.5 MG: 1 INJECTION, SOLUTION INTRAMUSCULAR; INTRAVENOUS; SUBCUTANEOUS at 19:55

## 2020-05-18 RX ADMIN — PROPOFOL 170 MG: 10 INJECTION, EMULSION INTRAVENOUS at 10:00

## 2020-05-18 RX ADMIN — HYDROMORPHONE HYDROCHLORIDE 0.5 MG: 1 INJECTION, SOLUTION INTRAMUSCULAR; INTRAVENOUS; SUBCUTANEOUS at 17:18

## 2020-05-18 RX ADMIN — ROCURONIUM BROMIDE 40 MG: 10 INJECTION INTRAVENOUS at 10:00

## 2020-05-18 RX ADMIN — FENTANYL CITRATE 50 MCG: 50 INJECTION, SOLUTION INTRAMUSCULAR; INTRAVENOUS at 11:56

## 2020-05-18 RX ADMIN — ONDANSETRON HYDROCHLORIDE 4 MG: 2 INJECTION, SOLUTION INTRAVENOUS at 11:00

## 2020-05-18 RX ADMIN — OXYCODONE HYDROCHLORIDE 5 MG: 5 TABLET ORAL at 23:46

## 2020-05-18 RX ADMIN — ALVIMOPAN 12 MG: 12 CAPSULE ORAL at 09:13

## 2020-05-18 RX ADMIN — MIDAZOLAM 2 MG: 1 INJECTION INTRAMUSCULAR; INTRAVENOUS at 09:52

## 2020-05-18 RX ADMIN — HYDROMORPHONE HYDROCHLORIDE 1 MG: 1 INJECTION, SOLUTION INTRAMUSCULAR; INTRAVENOUS; SUBCUTANEOUS at 10:22

## 2020-05-18 RX ADMIN — Medication 3 MG: at 11:00

## 2020-05-18 RX ADMIN — HYDROMORPHONE HYDROCHLORIDE 0.5 MG: 1 INJECTION, SOLUTION INTRAMUSCULAR; INTRAVENOUS; SUBCUTANEOUS at 13:01

## 2020-05-18 RX ADMIN — DEXAMETHASONE SODIUM PHOSPHATE 4 MG: 4 INJECTION, SOLUTION INTRA-ARTICULAR; INTRALESIONAL; INTRAMUSCULAR; INTRAVENOUS; SOFT TISSUE at 10:00

## 2020-05-18 ASSESSMENT — LIFESTYLE VARIABLES: TOBACCO_USE: 1

## 2020-05-18 ASSESSMENT — ACTIVITIES OF DAILY LIVING (ADL)
ADLS_ACUITY_SCORE: 15
ADLS_ACUITY_SCORE: 15

## 2020-05-18 ASSESSMENT — MIFFLIN-ST. JEOR: SCORE: 1540.3

## 2020-05-18 NOTE — ANESTHESIA POSTPROCEDURE EVALUATION
Patient: Alex Cardona    Procedure(s):  open ileostomy closure; pouchoscopy  SIGMOIDOSCOPY, FLEXIBLE    Diagnosis:Attention to ileostomy (H) [Z43.2]  Diagnosis Additional Information: No value filed.    Anesthesia Type:  General    Note:  Anesthesia Post Evaluation    Patient location during evaluation: PACU  Patient participation: Able to fully participate in evaluation  Level of consciousness: awake  Pain management: adequate  Airway patency: patent  Cardiovascular status: acceptable  Respiratory status: acceptable  Hydration status: acceptable  PONV: none             Last vitals:  Vitals:    05/18/20 1200 05/18/20 1215 05/18/20 1230   BP: 102/63 112/73 116/74   Pulse:      Resp: 29 14 13   Temp:      SpO2: 100% 100% 100%         Electronically Signed By: Gerardo Knapp MD  May 18, 2020  12:33 PM

## 2020-05-18 NOTE — ANESTHESIA PREPROCEDURE EVALUATION
Anesthesia Pre-Procedure Evaluation    Patient: Alex Cardona   MRN: [id number] : 1970          Preoperative Diagnosis: Attention to ileostomy (H) [Z43.2]    Procedure(s):  open ileostomy closure; intraoperative sigmoidoscopy  SIGMOIDOSCOPY, FLEXIBLE    Past Medical History:   Diagnosis Date     Ulcerative colitis (H)      Past Surgical History:   Procedure Laterality Date     COLONOSCOPY N/A 2019    Procedure: incomplete COLONOSCOPY with left colon biopsies by cold biopsy forceps;  Surgeon: Herbert Dior DO;  Location: [facility]     LAPAROSCOPIC ASSISTED COLECTOMY N/A 2019    Procedure: Laparoscopic Assisted Total Abdominal Colectomy with End Ileostomy;  Surgeon: Brigitte Ruano MD;  Location: [facility]     LAPAROSCOPIC ASSISTED COMPLETION PROCTOCOLECTOMY N/A 2020    Procedure: Completion proctectomy and ileal pouch anal anastomosis with diverting loop ileostomy.;  Surgeon: Brigitte uRano MD;  Location: [facility]     SIGMOIDOSCOPY FLEXIBLE  2020    [facility]     Anesthesia Evaluation     . Pt has had prior anesthetic.            ROS/MED HX    ENT/Pulmonary:     (+)tobacco use, , . .    Neurologic:       Cardiovascular:         METS/Exercise Tolerance:     Hematologic:         Musculoskeletal:         GI/Hepatic:     (+) Inflammatory bowel disease,       Renal/Genitourinary:         Endo:         Psychiatric:         Infectious Disease:         Malignancy:         Other:                          Physical Exam      Airway   Mallampati: II  TM distance: >3 FB  Neck ROM: full    Dental     Cardiovascular       Pulmonary             Lab Results   Component Value Date    WBC 4.3 2020    HGB 11.7 (L) 2020    HCT 36.9 (L) 2020     2020    CRP 41.8 (H) 2019     2020    POTASSIUM 3.9 2020    CHLORIDE 104 2020    CO2 29 2020    BUN 9 2020    CR 0.92 2020     (H) 2020    KEZIA 8.5 2020    PHOS  "3.1 06/10/2019    MAG 1.7 06/10/2019    ALBUMIN 3.9 11/08/2019    PROTTOTAL 7.6 11/08/2019    ALT 24 11/08/2019    AST 14 11/08/2019    ALKPHOS 59 11/08/2019    BILITOTAL 0.3 11/08/2019    LIPASE 33 (L) 05/25/2019       Preop Vitals  BP Readings from Last 3 Encounters:   05/18/20 123/84   05/11/20 118/75   02/24/20 114/68    Pulse Readings from Last 3 Encounters:   05/11/20 118   02/24/20 99   02/15/20 76      Resp Readings from Last 3 Encounters:   05/18/20 14   05/11/20 18   02/24/20 21    SpO2 Readings from Last 3 Encounters:   05/18/20 97%   05/11/20 96%   02/24/20 98%      Temp Readings from Last 1 Encounters:   05/18/20 97.3  F (36.3  C) (Temporal)    Ht Readings from Last 1 Encounters:   05/18/20 1.778 m (5' 10\")      Wt Readings from Last 1 Encounters:   05/18/20 67.4 kg (148 lb 9.6 oz)    Estimated body mass index is 21.32 kg/m  as calculated from the following:    Height as of this encounter: 1.778 m (5' 10\").    Weight as of this encounter: 67.4 kg (148 lb 9.6 oz).       Anesthesia Plan      History & Physical Review  History and physical reviewed and following examination; no interval change.    ASA Status:  2 .    NPO Status:  > 8 hours    Plan for General with Intravenous and Propofol induction. Maintenance will be Balanced.    PONV prophylaxis:  Ondansetron (or other 5HT-3) and Dexamethasone or Solumedrol         Postoperative Care  Postoperative pain management:  IV analgesics.      Consents  Anesthetic plan, risks, benefits and alternatives discussed with:  Patient.  Use of blood products discussed: Yes.   .                 Gerardo Knapp MD                    .  "

## 2020-05-18 NOTE — PLAN OF CARE
To Do:  End of Shift Summary  For vital signs and complete assessments, please see documentation flowsheets.     Pertinent assessments: pain 6 out 10 upon arrival  to room 527 from PACU, DTV and was bladder scanned in PACU at 1 pm for 169, ice pack given and patient understands his pain management plan, walked to bed with SBA from stretcher when he arrived, oriented to room and call light  Major Shift Events none  Treatment Plan: treat pain and tolerate diet    Discharge Readiness: Medically active  Expected Discharge Date: TBD  Discharge Disposition: Home with Self care  Barriers/Criteria for discharge tolerate diet and pain control, return of bowel function, able to void

## 2020-05-18 NOTE — OP NOTE
Procedure Date: 05/18/2020      PREOPERATIVE DIAGNOSIS:  Refractory ulcerative colitis, status post ileal pouch anal anastomosis and diverting loop ileostomy.      POSTOPERATIVE DIAGNOSIS:  Refractory ulcerative colitis, status post ileal pouch anal anastomosis and diverting loop ileostomy.      PROCEDURES:  Pouchoscopy and ileostomy reversal.      SURGEON:  Brigitte Ruano MD      ASSISTANT:  Renae Haskins MD, [facility] Colorectal Surgery Fellow      ANESTHESIA:  General endotracheal anesthesia plus 30 mL of 0.25% Marcaine locally.      INDICATIONS:  Alex is a 49-year-old man who had medically refractory colitis.  He underwent a total abdominal colectomy and after coming off his medications, he underwent a second surgery for a J pouch reconstruction and proctectomy with diverting loop ileostomy on 02/12.  He had been doing quite well.  He had a Pouchogram that was negative for leak, and we planned for reversal of his loop ileostomy today.  We discussed the risks of bleeding, both superficial and deep, possible anastomotic leak, other pouch issues and other risks associated with major abdominal and general anesthesia.  He understood and wished to proceed.      FINDINGS:  He had a healthy-appearing pouch, and I was able to extend proximal to this up into the ileostomy bag.  Upon withdrawal, there was a visualized staple line within the pouch that appeared intact and no concerning findings for a leak.      DESCRIPTION OF PROCEDURE:  After informed consent was obtained, the patient was taken to the operating room, positioned on the operating table in the supine position.  He was intubated and positioned on the left lateral decubitus position.  After a timeout, a rectal exam revealed a patent anastomosis in the upper anal canal.  A flexible pediatric colonoscope was advanced in through there and up into the prepouch ileum up into the bag.  Upon withdrawal, I could see healthy-appearing mucosa in the prepouch  ileum, as well as in the pouch.  No significant inflammation of the mucosa distal to the anastomosis.  Everything appeared healthy.  He was then repositioned in the supine position and his abdomen was prepped and draped in the usual fashion.  We began by making an incision circular around the stoma incorporating about a millimeter of skin.  Dissection was carried down through the subcutaneous tissues and the stoma was freed up from the fascia circumferentially.  This allowed us to bring up about 10 cm on each side of the ileum.  We then unfurled the brooking of the stoma and straightened this out.  We identified where we wished to divide to create our anastomosis and carefully inspected that the bowel was healthy.  A window was made in the mesentery and the mesentery was clamped and tied.  We then lined up a side-to-side functional end-to-end anastomosis.  An enterotomy was made on each side.  A 75 blue load was used to create the common channel and inspection revealed excellent hemostasis.  The staple lines were offset, and the common enterotomy was closed with a TA 60.  The corners, crotch and crossing staple line were oversewn, and the anastomosis was gently guided back into the abdomen.  Irrigation was used, and then we identified the fascia, freeing this up circumferentially, and the fascia was reapproximated using 0 PDS in a running fashion vertically.  We again irrigated and then loosely reapproximated the skin with a pursestring, and this was then packed with a 2 x 2.  30 mL of 0.25% Marcaine was instilled into the peristomal tissues.  He was awakened from anesthesia and taken to the recovery area in stable condition.  Sponge and needle counts were correct at the end of the case.      ESTIMATED BLOOD LOSS:  Less than 10 mL.      COMPLICATIONS:  No immediate complications.      SPECIMENS:  Ileal trim.         DANIEL PEREZ MD             D: 05/18/2020   T: 05/18/2020   MT: JULIAN      Name:     PHAN CONNOLLY    MRN:      [id number]        Account:        [id number]   :      1970           Procedure Date: 2020      Document: [id number]       cc: Chau Ruano MD

## 2020-05-18 NOTE — ANESTHESIA CARE TRANSFER NOTE
Patient: Alex Cardona    Procedure(s):  open ileostomy closure; pouchoscopy  SIGMOIDOSCOPY, FLEXIBLE    Diagnosis: Attention to ileostomy (H) [Z43.2]  Diagnosis Additional Information: No value filed.    Anesthesia Type:   General     Note:    Patient transferred to:PACU  Comments: Pt spont resps ETT removed to PACU VSS Report to RNHandoff Report: Identifed the Patient, Identified the Reponsible Provider, Reviewed the pertinent medical history, Discussed the surgical course, Reviewed Intra-OP anesthesia mangement and issues during anesthesia, Set expectations for post-procedure period and Allowed opportunity for questions and acknowledgement of understanding      Vitals: (Last set prior to Anesthesia Care Transfer)    CRNA VITALS  5/18/2020 1052 - 5/18/2020 1127      5/18/2020             Pulse:  72    SpO2:  100 %    Resp Rate (observed):  10                Electronically Signed By: MILTON Vasquez CRNA  May 18, 2020  11:27 AM

## 2020-05-18 NOTE — BRIEF OP NOTE
[facility]    Brief Operative Note    Pre-operative diagnosis: Attention to ileostomy (H) [Z43.2], Ulcerative colitis prior J-pouch  Post-operative diagnosis Same as pre-operative diagnosis    Procedure: open ileostomy closure; pouchoscopy    Surgeon: Surgeon(s) and Role:     * Brigitte Ruano MD - Primary     * Renae Haskins MD  Anesthesia: General   Estimated blood loss: Less than 10 ml  Drains: None  Specimens:   ID Type Source Tests Collected by Time Destination   A : Ileostomy Tissue Small Intestine, Ileum SURGICAL PATHOLOGY EXAM Brigitte Ruano MD 5/18/2020 11:08 AM      Findings:   Healthy intact pouch, expected anatomy of the stoma  Complications: None.  Implants: * No implants in log *      IVF: 700  UOP: NM  Condition on discharge from OR: Satisfactory    Brigitte Ruano MD, MD   [facility]   [phone].        ADDENDUM:    PATIENT DATA  Indicate Y or N:  Home O2 No  Hemodialysis  No  Transplant patient  No  Cirrhosis  No  Steroids in last 30 days  No  Immunomodulators in last 30 days  No  Anticoagulation at time of surgery  No   List medication n  Prior abdominal surgery  Yes  Pelvic irradiation  No    Albumin within 30 days if known na   Hgb within 30 days if known    Hemoglobin   Date Value Ref Range Status   05/11/2020 11.7 (L) 13.3 - 17.7 g/dL Final   ]  Cr within 30 days if known    Creatinine   Date Value Ref Range Status   02/12/2020 0.92 0.66 - 1.25 mg/dL Final   ]  Body mass index is 21.32 kg/m .      OR DATA  Emergent  No   <24 hours  No   <1 week  No  Bowel Prep No  Antibiotics  Yes  DVT prophylaxis    Heparin  Yes   SCD  Yes  Drain  No  ASA (1,2,3,4) 2  OR time (min) 67  Stents  No  Transfuse >/= 2U  No  Anastomosis   Stapled  Yes  Leak Test    Not done  Yes    FOR CANCER ALSO COMPLETE:NA

## 2020-05-19 LAB
COPATH REPORT: NORMAL
GLUCOSE BLDC GLUCOMTR-MCNC: 100 MG/DL (ref 70–99)
HGB BLD-MCNC: 10.9 G/DL (ref 13.3–17.7)

## 2020-05-19 PROCEDURE — 12000000 ZZH R&B MED SURG/OB

## 2020-05-19 PROCEDURE — 36415 COLL VENOUS BLD VENIPUNCTURE: CPT | Performed by: COLON & RECTAL SURGERY

## 2020-05-19 PROCEDURE — 25000132 ZZH RX MED GY IP 250 OP 250 PS 637: Performed by: COLON & RECTAL SURGERY

## 2020-05-19 PROCEDURE — 25800030 ZZH RX IP 258 OP 636: Performed by: COLON & RECTAL SURGERY

## 2020-05-19 PROCEDURE — 00000146 ZZHCL STATISTIC GLUCOSE BY METER IP

## 2020-05-19 PROCEDURE — 85018 HEMOGLOBIN: CPT | Performed by: COLON & RECTAL SURGERY

## 2020-05-19 PROCEDURE — 25000128 H RX IP 250 OP 636: Performed by: COLON & RECTAL SURGERY

## 2020-05-19 RX ADMIN — METRONIDAZOLE 500 MG: 500 INJECTION, SOLUTION INTRAVENOUS at 02:20

## 2020-05-19 RX ADMIN — HYDROMORPHONE HYDROCHLORIDE 0.5 MG: 1 INJECTION, SOLUTION INTRAMUSCULAR; INTRAVENOUS; SUBCUTANEOUS at 14:18

## 2020-05-19 RX ADMIN — METRONIDAZOLE 500 MG: 500 INJECTION, SOLUTION INTRAVENOUS at 09:13

## 2020-05-19 RX ADMIN — CIPROFLOXACIN 400 MG: 2 INJECTION, SOLUTION INTRAVENOUS at 10:27

## 2020-05-19 RX ADMIN — HYDROMORPHONE HYDROCHLORIDE 0.5 MG: 1 INJECTION, SOLUTION INTRAMUSCULAR; INTRAVENOUS; SUBCUTANEOUS at 16:51

## 2020-05-19 RX ADMIN — OXYCODONE HYDROCHLORIDE 5 MG: 5 TABLET ORAL at 05:45

## 2020-05-19 RX ADMIN — HYDROMORPHONE HYDROCHLORIDE 0.5 MG: 1 INJECTION, SOLUTION INTRAMUSCULAR; INTRAVENOUS; SUBCUTANEOUS at 09:12

## 2020-05-19 RX ADMIN — ACETAMINOPHEN 975 MG: 325 TABLET, FILM COATED ORAL at 05:34

## 2020-05-19 RX ADMIN — HYDROMORPHONE HYDROCHLORIDE 0.5 MG: 1 INJECTION, SOLUTION INTRAMUSCULAR; INTRAVENOUS; SUBCUTANEOUS at 23:21

## 2020-05-19 RX ADMIN — ACETAMINOPHEN 975 MG: 325 TABLET, FILM COATED ORAL at 21:23

## 2020-05-19 RX ADMIN — HYDROMORPHONE HYDROCHLORIDE 0.5 MG: 1 INJECTION, SOLUTION INTRAMUSCULAR; INTRAVENOUS; SUBCUTANEOUS at 11:39

## 2020-05-19 RX ADMIN — ONDANSETRON 4 MG: 2 INJECTION INTRAMUSCULAR; INTRAVENOUS at 06:27

## 2020-05-19 RX ADMIN — ALVIMOPAN 12 MG: 12 CAPSULE ORAL at 10:27

## 2020-05-19 RX ADMIN — ENOXAPARIN SODIUM 40 MG: 40 INJECTION SUBCUTANEOUS at 09:13

## 2020-05-19 RX ADMIN — ACETAMINOPHEN 975 MG: 325 TABLET, FILM COATED ORAL at 14:17

## 2020-05-19 RX ADMIN — ALVIMOPAN 12 MG: 12 CAPSULE ORAL at 21:24

## 2020-05-19 RX ADMIN — SODIUM CHLORIDE, POTASSIUM CHLORIDE, SODIUM LACTATE AND CALCIUM CHLORIDE: 600; 310; 30; 20 INJECTION, SOLUTION INTRAVENOUS at 16:53

## 2020-05-19 ASSESSMENT — ACTIVITIES OF DAILY LIVING (ADL)
ADLS_ACUITY_SCORE: 15

## 2020-05-19 NOTE — PROGRESS NOTES
End of Shift Summary  For vital signs and complete assessments, please see documentation flowsheets.     Pertinent assessments: POD 1 from ileostomy takedown. A&O, c/o abdominal pain 5/10 Oxycodone x 2 and scheduled Tylenol given, denies any nausea, on RA. No BS, not passing flatus, dressing CDI. Capnography in place. Voided 450,     Major Shift Events   Treatment Plan: IVF, Flagyl, Cipro, pain management

## 2020-05-19 NOTE — PLAN OF CARE
Pt up SBA. VSS Amb in halls freq. BS rare No flatus. Burping. Int nausea, states IV dilaudid also helps control the nausea. Abd drsg changed by CRS. Incisional pain mostly 5/10, giving IV Dilaudid q2h PRN. Voiding adequate amount. IVF infusing. LS clear, enc IS. PCds on.

## 2020-05-19 NOTE — PROGRESS NOTES
COLON & RECTAL SURGERY  PROGRESS NOTE    May 19, 2020  Post-op Day # 1    SUBJECTIVE:  Having some nausea.  Pain mostly controlled.  Has been up ambulating some.  Passed a small amount of blood per rectum yesterday.  No flatus or stool  Tolerating small amounts of liquids.     OBJECTIVE:  Temp:  [96.9  F (36.1  C)-98.5  F (36.9  C)] 98.5  F (36.9  C)  Pulse:  [52-71] 71  Heart Rate:  [54-98] 59  Resp:  [9-33] 15  BP: (102-133)/(63-94) 120/69  SpO2:  [96 %-100 %] 99 %    Intake/Output Summary (Last 24 hours) at 5/19/2020 0911  Last data filed at 5/19/2020 0500  Gross per 24 hour   Intake 2289 ml   Output 1160 ml   Net 1129 ml       GENERAL:  Awake, alert, no acute distress, resting in bed, has washcloth over forehead   HEAD: Nomocephalic atraumatic  RESPIRATORY: unlabored breathing on room air   ABDOMEN:  Soft, appropriately tender, non-distended, incision covered with dry gauze    LABS:  Lab Results   Component Value Date    WBC 4.3 05/11/2020     Lab Results   Component Value Date    HGB 10.9 05/19/2020     Lab Results   Component Value Date    HCT 36.9 05/11/2020     Lab Results   Component Value Date     05/18/2020     Last Basic Metabolic Panel:  Lab Results   Component Value Date     02/12/2020      Lab Results   Component Value Date    POTASSIUM 3.9 02/12/2020     Lab Results   Component Value Date    CHLORIDE 104 02/12/2020     Lab Results   Component Value Date    KEZIA 8.5 02/12/2020     Lab Results   Component Value Date    CO2 29 02/12/2020     Lab Results   Component Value Date    BUN 9 02/12/2020     Lab Results   Component Value Date    CR 0.88 05/18/2020     Lab Results   Component Value Date     02/12/2020       ASSESSMENT/PLAN: POD #1 s/p ileostomy takedown  - stick with full liquids for now  - ambulate  - await return of bowel function  - Lovenox for DVT prophylaxis  - possible discharge home in 1-2 days         For questions/paging, please contact the CRS office at  [phone].    Ana Rosa Wallace PA-C  [facility]  Phone: [phone]    Colon [facility] Attending Note    Patient seen and examined independently.  Agree with above assessment and plan.  Mild nausea, up walking well  abd soft, wound clean  Plan  Continue fulls  Encourage ambulation.    Brigitte Ruano MD  [facility]  Office Phone # [phone]

## 2020-05-19 NOTE — PLAN OF CARE
End of Shift Summary  For vital signs and complete assessments, please see documentation flowsheets.     Pertinent assessments: A&Ox4, POD # 0 from ileostomy TD, abd pain IV dilaudid given x2, on scheduled Tylenol, ice cold applied on abdomen, unable to void, straight cristi 700 ml,   Major Shift Events: Straith cristi   Treatment Plan:  Cipro, Flagyl, IVF, control  pain and tolerate diet.    Discharge Readiness: Medically active  Expected Discharge Date: TBD  Discharge Disposition: Home with Self care  Barriers/Criteria for discharge tolerate diet and pain control, return of bowel function, able to void

## 2020-05-20 VITALS
RESPIRATION RATE: 14 BRPM | HEART RATE: 71 BPM | SYSTOLIC BLOOD PRESSURE: 126 MMHG | OXYGEN SATURATION: 98 % | DIASTOLIC BLOOD PRESSURE: 76 MMHG | TEMPERATURE: 97.5 F | WEIGHT: 148.6 LBS | HEIGHT: 70 IN | BODY MASS INDEX: 21.27 KG/M2

## 2020-05-20 LAB
BLD PROD TYP BPU: NORMAL
BLD PROD TYP BPU: NORMAL
BLD UNIT ID BPU: 0
BLD UNIT ID BPU: 0
BLOOD PRODUCT CODE: NORMAL
BLOOD PRODUCT CODE: NORMAL
BPU ID: NORMAL
BPU ID: NORMAL
GLUCOSE BLDC GLUCOMTR-MCNC: 128 MG/DL (ref 70–99)
TRANSFUSION STATUS PATIENT QL: NORMAL

## 2020-05-20 PROCEDURE — 25000132 ZZH RX MED GY IP 250 OP 250 PS 637: Performed by: COLON & RECTAL SURGERY

## 2020-05-20 PROCEDURE — 00000146 ZZHCL STATISTIC GLUCOSE BY METER IP

## 2020-05-20 PROCEDURE — 25000128 H RX IP 250 OP 636: Performed by: COLON & RECTAL SURGERY

## 2020-05-20 RX ORDER — OXYCODONE HYDROCHLORIDE 5 MG/1
5 TABLET ORAL EVERY 4 HOURS PRN
Qty: 6 TABLET | Refills: 0 | Status: ON HOLD | OUTPATIENT
Start: 2020-05-20 | End: 2020-06-03

## 2020-05-20 RX ADMIN — ALVIMOPAN 12 MG: 12 CAPSULE ORAL at 09:54

## 2020-05-20 RX ADMIN — OXYCODONE HYDROCHLORIDE 5 MG: 5 TABLET ORAL at 09:53

## 2020-05-20 RX ADMIN — ACETAMINOPHEN 975 MG: 325 TABLET, FILM COATED ORAL at 14:41

## 2020-05-20 RX ADMIN — ENOXAPARIN SODIUM 40 MG: 40 INJECTION SUBCUTANEOUS at 09:54

## 2020-05-20 RX ADMIN — ACETAMINOPHEN 975 MG: 325 TABLET, FILM COATED ORAL at 06:22

## 2020-05-20 RX ADMIN — OXYCODONE HYDROCHLORIDE 5 MG: 5 TABLET ORAL at 14:41

## 2020-05-20 RX ADMIN — HYDROMORPHONE HYDROCHLORIDE 0.5 MG: 1 INJECTION, SOLUTION INTRAMUSCULAR; INTRAVENOUS; SUBCUTANEOUS at 05:08

## 2020-05-20 ASSESSMENT — ACTIVITIES OF DAILY LIVING (ADL)
ADLS_ACUITY_SCORE: 15

## 2020-05-20 NOTE — PROGRESS NOTES
"Brief note  Doing well. Nausea seems to be resolved and ready to try solid food. Tolerating oral oxy. Last 2 bms were small brown \"like gravy\"  Stopping IVF. Possibly ready for discharge this afternoon pending diet tolerance and pain control    For paging/questions please contact the CRS office at [phone].    Renae Haskins MD  Colon & Rectal Surgery Fellow  [facility]  Pager: [phone]    "

## 2020-05-20 NOTE — DISCHARGE SUMMARY
.[facility] Discharge Summary      Alex Cardona MRN# [id number]   Age: 49 year old YOB: 1970     Date of Admission:  5/18/2020  Date of Discharge::  5/20/2020  Admitting Physician:  Brigitte Ruano MD  Discharge Physician:  Brigitte Ruano MD     PCP:  Kati Garcia    Disposition: Patient discharged from [facility] to home in stable condition.        Primary Diagnosis:       Refractory ulcerative colitis, status post ileal pouch anal anastomosis and diverting loop ileostomy.          Discharge Medications:     Current Discharge Medication List      START taking these medications    Details   oxyCODONE (ROXICODONE) 5 MG tablet Take 1 tablet (5 mg) by mouth every 4 hours as needed for moderate to severe pain  Qty: 6 tablet, Refills: 0    Associated Diagnoses: Ulcerative pancolitis with other complication (H)         CONTINUE these medications which have NOT CHANGED    Details   Melatonin 10 MG TABS tablet Take 5 mg by mouth nightly as needed for sleep      multivitamin w/minerals (MULTI-VITAMIN) tablet Take 1 tablet by mouth daily                    Follow Up, Special Instructions:     Discharge diet: Low residue diet    Discharge activity: No heavy lifting or straining.    Discharge follow-up: Follow up in 3 weeks in our clinic.              Procedures:     Procedure(s): Pouchoscopy and ileostomy reversal       Pathology:   SPECIMEN(S):   Ileostomy     FINAL DIAGNOSIS:   Small intestine, ileostomy closure:   - Changes of ileostomy.   - Negative for ileitis, dysplasia and malignancy.           Brief Hospital Summary:     Patient is a 49 year old male who underwent a pouchoscopy and ileostomy reversal on May 18, 2020 by Dr. Ruano.   There were no immediate complications during this procedure.  Please refer to the full operative summary for details.  The patient's hospital course was unremarkable.  He recovered as anticipated and experienced no post-operative  complications.  Diet was advanced and he had return of bowel function.  Patient was discharged home with a prescription for Oxycodone 5mg, #6 tablets.       Attestation:  I have reviewed today's vital signs, notes, medications, labs and imaging.      [facility]       Ana Rosa Wallace PA-C  [facility]  [phone]        ADDENDUM:  Length of stay: 2 days  Indicate Y or N for the following:  UTI n   C diff n  PNA n  SSI n  DVT n  PE n  CVA n  MI n  Enterocutaneous fistula n  Peripheral nerve injury n  Abscess (not adjacent to anastomosis) n  Leak n     Death within 30 days n  Reintubation n  Reoperation n      Path ileostomy benign    Brigitte Ruano MD  [facility]  Office Phone # [phone]

## 2020-05-20 NOTE — PROGRESS NOTES
End of Shift Summary  For vital signs and complete assessments, please see documentation flowsheets.     Pertinent assessments: A&O, POD 2 from ileostomy takedown, c/o abdominal pain 5/10  scheduled Tylenol and IV Dilaudid x 2 given. Intermittent nausea. On RA. BS faint, not passing flatus. Passed a small amount of blood per rectum and had a small, loose BM. Voiding adequate amount.

## 2020-05-20 NOTE — PROGRESS NOTES
Pt d/c'd home. Discharge instructions given & verbalized understanding. Discharge meds sent with pt.

## 2020-05-20 NOTE — PROGRESS NOTES
COLON & RECTAL SURGERY  PROGRESS NOTE    May 20, 2020  Post-op Day # 2    SUBJECTIVE:  Feels better.  Passed a small liquid stool.  No flatus yet.  Tolerating full liquid diet.  Hungry but being cautious about how much he is taking in.  Still with occasional nausea.  Voiding well.  Has been ambulating.  Pain controlled.  He thinks he is using less pain medication over the past 12 hours.  Had 5 doses of 0.5mg Dilaudid yesterday and one dose today.  Had one dose of 5mg Oxycodone yesterday.     OBJECTIVE:  Temp:  [97.5  F (36.4  C)-98.2  F (36.8  C)] 97.5  F (36.4  C)  Heart Rate:  [59-72] 65  Resp:  [14-16] 14  BP: (116-135)/(74-84) 126/76  SpO2:  [98 %-100 %] 98 %    Intake/Output Summary (Last 24 hours) at 5/20/2020 0831  Last data filed at 5/20/2020 0518  Gross per 24 hour   Intake 1856 ml   Output 900 ml   Net 956 ml       GENERAL:  Awake, alert, no acute distress, sitting up in bed  HEAD: Nomocephalic atraumatic  RESPIRATORY: unlabored breathing on room air   EXTREMITIES: warm and well perfused  ABDOMEN:  Soft, appropriately tender, non-distended, incision covered with dry dressing    LABS:  Lab Results   Component Value Date    WBC 4.3 05/11/2020     Lab Results   Component Value Date    HGB 10.9 05/19/2020     Lab Results   Component Value Date    HCT 36.9 05/11/2020     Lab Results   Component Value Date     05/18/2020     Last Basic Metabolic Panel:  Lab Results   Component Value Date     02/12/2020      Lab Results   Component Value Date    POTASSIUM 3.9 02/12/2020     Lab Results   Component Value Date    CHLORIDE 104 02/12/2020     Lab Results   Component Value Date    KEZIA 8.5 02/12/2020     Lab Results   Component Value Date    CO2 29 02/12/2020     Lab Results   Component Value Date    BUN 9 02/12/2020     Lab Results   Component Value Date    CR 0.88 05/18/2020     Lab Results   Component Value Date     02/12/2020       ASSESSMENT/PLAN: POD #2 s/p ileostomy reversal  - low fiber diet  as tolerated  - ambulate  - PO pain medications  - Lovenox for DVT prophylaxis   - could discharge home today if feeling well, otherwise plan for tomorrow         For questions/paging, please contact the CRS office at [phone].    Ana Rosa Wallace PA-C  [facility]  Phone: [phone]

## 2020-05-21 ENCOUNTER — TELEPHONE (OUTPATIENT)
Dept: INTERNAL MEDICINE | Facility: CLINIC | Age: 50
End: 2020-05-21

## 2020-05-21 NOTE — TELEPHONE ENCOUNTER
IP F/U    Date: 5/20/20  Diagnosis: Ulcerative pancolitis with other complication   Is patient active in care coordination? No  Was patient in TCU? No

## 2020-05-21 NOTE — TELEPHONE ENCOUNTER
"Hospital/TCU/ED for chronic condition Discharge Protocol    \"Hi, my name is Christina Desir RN, a registered nurse, and I am calling from [facility].  I am calling to follow up and see how things are going for you after your recent emergency visit/hospital/TCU stay.\"    Tell me how you are doing now that you are home?\"   Patient states he is doing well now that he is home. Patient reports his pain is well controlled, and no signs of infection are present. Patient denies current concerns but agrees to call back if concerns arise.     Discharge Instructions    \"Let's review your discharge instructions.  What is/are the follow-up recommendations?  Pt. Response: Follow up with surgeon. Patient denies need to follow up with primary care provider at this time.    \"Has an appointment with your primary care provider been scheduled?\"   See above    \"When you see the provider, I would recommend that you bring your medications with you.\"    Medications    \"Tell me what changed about your medicines when you discharged?\"    Changes to chronic meds?    0-1    \"What questions do you have about your medications?\"    None     New diagnoses of heart failure, COPD, diabetes, or MI?    No              Post Discharge Medication Reconciliation Status: discharge medications reconciled, continue medications without change.    Was MTM referral placed (*Make sure to put transitions as reason for referral)?   No    Call Summary    \"What questions or concerns do you have about your recent visit and your follow-up care?\"     none    \"If you have questions or things don't continue to improve, we encourage you contact us through the main clinic number (give number).  Even if the clinic is not open, triage nurses are available 24/7 to help you.     We would like you to know that our clinic has extended hours (provide information).  We also have urgent care (provide details on closest location and hours/contact info)\"      \"Thank you for your " "time and take care!\"             "

## 2020-06-02 ENCOUNTER — HOSPITAL ENCOUNTER (OUTPATIENT)
Facility: CLINIC | Age: 50
Setting detail: OBSERVATION
Discharge: HOME | End: 2020-06-04
Attending: EMERGENCY MEDICINE | Admitting: HOSPITALIST
Payer: COMMERCIAL

## 2020-06-02 DIAGNOSIS — Z98.890 S/P CLOSURE OF ILEOSTOMY: ICD-10-CM

## 2020-06-02 DIAGNOSIS — R10.31 RLQ ABDOMINAL PAIN: ICD-10-CM

## 2020-06-02 DIAGNOSIS — K51.911 ULCERATIVE COLITIS, ACUTE, WITH RECTAL BLEEDING (H): Primary | ICD-10-CM

## 2020-06-02 LAB
ALBUMIN SERPL-MCNC: 3.9 G/DL (ref 3.4–5)
ALP SERPL-CCNC: 71 U/L (ref 40–150)
ALT SERPL W P-5'-P-CCNC: 24 U/L (ref 0–70)
ANION GAP SERPL CALCULATED.3IONS-SCNC: 5 MMOL/L (ref 3–14)
AST SERPL W P-5'-P-CCNC: 20 U/L (ref 0–45)
BASOPHILS # BLD AUTO: 0.1 10E9/L (ref 0–0.2)
BASOPHILS NFR BLD AUTO: 0.4 %
BILIRUB SERPL-MCNC: 0.4 MG/DL (ref 0.2–1.3)
BUN SERPL-MCNC: 16 MG/DL (ref 7–30)
CALCIUM SERPL-MCNC: 9.1 MG/DL (ref 8.5–10.1)
CHLORIDE SERPL-SCNC: 103 MMOL/L (ref 94–109)
CO2 SERPL-SCNC: 28 MMOL/L (ref 20–32)
CREAT SERPL-MCNC: 0.94 MG/DL (ref 0.66–1.25)
DIFFERENTIAL METHOD BLD: ABNORMAL
EOSINOPHIL # BLD AUTO: 0.1 10E9/L (ref 0–0.7)
EOSINOPHIL NFR BLD AUTO: 0.9 %
ERYTHROCYTE [DISTWIDTH] IN BLOOD BY AUTOMATED COUNT: 16.3 % (ref 10–15)
GFR SERPL CREATININE-BSD FRML MDRD: >90 ML/MIN/{1.73_M2}
GLUCOSE SERPL-MCNC: 109 MG/DL (ref 70–99)
HCT VFR BLD AUTO: 39 % (ref 40–53)
HGB BLD-MCNC: 11.7 G/DL (ref 13.3–17.7)
IMM GRANULOCYTES # BLD: 0 10E9/L (ref 0–0.4)
IMM GRANULOCYTES NFR BLD: 0.4 %
LACTATE BLD-SCNC: 0.9 MMOL/L (ref 0.7–2)
LYMPHOCYTES # BLD AUTO: 1.1 10E9/L (ref 0.8–5.3)
LYMPHOCYTES NFR BLD AUTO: 9.9 %
MCH RBC QN AUTO: 22.6 PG (ref 26.5–33)
MCHC RBC AUTO-ENTMCNC: 30 G/DL (ref 31.5–36.5)
MCV RBC AUTO: 75 FL (ref 78–100)
MONOCYTES # BLD AUTO: 0.6 10E9/L (ref 0–1.3)
MONOCYTES NFR BLD AUTO: 5.6 %
NEUTROPHILS # BLD AUTO: 9.4 10E9/L (ref 1.6–8.3)
NEUTROPHILS NFR BLD AUTO: 82.8 %
NRBC # BLD AUTO: 0 10*3/UL
NRBC BLD AUTO-RTO: 0 /100
PLATELET # BLD AUTO: 459 10E9/L (ref 150–450)
POTASSIUM SERPL-SCNC: 3.9 MMOL/L (ref 3.4–5.3)
PROT SERPL-MCNC: 8.1 G/DL (ref 6.8–8.8)
RBC # BLD AUTO: 5.17 10E12/L (ref 4.4–5.9)
SODIUM SERPL-SCNC: 136 MMOL/L (ref 133–144)
WBC # BLD AUTO: 11.3 10E9/L (ref 4–11)

## 2020-06-02 PROCEDURE — 85025 COMPLETE CBC W/AUTO DIFF WBC: CPT | Performed by: EMERGENCY MEDICINE

## 2020-06-02 PROCEDURE — 80053 COMPREHEN METABOLIC PANEL: CPT | Performed by: EMERGENCY MEDICINE

## 2020-06-02 PROCEDURE — 96361 HYDRATE IV INFUSION ADD-ON: CPT

## 2020-06-02 PROCEDURE — 87040 BLOOD CULTURE FOR BACTERIA: CPT | Performed by: EMERGENCY MEDICINE

## 2020-06-02 PROCEDURE — 96375 TX/PRO/DX INJ NEW DRUG ADDON: CPT

## 2020-06-02 PROCEDURE — 25800030 ZZH RX IP 258 OP 636: Performed by: EMERGENCY MEDICINE

## 2020-06-02 PROCEDURE — 25000128 H RX IP 250 OP 636: Performed by: EMERGENCY MEDICINE

## 2020-06-02 PROCEDURE — 83605 ASSAY OF LACTIC ACID: CPT | Performed by: EMERGENCY MEDICINE

## 2020-06-02 PROCEDURE — 99285 EMERGENCY DEPT VISIT HI MDM: CPT | Mod: 25

## 2020-06-02 PROCEDURE — 96374 THER/PROPH/DIAG INJ IV PUSH: CPT | Mod: 59

## 2020-06-02 RX ORDER — HYDROMORPHONE HYDROCHLORIDE 1 MG/ML
0.5 INJECTION, SOLUTION INTRAMUSCULAR; INTRAVENOUS; SUBCUTANEOUS
Status: COMPLETED | OUTPATIENT
Start: 2020-06-02 | End: 2020-06-03

## 2020-06-02 RX ORDER — SODIUM CHLORIDE 9 MG/ML
1000 INJECTION, SOLUTION INTRAVENOUS CONTINUOUS
Status: DISCONTINUED | OUTPATIENT
Start: 2020-06-02 | End: 2020-06-03

## 2020-06-02 RX ORDER — ONDANSETRON 2 MG/ML
4 INJECTION INTRAMUSCULAR; INTRAVENOUS
Status: DISCONTINUED | OUTPATIENT
Start: 2020-06-02 | End: 2020-06-03

## 2020-06-02 RX ADMIN — SODIUM CHLORIDE 1000 ML: 9 INJECTION, SOLUTION INTRAVENOUS at 23:20

## 2020-06-02 RX ADMIN — HYDROMORPHONE HYDROCHLORIDE 0.5 MG: 1 INJECTION, SOLUTION INTRAMUSCULAR; INTRAVENOUS; SUBCUTANEOUS at 23:21

## 2020-06-02 RX ADMIN — ONDANSETRON 4 MG: 2 INJECTION INTRAMUSCULAR; INTRAVENOUS at 23:22

## 2020-06-02 ASSESSMENT — ENCOUNTER SYMPTOMS
COUGH: 0
HEMATURIA: 0
APPETITE CHANGE: 1
NAUSEA: 1
ABDOMINAL PAIN: 1
VOMITING: 0
DIFFICULTY URINATING: 1
DYSURIA: 0
RECTAL PAIN: 1
BLOOD IN STOOL: 0
CHILLS: 0
FEVER: 1
DIARRHEA: 1
MYALGIAS: 0

## 2020-06-03 ENCOUNTER — APPOINTMENT (OUTPATIENT)
Dept: CT IMAGING | Facility: CLINIC | Age: 50
End: 2020-06-03
Attending: EMERGENCY MEDICINE
Payer: COMMERCIAL

## 2020-06-03 PROBLEM — R10.9 ABDOMINAL PAIN: Status: ACTIVE | Noted: 2020-06-03

## 2020-06-03 LAB
ALBUMIN UR-MCNC: NEGATIVE MG/DL
APPEARANCE UR: CLEAR
BILIRUB UR QL STRIP: NEGATIVE
COLOR UR AUTO: ABNORMAL
GLUCOSE UR STRIP-MCNC: NEGATIVE MG/DL
HGB UR QL STRIP: NEGATIVE
KETONES UR STRIP-MCNC: NEGATIVE MG/DL
LEUKOCYTE ESTERASE UR QL STRIP: NEGATIVE
MUCOUS THREADS #/AREA URNS LPF: PRESENT /LPF
NITRATE UR QL: NEGATIVE
PH UR STRIP: 5.5 PH (ref 5–7)
RBC #/AREA URNS AUTO: 1 /HPF (ref 0–2)
SOURCE: ABNORMAL
SP GR UR STRIP: 1.02 (ref 1–1.03)
UROBILINOGEN UR STRIP-MCNC: NORMAL MG/DL (ref 0–2)
WBC #/AREA URNS AUTO: 1 /HPF (ref 0–5)

## 2020-06-03 PROCEDURE — 25000128 H RX IP 250 OP 636: Performed by: EMERGENCY MEDICINE

## 2020-06-03 PROCEDURE — 25000132 ZZH RX MED GY IP 250 OP 250 PS 637: Performed by: HOSPITALIST

## 2020-06-03 PROCEDURE — 99207 ZZC APP CREDIT; MD BILLING SHARED VISIT: CPT | Performed by: PHYSICIAN ASSISTANT

## 2020-06-03 PROCEDURE — 99220 ZZC INITIAL OBSERVATION CARE,LEVL III: CPT | Performed by: HOSPITALIST

## 2020-06-03 PROCEDURE — 25000132 ZZH RX MED GY IP 250 OP 250 PS 637: Performed by: PHYSICIAN ASSISTANT

## 2020-06-03 PROCEDURE — G0378 HOSPITAL OBSERVATION PER HR: HCPCS

## 2020-06-03 PROCEDURE — 96376 TX/PRO/DX INJ SAME DRUG ADON: CPT

## 2020-06-03 PROCEDURE — 81001 URINALYSIS AUTO W/SCOPE: CPT | Performed by: EMERGENCY MEDICINE

## 2020-06-03 PROCEDURE — 25800030 ZZH RX IP 258 OP 636: Performed by: HOSPITALIST

## 2020-06-03 PROCEDURE — 25000125 ZZHC RX 250: Performed by: EMERGENCY MEDICINE

## 2020-06-03 PROCEDURE — 74177 CT ABD & PELVIS W/CONTRAST: CPT

## 2020-06-03 RX ORDER — LANOLIN ALCOHOL/MO/W.PET/CERES
3 CREAM (GRAM) TOPICAL
Status: DISCONTINUED | OUTPATIENT
Start: 2020-06-03 | End: 2020-06-04 | Stop reason: HOSPADM

## 2020-06-03 RX ORDER — DIAZEPAM 5 MG
5 TABLET ORAL EVERY 12 HOURS PRN
Status: ON HOLD | COMMUNITY
End: 2020-06-04

## 2020-06-03 RX ORDER — NALOXONE HYDROCHLORIDE 0.4 MG/ML
.1-.4 INJECTION, SOLUTION INTRAMUSCULAR; INTRAVENOUS; SUBCUTANEOUS
Status: DISCONTINUED | OUTPATIENT
Start: 2020-06-03 | End: 2020-06-04 | Stop reason: HOSPADM

## 2020-06-03 RX ORDER — ONDANSETRON 2 MG/ML
4 INJECTION INTRAMUSCULAR; INTRAVENOUS EVERY 6 HOURS PRN
Status: DISCONTINUED | OUTPATIENT
Start: 2020-06-03 | End: 2020-06-04 | Stop reason: HOSPADM

## 2020-06-03 RX ORDER — LIDOCAINE 4 G/G
1 PATCH TOPICAL
Status: DISCONTINUED | OUTPATIENT
Start: 2020-06-03 | End: 2020-06-04 | Stop reason: HOSPADM

## 2020-06-03 RX ORDER — PROCHLORPERAZINE MALEATE 5 MG
10 TABLET ORAL EVERY 6 HOURS PRN
Status: DISCONTINUED | OUTPATIENT
Start: 2020-06-03 | End: 2020-06-04 | Stop reason: HOSPADM

## 2020-06-03 RX ORDER — OXYCODONE HYDROCHLORIDE 5 MG/1
5-10 TABLET ORAL
Status: DISCONTINUED | OUTPATIENT
Start: 2020-06-03 | End: 2020-06-04

## 2020-06-03 RX ORDER — HYDROMORPHONE HYDROCHLORIDE 1 MG/ML
0.5 INJECTION, SOLUTION INTRAMUSCULAR; INTRAVENOUS; SUBCUTANEOUS
Status: DISCONTINUED | OUTPATIENT
Start: 2020-06-03 | End: 2020-06-04

## 2020-06-03 RX ORDER — ACETAMINOPHEN 325 MG/1
650 TABLET ORAL EVERY 4 HOURS PRN
Status: DISCONTINUED | OUTPATIENT
Start: 2020-06-03 | End: 2020-06-04 | Stop reason: HOSPADM

## 2020-06-03 RX ORDER — IOPAMIDOL 755 MG/ML
500 INJECTION, SOLUTION INTRAVASCULAR ONCE
Status: COMPLETED | OUTPATIENT
Start: 2020-06-03 | End: 2020-06-03

## 2020-06-03 RX ORDER — ACETAMINOPHEN 650 MG/1
650 SUPPOSITORY RECTAL EVERY 4 HOURS PRN
Status: DISCONTINUED | OUTPATIENT
Start: 2020-06-03 | End: 2020-06-04 | Stop reason: HOSPADM

## 2020-06-03 RX ORDER — PROCHLORPERAZINE 25 MG
25 SUPPOSITORY, RECTAL RECTAL EVERY 12 HOURS PRN
Status: DISCONTINUED | OUTPATIENT
Start: 2020-06-03 | End: 2020-06-04 | Stop reason: HOSPADM

## 2020-06-03 RX ORDER — ONDANSETRON 4 MG/1
4 TABLET, ORALLY DISINTEGRATING ORAL EVERY 6 HOURS PRN
Status: DISCONTINUED | OUTPATIENT
Start: 2020-06-03 | End: 2020-06-04 | Stop reason: HOSPADM

## 2020-06-03 RX ORDER — SODIUM CHLORIDE, SODIUM LACTATE, POTASSIUM CHLORIDE, CALCIUM CHLORIDE 600; 310; 30; 20 MG/100ML; MG/100ML; MG/100ML; MG/100ML
INJECTION, SOLUTION INTRAVENOUS CONTINUOUS
Status: DISCONTINUED | OUTPATIENT
Start: 2020-06-03 | End: 2020-06-04 | Stop reason: HOSPADM

## 2020-06-03 RX ORDER — ACETAMINOPHEN 500 MG
1000 TABLET ORAL EVERY 8 HOURS PRN
COMMUNITY

## 2020-06-03 RX ORDER — DIAZEPAM 5 MG
5 TABLET ORAL EVERY 12 HOURS PRN
Status: DISCONTINUED | OUTPATIENT
Start: 2020-06-03 | End: 2020-06-04 | Stop reason: HOSPADM

## 2020-06-03 RX ADMIN — OXYCODONE HYDROCHLORIDE 10 MG: 5 TABLET ORAL at 17:20

## 2020-06-03 RX ADMIN — SODIUM CHLORIDE 59 ML: 9 INJECTION, SOLUTION INTRAVENOUS at 00:04

## 2020-06-03 RX ADMIN — IOPAMIDOL 75 ML: 755 INJECTION, SOLUTION INTRAVENOUS at 00:03

## 2020-06-03 RX ADMIN — HYDROMORPHONE HYDROCHLORIDE 0.5 MG: 1 INJECTION, SOLUTION INTRAMUSCULAR; INTRAVENOUS; SUBCUTANEOUS at 00:21

## 2020-06-03 RX ADMIN — HYDROMORPHONE HYDROCHLORIDE 0.5 MG: 1 INJECTION, SOLUTION INTRAMUSCULAR; INTRAVENOUS; SUBCUTANEOUS at 04:36

## 2020-06-03 RX ADMIN — SODIUM CHLORIDE, POTASSIUM CHLORIDE, SODIUM LACTATE AND CALCIUM CHLORIDE: 600; 310; 30; 20 INJECTION, SOLUTION INTRAVENOUS at 14:55

## 2020-06-03 RX ADMIN — OXYCODONE HYDROCHLORIDE 10 MG: 5 TABLET ORAL at 05:20

## 2020-06-03 RX ADMIN — OXYCODONE HYDROCHLORIDE 10 MG: 5 TABLET ORAL at 09:34

## 2020-06-03 RX ADMIN — OXYCODONE HYDROCHLORIDE 10 MG: 5 TABLET ORAL at 20:34

## 2020-06-03 RX ADMIN — LIDOCAINE 1 PATCH: 560 PATCH PERCUTANEOUS; TOPICAL; TRANSDERMAL at 10:27

## 2020-06-03 RX ADMIN — OXYCODONE HYDROCHLORIDE 10 MG: 5 TABLET ORAL at 13:38

## 2020-06-03 RX ADMIN — SODIUM CHLORIDE, POTASSIUM CHLORIDE, SODIUM LACTATE AND CALCIUM CHLORIDE: 600; 310; 30; 20 INJECTION, SOLUTION INTRAVENOUS at 05:20

## 2020-06-03 NOTE — PHARMACY-ADMISSION MEDICATION HISTORY
Admission medication history interview status for this patient is complete. See [facility] admission navigator for allergy information, prior to admission medications and immunization status.     Medication history interview done via telephone during Covid-19 pandemic, indicate source(s): Patient  Medication history resources (including written lists, pill bottles, clinic record): SureScripts and Care Everywhere    Changes made to PTA medication list:  Added: diazepam, diltiazem gel, acetaminophen, calmoseptine  Deleted: oxycodone - ran out  Changed: none    Actions taken by pharmacist (provider contacted, etc): Called patient to verify med list     Additional medication history information: There was no fill history for diazepam or the diltiazem gel.    Medication reconciliation/reorder completed by provider prior to medication history?  N   (Y/N)     For patients on insulin therapy: N  (Y/N)      Prior to Admission medications    Medication Sig Last Dose Taking? Auth Provider   acetaminophen (TYLENOL) 500 MG tablet Take 1,000 mg by mouth every 8 hours as needed for mild pain 6/2/2020 at 0200 Yes Unknown, Entered By History   diazepam (VALIUM) 5 MG tablet Take 5 mg by mouth every 12 hours as needed for anxiety 6/2/2020 at 0200 Yes Unknown, Entered By History   diltiazem 2% in PLO gel Apply topically 2 times daily To the anal region 6/2/2020 at 1300 Yes Unknown, Entered By History   Melatonin 10 MG TABS tablet Take 10 mg by mouth nightly as needed for sleep  Past Month at Unknown time Yes Reported, Patient   menthol-zinc oxide (CALMOSEPTINE) 0.44-20.6 % OINT ointment Apply topically 4 times daily as needed for skin protection or irritation 6/2/2020 at Unknown time Yes Unknown, Entered By History   multivitamin w/minerals (MULTI-VITAMIN) tablet Take 1 tablet by mouth daily 6/2/2020 at Unknown time Yes Reported, Patient

## 2020-06-03 NOTE — PLAN OF CARE
ROOM # [id number]    Living Situation (if not independent, order SW consult):  Lives with mother in apartment with stairs  [facility] name:  : [facility]    Activity level at baseline: Ind  Activity level on admit: SBA      Patient registered to observation; given Patient Bill of Rights; given the opportunity to ask questions about observation status and their plan of care.  Patient has been oriented to the observation room, bathroom and call light is in place.    Discussed discharge goals and expectations with patient/family.

## 2020-06-03 NOTE — ED NOTES
[facility]  ED Nurse Handoff Report    Alex Cardona is a 49 year old male   ED Chief complaint: Abdominal Pain and Fever  . ED Diagnosis:   Final diagnoses:   RLQ abdominal pain     Allergies:   Allergies   Allergen Reactions     Blood Transfusion Related (Informational Only) Other (See Comments)     Patient has a history of a clinically significant antibody against RBC antigens.  A delay in compatible RBCs may occur.       Code Status: Full Code  Activity level - Baseline/Home:  Independent. Activity Level - Current:   Stand by Assist. Lift room needed: No. Bariatric: No   Needed: No   Isolation: No. Infection: Not applicable.    Vital Signs:   Vitals:    06/03/20 0145 06/03/20 0200 06/03/20 0215 06/03/20 0230   BP: 114/86 113/66 105/62 102/62   Pulse: 81 76 76 77   Resp:       Temp:       TempSrc:       SpO2: 100%  100%        Cardiac Rhythm:  ,   Cardiac  Cardiac Rhythm: Normal sinus rhythm  Pain level: 0-10 Pain Scale: 7  Patient confused: No. Patient Falls Risk: Yes.   Elimination Status: Has voided   Patient Report - Initial Complaint: Patient comes in for evaluation of fever and abdominal pain. Patient had a J pouch takedown surgery on 3/18. Today developed a  fever and pain is worsening. Focused Assessment: Gastrointestinal - GI WDL: -WDL except  Nausea/Vomiting Signs/Symptoms: nausea intermittent (Pt c/o intermittent nausea with lack of appetite over the past few days. No vomiting) Last Bowel Movement: 06/02/20  GI Signs/Symptoms: abdominal discomfort; abdominal pain; nausea (Pt c/o 7/10 generalized abdominal pain today which he describes as a constant dull pain) Gastrointestinal Comment: Pt c/o generalized abdominal pain that began this afternoon at 1630 after eating a small meal. Pt reports increased pain in the RLQ that has been present for the past few weeks. Pt has 1 year hx of ulcerative colitis and recently had j pouch takedown surgery on 5/18.    Respiratory - Respiratory  WDL: -WDL except  Breath Sounds: All Fields (Pt c/o some mild SOB which he thinks is d/t his abdominal pain)  Head To Toe Assessment - All Lung Fields Breath Sounds: Anterior:; Posterior:; equal bilaterally; clear   Cardiac - Cardiac WDL: WDL   Cardiac Monitoring - EKG Monitoring: Yes  Cardiac Regularity: Regular  Cardiac Rhythm: NSR   Tests Performed: IV labs, imaging. Abnormal Results:   Labs Ordered and Resulted from Time of ED Arrival Up to the Time of Departure from the ED   CBC WITH PLATELETS DIFFERENTIAL - Abnormal; Notable for the following components:       Result Value    WBC 11.3 (*)     Hemoglobin 11.7 (*)     Hematocrit 39.0 (*)     MCV 75 (*)     MCH 22.6 (*)     MCHC 30.0 (*)     RDW 16.3 (*)     Platelet Count 459 (*)     Absolute Neutrophil 9.4 (*)     All other components within normal limits   COMPREHENSIVE METABOLIC PANEL - Abnormal; Notable for the following components:    Glucose 109 (*)     All other components within normal limits   ROUTINE UA WITH MICROSCOPIC - Abnormal; Notable for the following components:    Mucous Urine Present (*)     All other components within normal limits   LACTIC ACID WHOLE BLOOD   PERIPHERAL IV CATHETER   BLADDER SCAN   BLOOD CULTURE   BLOOD CULTURE     Abd/pelvis CT,  IV  contrast only TRAUMA / AAA   Final Result   IMPRESSION:    1. Evidence of recent abdominal surgery. No visualized abscess.   2. No other acute abnormality identified in the abdomen or pelvis.          Treatments provided: IV fluids, pain meds  Family Comments: N/A  OBS brochure/video discussed/provided to patient: YES  ED Medications:   Medications   HYDROmorphone (PF) (DILAUDID) injection 0.5 mg (0.5 mg Intravenous Given 6/3/20 0021)   sodium chloride 0.9% infusion (has no administration in time range)   ondansetron (ZOFRAN) injection 4 mg (4 mg Intravenous Given 6/2/20 2322)   0.9% sodium chloride BOLUS (0 mLs Intravenous Stopped 6/3/20 0123)   CT Scan Flush (59 mLs Intravenous Given 6/3/20  0004)   iopamidol (ISOVUE-370) solution 500 mL (75 mLs Intravenous Given 6/3/20 0003)     Drips infusing:  No  For the majority of the shift, the patient's behavior Green. Interventions performed were None.    Sepsis treatment initiated: No       ED Nurse Name/Phone Number: Tamra Allen RN,   2:48 AM    RECEIVING UNIT ED HANDOFF REVIEW    Above ED Nurse Handoff Report was reviewed: Yes  Reviewed by: Apple Falcon RN on Sonia 3, 2020 at 4:22 AM

## 2020-06-03 NOTE — PROGRESS NOTES
SPIRITUAL HEALTH SERVICES Progress Note  [facility] Obs 2    Spiritual Health Services consult order for assistance with healthcare directive.  Provided informational resources and education related to directive.  Due to Covid visit restrictions, notary services are not available at hospital.    Pt would like to review documents with healthcare agent and will request follow up as needed.      Jigar Ortiz MA  Staff   Pager: [phone]  Phone: [phone]

## 2020-06-03 NOTE — PROGRESS NOTES
PRIMARY DIAGNOSIS: Abdominal Pain   OUTPATIENT/OBSERVATION GOALS TO BE MET BEFORE DISCHARGE:  1. ADLs back to baseline: Yes     2. Activity and level of assistance: Ambulating independently.     3. Pain status: Improved but still requiring IV narcotics.     4. Return to near baseline physical activity: Yes             Discharge Planner Nurse      Safe discharge environment identified: Yes  Barriers to discharge: Yes       Entered by: Norma Chung 06/03/2020 9546     Please review provider order for any additional goals.   Nurse to notify provider when observation goals have been met and patient is ready for discharge.     Patient is alert and oriented x4. BP soft this afternoon and KATIE Houser, is aware and will not do an intervention at this time. Lung sounds clear in all lobes and patient is on RA. Denies SOB. Active bowel sounds in all 4 quadrants with LBM this morning. Dressing in LRQ is intact (wound packed). No s/s of infection. Patient denies any pain, urgency, and frequency when voiding. Patient rating abdominal pain a 5-6/10 and can have Oxycodone and Tylenol for pain. Patient started on clears. IV fluids infusing at 100 ml/hr. Denies nausea. Patient is independent in room, but educated him on asking for help if he feels lightheaded or dizzy. Plan: Colorectal consult.     BP 96/56 (BP Location: Right arm)   Pulse 77   Temp 97.5  F (36.4  C) (Oral)   Resp 16   SpO2 98%

## 2020-06-03 NOTE — PROGRESS NOTES
PRIMARY DIAGNOSIS: Abdominal Pain   OUTPATIENT/OBSERVATION GOALS TO BE MET BEFORE DISCHARGE:  1. ADLs back to baseline: Yes    2. Activity and level of assistance: Ambulating independently.    3. Pain status: Improved but still requiring IV narcotics.    4. Return to near baseline physical activity: Yes     Discharge Planner Nurse   Safe discharge environment identified: Yes  Barriers to discharge: Yes       Entered by: Norma Chung 06/03/2020 7:55 AM     Please review provider order for any additional goals.   Nurse to notify provider when observation goals have been met and patient is ready for discharge.    Patient is alert and oriented x4. VS WNL and documented on the FS. Lung sounds clear in all lobes and patient is on RA. Denies SOB. Active bowel sounds in all 4 quadrants with LBM this morning. Patient states he has been having a BM Q2hrs since being on obs. Dressing in LRQ is intact (wound packed). No s/s of infection. Patient denies any pain, urgency, and frequency when voiding. Patient rating abdominal pain a 5-6/10 and can have Oxycodone and Tylenol for pain. Patient NPO and IV fluids infusing at 100 ml/hr. Denies nausea. Patient is independent in room, but educated him on asking for help if he feels lightheaded or dizzy. Plan: Colorectal consult.     BP 94/54 (BP Location: Left arm)   Pulse 77   Temp 98  F (36.7  C) (Oral)   Resp 16   SpO2 97%

## 2020-06-03 NOTE — PROGRESS NOTES
[facility]    Hospitalist Progress Note  Name: Alex Cardona    MRN: [id number]  Provider:  Em Henry PA-C  Date of Service: 06/03/2020    Assessment & Plan   Summary of Stay: Alex Cardona is a 49-year-old male with a history of ulcerative colitis, status post ileostomy and subsequent takedown about 2 weeks ago who was admitted on 6/2/20 for evaluation of abdominal pain and fever.    #Abdominal pain, fever: unclear etiology, likely related to recent surgery. No signs of underlying abscess, hematoma, or bowel obstruction noted on CT scan. Afebrile and no leukocytosis. CRS contacted initially in the ED and recommended the patient trial diet modification with including less fiber in his diet while changing to liquid/protein diet and reintroducing solid foods in the next few days.   - continue LR at 100 ml/hour until adequate PO intake  - clear liquid diet  - CRS consulted and following, recommended the patient start with a clear liquid diet today  - Pain control with PRN tylenol, oxycodone, and IV dilaudid available for severe pain   - PRN antiemetics     #Refractory ulcerative colitis  #s/p ileal pouch, anal anastomosis, and diverting loop ileostomy with recent ileostomy reversal/takedown: procedure performed by Dr. Ruano without complication   - Planned outpatient follow with CRS on 6/8/20    #Hypotension: blood pressure down to 84/44 today with recheck of 96/56, patient asymptomatic. Appears to have had soft BPs throughout stay, will continue IVFs and monitor.     DVT Prophylaxis: Ambulate every shift  Code Status: Full Code  Disposition: Expected discharge tomorrow if pain improves and tolerating adequate PO intake    Interval History   Patient reports improvement in pain but still present and more localized to the right side today with radiation into side. Pain increases with movement. He has had two stools today. Denies associated nausea or vomiting. He reports anorexia.     -Data reviewed  today: I reviewed all new labs and imaging reports over the last 24 hours.    Physical Exam   Temp: 97.5  F (36.4  C) Temp src: Oral BP: 96/56(Updated Em WILSON and ok with this BP ) Pulse: 77 Heart Rate: 71 Resp: 16 SpO2: 98 % O2 Device: None (Room air)    There were no vitals filed for this visit.  Vital Signs with Ranges  Temp:  [97.5  F (36.4  C)-99.1  F (37.3  C)] 97.5  F (36.4  C)  Pulse:  [] 77  Heart Rate:  [71-93] 71  Resp:  [16] 16  BP: ()/(44-89) 96/56  SpO2:  [97 %-100 %] 98 %  No intake/output data recorded.    GEN:  Alert, oriented x 3, appears comfortable, NAD.  HEENT:  Normocephalic/atraumatic, no scleral icterus, no nasal discharge, mouth moist.  CV:  Regular rate and rhythm, no murmur or JVD.  S1 + S2 noted, no S3 or S4.  LUNGS:  Clear to auscultation bilaterally without rales/rhonchi/wheezing/retractions.  Symmetric chest rise on inhalation noted.  ABD:  Active bowel sounds, soft, recent incision and scar present, c/d/i, tendeness over RLQ with palpation, non-distended.  No rebound/guarding/rigidity.  EXT:  No edema.  No cyanosis.  No acute joint synovitis noted.  SKIN:  Dry to touch, no exanthems noted in the visualized areas.    Medications     lactated ringers 100 mL/hr at 06/03/20 0520       lidocaine  1 patch Transdermal Q24H     lidocaine   Transdermal Q8H     Data   Results for orders placed or performed during the hospital encounter of 06/02/20   Abd/pelvis CT,  IV  contrast only TRAUMA / AAA     Status: None    Narrative    EXAM: CT ABDOMEN AND PELVIS WITH CONTRAST  LOCATION: [facility]  DATE/TIME: 6/3/2020 12:02 AM    INDICATION: History of ulcerative colitis status post colectomy and ileostomy take down 15 days ago. Acute generalized abdominal pain with fever.  COMPARISON: None.    TECHNIQUE: CT scan of the abdomen and pelvis was performed following injection of IV contrast. Multiplanar reformats were obtained. Dose reduction techniques were used.  CONTRAST:  75mL Isovue-370    FINDINGS:    LOWER CHEST: Unremarkable.    HEPATOBILIARY: Unremarkable.    SPLEEN: Unremarkable.    PANCREAS: Unremarkable.    ADRENAL GLANDS: Unremarkable.    KIDNEYS/BLADDER: Unremarkable.    BOWEL: Prior colectomy with ileoanal anastomosis. A small amount of stranding and a minimal amount of fluid is present within the soft tissues about the small bowel in the regions of surgery, within normal limits for the recent surgery. No visualized   bowel wall thickening, pneumatosis or free intraperitoneal gas. No loculated fluid collections in the abdomen or pelvis.    LYMPH NODES: Unremarkable.    VASCULATURE: Atherosclerotic calcification in the abdominal aorta.      Impression    IMPRESSION:   1. Evidence of recent abdominal surgery. No visualized abscess.  2. No other acute abnormality identified in the abdomen or pelvis.    CBC + differential     Status: Abnormal   Result Value Ref Range    WBC 11.3 (H) 4.0 - 11.0 10e9/L    RBC Count 5.17 4.4 - 5.9 10e12/L    Hemoglobin 11.7 (L) 13.3 - 17.7 g/dL    Hematocrit 39.0 (L) 40.0 - 53.0 %    MCV 75 (L) 78 - 100 fl    MCH 22.6 (L) 26.5 - 33.0 pg    MCHC 30.0 (L) 31.5 - 36.5 g/dL    RDW 16.3 (H) 10.0 - 15.0 %    Platelet Count 459 (H) 150 - 450 10e9/L    Diff Method Automated Method     % Neutrophils 82.8 %    % Lymphocytes 9.9 %    % Monocytes 5.6 %    % Eosinophils 0.9 %    % Basophils 0.4 %    % Immature Granulocytes 0.4 %    Nucleated RBCs 0 0 /100    Absolute Neutrophil 9.4 (H) 1.6 - 8.3 10e9/L    Absolute Lymphocytes 1.1 0.8 - 5.3 10e9/L    Absolute Monocytes 0.6 0.0 - 1.3 10e9/L    Absolute Eosinophils 0.1 0.0 - 0.7 10e9/L    Absolute Basophils 0.1 0.0 - 0.2 10e9/L    Abs Immature Granulocytes 0.0 0 - 0.4 10e9/L    Absolute Nucleated RBC 0.0    Comprehensive metabolic panel     Status: Abnormal   Result Value Ref Range    Sodium 136 133 - 144 mmol/L    Potassium 3.9 3.4 - 5.3 mmol/L    Chloride 103 94 - 109 mmol/L    Carbon Dioxide 28 20 - 32 mmol/L     Anion Gap 5 3 - 14 mmol/L    Glucose 109 (H) 70 - 99 mg/dL    Urea Nitrogen 16 7 - 30 mg/dL    Creatinine 0.94 0.66 - 1.25 mg/dL    GFR Estimate >90 >60 mL/min/[1.73_m2]    GFR Estimate If Black >90 >60 mL/min/[1.73_m2]    Calcium 9.1 8.5 - 10.1 mg/dL    Bilirubin Total 0.4 0.2 - 1.3 mg/dL    Albumin 3.9 3.4 - 5.0 g/dL    Protein Total 8.1 6.8 - 8.8 g/dL    Alkaline Phosphatase 71 40 - 150 U/L    ALT 24 0 - 70 U/L    AST 20 0 - 45 U/L   Lactic acid whole blood     Status: None   Result Value Ref Range    Lactic Acid 0.9 0.7 - 2.0 mmol/L   UA with Microscopic     Status: Abnormal   Result Value Ref Range    Color Urine Light Yellow     Appearance Urine Clear     Glucose Urine Negative NEG^Negative mg/dL    Bilirubin Urine Negative NEG^Negative    Ketones Urine Negative NEG^Negative mg/dL    Specific Gravity Urine 1.020 1.003 - 1.035    Blood Urine Negative NEG^Negative    pH Urine 5.5 5.0 - 7.0 pH    Protein Albumin Urine Negative NEG^Negative mg/dL    Urobilinogen mg/dL Normal 0.0 - 2.0 mg/dL    Nitrite Urine Negative NEG^Negative    Leukocyte Esterase Urine Negative NEG^Negative    Source Midstream Urine     WBC Urine 1 0 - 5 /HPF    RBC Urine 1 0 - 2 /HPF    Mucous Urine Present (A) NEG^Negative /LPF   Blood culture     Status: None (Preliminary result)    Specimen: Blood    Right Arm   Result Value Ref Range    Specimen Description Blood Right Arm     Culture Micro No growth after 3 hours    Blood culture     Status: None (Preliminary result)    Specimen: Blood    Left Arm   Result Value Ref Range    Specimen Description Blood Left Arm     Culture Micro No growth after 3 hours      Em Henry PA-C

## 2020-06-03 NOTE — ED TRIAGE NOTES
Patient comes in for evaluation of fever and abdominal pain. Patient had a J pouch takedown surgery on 3/18. Today developed a  fever and pain is worsening.

## 2020-06-03 NOTE — ED PROVIDER NOTES
History     Chief Complaint:  Abdominal Pain and Fever      HPI   Alex Cardona is a 49 year old male with a history of ulcerative colitis s/p ileostomy takedown 15 days who presents for the evaluation of abdominal pain and fever. The patient reports that his normal abdominal pain since his ileostomy takedown surgery has been worsening today since 1700 and that he also developed a fever measured at 100.6, prompting him to the ED. The patient describes that his pain is mainly located in his inguinal area and that it is worsened with any movement. He notes that he is also experiencing difficulty urinating due to spasms in his inguinal area. Patient also endorses a decreased appetite for the last few days and nausea. He states that he is also experiencing rectal/anal pain which has remained unchanged and persistent since his surgery as well as diarrhea which is unchanged from his baseline. He also notes that his ileostomy takedown wound has been healing well and denies any discharge from it. No medication today. He also states that he had been recovering well since his ileostomy takedown and has been walking a few miles every day for exercise. The patient denies blood in his stool, vomiting, chills, dysuria, hematuria, cough, sneezing, myalgias, and other issues. No history of bowel obstructions.     Allergies:  RBC antigens     Medications:    Roxicodone    Past Medical History:    Ulcerative colitis  Diarrhea  Hypoproteinemia  Ileostomy status    Past Surgical History:    Laparoscopic assisted colectomy - 6/12/2019  Laparoscopic assisted completion proctocolectomy - 2/11/2020  Sigmoidoscopy flexible - 1/13/2020  Takedown ileostomy - 5/18/2020    Family History:    Heart disease - mother  COPD - mother  Cancer- father    Social History:  Smoking status: Former smoker, quit date: 2/5/2009  Alcohol use:   Drug use: Sober from marijuana since 5/2019  PCP: Kati Garcia   Marital Status:  Single [1]      Review of Systems   Constitutional: Positive for appetite change and fever. Negative for chills.   Respiratory: Negative for cough.    Gastrointestinal: Positive for abdominal pain, diarrhea, nausea and rectal pain. Negative for blood in stool and vomiting.   Genitourinary: Positive for difficulty urinating. Negative for dysuria and hematuria.   Musculoskeletal: Negative for myalgias.   All other systems reviewed and are negative.      Physical Exam     Patient Vitals for the past 24 hrs:   BP Temp Temp src Pulse Heart Rate Resp SpO2   06/03/20 0230 102/62 -- -- 77 82 -- --   06/03/20 0215 105/62 -- -- 76 77 -- 100 %   06/03/20 0200 113/66 -- -- 76 81 -- --   06/03/20 0145 114/86 -- -- 81 84 -- 100 %   06/03/20 0120 115/65 -- -- -- 80 -- 99 %   06/03/20 0115 115/65 -- -- 81 77 -- 98 %   06/03/20 0100 117/69 -- -- 86 85 -- 98 %   06/03/20 0030 123/68 -- -- 82 80 -- 98 %   06/03/20 0018 -- 98.7  F (37.1  C) Oral -- -- -- --   06/02/20 2345 117/80 -- -- 84 81 -- 99 %   06/02/20 2315 114/70 -- -- 85 87 -- 100 %   06/02/20 2300 125/89 -- -- 96 93 -- 100 %   06/02/20 2230 124/83 99.1  F (37.3  C) Oral 108 -- 16 100 %      Physical Exam  General: Alert, no acute distress; nontoxic appearing deficits  HEENT:  Moist mucous membranes.  Conjunctiva normal.   CV:  Tachycardic, regular rhythm, no m/r/g, skin warm and well perfused  Pulm:  CTAB, no wheezes/ronchi/rales.  No acute distress, breathing comfortably  GI:  Soft and nondistended. Right lower quadrant tenderness. No rebound or guarding.  Normal bowel sounds; ileostomy site appears well healing in the RLQ  MSK:  Moving all extremities.  No focal areas of edema, erythema, or tenderness  Skin:  WWP, no rashes, no lower extremity edema, skin color normal, no diaphoresis  Psych:  Well-appearing, normal affect, regular speech    Emergency Department Course   Imaging:  Radiographic findings were communicated with the patient who voiced understanding of the  findings.  Abd/pelvis CT, IV Contrast Only TRAUMA/AAA   IMPRESSION:   1. Evidence of recent surgery. No visualized abscess.  2. No other acute abnormality identified in the abdomen or pelvis.   As read by Radiology.    Laboratory:  UA: Mucous Present, o/w Negative    CBC: WBC 11.3 (H), HGB 11.7 (L),  (H)  CMP: glucose 109 (H), WNL (Creatinine 0.94)  Lactic acid whole blood (2326): WNL 0.9  Blood Culture x2: Pending     Interventions:  Medications   HYDROmorphone (PF) (DILAUDID) injection 0.5 mg (0.5 mg Intravenous Given 6/3/20 0021)   sodium chloride 0.9% infusion (has no administration in time range)   ondansetron (ZOFRAN) injection 4 mg (4 mg Intravenous Given 6/2/20 2322)   0.9% sodium chloride BOLUS (0 mLs Intravenous Stopped 6/3/20 0123)   CT Scan Flush (59 mLs Intravenous Given 6/3/20 0004)   iopamidol (ISOVUE-370) solution 500 mL (75 mLs Intravenous Given 6/3/20 0003)     2320, NS 1L IV Bolus  2321, Dilaudid, 0.5 mg, IV  2322, Zofran, 4 mg, IV  0021, Dilaudid, 0.5 mg, IV    Emergency Department Course:  Past medical records, nursing notes, and vitals reviewed.  2255: I performed an exam of the patient and obtained history, as documented above.     IV inserted and blood drawn.     The patient was sent for an abdomen pelvis CT while in the emergency department, findings above.    0100: I rechecked the patient. Explained findings to patient.     0133: I spoke with Dr. Randolph or colorectal surgery.  No other imaging or lab studies recommended.  If patient well appearing, start liquid/protein diet and re-introduce solid foods in the next few days as too fibrous a diet can sometimes cause pain/discomfort.  Dr. Randolph will send message to Dr. Ruano as to follow up as outpatient if discharged.    0245: I rechecked the patient. Explained findings to the patient.  His pain is improved, but still present.      Findings and plan explained to the Patient who consents to admission. Discussed the patient with   Pardeep who will admit the patient to an observation bed for further monitoring, evaluation, and treatment.       Impression & Plan    Medical Decision Making:  Alex Cardona is a 49 year old male who presents to the ER with right lower quadrant abdominal pain and fever to 100.6 at home.  Please see HPI for specifics.  He had recent ileostomy takedown 15 days prior and has otherwise been doing okay but started to develop lower abdominal pain and fever tonight.  He has right lower quadrant abdominal tenderness.  Concern for possible anastomotic leak versus abscess versus seroma or other.  Labs notable for mild leukocytosis.  Lactic acid is normal.  Blood culture sent and pending.  Urinalysis not concerning for UTI.  CT abdomen/pelvis shows no acute abnormality.  Discussed case with colorectal surgery who had no other recommendations for labs or imaging.  Patient continued to be symptomatic in the ER and did not feel comfortable going home.  I feel it is reasonable to get the patient in for continued pain control and colorectal surgery consult.  Patient in agreement with this.  Discussed case with Dr. Roberto who accepted the patient.    Diagnosis:    ICD-10-CM    1. RLQ abdominal pain  R10.31    2. S/P closure of ileostomy  Z98.890        Disposition:  Admitted to an observation bed.     Scribe Disclosure:  I, Evgeny Grayson, am serving as a scribe at 10:51 PM on 6/2/2020 to document services personally performed by Guanakito Hannah MD based on my observations and the provider's statements to me.      Evgeny Grayson  6/2/2020   [facility] EMERGENCY DEPARTMENT       Guanakito Hannah MD  06/03/20 0576

## 2020-06-03 NOTE — CONSULTS
"[facility]  Colon and Rectal Surgery Consult Note  Name: Alex Cardona    MRN: [id number]  YOB: 1970    Age: 49 year old  Date of admission: 6/2/2020  Primary care provider: Kati Garcia     Requesting Physician:  Dr. Roberto  Reason for consult:  Abdominal pain           History of Present Illness:   Alex Cardona is a 49 year old male, seen at the request of Dr. Roberto, who presents with abdominal pain.  The patient has a history of refractory ulcerative colitis and is s/p total abdominal colectomy with end ileostomy on 6/12/19.  Completion proctocolectomy with ileal pouch, anal anastomosis, and diverting loop ileostomy on 2/11/20.  He then underwent pouchoscopy and ileostomy reversal on 5/18/20.  All procedures done by Dr. Ruano.    He had done well following the ileostomy reversal and was discharged from the hospital on 5/20/20.  Since discharge he has been experiencing right lower quadrant abdominal pain which worsened yesterday evening with an associated fever to 100.6 F, decreased appetite, and nausea.  The pain worsened with any movement and he notes that he has been having some intermittent spasms in the groin area making it difficult to urinate.  Due to these symptoms he presented to the Emergency Department for further evaluation.      In the Emergency Department, his temperature was 99.1 F and other vital signs within normal limits.  WBC 11.3.  Hgb 11.7.  CT abdomen pelvis showed \"Evidence of recent abdominal surgery. No visualized abscess or other acute abnormality.\"    He was admitted to observation for IV fluids, pain control, and made NPO.  Patient reports his pain is slightly better.  He is passing stool about every 3-4 hours or so.  Since arriving to the hospital he has passed 3 stools.  He believes he is evacuating completely.  He denies nausea or vomiting.      Colonoscopy History:  Pouchoscopy 5/18/20    Surgical History:  Total abdominal colectomy with " end ileostomy on 19.  Completion proctocolectomy with ileal pouch, anal anastomosis, and diverting loop ileostomy on 20.    Pouchoscopy and ileostomy reversal on 20.            Past Medical History:     Past Medical History:   Diagnosis Date     Ulcerative colitis (H)              Past Surgical History:     Past Surgical History:   Procedure Laterality Date     COLONOSCOPY N/A 2019    Procedure: incomplete COLONOSCOPY with left colon biopsies by cold biopsy forceps;  Surgeon: Herbert Dior DO;  Location:  GI     LAPAROSCOPIC ASSISTED COLECTOMY N/A 2019    Procedure: Laparoscopic Assisted Total Abdominal Colectomy with End Ileostomy;  Surgeon: Brigitte Ruano MD;  Location: [facility] OR     LAPAROSCOPIC ASSISTED COMPLETION PROCTOCOLECTOMY N/A 2020    Procedure: Completion proctectomy and ileal pouch anal anastomosis with diverting loop ileostomy.;  Surgeon: Brigitte Ruano MD;  Location: [facility] OR     SIGMOIDOSCOPY FLEXIBLE  2020    Dr. Ruano [facility]     SIGMOIDOSCOPY FLEXIBLE N/A 2020    Procedure: SIGMOIDOSCOPY, FLEXIBLE;  Surgeon: Brigitte Ruano MD;  Location: [facility] OR     TAKEDOWN ILEOSTOMY N/A 2020    Procedure: Pouchoscopy and ileostomy reversal;  Surgeon: Brigitte Ruano MD;  Location: [facility] OR               Social History:     Social History     Tobacco Use     Smoking status: Former Smoker     Packs/day: 0.00     Types: Cigarettes     Last attempt to quit: 2009     Years since quittin.3     Smokeless tobacco: Never Used   Substance Use Topics     Alcohol use: Not Currently     Comment: none  since               Family History:     Family History   Problem Relation Age of Onset     Heart Disease Mother      Chronic Obstructive Pulmonary Disease Mother      Cancer Father      No Known Problems Sister      No Known Problems Son      Heart Failure Other      Colon Cancer No family hx of              Allergies:     Allergies   Allergen Reactions      Blood Transfusion Related (Informational Only) Other (See Comments)     Patient has a history of a clinically significant antibody against RBC antigens.  A delay in compatible RBCs may occur.             Medications:       lidocaine  1 patch Transdermal Q24H     lidocaine   Transdermal Q8H             Review of Systems:   A comprehensive greater than 10 system review of systems was carried out.  Pertinent positives and negatives are noted above.  Otherwise negative for contributory info.            Physical Exam:     Blood pressure 94/54, pulse 77, temperature 98  F (36.7  C), temperature source Oral, resp. rate 16, SpO2 97 %.    Intake/Output Summary (Last 24 hours) at 6/3/2020 1119  Last data filed at 6/3/2020 0700  Gross per 24 hour   Intake 0 ml   Output --   Net 0 ml     EXAM:  GEN: Awake alert and oriented, appears stated age  PULM: Non-labored breathing with normal respiratory effort  CVS: reg rate and rhythm, no peripheral edema  ABD: Soft, tenderness over the right lower quadrant, non distended. No rebound or guarding.  No peritoneal signs.  RECTAL: Digital rectal exam was deferred.  No abnormalities in the perianal area.  NEURO: CN II-XII grossly intact  MSK: extremeties with no clubbing, cyanosis or edema; able to ambulate  PSYCH: responsive, alert, cooperative; oriented x3; appropriate mood and affect  EXT/SKIN: inspection reveals no rashes, lesions or ulcers, normal coloring         Data Reviewed:     Results for orders placed or performed during the hospital encounter of 06/02/20   Abd/pelvis CT,  IV  contrast only TRAUMA / AAA    Narrative    EXAM: CT ABDOMEN AND PELVIS WITH CONTRAST  LOCATION: [facility]  DATE/TIME: 6/3/2020 12:02 AM    INDICATION: History of ulcerative colitis status post colectomy and ileostomy take down 15 days ago. Acute generalized abdominal pain with fever.  COMPARISON: None.    TECHNIQUE: CT scan of the abdomen and pelvis was performed following injection of  IV contrast. Multiplanar reformats were obtained. Dose reduction techniques were used.  CONTRAST: 75mL Isovue-370    FINDINGS:    LOWER CHEST: Unremarkable.    HEPATOBILIARY: Unremarkable.    SPLEEN: Unremarkable.    PANCREAS: Unremarkable.    ADRENAL GLANDS: Unremarkable.    KIDNEYS/BLADDER: Unremarkable.    BOWEL: Prior colectomy with ileoanal anastomosis. A small amount of stranding and a minimal amount of fluid is present within the soft tissues about the small bowel in the regions of surgery, within normal limits for the recent surgery. No visualized   bowel wall thickening, pneumatosis or free intraperitoneal gas. No loculated fluid collections in the abdomen or pelvis.    LYMPH NODES: Unremarkable.    VASCULATURE: Atherosclerotic calcification in the abdominal aorta.      Impression    IMPRESSION:   1. Evidence of recent abdominal surgery. No visualized abscess.  2. No other acute abnormality identified in the abdomen or pelvis.        Recent Labs   Lab 06/02/20  2248   WBC 11.3*   HGB 11.7*   HCT 39.0*   MCV 75*   *     Recent Labs   Lab 06/02/20  2248      POTASSIUM 3.9   CHLORIDE 103   CO2 28   ANIONGAP 5   *   BUN 16   CR 0.94   GFRESTIMATED >90   GFRESTBLACK >90   KEZIA 9.1   PROTTOTAL 8.1   ALBUMIN 3.9   BILITOTAL 0.4   ALKPHOS 71   AST 20   ALT 24     Recent Labs   Lab 06/03/20  0142   COLOR Light Yellow   APPEARANCE Clear   URINEGLC Negative   URINEBILI Negative   URINEKETONE Negative   SG 1.020   UBLD Negative   URINEPH 5.5   PROTEIN Negative   NITRITE Negative   LEUKEST Negative   RBCU 1   WBCU 1         Assessment and Plan:   Alex Cardona is a 49 year old man with a history of refractory ulcerative colitis who presents with abdominal pain.  Most recently, he is s/p ileostomy reversal on 5/18/20 by Dr. Ruano.  Labs and CT scan are reassuring.  Patient afebrile and other vital signs stable.  Cause of patient's pain is unclear at this time.  Pain potentially related to dehydration  / quick advancement of activity following surgery. We will continue to follow.    Plan:  1. Observation Unit  2. Surgery: No emergent surgery indicated at this time.  Given reassuring workup, recommend conservative management with IVF, pain control, and clear liquid diet.  3. Diet: Clear liquids  4. IV Fluids: as ordered  5. Antibiotics:  Not indicated from our perspective  6. Medications: Home meds per hospitalist  7. I&O s:  strict I&O s  8. Labs:   - Reviewed: by myself & Dr. Ruano  - Ordered: None   9. Imaging:   - Dr. Ruano and myself have personally viewed: CT abd/pelvis  - Ordered:  None  10. Activity: ambulate as tolerated, encourage OOB  11. DVT prophylaxis: SCD s  12. This plan has been discussed with Dr. Ruano    Patient specific identified risk factors considered as part of today s evaluation include: Recent abdominal surgery.      Additional history obtained from patient and chart.  Time spent on consultation: 30 minutes, greater than 50 percent of the total encounter time is spent in counseling and/or coordination of care.          Willow Quintana PA-C  [facility]  Phone:  [phone]

## 2020-06-03 NOTE — H&P
HISTORY AND PHYSICAL     Admitted:     06/02/2020      CHIEF COMPLAINT:  Abdominal pain.      HISTORY OF PRESENT ILLNESS:  This is a 49-year-old male with a history of ulcerative colitis, status post ileostomy and subsequent takedown about 2 weeks ago, presenting to [facility] for evaluation of abdominal pain and fever.  The patient was actually hospitalized here about 2 weeks ago.  He was managed by Dr. Ruano at that time with Colorectal Surgery.  He has a history of refractory ulcerative colitis, status post ileal pouch, anal anastomosis, and diverting loop ileostomy and underwent pouchoscopy and ileostomy reversal on 05/18.  He did well during the hospitalization and was discharged on 05/20.  Since surgery, he has been having some abdominal pain.  It seems as though this has worsened slightly over the past couple days.  Today, at about 5 p.m., his abdominal pain worsened quite a bit more and he developed a fever measuring at 100.6 Fahrenheit.  He admits to pain in the inguinal area, but worsens with any sort of movement.  He has been having some difficulty urinating due to spasms in the groin area.  Due to these ongoing issues, as well as some mild nausea, he comes to the Emergency Department for evaluation.      Here in the Emergency Department, the patient has a temperature of 99.1, heart rate 93, blood pressure 124/83, respiratory rate 16, and oxygen saturation 93% on room air.  Basic metabolic panel, liver function tests, and CBC are normal with the exception of a white blood cell count of 11.3, hemoglobin of 11.7.  Platelets are slightly elevated at 459.  Urinalysis is unremarkable.  One set of blood cultures was sent.  CT of the abdomen shows evidence of recent abdominal surgery, but no visualized abscess and no other acute abnormality in the abdomen or pelvis.  Colorectal Surgery was contacted from the Emergency Department and recommended no specific management, except for possible dietary  adjustments.  This includes potentially starting some liquid or protein diet and reintroduce solid foods in the next few days and minimize fiber in the diet.  The patient will be admitted to the Observation Unit with Colorectal Surgery consultation.      PAST MEDICAL HISTORY:  Ulcerative colitis.      PAST SURGICAL HISTORY:   1.  Laparoscopic-assisted colectomy on 06/12/2019.   2.  Laparoscopic assisted completion proctocolectomy in 02/2020.   3.  Ileostomy takedown on 05/18/2020.      Medications Prior to Admission   Medication Sig Dispense Refill Last Dose     acetaminophen (TYLENOL) 500 MG tablet Take 1,000 mg by mouth every 8 hours as needed for mild pain   6/2/2020 at 0200     diazepam (VALIUM) 5 MG tablet Take 5 mg by mouth every 12 hours as needed for anxiety   6/2/2020 at 0200     diltiazem 2% in PLO gel Apply topically 2 times daily To the anal region   6/2/2020 at 1300     Melatonin 10 MG TABS tablet Take 10 mg by mouth nightly as needed for sleep    Past Month at Unknown time     menthol-zinc oxide (CALMOSEPTINE) 0.44-20.6 % OINT ointment Apply topically 4 times daily as needed for skin protection or irritation   6/2/2020 at Unknown time     multivitamin w/minerals (MULTI-VITAMIN) tablet Take 1 tablet by mouth daily   6/2/2020 at Unknown time      FAMILY HISTORY:  Assessed and noncontributory.      SOCIAL HISTORY:  The patient is a former smoker, quitting in 2009.  He quit smoking marijuana in 2019.  His primary care physician is Dr. Garcia.  He is single.      ALLERGIES:  BLOOD TRANSFUSION.      REVIEW OF SYSTEMS:  A 10-point review of systems was performed and the pertinent and positive findings are presented in the history of present illness.  The remainder of the review of systems is negative.      PHYSICAL EXAMINATION:   VITAL SIGNS:  Temperature 99.1, heart rate 93, blood pressure 124/83, respiratory rate 16, oxygen saturation 93% on room air.   GENERAL:  No apparent distress, awake, alert and  oriented x 3.   HEENT:  Normocephalic, atraumatic.  Extraocular movements are intact.   NECK:  Supple without JVD.   CARDIOVASCULAR:  Regular rate and rhythm without murmurs, rubs or gallops.   PULMONARY:  Clear to auscultation bilaterally.   ABDOMEN:  Soft, but diffusely tender.  No rigidity, rebound, or guarding.  Nondistended.  Bowel sounds are normal.  Recent incision and scars are noted.   EXTREMITIES:  No cyanosis, clubbing or edema.   SKIN:  No rashes, ulcers or jaundice.   NEUROLOGICAL:  Cranial nerves II-XII are grossly intact.  No focal neurological deficits.   PSYCHIATRIC:  Mood and affect are appropriate.      LABORATORY AND IMAGING:  Personally reviewed and discussed pertinent findings in the HPI.      ASSESSMENT AND PLAN:  This is a 49-year-old male with a history of ulcerative colitis, status post ileostomy with more recent ileostomy takedown about 2 weeks ago, presenting to [facility] for evaluation of abdominal pain and fever.      1.  Abdominal pain and fever:  No clear abnormalities seen on imaging.  No abscess or hematoma noted.  No bowel obstruction identified.  Colorectal Surgery contacted from the Emergency Department and suggested dietary modification, including less fiber in his diet while changing to a liquid/protein diet and reintroducing solid foods in the next few days.  The patient has ongoing pain in the Emergency Department, although feels better since arriving.  We will admit him to the Observation Unit and provide analgesics.  IV fluids will be running overnight while he is n.p.o.  We will request a formal Colorectal Surgery consultation.   2.  Refractory ulcerative colitis, status post ileal pouch, anal anastomosis, and diverting loop ileostomy with more recent ileostomy reversal/takedown:  As above, Colorectal Surgery consultation will be requested.   3.  The patient will be admitted to observation status.   4.  Code status:  FULL CODE as discussed with the patient.          ANA ZARAGOZA MD             D: 2020   T: 2020   MT: JULIAN      Name:     PHAN CONNOLLY   MRN:      [id number]        Account:      [id number]   :      1970        Admitted:     2020                   Document: [id number]       cc: Chau Garcia MD

## 2020-06-04 VITALS
TEMPERATURE: 97.6 F | DIASTOLIC BLOOD PRESSURE: 56 MMHG | OXYGEN SATURATION: 96 % | SYSTOLIC BLOOD PRESSURE: 97 MMHG | HEART RATE: 68 BPM | RESPIRATION RATE: 12 BRPM

## 2020-06-04 PROCEDURE — 25000132 ZZH RX MED GY IP 250 OP 250 PS 637: Performed by: PHYSICIAN ASSISTANT

## 2020-06-04 PROCEDURE — 99217 ZZC OBSERVATION CARE DISCHARGE: CPT | Performed by: PHYSICIAN ASSISTANT

## 2020-06-04 PROCEDURE — 25800030 ZZH RX IP 258 OP 636: Performed by: HOSPITALIST

## 2020-06-04 PROCEDURE — G0378 HOSPITAL OBSERVATION PER HR: HCPCS

## 2020-06-04 PROCEDURE — 25000132 ZZH RX MED GY IP 250 OP 250 PS 637: Performed by: HOSPITALIST

## 2020-06-04 RX ORDER — DIAZEPAM 5 MG
5 TABLET ORAL EVERY 12 HOURS PRN
Start: 2020-06-04 | End: 2020-07-06

## 2020-06-04 RX ORDER — OXYCODONE HYDROCHLORIDE 5 MG/1
5-10 TABLET ORAL EVERY 6 HOURS PRN
Status: DISCONTINUED | OUTPATIENT
Start: 2020-06-04 | End: 2020-06-04

## 2020-06-04 RX ORDER — LOPERAMIDE HCL 2 MG
2 CAPSULE ORAL 4 TIMES DAILY PRN
Status: DISCONTINUED | OUTPATIENT
Start: 2020-06-04 | End: 2020-06-04 | Stop reason: HOSPADM

## 2020-06-04 RX ORDER — LOPERAMIDE HYDROCHLORIDE 2 MG/1
2 TABLET ORAL 3 TIMES DAILY PRN
Start: 2020-06-04 | End: 2020-07-25

## 2020-06-04 RX ADMIN — OXYCODONE HYDROCHLORIDE 10 MG: 5 TABLET ORAL at 06:27

## 2020-06-04 RX ADMIN — SODIUM CHLORIDE, POTASSIUM CHLORIDE, SODIUM LACTATE AND CALCIUM CHLORIDE: 600; 310; 30; 20 INJECTION, SOLUTION INTRAVENOUS at 01:06

## 2020-06-04 RX ADMIN — OXYCODONE HYDROCHLORIDE 10 MG: 5 TABLET ORAL at 03:25

## 2020-06-04 RX ADMIN — OXYCODONE HYDROCHLORIDE 10 MG: 5 TABLET ORAL at 00:12

## 2020-06-04 RX ADMIN — LIDOCAINE 1 PATCH: 560 PATCH PERCUTANEOUS; TOPICAL; TRANSDERMAL at 08:00

## 2020-06-04 NOTE — PLAN OF CARE
"PRIMARY DIAGNOSIS: ACUTE ABDOMINAL PAIN  OUTPATIENT/OBSERVATION GOALS TO BE MET BEFORE DISCHARGE:  1. Pain Status: Improved-controlled with oral pain medications.    2. Return to near baseline physical activity: Yes    3. Cleared for discharge by consultants (if involved): No    Discharge Planner Nurse   Safe discharge environment identified: Yes  Barriers to discharge: Yes       Entered by: Bhumika Suarez 06/03/2020        VSS; BP soft. IVF infusing. Oxycodone given x 2 this shift for 6/10 right sided abdominal pain. Patient states that the pain \"maybe got a little worse after eating fruit ice\" and did not worsen with BMs or walking. Denies nausea. BS active x 4. Tolerating clears; no order to advance. Dressing change completed on RLQ abdomen. Up ind; steady gait observed. Colorectal following.      Please review provider order for any additional goals.   Nurse to notify provider when observation goals have been met and patient is ready for discharge.  "

## 2020-06-04 NOTE — PLAN OF CARE
PRIMARY DIAGNOSIS: ACUTE ABDOMINAL PAIN  OUTPATIENT/OBSERVATION GOALS TO BE MET BEFORE DISCHARGE:  1. Pain Status: Improved-controlled with oral pain medications.    2. Return to near baseline physical activity: Yes    3. Cleared for discharge by consultants (if involved): No    Temp: 99  F (37.2  C) Temp src: Oral BP: 100/54 Pulse: 67 Heart Rate: 72 Resp: 16 SpO2: 97 % O2 Device: None (Room air)      Patient is alert and oriented x 4. Vital signs stable; BP soft/ Oxycodone given PRN for pain. Denies nausea. Up independently. Tolerating clear liquids. Dressing to RLQ abdomen is CDI. IVF infusing at 100 mL/hr.    Discharge Planner Nurse   Safe discharge environment identified: Yes  Barriers to discharge: Yes       Entered by: Em Dorsey 06/04/2020       Please review provider order for any additional goals.   Nurse to notify provider when observation goals have been met and patient is ready for discharge.

## 2020-06-04 NOTE — PROGRESS NOTES
COLON [facility] RECTAL [facility]  PROGRESS NOTE    June 4, 2020    SUBJECTIVE:  Patient reports he is feeling about the same as yesterday.  He felt full with liquids yesterday.  No nausea or vomiting.  Passing loose stools about every 3 hours.  Walked once yesterday.    OBJECTIVE:  Temp:  [97.4  F (36.3  C)-99  F (37.2  C)] 99  F (37.2  C)  Pulse:  [63-75] 75  Heart Rate:  [68-74] 72  Resp:  [16-17] 16  BP: ()/(44-67) 98/51  SpO2:  [96 %-98 %] 96 %    Intake/Output Summary (Last 24 hours) at 6/4/2020 0820  Last data filed at 6/4/2020 0800  Gross per 24 hour   Intake 4227 ml   Output --   Net 4227 ml       GENERAL:  Awake, alert, no acute distress, lying in bed.  HEAD: Nomocephalic atraumatic  SCLERA: anicteric  ABDOMEN:  Soft, mild tenderness over the right lower quadrant, non distended. No rebound or guarding.  No peritoneal signs.  Old stoma site in RLQ clean.    LABS:  Lab Results   Component Value Date    WBC 11.3 06/02/2020     Lab Results   Component Value Date    HGB 11.7 06/02/2020     Lab Results   Component Value Date    HCT 39.0 06/02/2020     Lab Results   Component Value Date     06/02/2020     Last Basic Metabolic Panel:  Lab Results   Component Value Date     06/02/2020      Lab Results   Component Value Date    POTASSIUM 3.9 06/02/2020     Lab Results   Component Value Date    CHLORIDE 103 06/02/2020     Lab Results   Component Value Date    KEZIA 9.1 06/02/2020     Lab Results   Component Value Date    CO2 28 06/02/2020     Lab Results   Component Value Date    BUN 16 06/02/2020     Lab Results   Component Value Date    CR 0.94 06/02/2020     Lab Results   Component Value Date     06/02/2020       ASSESSMENT/PLAN: 49 year old man 2 weeks s/p takedown of loop ileostomy over a J-pouch for colitis.  Admitted for abdominal pain.  CT without concerning findings.  Afebrile.    - Advance diet to low fiber  - May need Imodium once eating to slow his stool output  - Pain management - minimize  narcotics  - SLIV  - Encourage ambulation  - No plans for surgery at this time, we will continue to follow    For questions/paging, please contact the CRS office at [phone].    Willow Quintana PA-C  [facility]  Phone: [phone]    [facility] Attending Note    Patient seen and examined independently.  Agree with above assessment and plan.  Feeling a bit better but still having pain.   No  Nausea tolerating clears. Several loose stools  abd soft, old stoma site clean.  Plan as above.   Slowly advance diet.   Minimize narcotics.   Hopeful for discharge tomorrow if doing well with soft diet.     Brigitte Ruano MD  [facility]  Office Phone # [phone]

## 2020-06-04 NOTE — PLAN OF CARE
PRIMARY DIAGNOSIS: ACUTE ABDOMINAL PAIN  OUTPATIENT/OBSERVATION GOALS TO BE MET BEFORE DISCHARGE:  1. Pain Status: Improved-controlled with oral pain medications.    2. Return to near baseline physical activity: Yes    3. Cleared for discharge by consultants (if involved): No    Discharge Planner Nurse   Safe discharge environment identified: Yes  Barriers to discharge: Yes       Entered by: Bhumika Suarez 06/03/2020    Please review provider order for any additional goals.   Nurse to notify provider when observation goals have been met and patient is ready for discharge.

## 2020-06-04 NOTE — PROGRESS NOTES
Patient's After Visit Summary was reviewed with patient   Patient verbalized understanding of After Visit Summary, recommended follow up and was given an opportunity to ask questions.   Discharge medications sent home with patient/family: Not applicable   Discharged with other:    OBSERVATION patient END time: 1300

## 2020-06-05 ENCOUNTER — TELEPHONE (OUTPATIENT)
Dept: INTERNAL MEDICINE | Facility: CLINIC | Age: 50
End: 2020-06-05

## 2020-06-05 NOTE — TELEPHONE ENCOUNTER
IP F/U    Date: 6/4/20  Diagnosis: Rlq Abdominal Pain, Ulcerative Colitis, Acute, With Rectal Bleeding (H)  Is patient active in care coordination? No  Was patient in TCU? No

## 2020-06-08 ENCOUNTER — HOSPITAL ENCOUNTER (OUTPATIENT)
Dept: LAB | Facility: CLINIC | Age: 50
Discharge: HOME | End: 2020-06-08
Attending: PHYSICIAN ASSISTANT | Admitting: PHYSICIAN ASSISTANT
Payer: COMMERCIAL

## 2020-06-08 ENCOUNTER — TRANSFERRED RECORDS (OUTPATIENT)
Dept: HEALTH INFORMATION MANAGEMENT | Facility: CLINIC | Age: 50
End: 2020-06-08

## 2020-06-08 DIAGNOSIS — K51.018 ULCERATIVE PANCOLITIS WITH OTHER COMPLICATION (H): ICD-10-CM

## 2020-06-08 DIAGNOSIS — K51.018 ULCERATIVE PANCOLITIS WITH OTHER COMPLICATION (H): Primary | ICD-10-CM

## 2020-06-08 LAB
ANION GAP SERPL CALCULATED.3IONS-SCNC: 5 MMOL/L (ref 3–14)
BUN SERPL-MCNC: 8 MG/DL (ref 7–30)
CALCIUM SERPL-MCNC: 9.4 MG/DL (ref 8.5–10.1)
CHLORIDE SERPL-SCNC: 101 MMOL/L (ref 94–109)
CO2 SERPL-SCNC: 30 MMOL/L (ref 20–32)
CREAT SERPL-MCNC: 0.91 MG/DL (ref 0.66–1.25)
GFR SERPL CREATININE-BSD FRML MDRD: >90 ML/MIN/{1.73_M2}
GLUCOSE SERPL-MCNC: 114 MG/DL (ref 70–99)
MAGNESIUM SERPL-MCNC: 2.2 MG/DL (ref 1.6–2.3)
PHOSPHATE SERPL-MCNC: 3 MG/DL (ref 2.5–4.5)
POTASSIUM SERPL-SCNC: 4 MMOL/L (ref 3.4–5.3)
SODIUM SERPL-SCNC: 136 MMOL/L (ref 133–144)

## 2020-06-08 PROCEDURE — 80048 BASIC METABOLIC PNL TOTAL CA: CPT | Performed by: PHYSICIAN ASSISTANT

## 2020-06-08 PROCEDURE — 83735 ASSAY OF MAGNESIUM: CPT | Performed by: PHYSICIAN ASSISTANT

## 2020-06-08 PROCEDURE — 36415 COLL VENOUS BLD VENIPUNCTURE: CPT | Performed by: PHYSICIAN ASSISTANT

## 2020-06-08 PROCEDURE — 84100 ASSAY OF PHOSPHORUS: CPT | Performed by: PHYSICIAN ASSISTANT

## 2020-06-09 LAB
BACTERIA SPEC CULT: NO GROWTH
BACTERIA SPEC CULT: NO GROWTH
SPECIMEN SOURCE: NORMAL
SPECIMEN SOURCE: NORMAL

## 2020-06-11 NOTE — TELEPHONE ENCOUNTER
ED / Discharge Outreach Protocol    Patient Contact    Attempt # 3    Was call answered?  No.      This is the third attempt to reach patient with no response. Will close encounter.

## 2020-06-12 NOTE — DISCHARGE SUMMARY
[facility]    Discharge Summary  Hospitalist    Date of Admission:  6/2/2020  Date of Discharge:  6/4/2020  1:24 PM  Discharging Provider: Dulce Cooley PA-C  Date of Service (when I saw the patient): 6/4/2020    Discharge Diagnoses   Post operative Fever   Abdominal pain  Refractory ulcerative colitis      History of Present Illness   Alex Cardona is a 49-year-old male with a history of ulcerative colitis, two weeks status post ileostomy and subsequent takedown who was admitted on 6/2/20 to observation for evaluation of abdominal pain and fever.  Please see admitting H & P for full details of the encounter.     Hospital Course   Alex Cardona was admitted on 6/2/2020.  The following problems were addressed during his hospitalization:    #Abdominal pain, fever: unclear etiology, likely related to recent surgery. No signs of underlying abscess, hematoma, or bowel obstruction noted on CT scan. Afebrile and no leukocytosis. CRS contacted initially in the ED and recommended the patient trial diet modification with including less fiber in his diet while changing to liquid/protein diet and reintroducing solid foods in the next few days.   - required no narcotic medications, anti emetics prior to discharge.   - repeat labs to f/up with PCP     #Refractory ulcerative colitis  #s/p ileal pouch, anal anastomosis, and diverting loop ileostomy with recent ileostomy reversal/takedown: CRS consulted during stay.  - Planned outpatient follow with CRS on 6/8/20   - recommended imodium prn, low fiber diet       #Hypotension: Asymptomatic hypotension during hospitalization. No orthostatic sx. He did receive IVF. Upon chart review soft outpatient blood pressures which appear to be his baseline.       Pending Results   None.    Code Status   Full Code       Primary Care Physician   Kati Garcia      INTERVAL HISTORY  Up and walking halls without lightheadedness or dizziness. Abdominal pain is  improved. Afebrile, no chills or myalgias. Tolerating po fluids and diet.       BP 97/56 (BP Location: Right arm)   Pulse 68   Temp 97.6  F (36.4  C) (Oral)   Resp 12   SpO2 96%     Constitutional: Awake, alert, no apparent distress  Respiratory:  Normal work of breathing. Lungs clear to auscultation bilaterally, no crackles or wheezing.  Cardiovascular: Regular rate and rhythm, normal S1 and S2, and no murmur appreciated.   GI: Bowel sounds present. soft, non-distended, non-tender.   Skin/Integument: Warm, dry. no peripheral edema.  Neuro: No focal deficits. Moving all extremities with normal strength. Coordination and sensation grossly intact. Speech clear.   Psych: Appropriate affect.        Discharge Disposition   Discharged to home  Condition at discharge: Stable    Consultations This Hospital Stay   ADVANCE DIRECTIVE IP CONSULT  COLORECTAL SURGERY IP CONSULT    Time Spent on this Encounter   IDulce PA-C, personally saw the patient today and spent greater than 30 minutes discharging this patient.    Discharge Orders      Reason for your hospital stay    You were hospitalized to observation for abdominal pain after recent colorectal surgery.     Follow-up and recommended labs and tests     Follow up with Dr. Ruano as directed.   Follow up with primary care provider, Kati Garcia, within 7 days for hospital follow- up.     Activity    Your activity upon discharge: activity as tolerated     Discharge Instructions    You may take imodium as needed for looser stools, this is available OTC.     Diet    Follow this diet upon discharge: - Low fiber diet as tolerated     Discharge Medications   Discharge Medication List as of 6/4/2020 12:54 PM      START taking these medications    Details   diclofenac (VOLTAREN) 1 % topical gel Place 2 g onto the skin 2 times daily as needed for moderate pain, No Print Out      loperamide (IMODIUM A-D) 2 MG tablet Take 1 tablet (2 mg) by mouth 3 times  daily as needed for diarrhea, No Print Out         CONTINUE these medications which have CHANGED    Details   diazepam (VALIUM) 5 MG tablet Take 1 tablet (5 mg) by mouth every 12 hours as needed for anxiety, No Print OutCall [facility] office at [phone] for refills         CONTINUE these medications which have NOT CHANGED    Details   acetaminophen (TYLENOL) 500 MG tablet Take 1,000 mg by mouth every 8 hours as needed for mild pain, Historical      diltiazem 2% in PLO gel Apply topically 2 times daily To the anal region, HistoricalDispense in dosing applicator. 1 click = 0.25g of product.      Melatonin 10 MG TABS tablet Take 10 mg by mouth nightly as needed for sleep , Historical      menthol-zinc oxide (CALMOSEPTINE) 0.44-20.6 % OINT ointment Apply topically 4 times daily as needed for skin protection or irritationHistorical      multivitamin w/minerals (MULTI-VITAMIN) tablet Take 1 tablet by mouth daily, Historical           Allergies   Allergies   Allergen Reactions     Blood Transfusion Related (Informational Only) Other (See Comments)     Patient has a history of a clinically significant antibody against RBC antigens.  A delay in compatible RBCs may occur.     Data    Most Recent 3 CBC's:  Recent Labs   Lab Test 06/02/20 2248 05/19/20  0626 05/18/20  1521 05/11/20  1438 02/24/20  1650   WBC 11.3*  --   --  4.3 6.2   HGB 11.7* 10.9*  --  11.7* 10.1*   MCV 75*  --   --  73* 75*   *  --  254 252 477*      Most Recent 3 BMP's:  Recent Labs   Lab Test 06/08/20  1600 06/02/20 2248 05/18/20  1521 02/12/20  0657    136  --  137   POTASSIUM 4.0 3.9  --  3.9   CHLORIDE 101 103  --  104   CO2 30 28  --  29   BUN 8 16  --  9   CR 0.91 0.94 0.88 0.92   ANIONGAP 5 5  --  4   KEZIA 9.4 9.1  --  8.5   * 109*  --  126*     Most Recent 2 LFT's:  Recent Labs   Lab Test 06/02/20 2248 11/08/19  1344   AST 20 14   ALT 24 24   ALKPHOS 71 59   BILITOTAL 0.4 0.3     Most Recent 6 Bacteria Isolates From Any Culture  (See EPIC Reports for Culture Details):  Recent Labs   Lab Test 06/02/20  2332 06/02/20  2247   CULT No growth No growth     Results for orders placed or performed during the hospital encounter of 06/02/20   Abd/pelvis CT,  IV  contrast only TRAUMA / AAA    Narrative    EXAM: CT ABDOMEN AND PELVIS WITH CONTRAST  LOCATION: [facility]  DATE/TIME: 6/3/2020 12:02 AM    INDICATION: History of ulcerative colitis status post colectomy and ileostomy take down 15 days ago. Acute generalized abdominal pain with fever.  COMPARISON: None.    TECHNIQUE: CT scan of the abdomen and pelvis was performed following injection of IV contrast. Multiplanar reformats were obtained. Dose reduction techniques were used.  CONTRAST: 75mL Isovue-370    FINDINGS:    LOWER CHEST: Unremarkable.    HEPATOBILIARY: Unremarkable.    SPLEEN: Unremarkable.    PANCREAS: Unremarkable.    ADRENAL GLANDS: Unremarkable.    KIDNEYS/BLADDER: Unremarkable.    BOWEL: Prior colectomy with ileoanal anastomosis. A small amount of stranding and a minimal amount of fluid is present within the soft tissues about the small bowel in the regions of surgery, within normal limits for the recent surgery. No visualized   bowel wall thickening, pneumatosis or free intraperitoneal gas. No loculated fluid collections in the abdomen or pelvis.    LYMPH NODES: Unremarkable.    VASCULATURE: Atherosclerotic calcification in the abdominal aorta.      Impression    IMPRESSION:   1. Evidence of recent abdominal surgery. No visualized abscess.  2. No other acute abnormality identified in the abdomen or pelvis.        Dulce Cooley PA-C  [facility]

## 2020-06-25 ENCOUNTER — NURSE TRIAGE (OUTPATIENT)
Dept: INTERNAL MEDICINE | Facility: CLINIC | Age: 50
End: 2020-06-25

## 2020-06-25 DIAGNOSIS — M79.662 PAIN OF LEFT LOWER LEG: Primary | ICD-10-CM

## 2020-06-25 NOTE — TELEPHONE ENCOUNTER
Referral created.  Left message on patient's home/cell voicemail asking him to return call to clinic.  ARMANDO Fairbanks R.N.

## 2020-06-25 NOTE — TELEPHONE ENCOUNTER
Complains of left lower leg pain for a couple of months maybe as much as a year.  Pain has worsened over the past month.  Pain is constant but varies in intensity.  Ranges from 1 to 9 out of 10 and is worse when he walks any distance.  He has had symptoms he thought were due to restless leg syndrome for a year but now not sure he has it.    Pain is the outer aspect of left lower leg.  He denies swelling, warmth, bruising or redness of limb.  No known injury.  Up until May he had been walking 4 miles a day and can no longer do that because of the pain.      Patient had ileostomy take down done 5/18/20  He has been scheduled for video visit 6/26 with primary care provider.  See above, no redness, swelling, hard areas, warmth.  OK to keep appt as scheduled?    Additional Information    Negative: Looks like a broken bone or dislocated joint (e.g., crooked or deformed)    Negative: Sounds like a life-threatening emergency to the triager    Negative: Followed a hip injury    Negative: Followed a knee injury    Negative: Followed an ankle or foot injury    Negative: Back pain radiating (shooting) into leg(s)    Negative: Foot pain is the main symptom    Negative: Ankle pain is the main symptom    Negative: Knee pain is the main symptom    Negative: Leg swelling is the main symptom    Negative: Chest pain    Negative: Difficulty breathing    Negative: Entire foot is cool or blue in comparison to other side    Negative: Unable to walk    Negative: Fever and red area (or area very tender to touch)    Negative: Fever and swollen joint    Negative: Thigh or calf pain in only one leg and present > 1 hour    Negative: Thigh, calf, or ankle swelling in only one leg    Negative: Thigh, calf, or ankle swelling in both legs, but one side is definitely more swollen    Negative: History of prior 'blood clot' in leg or lungs (i.e., deep vein thrombosis, pulmonary embolism)    Negative: History of inherited increased risk of blood clots  "(e.g., factor 5 Leiden, antithrombin 3, protein C or protein S deficiency, prothrombin mutation)    Negative: Recent illness requiring prolonged bed rest (immobilization)    Negative: Hip or leg fracture in past 2 months (e.g., or had cast on leg or ankle)    Major surgery in the past two months    Negative: Cancer treatment in the past two months (or has cancer now)    Negative: Patient sounds very sick or weak to the triager    Negative: Recent long-distance travel with prolonged time in car, bus, plane, or train (i.e., within past 2 weeks; 6 or more hours duration)    Negative: SEVERE pain (e.g., excruciating, unable to do any normal activities)    Negative: Cast on leg or ankle and now has increasing pain    Negative: Red area or streak and large (> 2 in. or 5 cm)    Negative: Painful rash with multiple small blisters grouped together (i.e., dermatomal distribution or 'band' or 'stripe')    Negative: Looks like a boil, infected sore, deep ulcer, or other infected rash (spreading redness, pus)    Negative: Localized rash is very painful (no fever)    Patient wants to be seen    Negative: Localized pain, redness or hard lump along vein    Negative: Numbness in a leg or foot (i.e., loss of sensation)    Answer Assessment - Initial Assessment Questions  1. ONSET: \"When did the pain start?\"       1 month ago  2. LOCATION: \"Where is the pain located?\"       Left lower leg, outer aspect  3. PAIN: \"How bad is the pain?\"    (Scale 1-10; or mild, moderate, severe)    -  MILD (1-3): doesn't interfere with normal activities     -  MODERATE (4-7): interferes with normal activities (e.g., work or school) or awakens from sleep, limping     -  SEVERE (8-10): excruciating pain, unable to do any normal activities, unable to walk      Ranges from mild to severe  4. WORK OR EXERCISE: \"Has there been any recent work or exercise that involved this part of the body?\"       no  5. CAUSE: \"What do you think is causing the leg pain?\"     " " He does not know  6. OTHER SYMPTOMS: \"Do you have any other symptoms?\" (e.g., chest pain, back pain, breathing difficulty, swelling, rash, fever, numbness, weakness)      NO  7. PREGNANCY: \"Is there any chance you are pregnant?\" \"When was your last menstrual period?\"      NA    Protocols used: LEG PAIN-A-OH      "

## 2020-07-02 ENCOUNTER — OFFICE VISIT (OUTPATIENT)
Dept: ORTHOPEDICS | Facility: CLINIC | Age: 50
End: 2020-07-02
Attending: INTERNAL MEDICINE
Payer: COMMERCIAL

## 2020-07-02 ENCOUNTER — ANCILLARY PROCEDURE (OUTPATIENT)
Dept: GENERAL RADIOLOGY | Facility: CLINIC | Age: 50
End: 2020-07-02
Attending: FAMILY MEDICINE
Payer: COMMERCIAL

## 2020-07-02 VITALS
HEIGHT: 70 IN | SYSTOLIC BLOOD PRESSURE: 130 MMHG | WEIGHT: 148 LBS | DIASTOLIC BLOOD PRESSURE: 88 MMHG | BODY MASS INDEX: 21.19 KG/M2

## 2020-07-02 DIAGNOSIS — M54.16 LUMBAR RADICULOPATHY: ICD-10-CM

## 2020-07-02 DIAGNOSIS — R29.898 WEAKNESS OF LEFT FOOT: ICD-10-CM

## 2020-07-02 DIAGNOSIS — M79.662 PAIN OF LEFT LOWER LEG: ICD-10-CM

## 2020-07-02 DIAGNOSIS — M54.16 LUMBAR RADICULOPATHY: Primary | ICD-10-CM

## 2020-07-02 DIAGNOSIS — M51.369 LUMBAR DEGENERATIVE DISC DISEASE: ICD-10-CM

## 2020-07-02 PROCEDURE — 99244 OFF/OP CNSLTJ NEW/EST MOD 40: CPT | Performed by: FAMILY MEDICINE

## 2020-07-02 PROCEDURE — 72100 X-RAY EXAM L-S SPINE 2/3 VWS: CPT

## 2020-07-02 ASSESSMENT — MIFFLIN-ST. JEOR: SCORE: 1537.57

## 2020-07-02 NOTE — PATIENT INSTRUCTIONS
1. Lumbar radiculopathy    2. Pain of left lower leg    3. Lumbar degenerative disc disease    4. Weakness of left foot      MRI of your lower back has been ordered.Schedule with [facility] ([phone]). Once you know the date of your MRI, please call my office ([phone]) and schedule a follow-up video or in-person visit for 2 days after that.  Recommend labwork to access for inflammatory causes especially given that you have ulcerative colitis

## 2020-07-02 NOTE — PROGRESS NOTES
ASSESSMENT & PLAN    1. Lumbar radiculopathy    2. Pain of left lower leg    3. Lumbar degenerative disc disease    4. Weakness of left foot      Seen in consultation for chronic left calf pain that is lumbar in origin  Has fair amount of dorsiflexion weakness and history of back pain  MRI of your lower back has been ordered.Schedule with [facility] ([phone]). Once you know the date of your MRI, please call my office ([phone]) and schedule a follow-up video or in-person visit for 2 days after that.  Recommend labwork to access for inflammatory causes especially given ulcerative colitis  -----    SUBJECTIVE  Alex Cardona is a/an 49 year old male who is seen in consultation at the request of  Kati Garcia M.D. for evaluation of left calf pain. The patient is seen by themselves.    Onset: 1 years(s) ago with pain worsening over the last 2-3 months. Reports insidious onset without acute precipitating event.  Location of Pain: left lateral lower leg from lateral malleolus to knee  Rating of Pain at worst: 8/10  Rating of Pain Currently: 4/10  Worsened by: walking (patient reported this has gotten worse - he used to be able to walk 4 miles), standing, laying down, bending down  Better with: sitting  Treatments tried: rest/activity avoidance and Tylenol  Quality: dull, occasional sharp and shooting  Red flags: Weakness: No, bowel/bladder loss: No, foot drop: No  Associated symptoms: no distal numbness or tingling; denies swelling or warmth  Orthopedic history: NO  Relevant surgical history: YES - pouchoscopy and ileostomy reversal DOS 5/18/20  Patient Social History: not working    Improvement with exercise  No improvement with rest  Pain at night (improvement upon rising)    Patient's past medical, surgical, social, and family histories were reviewed today and no pertinent history related to patient's presenting problem.    REVIEW OF SYSTEMS:  10 point ROS is negative other than symptoms noted  "above in HPI, Past Medical History or as stated below  Constitutional: NEGATIVE for fever, chills, change in weight  Skin: NEGATIVE for worrisome rashes, moles or lesions  GI/: NEGATIVE for bowel or bladder changes  Neuro: NEGATIVE for weakness, dizziness or paresthesias    OBJECTIVE:  /88   Ht 1.778 m (5' 10\")   Wt 67.1 kg (148 lb)   BMI 21.24 kg/m     General: healthy, alert and in no distress  HEENT: no scleral icterus or conjunctival erythema  Skin: no suspicious lesions or rash. No jaundice.  CV: no pedal edema  Resp: normal respiratory effort without conversational dyspnea   Psych: normal mood and affect  Gait: normal steady gait with appropriate coordination and balance  Neuro: normal light touch sensory exam of the bilateral lower extremities.    MSK:  THORACIC/LUMBAR SPINE  Inspection:    No gross deformity/asymmetry  Palpation:    Tender about the lumbar facet joints and lumbosacral junction. Otherwise remainder of landmarks are nontender.  Range of Motion:     Lumbar flexion limited substantially by pain    Lumbar extension limited slightly by pain  Strength:    quadriceps 5/5, hamstrings 5/5, gastrocsoleus 5-/5, tibialis anterior 4+/5, extensor hallicus longus 5/5  Special Tests:    Positive: slump test (bilateral)    Independent visualization of the below image:  Xray Lumbar  No significant disc narrowing. Facet arthropathy without spondylolesthesis. No fracture or acute osseous findings.     Lashanda Villa, DO [facility]    "

## 2020-07-02 NOTE — LETTER
7/2/2020         RE: Alex Cardona  [address]  [place] [address]        Dear Colleague,    Thank you for referring your patient, Alex Cardona, to the [facility]. Please see a copy of my visit note below.    ASSESSMENT & PLAN    1. Lumbar radiculopathy    2. Pain of left lower leg    3. Lumbar degenerative disc disease    4. Weakness of left foot      Seen in consultation for chronic left calf pain that is lumbar in origin  Has fair amount of dorsiflexion weakness and history of back pain  MRI of your lower back has been ordered.Schedule with [facility] ([phone]). Once you know the date of your MRI, please call my office ([phone]) and schedule a follow-up video or in-person visit for 2 days after that.  Recommend labwork to access for inflammatory causes especially given ulcerative colitis  -----    SUBJECTIVE  Alex Cardona is a/an 49 year old male who is seen in consultation at the request of  Kati Garcia M.D. for evaluation of left calf pain. The patient is seen by themselves.    Onset: 1 years(s) ago with pain worsening over the last 2-3 months. Reports insidious onset without acute precipitating event.  Location of Pain: left lateral lower leg from lateral malleolus to knee  Rating of Pain at worst: 8/10  Rating of Pain Currently: 4/10  Worsened by: walking (patient reported this has gotten worse - he used to be able to walk 4 miles), standing, laying down, bending down  Better with: sitting  Treatments tried: rest/activity avoidance and Tylenol  Quality: dull, occasional sharp and shooting  Red flags: Weakness: No, bowel/bladder loss: No, foot drop: No  Associated symptoms: no distal numbness or tingling; denies swelling or warmth  Orthopedic history: NO  Relevant surgical history: YES - pouchoscopy and ileostomy reversal DOS 5/18/20  Patient Social History: not working    Improvement with exercise  No improvement with rest  Pain at  "night (improvement upon rising)    Patient's past medical, surgical, social, and family histories were reviewed today and no pertinent history related to patient's presenting problem.    REVIEW OF SYSTEMS:  10 point ROS is negative other than symptoms noted above in HPI, Past Medical History or as stated below  Constitutional: NEGATIVE for fever, chills, change in weight  Skin: NEGATIVE for worrisome rashes, moles or lesions  GI/: NEGATIVE for bowel or bladder changes  Neuro: NEGATIVE for weakness, dizziness or paresthesias    OBJECTIVE:  /88   Ht 1.778 m (5' 10\")   Wt 67.1 kg (148 lb)   BMI 21.24 kg/m     General: healthy, alert and in no distress  HEENT: no scleral icterus or conjunctival erythema  Skin: no suspicious lesions or rash. No jaundice.  CV: no pedal edema  Resp: normal respiratory effort without conversational dyspnea   Psych: normal mood and affect  Gait: normal steady gait with appropriate coordination and balance  Neuro: normal light touch sensory exam of the bilateral lower extremities.    MSK:  THORACIC/LUMBAR SPINE  Inspection:    No gross deformity/asymmetry  Palpation:    Tender about the lumbar facet joints and lumbosacral junction. Otherwise remainder of landmarks are nontender.  Range of Motion:     Lumbar flexion limited substantially by pain    Lumbar extension limited slightly by pain  Strength:    quadriceps 5/5, hamstrings 5/5, gastrocsoleus 5-/5, tibialis anterior 4+/5, extensor hallicus longus 5/5  Special Tests:    Positive: slump test (bilateral)    Independent visualization of the below image:  Xray Lumbar  No significant disc narrowing. Facet arthropathy without spondylolesthesis. No fracture or acute osseous findings.     Lashanda Villa DO [facility]      Again, thank you for allowing me to participate in the care of your patient.        Sincerely,        Lashanda Villa, DO    "

## 2020-07-03 DIAGNOSIS — M54.16 LUMBAR RADICULOPATHY: ICD-10-CM

## 2020-07-03 LAB
CRP SERPL-MCNC: <2.9 MG/L (ref 0–8)
ERYTHROCYTE [SEDIMENTATION RATE] IN BLOOD BY WESTERGREN METHOD: 14 MM/H (ref 0–20)

## 2020-07-03 PROCEDURE — 36415 COLL VENOUS BLD VENIPUNCTURE: CPT | Performed by: FAMILY MEDICINE

## 2020-07-03 PROCEDURE — 85652 RBC SED RATE AUTOMATED: CPT | Performed by: FAMILY MEDICINE

## 2020-07-03 PROCEDURE — 86140 C-REACTIVE PROTEIN: CPT | Performed by: FAMILY MEDICINE

## 2020-07-03 PROCEDURE — 81374 HLA I TYPING 1 ANTIGEN LR: CPT | Performed by: FAMILY MEDICINE

## 2020-07-08 LAB
B LOCUS: NORMAL
B27TEST METHOD: NORMAL

## 2020-07-09 ENCOUNTER — TELEPHONE (OUTPATIENT)
Dept: ORTHOPEDICS | Facility: CLINIC | Age: 50
End: 2020-07-09

## 2020-07-09 ENCOUNTER — HOSPITAL ENCOUNTER (OUTPATIENT)
Dept: MRI IMAGING | Facility: CLINIC | Age: 50
Discharge: HOME | End: 2020-07-09
Attending: FAMILY MEDICINE | Admitting: FAMILY MEDICINE
Payer: COMMERCIAL

## 2020-07-09 DIAGNOSIS — M54.16 LUMBAR RADICULOPATHY: ICD-10-CM

## 2020-07-09 PROCEDURE — 72148 MRI LUMBAR SPINE W/O DYE: CPT

## 2020-07-09 NOTE — TELEPHONE ENCOUNTER
MRI and labs reviewed.  Will discuss at follow-up visit on 7/13/2020.    Lashanda Villa DO, WOJCIECHM  [facility]

## 2020-07-13 ENCOUNTER — OFFICE VISIT (OUTPATIENT)
Dept: ORTHOPEDICS | Facility: CLINIC | Age: 50
End: 2020-07-13
Payer: COMMERCIAL

## 2020-07-13 VITALS
SYSTOLIC BLOOD PRESSURE: 136 MMHG | WEIGHT: 148 LBS | HEIGHT: 70 IN | BODY MASS INDEX: 21.19 KG/M2 | DIASTOLIC BLOOD PRESSURE: 72 MMHG

## 2020-07-13 DIAGNOSIS — M47.816 FACET ARTHROPATHY, LUMBAR: ICD-10-CM

## 2020-07-13 DIAGNOSIS — M54.16 LEFT LUMBAR RADICULOPATHY: ICD-10-CM

## 2020-07-13 DIAGNOSIS — M51.369 LUMBAR DEGENERATIVE DISC DISEASE: Primary | ICD-10-CM

## 2020-07-13 DIAGNOSIS — R29.898 WEAKNESS OF LEFT FOOT: ICD-10-CM

## 2020-07-13 PROCEDURE — 99214 OFFICE O/P EST MOD 30 MIN: CPT | Performed by: FAMILY MEDICINE

## 2020-07-13 RX ORDER — HYDROCODONE BITARTRATE AND ACETAMINOPHEN 5; 325 MG/1; MG/1
1 TABLET ORAL EVERY 6 HOURS PRN
Qty: 10 TABLET | Refills: 0 | Status: ON HOLD | OUTPATIENT
Start: 2020-07-13 | End: 2020-07-28

## 2020-07-13 RX ORDER — HYDROCODONE BITARTRATE AND ACETAMINOPHEN 5; 325 MG/1; MG/1
1 TABLET ORAL
Qty: 10 TABLET | Refills: 0 | Status: SHIPPED | OUTPATIENT
Start: 2020-07-13 | End: 2020-07-13

## 2020-07-13 ASSESSMENT — MIFFLIN-ST. JEOR: SCORE: 1537.57

## 2020-07-13 NOTE — PROGRESS NOTES
"ASSESSMENT & PLAN    1. Lumbar degenerative disc disease    2. Left lumbar radiculopathy    3. Facet arthropathy, lumbar    4. Weakness of left foot      Reviewed MRI - disc herniation that is impinging your L5 nerve root. This is what is causing your leg weakness and increased back pain  Reviewed labs - all negative  Referral for pain management for an injection  Physical therapy: [facility] - [phone]  Rx for Vicodin to be used at night    -----    SUBJECTIVE:  Alex Cardona is a 49 year old male who is seen in follow-up for left lower leg pain.They were last seen 7/2/2020 and had an MRI done on 7/9/20.     Since their last visit reports worsening pain, states it is more consistent and intense. They indicate that their current pain level is 5/10. They have tried rest/activity avoidance and Tylenol.      The patient is seen by themselves.    Patient's past medical, surgical, social, and family histories were reviewed today and no changes are noted.    REVIEW OF SYSTEMS:  Constitutional: NEGATIVE for fever, chills, change in weight  Skin: NEGATIVE for worrisome rashes, moles or lesions  GI/: NEGATIVE for bowel or bladder changes  Neuro: NEGATIVE for weakness, dizziness or paresthesias    OBJECTIVE:  /72 (BP Location: Right arm, Patient Position: Chair)   Ht 1.778 m (5' 10\")   Wt 67.1 kg (148 lb)   BMI 21.24 kg/m     General: healthy, alert and in no distress  HEENT: no scleral icterus or conjunctival erythema  Skin: no suspicious lesions or rash. No jaundice.  CV: regular rhythm by palpation  Resp: normal respiratory effort without conversational dyspnea   Psych: normal mood and affect  Gait: normal steady gait with appropriate coordination and balance  MSK:  Deferred    Independent visualization of the below image:  MRI OF THE LUMBAR SPINE WITHOUT CONTRAST 7/9/2020 9:15 AM      COMPARISON: None     HISTORY: Left foot weakness, access left L5 herniation/stenosis.  Lumbar " radiculopathy.     TECHNIQUE: Multiplanar, multisequence MRI images of the lumbar spine  were acquired without IV contrast.     FINDINGS: There are five lumbar-type vertebrae for the purposes of  this dictation.      There is normal alignment of the lumbar vertebrae. Vertebral body  heights of the lumbar spine are normal. Marrow signal throughout the  lumbar vertebrae is within normal limits. There is no evidence for  fracture or pathologic bony lesion of the lumbar spine.      There is loss of disc height, disc desiccation and posterior disc  herniation of the L4-L5 disc. There is loss of disc height and disc  desiccation without significant bulging or herniation of the L3-L4 and  L5-S1 discs. The upper two lumbar intervertebral discs are normal in  height, contour and signal intensity.     The tip of the conus medullaris is at the L1-L2 level which is within  normal limits. There is no evidence for intrathecal abnormality.      Level by level:      T12-L1: There is no spinal canal or neural foraminal stenosis at this  level.     L1-L2: There is no spinal canal or neural foraminal stenosis at this  level.     L2-L3: There is no spinal canal or neural foraminal stenosis at this  level.     L3-L4: Loss of disc height, disc desiccation and mild circumferential  disc bulging. No herniation. Mild facet arthropathy bilaterally. There  is no spinal canal or neural foraminal stenosis at this level.     L4-L5: Loss of disc height, disc desiccation and circumferential disc  bulging with a superimposed moderate-sized posterior central/left  paracentral disc herniation (extrusion). Mild facet arthropathy  bilaterally. No foraminal stenosis on either side. Mild spinal canal  narrowing with moderate narrowing of the left lateral recess of the  spinal canal. The herniated disc material likely compresses the  descending left L5 nerve root in the lateral recess and may contact  the descending left S1 nerve root, possibly resulting  in irritation or  impingement.     L5-S1: Loss of disc height and disc desiccation. No herniation. Normal  facets. There is no spinal canal or neural foraminal stenosis at this  level.                                                                      IMPRESSION:  1. Degenerative changes of the lumbar spine as detailed above.  2. Moderate-sized left paracentral disc extrusion arising from the  L4-L5 disc resulting in moderate narrowing of the left lateral recess  of the spinal canal with likely compression of the descending left L5  nerve root. This herniation may also contact the descending left S1  nerve root in the lateral recess, possibly resulting in irritation or  impingement.  3. No other spinal canal, lateral recess or neural foraminal narrowing  of the lumbar spine.     PARVIZ GRAVES MD    Patient's conditions were thoroughly discussed during today's visit with greater than 50% of the visit spent counseling the patient with total time spent face-to-face with the patient being 25 minutes.    Lashanda Villa,  [facility]

## 2020-07-13 NOTE — PATIENT INSTRUCTIONS
1. Lumbar degenerative disc disease    2. Left lumbar radiculopathy    3. Facet arthropathy, lumbar    4. Weakness of left foot      Reviewed MRI - disc herniation that is impinging your L5 nerve root. This is what is causing your leg weakness and increased back pain  Reviewed labs - all negative  Referral for pain management for an injection  Physical therapy: [facility] - [phone]  Rx for Vicodin to be used at night

## 2020-07-13 NOTE — LETTER
"    7/13/2020         RE: Alex Cardona  [address]  [place] [address]        Dear Colleague,    Thank you for referring your patient, Alex Cardona, to the [facility]. Please see a copy of my visit note below.    ASSESSMENT & PLAN    1. Lumbar degenerative disc disease    2. Left lumbar radiculopathy    3. Facet arthropathy, lumbar    4. Weakness of left foot      Reviewed MRI - disc herniation that is impinging your L5 nerve root. This is what is causing your leg weakness and increased back pain  Reviewed labs - all negative  Referral for pain management for an injection  Physical therapy: [facility] - [phone]  Rx for Vicodin to be used at night    -----    SUBJECTIVE:  Alex Cardona is a 49 year old male who is seen in follow-up for left lower leg pain.They were last seen 7/2/2020 and had an MRI done on 7/9/20.     Since their last visit reports worsening pain, states it is more consistent and intense. They indicate that their current pain level is 5/10. They have tried rest/activity avoidance and Tylenol.      The patient is seen by themselves.    Patient's past medical, surgical, social, and family histories were reviewed today and no changes are noted.    REVIEW OF SYSTEMS:  Constitutional: NEGATIVE for fever, chills, change in weight  Skin: NEGATIVE for worrisome rashes, moles or lesions  GI/: NEGATIVE for bowel or bladder changes  Neuro: NEGATIVE for weakness, dizziness or paresthesias    OBJECTIVE:  /72 (BP Location: Right arm, Patient Position: Chair)   Ht 1.778 m (5' 10\")   Wt 67.1 kg (148 lb)   BMI 21.24 kg/m     General: healthy, alert and in no distress  HEENT: no scleral icterus or conjunctival erythema  Skin: no suspicious lesions or rash. No jaundice.  CV: regular rhythm by palpation  Resp: normal respiratory effort without conversational dyspnea   Psych: normal mood and affect  Gait: normal steady gait with " appropriate coordination and balance  MSK:  Deferred    Independent visualization of the below image:  MRI OF THE LUMBAR SPINE WITHOUT CONTRAST 7/9/2020 9:15 AM      COMPARISON: None     HISTORY: Left foot weakness, access left L5 herniation/stenosis.  Lumbar radiculopathy.     TECHNIQUE: Multiplanar, multisequence MRI images of the lumbar spine  were acquired without IV contrast.     FINDINGS: There are five lumbar-type vertebrae for the purposes of  this dictation.      There is normal alignment of the lumbar vertebrae. Vertebral body  heights of the lumbar spine are normal. Marrow signal throughout the  lumbar vertebrae is within normal limits. There is no evidence for  fracture or pathologic bony lesion of the lumbar spine.      There is loss of disc height, disc desiccation and posterior disc  herniation of the L4-L5 disc. There is loss of disc height and disc  desiccation without significant bulging or herniation of the L3-L4 and  L5-S1 discs. The upper two lumbar intervertebral discs are normal in  height, contour and signal intensity.     The tip of the conus medullaris is at the L1-L2 level which is within  normal limits. There is no evidence for intrathecal abnormality.      Level by level:      T12-L1: There is no spinal canal or neural foraminal stenosis at this  level.     L1-L2: There is no spinal canal or neural foraminal stenosis at this  level.     L2-L3: There is no spinal canal or neural foraminal stenosis at this  level.     L3-L4: Loss of disc height, disc desiccation and mild circumferential  disc bulging. No herniation. Mild facet arthropathy bilaterally. There  is no spinal canal or neural foraminal stenosis at this level.     L4-L5: Loss of disc height, disc desiccation and circumferential disc  bulging with a superimposed moderate-sized posterior central/left  paracentral disc herniation (extrusion). Mild facet arthropathy  bilaterally. No foraminal stenosis on either side. Mild spinal  canal  narrowing with moderate narrowing of the left lateral recess of the  spinal canal. The herniated disc material likely compresses the  descending left L5 nerve root in the lateral recess and may contact  the descending left S1 nerve root, possibly resulting in irritation or  impingement.     L5-S1: Loss of disc height and disc desiccation. No herniation. Normal  facets. There is no spinal canal or neural foraminal stenosis at this  level.                                                                      IMPRESSION:  1. Degenerative changes of the lumbar spine as detailed above.  2. Moderate-sized left paracentral disc extrusion arising from the  L4-L5 disc resulting in moderate narrowing of the left lateral recess  of the spinal canal with likely compression of the descending left L5  nerve root. This herniation may also contact the descending left S1  nerve root in the lateral recess, possibly resulting in irritation or  impingement.  3. No other spinal canal, lateral recess or neural foraminal narrowing  of the lumbar spine.     PARVIZ GRAVES MD    Patient's conditions were thoroughly discussed during today's visit with greater than 50% of the visit spent counseling the patient with total time spent face-to-face with the patient being 25 minutes.    Lashanda Villa DO [facility]          Again, thank you for allowing me to participate in the care of your patient.        Sincerely,        Lashanda Villa DO

## 2020-07-14 ENCOUNTER — TELEPHONE (OUTPATIENT)
Dept: PALLIATIVE MEDICINE | Facility: CLINIC | Age: 50
End: 2020-07-14

## 2020-07-14 NOTE — TELEPHONE ENCOUNTER
Spoke with patient Reminded patient of date, time and location of appointment.     Patient has not had fever or cough in the last 14 days.   Patient will bring mask to appointment.   Patient has a .    Allison Stratton [facility]  [place]

## 2020-07-14 NOTE — PROGRESS NOTES
[facility] - Procedure Note    Date of Visit: 7/15/2020    Procedure performed: Lumbar 4-5 interlaminar epidural steroid injection  Diagnosis: Lumbar spondylosis; Lumbar radiculitis/radiculopathy  : Jose Pandya DO   Anesthesia: none    Indications: Alex Cardona is a 49 year old male who is seen at the request of Dr. Villa for a lumbar epidural steroid injection. The patient describes pain in his low back that radiates to the left hip and down into the left anterior shin and ankle. The patient has been exhibiting symptoms consistent with lumbar intraspinal inflammation and radiculopathy. Symptoms have been persistent, disabling, and intermittently severe. The patient reports minimal improvement with conservative treatment, including oral medications and exercises.      Lumbar MRI was done on 7/9/2020 which showed   IMPRESSION:  1. Degenerative changes of the lumbar spine as detailed above.  2. Moderate-sized left paracentral disc extrusion arising from the  L4-L5 disc resulting in moderate narrowing of the left lateral recess  of the spinal canal with likely compression of the descending left L5  nerve root. This herniation may also contact the descending left S1  nerve root in the lateral recess, possibly resulting in irritation or  impingement.  3. No other spinal canal, lateral recess or neural foraminal narrowing  of the lumbar spine.     PARVIZ GRAVES MD      Allergies:      Allergies   Allergen Reactions     Blood Transfusion Related (Informational Only) Other (See Comments)     Patient has a history of a clinically significant antibody against RBC antigens.  A delay in compatible RBCs may occur.        Vitals:  There were no vitals taken for this visit.    Review of Systems: The patient denies recent fever, chills, illness, use of antibiotics or anticoagulants. All other 10-point review of systems negative.     Procedure: The procedure and risks were explained,  and informed written consent was obtained from the patient. Risks include but are not limited to: infection, bleeding, increased pain, and damage to soft tissue, nerve, muscle, and vasculature structures. After getting informed consent, patient was brought into the procedure suite and was placed in a prone position on the procedure table. A Pause for the Cause was performed. Patient was prepped and draped in sterile fashion.     The L4-5 interspace was identified with use of fluoroscopy in AP view. A 25-gauge, 1.5 inch needle was used to anesthetize the skin and subcutaneous tissue entry site with a total of 2 ml of 1% lidocaine. Under fluoroscopic visualization, a 22-gauge, 4.5 inch Tuohy epidural needle was slowly advanced towards the epidural space a few millimeters left-sided of midline. The latter part of the needle advancement was guided with fluoroscopy in the lateral view. The epidural space was identified using loss of resistance technique. After negative aspiration for heme and cerebrospinal fluid, a total of 1 mL of non-ionic contrast was injected to confirm needle placement with 9 mL of contrast wasted. Epidurogram confirmed spread within the posterior epidural space. 1 ml of 40mg/ml of triamcinolone, 1 ml of 1% lidocaine, and 3 ml of preservative free saline was injected. The needle was removed.  Images were saved to PACS.    The patient tolerated the procedure well, and there was no evidence of procedural complications. No new sensory or motor deficits were noted following the procedure. The patient was stable and able to ambulate on discharge home. Post-procedure instructions were provided.     Pre-procedure pain score: 5/10 in the back, 5/10 in the leg  Post-procedure pain score: 4/10 in the back, 2/10 in the leg    Assessment/Plan: Alex Cardona is a 49 year old male s/p lumbar interlaminar epidural steroid injection today for lumbar spondylosis and radiculitis/radiculopathy.     1. Following  today's procedure, the patient was advised to contact the [facility] for any of the following:   Fever, chills, or night sweats   New onset of pain, numbness, or weakness   Any questions/concerns regarding the procedure  If unable to contact the Pain Center, the patient was instructed to go to a local Emergency Room for any complications.   2. The patient will receive a follow-up call in 1 week.   3. Follow-up with the referring provider in 2 weeks for post-procedure evaluation.        Jose Pandya DO  [facility]

## 2020-07-14 NOTE — TELEPHONE ENCOUNTER
"Screening Questions for Radiology Injections:    Injection to be done at which interventional clinic site? [facility]    Instruct patient to arrive as directed prior to the scheduled appointment time:    [place] minutes before      [place]: 30 minutes before; if IV needed 1 hour before     Dr. Pandya-no IV needed for Cervical FRE; please instruct to arrive 30\" early    Procedure ordered by Allen     Procedure ordered? ILESI L4-5 or L5-S1     As a reminder, receiving steroids can decrease your body's ability to fight infection.   Would you still like to move forward with scheduling the injection?  Yes      Transforaminal Cervical FER - no pain provider currently performing    What insurance would patient like us to bill for this procedure? [employer]       Worker's comp or MVA (motor vehicle accident) -Any injection DO NOT SCHEDULE and route to [facility] [place].      [employer] insurance - For SI joint injections, DO NOT SCHEDULE and route [facility].       [employer] - Any injection besides hip/shoulder/knee joint DO NOT SCHEDULE and route to [facility]. She will obtain PA and call pt back to schedule procedure or notify pt of denial.       [employer]-Route to [place] for review      **[employer]- ALL need to be routed to [place] for review if a PA is needed**      IF SCHEDULING IN [place] AND NEEDS A PA, IT IS OKAY TO SCHEDULE. [place] HANDLES THEIR OWN PA'S AFTER THE PATIENT IS SCHEDULED. PLEASE SCHEDULE AT LEAST 1 WEEK OUT SO A PA CAN BE OBTAINED.    Any chance of pregnancy? Not Applicable   If YES, do NOT schedule and route to RN pool    Is an  needed? No     Patient has a drive home? (mandatory) YES: informed     Is patient taking any blood thinners (i.e. plavix, coumadin, jantoven, warfarin, heparin, pradaxa or dabigatran, etc)? No   If hold needed, do NOT schedule, route to RN pool     Is patient taking any aspirin products (includes Excedrin and Fiorinal)? No     If more than 325mg/day, OK to " schedule; Instruct pt to decrease to less than 325 mg for 7 days AND route to RN pool    For CERVICAL procedures, hold all aspirin products for 6 days.     Tell pt that if aspirin product is not held for 6 days, the procedure WILL BE cancelled.      Does the patient have a bleeding or clotting disorder? No     If YES, okay to schedule AND route to RN nurse pool    For any patients with platelet count <100, must be forwarded to provider    Is patient diabetic?  No  If YES, instruct them to bring their glucometer.    Does patient have an active infection or treated for one within the past week? No     Is patient currently taking any antibiotics?  No     For patients on chronic, preventative, or prophylactic antibiotics, procedures may be scheduled.     For patients on antibiotics for active or recent infection:antibiotic course must have been completed for 4 days    Is patient currently taking any steroid medications? (i.e. Prednisone, Medrol)  No     For patients on steroid medications, course must have been completed for 4 days    Is patient actively being treated for cancer or immunocompromised? No  If YES, do NOT schedule and route to RN pool     Are you able to get on and off an exam table with minimal or no assistance? Yes  If NO, do NOT schedule and route to RN pool    Are you able to roll over and lay on your stomach with minimal or no assistance? Yes  If NO, do NOT schedule and route to RN pool     Any allergies to contrast dye, iodine, shellfish, or numbing and steroid medications? No  If YES, route to RN pool AND add allergy information to appointment notes    Allergies: Blood transfusion related (informational only)      Has the patient had a flu shot or any other vaccinations within 7 days before or after the procedure.  No     Does patient have an MRI/CT?  YES: 2020  Check Procedure Scheduling Grid to see if required.      Was the MRI done within the last 3 years?  Yes    If yes, where was the MRI done  i.e.[facility], [facility], [place], [facility] etc?       If no, do not schedule and route to RN pool    If MRI was not done at [place], [facility] or [facility] do NOT schedule and route to RN pool.      If pt has an imaging disc, the injection MAY be scheduled but pt has to bring disc to appt.     If they show up without the disc the injection cannot be done    Procedure Specific Instructions:      If celiac plexus block, informed patient NPO for 6 hours and that it is okay to take medications with sips of water, especially blood pressure medications  Not Applicable         If this is for a cervical procedure, informed patient that aspirin needs to be held for 6 days.   Not Applicable      If IV needed:    Do not schedule procedures requiring IV placement in the first appointment of the day or first appointment after lunch. Do NOT schedule at 0745, 0815 or 1245.     Instructed pt to arrive 30 minutes early for IV start if required. (Check Procedure Scheduling Grid)  Not Applicable    Reminders:      If you are started on any steroids or antibiotics between now and your appointment, you must contact us because the procedure may need to be cancelled.  No      For all procedures except radiofrequency ablations (RFAs) and spinal cord stimulator (SCS) trials, informed patient:    IV sedation is not provided for this procedure.  If you feel that an oral anti-anxiety medication is needed, you can discuss this further with your referring provider or primary care provider.  The Pain Clinic provider will discuss specifics of what the procedure includes at your appointment.  Most procedures last 10-20 minutes.  We use numbing medications to help with any discomfort during the procedure.  Not Applicable      For patients 85 or older we recommend having an adult stay w/ them for the remainder of the day.       Does the patient have any questions?  NO  Tracee Villa  [facility]

## 2020-07-15 ENCOUNTER — ANCILLARY PROCEDURE (OUTPATIENT)
Dept: GENERAL RADIOLOGY | Facility: CLINIC | Age: 50
End: 2020-07-15
Attending: PHYSICAL MEDICINE & REHABILITATION
Payer: COMMERCIAL

## 2020-07-15 ENCOUNTER — RADIOLOGY INJECTION OFFICE VISIT (OUTPATIENT)
Dept: PALLIATIVE MEDICINE | Facility: CLINIC | Age: 50
End: 2020-07-15
Payer: COMMERCIAL

## 2020-07-15 VITALS — DIASTOLIC BLOOD PRESSURE: 83 MMHG | OXYGEN SATURATION: 99 % | HEART RATE: 83 BPM | SYSTOLIC BLOOD PRESSURE: 130 MMHG

## 2020-07-15 DIAGNOSIS — M54.16 LUMBAR RADICULOPATHY: ICD-10-CM

## 2020-07-15 PROCEDURE — 62323 NJX INTERLAMINAR LMBR/SAC: CPT | Performed by: PHYSICAL MEDICINE & REHABILITATION

## 2020-07-15 NOTE — Clinical Note
Left L4-5 lumbar epidural steroid injection completed.    Thanks,    DO [facility]

## 2020-07-15 NOTE — NURSING NOTE
Discharge Information    IV Discontiued Time:  NA    Amount of Fluid Infused:  NA    Discharge Criteria = When patient returns to baseline or as per MD order    Consciousness:  Pt is fully awake    Circulation:  BP +/- 20% of pre-procedure level    Respiration:  Patient is able to breathe deeply    O2 Sat:  Patient is able to maintain O2 Sat >92% on room air    Activity:  Moves 4 extremities on command    Ambulation:  Patient is able to stand and walk or stand and pivot into wheelchair    Dressing:  Clean/dry or No Dressing    Notes:   Discharge instructions and AVS given to patient    Patient meets criteria for discharge?  YES    Admitted to PCU?  No    Responsible adult present to accompany patient home?  Yes    Signature/Title:    RIOS VENTURA RN  RN Care Coordinator  [facility]

## 2020-07-15 NOTE — PATIENT INSTRUCTIONS
[facility]   Procedure Discharge Instructions    Today you saw:  Dr. Jose Pandya      You had an:  Epidural steroid injection     Medications used:  Lidocaine Omnipaque Kenalog Normal saline            Be cautious when walking. Numbness and/or weakness in the lower extremities may occur for up to 6-8 hours after the procedure due to effect of the local anesthetic    Do not drive for 6 hours. The effect of the local anesthetic could slow your reflexes.     You may resume your regular activities after 24 hours    Avoid strenuous activity for the first 24 hours    You may shower, however avoid swimming, tub baths or hot tubs for 24 hours following your procedure    You may have a mild to moderate increase in pain for several days following the injection.    It may take up to 14 days for the steroid medication to start working although you may feel the effect as early as a few days after the procedure.       You may use ice packs for 10-15 minutes, 3 to 4 times a day at the injection site for comfort    Do not use heat to painful areas for 6 to 8 hours. This will give the local anesthetic time to wear off and prevent you from accidentally burning your skin.     Unless you have been directed to avoid the use of anti-inflammatory medications (NSAIDS), you may use medications such as ibuprofen, Aleve or Tylenol for pain control if needed.     Possible side effects of steroids that you may experience include flushing, elevated blood pressure, increased appetite, mild headaches and restlessness.  All of these symptoms will get better with time.    If you experience any of the following, call the Pain Clinic during work hours (Mon-Friday 8-4:30 pm) at [phone] or the Provider Line after hours at [phone]:  -Fever over 100 degree F  -Swelling, bleeding, redness, drainage, warmth at the injection site  -Progressive weakness or numbness in your legs  -Loss of bowel or bladder  function  -Unusual headache that is not relieved by Tylenol or other pain reliever  -Unusual new onset of pain that is not improving

## 2020-07-20 ENCOUNTER — THERAPY VISIT (OUTPATIENT)
Dept: PHYSICAL THERAPY | Facility: CLINIC | Age: 50
End: 2020-07-20
Payer: COMMERCIAL

## 2020-07-20 DIAGNOSIS — M51.369 LUMBAR DEGENERATIVE DISC DISEASE: ICD-10-CM

## 2020-07-20 DIAGNOSIS — M47.816 FACET ARTHROPATHY, LUMBAR: ICD-10-CM

## 2020-07-20 DIAGNOSIS — M54.16 LEFT LUMBAR RADICULOPATHY: ICD-10-CM

## 2020-07-20 DIAGNOSIS — R29.898 WEAKNESS OF LEFT FOOT: ICD-10-CM

## 2020-07-20 PROCEDURE — 97161 PT EVAL LOW COMPLEX 20 MIN: CPT | Mod: GP | Performed by: PHYSICAL THERAPIST

## 2020-07-20 PROCEDURE — 97110 THERAPEUTIC EXERCISES: CPT | Mod: GP | Performed by: PHYSICAL THERAPIST

## 2020-07-20 NOTE — PROGRESS NOTES
[facility] Initial Evaluation  Subjective:  The history is provided by the patient. No  was used.   Patient Health History  Alex Cardona being seen for Left lower leg pain, lower back pain.     Problem began: 6/22/2019.   Problem occurred: Don't know   Pain is reported as 5/10 on pain scale.  General health as reported by patient is good.  Pertinent medical history includes: anemia, changes in bowel/bladder, numbness/tingling, pain at night/rest, smoking and other. Other medical history details: I've had j-pouch surgery.   Red flags:  None as reported by patient.  Medical allergies: none.   Surgeries include:  Other. Other surgery history details: Colon removed, j-pouch surgery, j-pouch takedown surgery.    Current medications:  Pain medication.    Current occupation is none.   Primary job tasks include:  Computer work and prolonged sitting.                  Therapist Generated HPI Evaluation  Problem details: Pt reports intermittent pain in L low back and L LE, had spine epidural 5 days ago with possible little benefit.    Pt reports L LE weakness started last year June - after having colon surgery June 2019.  Mid April-May difficulty with walking due to L LE weakness (1/4mile limits) and L lateral leg pain possible from having 2 more abdominal surgeries.  Pt has had 3 abdominal surgeries since last June 2019.  Pt denies any back injury over this timeframe.  MRI in Epic..         Type of problem:  Lumbar.    This is a new condition.  Condition occurred with:  Insidious onset and other reason.  Where condition occurred: for unknown reasons.  Patient reports pain:  Central lumbar spine and lumbar spine left.  Pain is described as aching, sharp and shooting and is intermittent.  Pain radiates to:  Gluteals left, thigh left and lower leg left. Pain is worse in the A.M..  Since onset symptoms are gradually improving.  Associated symptoms:  Loss of motion/stiffness, loss of  strength, fatigue and loss of balance. Symptoms are exacerbated by sitting, certain positions, bending, standing and lifting  and relieved by analgesics.  Special tests included:  MRI and x-ray.    Barriers include:  None as reported by patient.                        Objective:    Gait:    Gait Type:  Antalgic   Weight Bearing Status:  WBAT   Assistive Devices:  None  Deviations:  Hip:  Decr dynamic control L and decr dynamic control RGeneral Deviations:  Cheli decr and stride length decr    Flexibility/Screens:       Lower Extremity:  Decreased left lower extremity flexibility:Hamstrings                 Lumbar/SI Evaluation  ROM:  Arom wnl lumbar: repeated EIS - distal worse, Repeated standing SG L - distal worse, prone lying ok, better.  JUAN w figure 4 L LE better during, after little better minimal.  AROM Lumbar:   Flexion:          Mod loss (to knees) with L side pain/tension  Ext:                    Mod-max loss w some low back pain to left   Side Bend:        Left:  Min loss w L sided pain, back and distal LE    Right:  WNL  Rotation:           Left:     Right:   Side Glide:        Left:     Right:           Lumbar Myotomes:  Lumbar myotomes: negative toe walks, heels walks for strength, limited with cordination of movement/balance bilaterally.      L4 (Ankle DF):  Left:  4    Right:  5  L5 (Great Toe Ext): Left: 4    Right: 5   S1 (Toe Raise):  Left: 4    Right: 4+        Neural Tension/Mobility:    Left side:  SLR and SLR w/DF positive.   Right side:   SLR positive.                                                       General     ROS    Assessment/Plan:    Patient is a 49 year old male with lumbar complaints.    Patient has the following significant findings with corresponding treatment plan.                Diagnosis 1:  L Lumbar Radiculopathy, w 3 abdominal surgeries over the past 1 year  Pain -  electric stimulation, manual therapy, splint/taping/bracing/orthotics, self management, education,  directional preference exercise and home program  Decreased ROM/flexibility - manual therapy and therapeutic exercise  Decreased strength - therapeutic exercise and therapeutic activities  Impaired balance - neuro re-education and therapeutic activities  Decreased proprioception - neuro re-education and therapeutic activities  Inflammation - cold therapy and electric stimulation  Impaired gait - gait training  Decreased function - therapeutic activities    Previous and current functional limitations:  (See Goal Flow Sheet for this information)    Short term and Long term goals: (See Goal Flow Sheet for this information)     Communication ability:  Patient appears to be able to clearly communicate and understand verbal and written communication and follow directions correctly.  Treatment Explanation - The following has been discussed with the patient:   RX ordered/plan of care  Anticipated outcomes  Possible risks and side effects  This patient would benefit from PT intervention to resume normal activities.   Rehab potential is good.    Frequency:  1-2 X week, once daily  Duration:  for 4 weeks  Discharge Plan:  Achieve all LTG.  Independent in home treatment program.  Reach maximal therapeutic benefit.    Please refer to the daily flowsheet for treatment today, total treatment time and time spent performing 1:1 timed codes.

## 2020-07-25 ENCOUNTER — NURSE TRIAGE (OUTPATIENT)
Dept: NURSING | Facility: CLINIC | Age: 50
End: 2020-07-25

## 2020-07-25 ENCOUNTER — HOSPITAL ENCOUNTER (INPATIENT)
Facility: CLINIC | Age: 50
LOS: 2 days | Discharge: HOME | End: 2020-07-29
Attending: INTERNAL MEDICINE | Admitting: INTERNAL MEDICINE
Payer: COMMERCIAL

## 2020-07-25 DIAGNOSIS — M54.42 ACUTE LEFT-SIDED LOW BACK PAIN WITH LEFT-SIDED SCIATICA: Primary | ICD-10-CM

## 2020-07-25 DIAGNOSIS — M54.42 LEFT-SIDED LOW BACK PAIN WITH LEFT-SIDED SCIATICA, UNSPECIFIED CHRONICITY: ICD-10-CM

## 2020-07-25 PROBLEM — M54.9 BACK PAIN: Status: ACTIVE | Noted: 2020-07-25

## 2020-07-25 PROCEDURE — C9803 HOPD COVID-19 SPEC COLLECT: HCPCS

## 2020-07-25 PROCEDURE — 96375 TX/PRO/DX INJ NEW DRUG ADDON: CPT

## 2020-07-25 PROCEDURE — 96374 THER/PROPH/DIAG INJ IV PUSH: CPT

## 2020-07-25 PROCEDURE — U0003 INFECTIOUS AGENT DETECTION BY NUCLEIC ACID (DNA OR RNA); SEVERE ACUTE RESPIRATORY SYNDROME CORONAVIRUS 2 (SARS-COV-2) (CORONAVIRUS DISEASE [COVID-19]), AMPLIFIED PROBE TECHNIQUE, MAKING USE OF HIGH THROUGHPUT TECHNOLOGIES AS DESCRIBED BY CMS-2020-01-R: HCPCS | Performed by: INTERNAL MEDICINE

## 2020-07-25 PROCEDURE — 96376 TX/PRO/DX INJ SAME DRUG ADON: CPT

## 2020-07-25 PROCEDURE — 99285 EMERGENCY DEPT VISIT HI MDM: CPT | Mod: 25

## 2020-07-25 PROCEDURE — G0378 HOSPITAL OBSERVATION PER HR: HCPCS

## 2020-07-25 PROCEDURE — 99219 ZZC INITIAL OBSERVATION CARE,LEVL II: CPT | Performed by: INTERNAL MEDICINE

## 2020-07-25 PROCEDURE — 25000128 H RX IP 250 OP 636: Performed by: INTERNAL MEDICINE

## 2020-07-25 RX ORDER — SIMETHICONE 125 MG
125 TABLET,CHEWABLE ORAL EVERY EVENING
COMMUNITY
End: 2020-11-13

## 2020-07-25 RX ORDER — ONDANSETRON 2 MG/ML
4 INJECTION INTRAMUSCULAR; INTRAVENOUS EVERY 6 HOURS PRN
Status: DISCONTINUED | OUTPATIENT
Start: 2020-07-25 | End: 2020-07-29 | Stop reason: HOSPADM

## 2020-07-25 RX ORDER — AMOXICILLIN 250 MG
1 CAPSULE ORAL 2 TIMES DAILY
Status: DISCONTINUED | OUTPATIENT
Start: 2020-07-25 | End: 2020-07-29 | Stop reason: HOSPADM

## 2020-07-25 RX ORDER — OXYCODONE HYDROCHLORIDE 5 MG/1
5-10 TABLET ORAL
Status: DISCONTINUED | OUTPATIENT
Start: 2020-07-25 | End: 2020-07-29 | Stop reason: HOSPADM

## 2020-07-25 RX ORDER — ACETAMINOPHEN 325 MG/1
650 TABLET ORAL EVERY 4 HOURS PRN
Status: DISCONTINUED | OUTPATIENT
Start: 2020-07-25 | End: 2020-07-26

## 2020-07-25 RX ORDER — ACETAMINOPHEN 650 MG/1
650 SUPPOSITORY RECTAL EVERY 4 HOURS PRN
Status: DISCONTINUED | OUTPATIENT
Start: 2020-07-25 | End: 2020-07-26

## 2020-07-25 RX ORDER — HYDROMORPHONE HYDROCHLORIDE 1 MG/ML
0.3 INJECTION, SOLUTION INTRAMUSCULAR; INTRAVENOUS; SUBCUTANEOUS
Status: DISCONTINUED | OUTPATIENT
Start: 2020-07-25 | End: 2020-07-29 | Stop reason: HOSPADM

## 2020-07-25 RX ORDER — ONDANSETRON 4 MG/1
4 TABLET, ORALLY DISINTEGRATING ORAL EVERY 6 HOURS PRN
Status: DISCONTINUED | OUTPATIENT
Start: 2020-07-25 | End: 2020-07-29 | Stop reason: HOSPADM

## 2020-07-25 RX ORDER — AMOXICILLIN 250 MG
2 CAPSULE ORAL 2 TIMES DAILY
Status: DISCONTINUED | OUTPATIENT
Start: 2020-07-25 | End: 2020-07-27

## 2020-07-25 RX ORDER — IBUPROFEN 600 MG/1
600 TABLET, FILM COATED ORAL EVERY 6 HOURS PRN
Status: DISCONTINUED | OUTPATIENT
Start: 2020-07-25 | End: 2020-07-26

## 2020-07-25 RX ORDER — NALOXONE HYDROCHLORIDE 0.4 MG/ML
.1-.4 INJECTION, SOLUTION INTRAMUSCULAR; INTRAVENOUS; SUBCUTANEOUS
Status: DISCONTINUED | OUTPATIENT
Start: 2020-07-25 | End: 2020-07-29 | Stop reason: HOSPADM

## 2020-07-25 RX ORDER — SIMETHICONE 125 MG
250 TABLET,CHEWABLE ORAL AT BEDTIME
COMMUNITY
End: 2023-12-21

## 2020-07-25 RX ORDER — KETOROLAC TROMETHAMINE 30 MG/ML
30 INJECTION, SOLUTION INTRAMUSCULAR; INTRAVENOUS EVERY 6 HOURS PRN
Status: DISCONTINUED | OUTPATIENT
Start: 2020-07-25 | End: 2020-07-27

## 2020-07-25 RX ORDER — HYDROMORPHONE HYDROCHLORIDE 1 MG/ML
0.5 INJECTION, SOLUTION INTRAMUSCULAR; INTRAVENOUS; SUBCUTANEOUS
Status: DISCONTINUED | OUTPATIENT
Start: 2020-07-25 | End: 2020-07-25

## 2020-07-25 RX ORDER — LOPERAMIDE HCL 2 MG
4 CAPSULE ORAL AT BEDTIME
COMMUNITY
End: 2022-11-18

## 2020-07-25 RX ORDER — LOPERAMIDE HCL 2 MG
2 CAPSULE ORAL 2 TIMES DAILY
COMMUNITY
End: 2023-12-21

## 2020-07-25 RX ORDER — METHYLPREDNISOLONE SODIUM SUCCINATE 125 MG/2ML
125 INJECTION, POWDER, LYOPHILIZED, FOR SOLUTION INTRAMUSCULAR; INTRAVENOUS ONCE
Status: COMPLETED | OUTPATIENT
Start: 2020-07-25 | End: 2020-07-25

## 2020-07-25 RX ORDER — POLYETHYLENE GLYCOL 3350 17 G/17G
17 POWDER, FOR SOLUTION ORAL DAILY PRN
Status: DISCONTINUED | OUTPATIENT
Start: 2020-07-25 | End: 2020-07-29 | Stop reason: HOSPADM

## 2020-07-25 RX ADMIN — HYDROMORPHONE HYDROCHLORIDE 0.5 MG: 1 INJECTION, SOLUTION INTRAMUSCULAR; INTRAVENOUS; SUBCUTANEOUS at 21:44

## 2020-07-25 RX ADMIN — METHYLPREDNISOLONE SODIUM SUCCINATE 125 MG: 125 INJECTION, POWDER, FOR SOLUTION INTRAMUSCULAR; INTRAVENOUS at 20:48

## 2020-07-25 RX ADMIN — HYDROMORPHONE HYDROCHLORIDE 0.5 MG: 1 INJECTION, SOLUTION INTRAMUSCULAR; INTRAVENOUS; SUBCUTANEOUS at 20:47

## 2020-07-25 ASSESSMENT — ENCOUNTER SYMPTOMS
NUMBNESS: 0
ABDOMINAL PAIN: 0
ARTHRALGIAS: 1
BACK PAIN: 1
MYALGIAS: 1

## 2020-07-25 ASSESSMENT — MIFFLIN-ST. JEOR: SCORE: 1517.16

## 2020-07-26 ENCOUNTER — APPOINTMENT (OUTPATIENT)
Dept: PHYSICAL THERAPY | Facility: CLINIC | Age: 50
End: 2020-07-26
Attending: INTERNAL MEDICINE
Payer: COMMERCIAL

## 2020-07-26 PROCEDURE — 96376 TX/PRO/DX INJ SAME DRUG ADON: CPT

## 2020-07-26 PROCEDURE — 25000125 ZZHC RX 250: Performed by: INTERNAL MEDICINE

## 2020-07-26 PROCEDURE — G0378 HOSPITAL OBSERVATION PER HR: HCPCS

## 2020-07-26 PROCEDURE — 96375 TX/PRO/DX INJ NEW DRUG ADDON: CPT

## 2020-07-26 PROCEDURE — 25000128 H RX IP 250 OP 636: Performed by: INTERNAL MEDICINE

## 2020-07-26 PROCEDURE — 97161 PT EVAL LOW COMPLEX 20 MIN: CPT | Mod: GP

## 2020-07-26 PROCEDURE — 99232 SBSQ HOSP IP/OBS MODERATE 35: CPT | Performed by: PHYSICIAN ASSISTANT

## 2020-07-26 PROCEDURE — 25000132 ZZH RX MED GY IP 250 OP 250 PS 637: Performed by: PHYSICIAN ASSISTANT

## 2020-07-26 PROCEDURE — 99226 ZZC SUBSEQUENT OBSERVATION CARE,LEVEL III: CPT | Performed by: PHYSICIAN ASSISTANT

## 2020-07-26 RX ORDER — HYDROXYZINE HYDROCHLORIDE 50 MG/1
50 TABLET, FILM COATED ORAL EVERY 6 HOURS PRN
Status: DISCONTINUED | OUTPATIENT
Start: 2020-07-26 | End: 2020-07-29 | Stop reason: HOSPADM

## 2020-07-26 RX ORDER — HYDROXYZINE HYDROCHLORIDE 25 MG/1
25 TABLET, FILM COATED ORAL EVERY 6 HOURS PRN
Status: DISCONTINUED | OUTPATIENT
Start: 2020-07-26 | End: 2020-07-29 | Stop reason: HOSPADM

## 2020-07-26 RX ORDER — TIZANIDINE 2 MG/1
2-4 TABLET ORAL EVERY 6 HOURS PRN
Status: DISCONTINUED | OUTPATIENT
Start: 2020-07-26 | End: 2020-07-29 | Stop reason: HOSPADM

## 2020-07-26 RX ORDER — GABAPENTIN 100 MG/1
100 CAPSULE ORAL 3 TIMES DAILY
Status: DISCONTINUED | OUTPATIENT
Start: 2020-07-26 | End: 2020-07-27

## 2020-07-26 RX ORDER — ACETAMINOPHEN 500 MG
1000 TABLET ORAL EVERY 4 HOURS PRN
Status: DISCONTINUED | OUTPATIENT
Start: 2020-07-26 | End: 2020-07-27

## 2020-07-26 RX ADMIN — KETOROLAC TROMETHAMINE 30 MG: 30 INJECTION, SOLUTION INTRAMUSCULAR at 00:16

## 2020-07-26 RX ADMIN — FAMOTIDINE 20 MG: 10 INJECTION INTRAVENOUS at 00:16

## 2020-07-26 RX ADMIN — GABAPENTIN 100 MG: 100 CAPSULE ORAL at 14:05

## 2020-07-26 RX ADMIN — TIZANIDINE 4 MG: 2 TABLET ORAL at 08:04

## 2020-07-26 RX ADMIN — HYDROMORPHONE HYDROCHLORIDE 0.3 MG: 1 INJECTION, SOLUTION INTRAMUSCULAR; INTRAVENOUS; SUBCUTANEOUS at 14:05

## 2020-07-26 RX ADMIN — GABAPENTIN 100 MG: 100 CAPSULE ORAL at 10:49

## 2020-07-26 RX ADMIN — HYDROMORPHONE HYDROCHLORIDE 0.3 MG: 1 INJECTION, SOLUTION INTRAMUSCULAR; INTRAVENOUS; SUBCUTANEOUS at 03:54

## 2020-07-26 RX ADMIN — HYDROXYZINE HYDROCHLORIDE 50 MG: 50 TABLET, FILM COATED ORAL at 12:02

## 2020-07-26 RX ADMIN — GABAPENTIN 100 MG: 100 CAPSULE ORAL at 20:04

## 2020-07-26 RX ADMIN — KETOROLAC TROMETHAMINE 30 MG: 30 INJECTION, SOLUTION INTRAMUSCULAR at 16:52

## 2020-07-26 RX ADMIN — KETOROLAC TROMETHAMINE 30 MG: 30 INJECTION, SOLUTION INTRAMUSCULAR at 08:04

## 2020-07-26 NOTE — PLAN OF CARE
[facility]      OUTPATIENT PHYSICAL THERAPY EVALUATION  PLAN OF TREATMENT FOR OUTPATIENT REHABILITATION  (COMPLETE FOR INITIAL CLAIMS ONLY)  Patient's Last Name, First Name, M.I.  YOB: 1970  Alex Cardona                        Provider's Name  [facility] Medical Record No.  [id number]                               Onset Date:  07/25/20   Start of Care Date:  07/26/20      Type:     _X_PT   ___OT   ___SLP Medical Diagnosis:                           PT Diagnosis:  impaired functional mobility   Visits from SOC:  1   _________________________________________________________________________________  Plan of Treatment/Functional Goals    Planned Interventions:  ,     ,       Goals: See Physical Therapy Goals on Care Plan in [employer] electronic health record.    Therapy Frequency: (eval only)  Predicted Duration of Therapy Intervention:    _________________________________________________________________________________    I CERTIFY THE NEED FOR THESE SERVICES FURNISHED UNDER        THIS PLAN OF TREATMENT AND WHILE UNDER MY CARE     (Physician co-signature of this document indicates review and certification of the therapy plan).                Certification date from: 07/26/20, Certification date to: 07/26/20    Referring Physician: Xander Mcclure MD            Initial Assessment        See Physical Therapy evaluation dated 07/26/20 in [employer] electronic health record.

## 2020-07-26 NOTE — ED TRIAGE NOTES
Pt c/o back pain.  H/o ama disc L 4-5. Pt c/o left lower leg pain that radiates to foot.  Pt currently in physical therapy. Pt had his first therapy on Monday. Pt had epidural on July 15th. Unable to walk, pt can take a few steps and then has extreme pain. 100 fent given by EMS. No loss of bowel/bladder.

## 2020-07-26 NOTE — PLAN OF CARE
"PRIMARY DIAGNOSIS: ACUTE back PAIN  OUTPATIENT/OBSERVATION GOALS TO BE MET BEFORE DISCHARGE:  1. Pain Status: Improved but still requiring IV narcotics.    2. Return to near baseline physical activity: No    3. Cleared for discharge by consultants (if involved): No    Discharge Planner Nurse   Safe discharge environment identified: Yes  Barriers to discharge: Yes       Entered by: Forrest Gonzalez 07/26/2020     Pt alert and oriented x4. Up w/ 1 assist. Pain is 7/10 in his back, lower L leg and hip. Given Toradol, atarax, gabapentin, tizanidine and Iv dilaudid . Nothing has improved his pain yet. Regular diet. SL. PT today @4 p.     /64 (BP Location: Left arm)   Pulse 85   Temp 98.1  F (36.7  C) (Oral)   Resp 16   Ht 1.778 m (5' 10\")   Wt 65.1 kg (143 lb 8 oz)   SpO2 100%   BMI 20.59 kg/m          Please review provider order for any additional goals.   Nurse to notify provider when observation goals have been met and patient is ready for discharge.  "

## 2020-07-26 NOTE — UTILIZATION REVIEW
"[facility] Utilization Review  Admission Status; Secondary Review Determination     Admission Date: 7/25/2020  8:14 PM      Under the authority of the Utilization Management Committee, the utilization review process indicated a secondary review on the above patient.  The review outcome is based on review of the medical records, discussions with staff, and applying clinical experience noted on the date of the review.        (X) Observation Status Appropriate - This patient does not meet hospital inpatient criteria and is placed in observation status. If this patient's primary payer is Medicare and was admitted as an inpatient, Condition Code 44 should be used and patient status changed to \"observation\".   () Observation Status concurrent Review           RATIONALE FOR DETERMINATION   Alex Cardona is a 49 year old male with past medical history of chronic back pain, who presented to the emergency room with complaints of worsening back pain and numbness in leg.  He is registered to observation and started on pain management, supportive cares and orthopedic/neurosurgery consultation requested for possible intervention.  He is getting oral analgesics and IV Dilaudid ( 4 doses thus far), and IV Solu-Medrol to reduce inflammation.Based on severity of illness and intensity of service, patient does not meet criteria for inpatient admission.  Observation cares are appropriate for pain management and further evaluation as outlined above.     The information on this document is developed by the utilization review team in order for the business office to ensure compliance.  This only denotes the appropriateness of proper admission status and does not reflect the quality of care rendered.              Sincerely,       Alex Campos MD, MS  Physician Advisor  Utilization Review-[place]    Phone: [phone]   "

## 2020-07-26 NOTE — PROGRESS NOTES
07/26/20 1553   Quick Adds   Type of Visit Initial PT Evaluation   Living Environment   Lives With parent(s)   Living Arrangements apartment   Home Accessibility stairs to enter home   Number of Stairs, Main Entrance   (minimum of 1 flight of steps)   Stair Railings, Main Entrance railings on both sides of stairs   Transportation Anticipated family or friend will provide   Living Environment Comment Patient lives in a 2 bedroom apartment with his mother, there is a minimum of 1 flight of steps into the home. Mother is out working during the daytime, otherwise able to help as needed with household tasks.   Self-Care   Usual Activity Tolerance excellent   Current Activity Tolerance moderate   Equipment Currently Used at Home none   Activity/Exercise/Self-Care Comment At baseline IND with functional mobility & ADLs.Patient notes prior to initiation of pain symptoms he was walking up to 4 miles/day.    Functional Level Prior   Ambulation 0-->independent   Transferring 0-->independent   Fall history within last six months no   Which of the above functional risks had a recent onset or change? ambulation;transferring   Prior Functional Level Comment Patient indicates symptoms present for about a year but have become progressively worse since 05/2020 with acute worsening since the beginning of July where the patient was seen by orthopedic sports medicine on 7/2/20 due to left lateral lower leg pain that would be worsened by walking, standing, and sitting. Patient started outpatient PT on 7/20 - with minimal affect. Now patient reports trouble walking and sitting since 7/23 and was unable to get out of bed since 2 AM on 7/25 prior to coming into the ED. He is only able to take a few steps to the bathroom and tried to have a BM this morning but sitting for more than a minute was too unbearable. He has ongoing weakness and tingling in his ankle, top of his foot, and left outer calf.   General Information   Onset of  Illness/Injury or Date of Surgery - Date 07/25/20   Referring Physician Xander Mcclure MD   Patient/Family Goals Statement To improve pain   Pertinent History of Current Problem (include personal factors and/or comorbidities that impact the POC) per chart: 49 year old male with PMH significant for h/o ulcerative colitis s/p ileostomy and subsequent takedown in 05/2020, lumbar degenerative disc disease, left lumbar radiculopathy, and herniated disc impinging L5 nerve root, and left leg weakness who was admitted on 7/25/2020 for evaluation of management of worsening back pain.    Precautions/Limitations fall precautions   General Observations Greeted patient supine in bed.   General Info Comments Activity: ambulate with assist   Cognitive Status Examination   Orientation orientation to person, place and time   Level of Consciousness alert   Follows Commands and Answers Questions 100% of the time;able to follow multistep instructions   Pain Assessment   Patient Currently in Pain   (left radiating leg pain 7/10; jumps to 10/10 with standing)   Integumentary/Edema   Integumentary/Edema no deficits were identifed   Posture    Posture Not impaired   Range of Motion (ROM)   ROM Comment B LE WFL   Strength   Strength Comments left leg weakness; left DF 4-/5 - otherwise B LE 5/5   Bed Mobility   Bed Mobility Comments NIALL with supine <> EOB.   Transfer Skills   Transfer Comments SBA for sit <> stand with FWW   Gait   Gait Comments unable to perform secondary to pain   Sensory Examination   Sensory Perception Comments left leg radicular symptoms from back - pain   Clinical Impression   Criteria for Skilled Therapeutic Intervention evaluation only   PT Diagnosis impaired functional mobility   Influenced by the following impairments left DF weakness, pain   Functional limitations due to impairments transfers, ambulation, stairs   Clinical Presentation Evolving/Changing   Clinical Presentation Rationale PMH, current  "presentation, clinical judgement   Clinical Decision Making (Complexity) Low complexity   Therapy Frequency   (eval only)   Anticipated Discharge Disposition Home   Risk & Benefits of therapy have been explained Yes   Patient, Family & other staff in agreement with plan of care Yes   Therapy Certification   Start of care date 07/26/20   Certification date from 07/26/20   Certification date to 07/26/20   [facility] TM \"6 Clicks\"   2016, Trustees of [facility], under license to [employer].  All rights reserved.   6 Clicks Short Forms Basic Mobility Inpatient Short Form   [facility] AM-PAC  \"6 Clicks\" V.2 Basic Mobility Inpatient Short Form   1. Turning from your back to your side while in a flat bed without using bedrails? 4 - None   2. Moving from lying on your back to sitting on the side of a flat bed without using bedrails? 4 - None   3. Moving to and from a bed to a chair (including a wheelchair)? 3 - A Little   4. Standing up from a chair using your arms (e.g., wheelchair, or bedside chair)? 3 - A Little   5. To walk in hospital room? 1 - Total   6. Climbing 3-5 steps with a railing? 1 - Total   Basic Mobility Raw Score (Score out of 24.Lower scores equate to lower levels of function) 16   Total Evaluation Time   Total Evaluation Time (Minutes) 20     "

## 2020-07-26 NOTE — PHARMACY-ADMISSION MEDICATION HISTORY
Admission medication history interview status for this patient is complete. See [facility] admission navigator for allergy information, prior to admission medications and immunization status.     Medication history interview done via telephone during Covid-19 pandemic, indicate source(s): Patient  Medication history resources (including written lists, pill bottles, clinic record):None    Changes made to PTA medication list:  Added: simethicone   Deleted: diltiazem plo gel  Changed: loperamide sig, melatonin dose/sig     Actions taken by pharmacist (provider contacted, etc):None     Additional medication history information:None    Medication reconciliation/reorder completed by provider prior to medication history?  N   (Y/N)     For patients on insulin therapy: N  (Y/N)    Prior to Admission medications    Medication Sig Last Dose Taking? Auth Provider   acetaminophen (TYLENOL) 500 MG tablet Take 1,000 mg by mouth every 8 hours as needed for mild pain Past Month at Unknown time Yes Unknown, Entered By History   HYDROcodone-acetaminophen (NORCO) 5-325 MG tablet Take 1 tablet by mouth every 6 hours as needed for severe pain 7/25/2020 at Unknown time Yes Lashanda Villa,    loperamide (IMODIUM) 2 MG capsule Take 2 mg by mouth every evening 7/24/2020 at Unknown time Yes Unknown, Entered By History   loperamide (IMODIUM) 2 MG capsule Take 4 mg by mouth At Bedtime 7/24/2020 at Unknown time Yes Unknown, Entered By History   melatonin 5 MG tablet Take 5 mg by mouth At Bedtime  7/24/2020 at Unknown time Yes Reported, Patient   menthol-zinc oxide (CALMOSEPTINE) 0.44-20.6 % OINT ointment Apply topically 4 times daily as needed for skin protection or irritation 7/24/2020 at Unknown time Yes Unknown, Entered By History   multivitamin w/minerals (MULTI-VITAMIN) tablet Take 1 tablet by mouth daily 7/25/2020 at am Yes Reported, Patient   simethicone (MYLICON) 125 MG chewable tablet Take 125 mg by mouth every evening 7/24/2020 at  Unknown time Yes Unknown, Entered By History   simethicone (MYLICON) 125 MG chewable tablet Take 250 mg by mouth At Bedtime 7/24/2020 at Unknown time Yes Unknown, Entered By History

## 2020-07-26 NOTE — PLAN OF CARE
PT orders received & acknowledged. Chart reviewed. Patient admitted under OBS status. 1x eval completed.     Patient lives in a 2 bedroom apartment with his mother, there is a minimum of 1 flight of steps into the home with bilateral railings. Mother is out working during the daytime, otherwise able to help as needed with household tasks. At baseline IND with functional mobility & ADLs. Patient notes prior to initiation of pain symptoms he was walking up to 4 miles/day.     Discharge Planner PT   Patient plan for discharge: Home once pain is managed   Current status: Greeted sidyling in bed and agreeable to session despite indicating 7/10 pain at rest in left leg and noting not being able to tolerate most positions due to severe pain. Weakness noted in left leg specifically left DF. Patient demonstrates NIALL with supine <> EOB. Patient demonstrates no issues with seated trunk control. Patient is SBA for sit <> stand with FWW and only able to tolerate standing for 15 seconds with minimal WB through left leg before the pain becomes unbearable 10/10 and requires returning to supine. Unable to ambulate secondary to pain. Patient is most limited in functional mobility due to pain. Concluded session with patient sidyling in bed, all needs in reach.   Barriers to return to prior living situation: unmanaged pain significantly limited ability for functional mobility & ADLs   Recommendations for discharge: Home once pain managed   Rationale for recommendations: Based on patients high prior level of function & fitness, patient is anticipated to be able to perform necessary functional mobility for return to home once pain is better managed.        Entered by: Ashleigh Dangelo 07/26/2020 4:25 PM

## 2020-07-26 NOTE — PLAN OF CARE
PRIMARY DIAGNOSIS: Back pain  OUTPATIENT/OBSERVATION GOALS TO BE MET BEFORE DISCHARGE:  1. Pain Status: Improved but still requiring IV narcotics.    2. Return to near baseline physical activity: No    3. Cleared for discharge by consultants (if involved): No    Discharge Planner Nurse   Safe discharge environment identified: Yes  Barriers to discharge: Yes       Entered by: Brenda Yanes 07/26/2020 4:08 AM    VSS, pt is A&Ox4, up with assist x1, SL, tingling in L leg, PT consult in, requiring IV pain meds, tolerating a regular diet, will continue to monitor       Please review provider order for any additional goals.   Nurse to notify provider when observation goals have been met and patient is ready for discharge.

## 2020-07-26 NOTE — TELEPHONE ENCOUNTER
Patient calling - says he was diagnosed with a herniated disc.  Had epidural injection 1/5 weeks ago.  Says he cannot stand, walk or sit without being in severe pain in left leg.  Is unable to bear any weight.    No chest pain or difficulty breathing.      Triaged to disposition of Go to ED Now.  Patient says he is unable to walk to a vehicle.  Advised to call 911 for ambulance transport.    Ana Sanabria RN  Triage Nurse Advisor    COVID 19 Nurse Triage Plan/Patient Instructions    Please be aware that novel coronavirus (COVID-19) may be circulating in the community. If you develop symptoms such as fever, cough, or SOB or if you have concerns about the presence of another infection including coronavirus (COVID-19), please contact your health care provider or visit [email or web address].     Disposition/Instructions    ED Visit recommended. Follow protocol based instructions.     Bring Your Own Device:  Please also bring your smart device(s) (smart phones, tablets, laptops) and their charging cables for your personal use and to communicate with your care team during your visit.  Call to EMS/911 recommended. Follow protocol based instructions.     Bring Your Own Device:  Please also bring your smart device(s) (smart phones, tablets, laptops) and their charging cables for your personal use and to communicate with your care team during your visit.    Thank you for taking steps to prevent the spread of this virus.  o Limit your contact with others.  o Wear a simple mask to cover your cough.  o Wash your hands well and often.    Resources    [facility]: About COVID-19: [email or web address]    CDC: What to Do If You're Sick: www.cdc.gov/coronavirus/2019-ncov/about/steps-when-sick.html    CDC: Ending Home Isolation: www.cdc.gov/coronavirus/2019-ncov/hcp/disposition-in-home-patients.html     CDC: Caring for Someone: www.cdc.gov/coronavirus/2019-ncov/if-you-are-sick/care-for-someone.html     [facility]: Interim Guidance  for Hospital Discharge to Home: www.health.[facility].[place]/diseases/coronavirus/hcp/hospdischarge.pdf    [facility] clinical trials (COVID-19 research studies): [email or web address].[facility]/umn-clinical-trials     Below are the COVID-19 hotlines at the [place] Department of Health ([facility]). Interpreters are available.   o For health questions: Call 885-257-6212 or 1-821.780.8358 (7 a.m. to 7 p.m.)  o For questions about schools and childcare: Call 735-214-1791 or 1-748.917.2344 (7 a.m. to 7 p.m.)     Reason for Disposition    Unable to walk    Additional Information    Negative: Looks like a broken bone or dislocated joint (e.g., crooked or deformed)    Negative: Sounds like a life-threatening emergency to the triager    Negative: Followed a leg injury    Negative: Leg swelling is main symptom    Negative: Back pain radiating (shooting) into leg(s)    Negative: Knee pain is main symptom    Negative: Ankle pain is main symptom    Negative: Pregnant    Negative: Postpartum (from 0 to 6 weeks after delivery)    Negative: Chest pain    Negative: Difficulty breathing    Negative: Entire foot is cool or blue in comparison to other side    Protocols used: LEG PAIN-A-AH

## 2020-07-26 NOTE — ED NOTES
Pt able to rise from bed and move to a standing position under his own power. States that he is unable to put much weight on his left leg which is not new today, but states he does not feel he'd be able to ambulate because of it. Pt returned to the bed under his own power. States pain is approx 5/10.

## 2020-07-26 NOTE — PROGRESS NOTES
[facility]    Hospitalist Progress Note  Name: Alex Cardona    MRN: [id number]  Provider:  Em Henry PA-C  Date of Service: 07/26/2020    Assessment & Plan   Summary of Stay: Alex Cardona is a 49 year old male with PMH significant for h/o ulcerative colitis s/p ileostomy and subsequent takedown in 05/2020, lumbar degenerative disc disease, left lumbar radiculopathy, and herniated disc impinging L5 nerve root, and left leg weakness who was admitted on 7/25/2020 for evaluation of management of worsening back pain.     #Left L5 herniated disc with nerve root impingement and radiculopathy  #Intractable back pain: symptoms present for about a year but have become progressively worse since 05/2020 with acute worsening since the beginning of July where the patient was seen by orthopedic sports medicine on 7/2/20 due to left lateral lower leg pain that would be worsened by walking, standing, and sitting. Pain seems to improve with exercise, not with rest, and pain tends to increase at night. Underwent MRI of lumbar spine on 7/9/20 which showed disc herniation causing impingement of L5 nerve root likely causing leg weakness and increased back pain. S/p FER on 7/15 without significant improvement and started outpatient PT on 7/20. Trouble walking and sitting since 7/23 and was unable to get out of bed since 2 AM on 7/25 prior to coming into the ED. No improvement with any pain medications up to this point. Received a dose of IV solumedrol 125 mg in the ED.   - scheduled Tylenol, PRN IV Toradol with Pepcid for GI protection, Atarax, Tizanidine, Oxycodone, and IV Dilaudid available   - trial Gabapentin 100 mg TID  - will plan to discuss with spine surgery in AM if still no improvement since the patient has discussed surgical intervention with orthopedic provider if not improving after FER  - mobilize with nursing and PT consult  - will check BMP and CBC due to staying in hospital     #Refractory ulcerative  colitis  #s/p ileal pouch, anal anastomosis, and diverting loop ileostomy with ileostomy reversal/takedown (05/2020): procedure performed by Dr. Ruano without complication     DVT Prophylaxis: low risk, encourage ambulation   Code Status: Full Code  Disposition: Expected discharge unclear, expect an additional 1-2 days pending recommendation from neurosurgery evaluation and if surgery recommended     Interval History   Patient reports no improvement with any interventions thus far. He is only able to take a few steps to the bathroom and tried to have a BM this morning but sitting for more than a minute was too unbearable. He has ongoing weakness and tingling in his ankle, top of his foot, and left outer calf. He states his pain fluctuates pending on the time of day. He notes progressive trouble with walking and sitting since Thursday this past week. Denies change in bowel or bladder control.     -Data reviewed today: I reviewed all new labs and imaging reports over the last 24 hours.     Physical Exam   Temp: 98.1  F (36.7  C) Temp src: Oral BP: 125/64 Pulse: 85   Resp: 16 SpO2: 100 % O2 Device: None (Room air)    Vitals:    07/25/20 2020 07/25/20 2355   Weight: 65.8 kg (145 lb) 65.1 kg (143 lb 8 oz)     Vital Signs with Ranges  Temp:  [96.8  F (36  C)-98.9  F (37.2  C)] 98.1  F (36.7  C)  Pulse:  [] 85  Resp:  [16-18] 16  BP: (109-137)/(64-89) 125/64  SpO2:  [95 %-100 %] 100 %  No intake/output data recorded.    GEN:  Alert, oriented x 3, appears comfortable laying in bed, NAD.  HEENT:  Normocephalic/atraumatic, no scleral icterus, no nasal discharge, mouth moist.  CV:  Regular rate and rhythm, no murmur or JVD.  S1 + S2 noted, no S3 or S4.  LUNGS:  Clear to auscultation bilaterally without rales/rhonchi/wheezing/retractions.  Symmetric chest rise on inhalation noted.  ABD:  Active bowel sounds, soft, non-tender/non-distended.  No rebound/guarding/rigidity.  BACK: positive straight leg raise on right with >45  degrees, unable to illicit pain with palpations along lumbar spine or SI joint on left   EXT:  No edema.  No cyanosis.  No acute joint synovitis noted.  SKIN:  Dry to touch, no exanthems noted in the visualized areas.  NEURO: strength of 4/5 of left lower leg, strength of 5/5 of left upper leg and right LE, sensation to touch grossly intact     Medications       gabapentin  100 mg Oral TID     senna-docusate  1 tablet Oral BID    Or     senna-docusate  2 tablet Oral BID     Data   No labs performed during this visit.     Em Henry PA-C

## 2020-07-26 NOTE — ED PROVIDER NOTES
History     Chief Complaint:  Back Pain    HPI   Alex Cardona is a 49 year old male with a history of lumbar DJD who arrived via EMS. He complains of extreme pain to back and radiating down his left leg to his foot. He received 100 of fentanyl en route and now rates the pain as 5/10. He has a history of herniated disc L4 and L5 for which he had his first physical therapy appointment five days ago. Since then he states the pain has worsened and he has difficulty walking and moving to use the bathroom. The back pain is also exacerbated when he tries to straighten his leg. He follows up with Dr. Villa of [facility] and was prescribed 5 mg Norco for his pain but he states it does not help. He denies any numbness and groin pain. No change of bowel or bladder control. No paralysis.     Allergies:  RBC antigens     Medications:    Norco  Imodium  Melatonin    Past Medical History:    Ulcerative colitis  Hypoproteinemia  Ileostomy status  Lumbar DJD  Left lumbar radiculopathy  Facet arthropathy, lumbar    Past Surgical History:    Colectomy  Proctocolectomy   Sigmoidoscopy x2  Takedown ileostomy     Family History:    Heart - mother  COPD - mother  Cancer - father    Social History:  Smoking status: quit 2009  Smokeless tobacco: no  Alcohol use: quit 5/2019  Drug use: quit marijuana 5/2019  Marital Status:  Single [1]     Review of Systems   Gastrointestinal: Negative for abdominal pain.   Musculoskeletal: Positive for arthralgias, back pain, gait problem and myalgias.   Neurological: Negative for numbness.   All other systems reviewed and are negative.    Physical Exam     Patient Vitals for the past 24 hrs:   BP Temp Temp src Pulse Resp SpO2 Weight   07/25/20 2111 -- -- -- -- -- 97 % --   07/25/20 2110 -- -- -- -- -- 97 % --   07/25/20 2109 -- -- -- -- -- 97 % --   07/25/20 2108 -- -- -- -- -- 99 % --   07/25/20 2105 -- -- -- -- -- 96 % --   07/25/20 2104 -- -- -- -- -- 96 % --   07/25/20  2103 -- -- -- -- -- 97 % --   07/25/20 2102 -- -- -- -- -- 98 % --   07/25/20 2100 -- -- -- -- -- 96 % --   07/25/20 2059 -- -- -- -- -- 96 % --   07/25/20 2058 -- -- -- -- -- 96 % --   07/25/20 2057 -- -- -- -- -- 95 % --   07/25/20 2023 -- -- -- -- -- 98 % --   07/25/20 2022 -- -- -- -- -- 99 % --   07/25/20 2021 -- -- -- -- -- 98 % --   07/25/20 2020 126/83 98.5  F (36.9  C) Oral 88 16 98 % 65.8 kg (145 lb)       Physical Exam  Abdominal:      Tenderness: There is no abdominal tenderness.   Musculoskeletal:      Lumbar back: He exhibits decreased range of motion, tenderness and spasm.      Comments: Straight leg raising early positive for radicular pain   Skin:     General: Skin is warm and dry.   Neurological:      Mental Status: He is alert.      Sensory: No sensory deficit.      Deep Tendon Reflexes: Reflexes are normal and symmetric.   Psychiatric:         Behavior: Behavior is cooperative.         Emergency Department Course   Laboratory:  Laboratory findings were communicated with the patient who voiced understanding of the findings.    Asymptomatic Covid-19 PCR: Pending    Interventions:  2047 Dilaudid 0.5 mg IV  2048 Solu-medrol 125 mg IV  2144 Dilaudid 0.5 mg IV    Emergency Department Course:  Past medical records, nursing notes, and vitals reviewed.  2014: Patient arrived via EMS.    2030: I performed an exam of the patient and obtained history, as documented above.     IV inserted and blood drawn.    2230: I rechecked the patient. I reviewed the results with the Patient and answered all related questions prior to admission.     Findings and plan explained to the Patient who consents to admission. Discussed the patient with Dr. Mcclure, who will admit the patient to a bed for further monitoring, evaluation, and treatment.  Impression & Plan   Medical Decision Making:    Alex Cardona is a 49 year old male who presents to the emergency department with worsening of his low back pain with left-sided  radicular symptoms.  He does not have any red flags to suggest cauda equina syndrome or spinal cord compression.  With multiple medications here he is not significantly improved and feels he would be unable to function at home.  I discussed the case with Dr. Mcclure as of the hospitalist service.  Patient will be admitted to the observation unit for further evaluation and management.    Covid-19  Alex Cardona was evaluated during a global COVID-19 pandemic, which necessitated consideration that the patient might be at risk for infection with the SARS-CoV-2 virus that causes COVID-19.   Applicable protocols for evaluation were followed during the patient's care.   COVID-19 was considered as part of the patient's evaluation. The plan for testing is:  a test was obtained during this visit.    Diagnosis:    ICD-10-CM   1. Left-sided low back pain with left-sided sciatica, unspecified chronicity  M54.42     Disposition:  Admitted.    Corie Vogel  7/25/2020   [facility] [place] EMERGENCY DEPARTMENT    Scribe Disclosure:  I, Corie Vogel, am serving as a scribe at 8:31 PM on 7/25/2020 to document services personally performed by Roseann Dobson MD based on my observations and the provider's statements to me.          Roseann Dobson MD  07/25/20 3117

## 2020-07-26 NOTE — H&P
[facility]  Hospitalist Admission Note  Name: Alex Cardona    MRN: [id number]  YOB: 1970    Age: 49 year old  Date of admission: 7/25/2020  Primary care provider: Kati Garcia            Assessment and Plan:   Alex Cardona is a 49 year old male with a history of ulcerative colitis, known with lumbar degenerative disc disease, left lumbar radiculopathy, herniated disc impinging L5 nerve root, left leg weakness and underwent FER last 7/15/2020 and afforded some relief of symptomatology but has to come in to the emergency room earlier due to worsening pain on his left lower extremity and back area and was not able to ambulate freely at home due to pain.    1.  Back pain  2.  Lumbar disc degenerative disease  3.  Lumbar radiculopathy  4.  Herniated disc known impingement of L5 nerve root  5.  Prior history of ulcerative colitis    [place] under observation.  Optimize pain control.  Received IV corticosteroids earlier at the ED.  Will give trial of NSAIDs with as needed IV Toradol, ibuprofen.  Pepcid ordered.  No prior history of bleeding ulcers.  As needed APAP.  As needed Flexeril  -Minimize narcotics if able  -Low threshold for spine orthopedics evaluation if not improving  -Physical therapy evaluation requested  -May have regular diet      Code status: Full code    Prophylaxis: Mechanical  Disposition: Home likely in 24 hours          Chief Complaint:   Back pain, left lower extremity pain       Source of Information:   Patient with good reliability  Discussion with ED physician  Review of E chart records         History of Present Illness:   Alex Cardona is a 49 year old male known history of ulcerative colitis, known with lumbar degenerative disc disease, left lumbar radiculopathy, herniated disc impinging L5 nerve root, left leg weakness and underwent FER last 7/15/2020 and afforded some relief of symptomatology but has to come in to the emergency room  earlier due to worsening pain on his left lower extremity and back area and was not able to ambulate freely at home due to pain.  However he endorses no urinary or fecal incontinence no foot drop, no reported fall events, denies any shortness of breath, chest pain, fevers, chills, nausea, vomiting, diarrhea nor bleeding tendencies.    At home he usually took previously prescribed Norco but was not affording relief of symptoms.  In the emergency room he has stable hemodynamics and was provided with IV narcotics and also given with 125 mg of IV Medrol.    During time my exam Steven remain pleasant, conversant and interactive.  He endorses no other new complaints.              Past Medical History:     Past Medical History:   Diagnosis Date     Ulcerative colitis (H)              Past Surgical History:     Past Surgical History:   Procedure Laterality Date     COLONOSCOPY N/A 5/26/2019    Procedure: incomplete COLONOSCOPY with left colon biopsies by cold biopsy forceps;  Surgeon: Herbert Dior DO;  Location: [facility]     LAPAROSCOPIC ASSISTED COLECTOMY N/A 6/12/2019    Procedure: Laparoscopic Assisted Total Abdominal Colectomy with End Ileostomy;  Surgeon: Brigitte Ruano MD;  Location:  OR     LAPAROSCOPIC ASSISTED COMPLETION PROCTOCOLECTOMY N/A 2/11/2020    Procedure: Completion proctectomy and ileal pouch anal anastomosis with diverting loop ileostomy.;  Surgeon: Brigitte Ruano MD;  Location:  OR     SIGMOIDOSCOPY FLEXIBLE  01/13/2020    Dr. Ruano [facility]     SIGMOIDOSCOPY FLEXIBLE N/A 5/18/2020    Procedure: SIGMOIDOSCOPY, FLEXIBLE;  Surgeon: Brigitte Ruano MD;  Location:  OR     TAKEDOWN ILEOSTOMY N/A 5/18/2020    Procedure: Pouchoscopy and ileostomy reversal;  Surgeon: Brigitte Ruano MD;  Location:  OR             Social History:     Social History     Tobacco Use     Smoking status: Former Smoker     Packs/day: 0.00     Types: Cigarettes     Last attempt to quit: 2/5/2009     Years  since quittin.4     Smokeless tobacco: Never Used   Substance Use Topics     Alcohol use: Not Currently     Comment: none  since               Family History:   Family history was fully reviewed and non-contributory in this case.         Allergies:     Allergies   Allergen Reactions     Blood Transfusion Related (Informational Only) Other (See Comments)     Patient has a history of a clinically significant antibody against RBC antigens.  A delay in compatible RBCs may occur.             Medications:     Prior to Admission medications    Medication Sig Last Dose Taking? Auth Provider   acetaminophen (TYLENOL) 500 MG tablet Take 1,000 mg by mouth every 8 hours as needed for mild pain Past Month at Unknown time Yes Unknown, Entered By History   HYDROcodone-acetaminophen (NORCO) 5-325 MG tablet Take 1 tablet by mouth every 6 hours as needed for severe pain 2020 at Unknown time Yes Lashanda Villa,    loperamide (IMODIUM) 2 MG capsule Take 2 mg by mouth every evening 2020 at Unknown time Yes Unknown, Entered By History   loperamide (IMODIUM) 2 MG capsule Take 4 mg by mouth At Bedtime 2020 at Unknown time Yes Unknown, Entered By History   melatonin 5 MG tablet Take 5 mg by mouth At Bedtime  2020 at Unknown time Yes Reported, Patient   menthol-zinc oxide (CALMOSEPTINE) 0.44-20.6 % OINT ointment Apply topically 4 times daily as needed for skin protection or irritation 2020 at Unknown time Yes Unknown, Entered By History   multivitamin w/minerals (MULTI-VITAMIN) tablet Take 1 tablet by mouth daily 2020 at am Yes Reported, Patient   simethicone (MYLICON) 125 MG chewable tablet Take 125 mg by mouth every evening 2020 at Unknown time Yes Unknown, Entered By History   simethicone (MYLICON) 125 MG chewable tablet Take 250 mg by mouth At Bedtime 2020 at Unknown time Yes Unknown, Entered By History             Review of Systems:   A Comprehensive greater than 10 system review  of systems was carried out.  Pertinent positives and negatives are noted above.  Otherwise negative for contributory information.           Physical Exam:   Blood pressure 125/83, pulse 88, temperature 98.5  F (36.9  C), temperature source Oral, resp. rate 16, weight 65.8 kg (145 lb), SpO2 98 %.  Wt Readings from Last 1 Encounters:   07/25/20 65.8 kg (145 lb)     Exam:  GENERAL: No apparent distress. Awake, alert, and fully oriented.  HEENT: Normocephalic, atraumatic. Extraocular movements intact.  CARDIOVASCULAR: Regular rate and rhythm without murmurs or rubs. No JVD  PULMONARY: Clear to auscultation, no wheezes, crackles  ABDOMINAL: Soft, non-tender, non-distended. Bowel sounds normoactive. No hepatosplenomegaly.  EXTREMITIES: No cyanosis or clubbing. No edema.  Limited range of motion on left lower extremity due to pain most pronounced during racing of the leg  NEUROLOGICAL: CN 2-12 grossly intact, awake and alert x3, spontaneous and coherent speech. no focal neurological deficits.  DERMATOLOGICAL: No rash, ulcer, ecchymoses, jaundice.  Psych: not agitation, not combative, pleasant mood           Data:   EKG: No EKG seen in epic    Imaging:  Results for orders placed or performed in visit on 07/15/20   XR Lumbar Epidural Injection Incl Imaging    Narrative    This exam was marked as non-reportable because it will not be read by a   radiologist or a [place] non-radiologist provider.               Labs:  No results for input(s): CULT in the last 168 hours.  No results for input(s): WBC, HGB, HCT, MCV, PLT in the last 168 hours.  No results for input(s): NA, POTASSIUM, CHLORIDE, CO2, ANIONGAP, GLC, BUN, CR, GFRESTIMATED, GFRESTBLACK, KEZIA, MAG, PHOS, PROTTOTAL, ALBUMIN, BILITOTAL, ALKPHOS, AST, ALT in the last 168 hours.  No results for input(s): SED, CRP in the last 168 hours.  No results for input(s): GLC, BGM in the last 168 hours.  No results for input(s): INR in the last 168 hours.  No results for input(s):  TROPONIN, TROPI, TROPR in the last 168 hours.    Invalid input(s): TROP, TROPONINIES  No results for input(s): COLOR, APPEARANCE, URINEGLC, URINEBILI, URINEKETONE, SG, UBLD, URINEPH, PROTEIN, UROBILINOGEN, NITRITE, LEUKEST, RBCU, WBCU in the last 168 hours.

## 2020-07-26 NOTE — PROGRESS NOTES
ROOM # [id number]    Living Situation: home with mother  Facility name:  : [facility] (mother) [phone]    Activity level at baseline: Ind  Activity level on admit: assist x1      Patient registered to observation; given Patient Bill of Rights; given the opportunity to ask questions about observation status and their plan of care.  Patient has been oriented to the observation room, bathroom and call light is in place.    Discussed discharge goals and expectations with patient/family.

## 2020-07-26 NOTE — PLAN OF CARE
PRIMARY DIAGNOSIS: ACUTE BACK PAIN  OUTPATIENT/OBSERVATION GOALS TO BE MET BEFORE DISCHARGE:  1. Pain Status: No improvement noted. Consider adjustment in pain regimen.    2. Return to near baseline physical activity: No    3. Cleared for discharge by consultants (if involved): N/A    Discharge Planner Nurse   Safe discharge environment identified: Yes  Barriers to discharge: Yes       Entered by: MERLE ARGUETA 07/26/2020   Vital signs:  Temp: 98.8  F (37.1  C) Temp src: Oral BP: 126/77 Pulse: 86   Resp: 16 SpO2: 99 % O2 Device: None (Room air)   Alert and oriented x4. Patient reported no pain relief with pain medications except for IV Tordal. Pt said they think IV Tordal from this morning did help a little bit of lowering their pain when they are lying supine. As needed dose of IV Tordal given. Saline locked. On regular diet. Assist of x1 with walker and gelt belt. PT consulted. Will continue to monitor.       Please review provider order for any additional goals.   Nurse to notify provider when observation goals have been met and patient is ready for discharge.

## 2020-07-26 NOTE — PLAN OF CARE
"PRIMARY DIAGNOSIS: ACUTE back PAIN  OUTPATIENT/OBSERVATION GOALS TO BE MET BEFORE DISCHARGE:  1. Pain Status: Improved but still requiring IV narcotics.    2. Return to near baseline physical activity: No    3. Cleared for discharge by consultants (if involved): No    Discharge Planner Nurse   Safe discharge environment identified: Yes  Barriers to discharge: Yes       Entered by: Forrest Gonzalez 07/26/2020     Pt alert and oriented x4. Up w/ 1 assist. Pain is 6/10 in his back, lower L leg and hip. Given Toradol, atarax, gabapentin and tizanidine. Nothing has improved his pain yet. Regular diet. SL. Hope to see PT today.     /64 (BP Location: Left arm)   Pulse 85   Temp 98.1  F (36.7  C) (Oral)   Resp 16   Ht 1.778 m (5' 10\")   Wt 65.1 kg (143 lb 8 oz)   SpO2 100%   BMI 20.59 kg/m          Please review provider order for any additional goals.   Nurse to notify provider when observation goals have been met and patient is ready for discharge.  "

## 2020-07-26 NOTE — ED NOTES
[facility]  ED Nurse Handoff Report    Alex Cardona is a 49 year old male   ED Chief complaint: Back Pain  . ED Diagnosis:   Final diagnoses:   Left-sided low back pain with left-sided sciatica, unspecified chronicity     Allergies:   Allergies   Allergen Reactions     Blood Transfusion Related (Informational Only) Other (See Comments)     Patient has a history of a clinically significant antibody against RBC antigens.  A delay in compatible RBCs may occur.       Code Status: Full Code  Activity level - Baseline/Home:  Independent. Activity Level - Current:   did not ambulate, too much pain. Lift room needed: No. Bariatric: No   Needed: No   Isolation: No. Infection: Not Applicable.     Vital Signs:   Vitals:    07/25/20 2111 07/25/20 2153 07/25/20 2154 07/25/20 2155   BP:       Pulse:       Resp:       Temp:       TempSrc:       SpO2: 97% 99% 99% 98%   Weight:           Cardiac Rhythm:  ,      Pain level: 0-10 Pain Scale: 5  Patient confused: No. Patient Falls Risk: Yes.   Elimination Status: did not void   Patient Report - Initial Complaint: back pain.   Focused Assessment: Alex Cardona is a 49 year old male with a history of lumbar DJD who arrived via EMS. He complains of extreme pain to back and radiating down his left leg to his foot. He received 100 of fentanyl en route and now rates the pain as 5/10. He has a history of herniated disc L4 and L5 for which he had his first physical therapy appointment five days ago. Since then he states the pain has worsened and he has difficulty walking and moving to use the bathroom. The back pain is also exacerbated when he tries to straighten his leg. He follows up with Dr. Villa of [facility] and was prescribed 5 mg Norco for his pain but he states it does not help. He denies any numbness and groin pain. No change of bowel or bladder control. No paralysis.   Tests Performed:    Abnormal Results:   Treatments  provided: pain meds  Family Comments: no family at bedside.  OBS brochure/video discussed/provided to patient:  Yes  ED Medications:   Medications   HYDROmorphone (PF) (DILAUDID) injection 0.5 mg (0.5 mg Intravenous Given 7/25/20 2144)   methylPREDNISolone sodium succinate (solu-MEDROL) injection 125 mg (125 mg Intravenous Given 7/25/20 2048)     Drips infusing:  No  For the majority of the shift, the patient's behavior Green. Interventions performed were na.    Sepsis treatment initiated: No       ED Nurse Name/Phone Number: Kristine Garcia RN,   10:14 PM  RECEIVING UNIT ED HANDOFF REVIEW    Above ED Nurse Handoff Report was reviewed: Yes  Reviewed by: Brenda Yanes RN on July 25, 2020 at 11:31 PM

## 2020-07-27 ENCOUNTER — ANESTHESIA EVENT (OUTPATIENT)
Dept: SURGERY | Facility: CLINIC | Age: 50
End: 2020-07-27
Payer: COMMERCIAL

## 2020-07-27 ENCOUNTER — TELEPHONE (OUTPATIENT)
Dept: ORTHOPEDICS | Facility: CLINIC | Age: 50
End: 2020-07-27

## 2020-07-27 ENCOUNTER — ANESTHESIA (OUTPATIENT)
Dept: SURGERY | Facility: CLINIC | Age: 50
End: 2020-07-27
Payer: COMMERCIAL

## 2020-07-27 ENCOUNTER — APPOINTMENT (OUTPATIENT)
Dept: GENERAL RADIOLOGY | Facility: CLINIC | Age: 50
End: 2020-07-27
Attending: HOSPITALIST
Payer: COMMERCIAL

## 2020-07-27 LAB
ANION GAP SERPL CALCULATED.3IONS-SCNC: 7 MMOL/L (ref 3–14)
BASOPHILS # BLD AUTO: 0.1 10E9/L (ref 0–0.2)
BASOPHILS NFR BLD AUTO: 0.7 %
BUN SERPL-MCNC: 32 MG/DL (ref 7–30)
CALCIUM SERPL-MCNC: 8.8 MG/DL (ref 8.5–10.1)
CHLORIDE SERPL-SCNC: 101 MMOL/L (ref 94–109)
CO2 SERPL-SCNC: 26 MMOL/L (ref 20–32)
CREAT SERPL-MCNC: 0.97 MG/DL (ref 0.66–1.25)
DIFFERENTIAL METHOD BLD: ABNORMAL
EOSINOPHIL # BLD AUTO: 0.1 10E9/L (ref 0–0.7)
EOSINOPHIL NFR BLD AUTO: 1.3 %
ERYTHROCYTE [DISTWIDTH] IN BLOOD BY AUTOMATED COUNT: 17.1 % (ref 10–15)
GFR SERPL CREATININE-BSD FRML MDRD: 90 ML/MIN/{1.73_M2}
GLUCOSE SERPL-MCNC: 117 MG/DL (ref 70–99)
HCT VFR BLD AUTO: 40.9 % (ref 40–53)
HGB BLD-MCNC: 12.6 G/DL (ref 13.3–17.7)
IMM GRANULOCYTES # BLD: 0 10E9/L (ref 0–0.4)
IMM GRANULOCYTES NFR BLD: 0.3 %
LYMPHOCYTES # BLD AUTO: 1.5 10E9/L (ref 0.8–5.3)
LYMPHOCYTES NFR BLD AUTO: 20 %
MCH RBC QN AUTO: 22.9 PG (ref 26.5–33)
MCHC RBC AUTO-ENTMCNC: 30.8 G/DL (ref 31.5–36.5)
MCV RBC AUTO: 74 FL (ref 78–100)
MONOCYTES # BLD AUTO: 1 10E9/L (ref 0–1.3)
MONOCYTES NFR BLD AUTO: 13.1 %
NEUTROPHILS # BLD AUTO: 4.9 10E9/L (ref 1.6–8.3)
NEUTROPHILS NFR BLD AUTO: 64.6 %
NRBC # BLD AUTO: 0 10*3/UL
NRBC BLD AUTO-RTO: 0 /100
PLATELET # BLD AUTO: 394 10E9/L (ref 150–450)
POTASSIUM SERPL-SCNC: 3.9 MMOL/L (ref 3.4–5.3)
RBC # BLD AUTO: 5.5 10E12/L (ref 4.4–5.9)
SARS-COV-2 RNA SPEC QL NAA+PROBE: NOT DETECTED
SODIUM SERPL-SCNC: 134 MMOL/L (ref 133–144)
SPECIMEN SOURCE: NORMAL
WBC # BLD AUTO: 7.6 10E9/L (ref 4–11)

## 2020-07-27 PROCEDURE — 0SB20ZZ EXCISION OF LUMBAR VERTEBRAL DISC, OPEN APPROACH: ICD-10-PCS | Performed by: NEUROLOGICAL SURGERY

## 2020-07-27 PROCEDURE — 01NB0ZZ RELEASE LUMBAR NERVE, OPEN APPROACH: ICD-10-PCS | Performed by: NEUROLOGICAL SURGERY

## 2020-07-27 PROCEDURE — 71000012 ZZH RECOVERY PHASE 1 LEVEL 1 FIRST HR: Performed by: NEUROLOGICAL SURGERY

## 2020-07-27 PROCEDURE — 25000128 H RX IP 250 OP 636: Performed by: NEUROLOGICAL SURGERY

## 2020-07-27 PROCEDURE — 99207 ZZC CDG-CODE CATEGORY CHANGED: CPT | Performed by: PHYSICIAN ASSISTANT

## 2020-07-27 PROCEDURE — G0378 HOSPITAL OBSERVATION PER HR: HCPCS

## 2020-07-27 PROCEDURE — 36000069 ZZH SURGERY LEVEL 5 EA 15 ADDTL MIN: Performed by: NEUROLOGICAL SURGERY

## 2020-07-27 PROCEDURE — 25000125 ZZHC RX 250: Performed by: NEUROLOGICAL SURGERY

## 2020-07-27 PROCEDURE — 27210794 ZZH OR GENERAL SUPPLY STERILE: Performed by: NEUROLOGICAL SURGERY

## 2020-07-27 PROCEDURE — 25800025 ZZH RX 258: Performed by: NEUROLOGICAL SURGERY

## 2020-07-27 PROCEDURE — 37000009 ZZH ANESTHESIA TECHNICAL FEE, EACH ADDTL 15 MIN: Performed by: NEUROLOGICAL SURGERY

## 2020-07-27 PROCEDURE — 25000300 ZZH OR RX SURGIFLO HEMOSTATIC MATRIX 10ML 199102S OPNP: Performed by: NEUROLOGICAL SURGERY

## 2020-07-27 PROCEDURE — 25000132 ZZH RX MED GY IP 250 OP 250 PS 637: Performed by: INTERNAL MEDICINE

## 2020-07-27 PROCEDURE — 25800030 ZZH RX IP 258 OP 636: Performed by: NURSE ANESTHETIST, CERTIFIED REGISTERED

## 2020-07-27 PROCEDURE — 80048 BASIC METABOLIC PNL TOTAL CA: CPT | Performed by: PHYSICIAN ASSISTANT

## 2020-07-27 PROCEDURE — 40000306 ZZH STATISTIC PRE PROC ASSESS II: Performed by: NEUROLOGICAL SURGERY

## 2020-07-27 PROCEDURE — 27210995 ZZH RX 272: Performed by: NEUROLOGICAL SURGERY

## 2020-07-27 PROCEDURE — 25000125 ZZHC RX 250: Performed by: NURSE ANESTHETIST, CERTIFIED REGISTERED

## 2020-07-27 PROCEDURE — 96376 TX/PRO/DX INJ SAME DRUG ADON: CPT

## 2020-07-27 PROCEDURE — 36415 COLL VENOUS BLD VENIPUNCTURE: CPT | Performed by: PHYSICIAN ASSISTANT

## 2020-07-27 PROCEDURE — 37000008 ZZH ANESTHESIA TECHNICAL FEE, 1ST 30 MIN: Performed by: NEUROLOGICAL SURGERY

## 2020-07-27 PROCEDURE — 25000128 H RX IP 250 OP 636: Performed by: NURSE ANESTHETIST, CERTIFIED REGISTERED

## 2020-07-27 PROCEDURE — 40000985 XR LUMBAR SPINE PORT 1 VW: Mod: TC

## 2020-07-27 PROCEDURE — 25000132 ZZH RX MED GY IP 250 OP 250 PS 637: Performed by: PHYSICIAN ASSISTANT

## 2020-07-27 PROCEDURE — 12000000 ZZH R&B MED SURG/OB

## 2020-07-27 PROCEDURE — 25000128 H RX IP 250 OP 636: Performed by: INTERNAL MEDICINE

## 2020-07-27 PROCEDURE — 85025 COMPLETE CBC W/AUTO DIFF WBC: CPT | Performed by: PHYSICIAN ASSISTANT

## 2020-07-27 PROCEDURE — 36000071 ZZH SURGERY LEVEL 5 W FLUORO 1ST 30 MIN: Performed by: NEUROLOGICAL SURGERY

## 2020-07-27 RX ORDER — LOPERAMIDE HCL 2 MG
4 CAPSULE ORAL AT BEDTIME
Status: DISCONTINUED | OUTPATIENT
Start: 2020-07-27 | End: 2020-07-27

## 2020-07-27 RX ORDER — HYDROMORPHONE HYDROCHLORIDE 1 MG/ML
.3-.5 INJECTION, SOLUTION INTRAMUSCULAR; INTRAVENOUS; SUBCUTANEOUS EVERY 5 MIN PRN
Status: DISCONTINUED | OUTPATIENT
Start: 2020-07-27 | End: 2020-07-27 | Stop reason: HOSPADM

## 2020-07-27 RX ORDER — CEFAZOLIN SODIUM 2 G/100ML
2 INJECTION, SOLUTION INTRAVENOUS EVERY 6 HOURS
Status: COMPLETED | OUTPATIENT
Start: 2020-07-28 | End: 2020-07-28

## 2020-07-27 RX ORDER — GABAPENTIN 100 MG/1
200 CAPSULE ORAL 3 TIMES DAILY
Status: DISCONTINUED | OUTPATIENT
Start: 2020-07-27 | End: 2020-07-27

## 2020-07-27 RX ORDER — SODIUM CHLORIDE, SODIUM LACTATE, POTASSIUM CHLORIDE, CALCIUM CHLORIDE 600; 310; 30; 20 MG/100ML; MG/100ML; MG/100ML; MG/100ML
INJECTION, SOLUTION INTRAVENOUS CONTINUOUS
Status: DISCONTINUED | OUTPATIENT
Start: 2020-07-27 | End: 2020-07-27 | Stop reason: HOSPADM

## 2020-07-27 RX ORDER — GLYCOPYRROLATE 0.2 MG/ML
INJECTION, SOLUTION INTRAMUSCULAR; INTRAVENOUS PRN
Status: DISCONTINUED | OUTPATIENT
Start: 2020-07-27 | End: 2020-07-27

## 2020-07-27 RX ORDER — NALOXONE HYDROCHLORIDE 0.4 MG/ML
.1-.4 INJECTION, SOLUTION INTRAMUSCULAR; INTRAVENOUS; SUBCUTANEOUS
Status: ACTIVE | OUTPATIENT
Start: 2020-07-27 | End: 2020-07-28

## 2020-07-27 RX ORDER — CEFAZOLIN SODIUM 2 G/100ML
2 INJECTION, SOLUTION INTRAVENOUS
Status: DISCONTINUED | OUTPATIENT
Start: 2020-07-27 | End: 2020-07-27

## 2020-07-27 RX ORDER — METOPROLOL TARTRATE 1 MG/ML
INJECTION, SOLUTION INTRAVENOUS PRN
Status: DISCONTINUED | OUTPATIENT
Start: 2020-07-27 | End: 2020-07-27

## 2020-07-27 RX ORDER — LIDOCAINE HYDROCHLORIDE 10 MG/ML
INJECTION, SOLUTION INFILTRATION; PERINEURAL PRN
Status: DISCONTINUED | OUTPATIENT
Start: 2020-07-27 | End: 2020-07-27

## 2020-07-27 RX ORDER — ACETAMINOPHEN 500 MG
1000 TABLET ORAL EVERY 6 HOURS SCHEDULED
Status: DISCONTINUED | OUTPATIENT
Start: 2020-07-27 | End: 2020-07-29 | Stop reason: HOSPADM

## 2020-07-27 RX ORDER — PROPOFOL 10 MG/ML
INJECTION, EMULSION INTRAVENOUS CONTINUOUS PRN
Status: DISCONTINUED | OUTPATIENT
Start: 2020-07-27 | End: 2020-07-27

## 2020-07-27 RX ORDER — ONDANSETRON 2 MG/ML
4 INJECTION INTRAMUSCULAR; INTRAVENOUS EVERY 30 MIN PRN
Status: DISCONTINUED | OUTPATIENT
Start: 2020-07-27 | End: 2020-07-27 | Stop reason: HOSPADM

## 2020-07-27 RX ORDER — FENTANYL CITRATE 50 UG/ML
INJECTION, SOLUTION INTRAMUSCULAR; INTRAVENOUS PRN
Status: DISCONTINUED | OUTPATIENT
Start: 2020-07-27 | End: 2020-07-27

## 2020-07-27 RX ORDER — ACETAMINOPHEN 325 MG/1
975 TABLET ORAL ONCE
Status: DISCONTINUED | OUTPATIENT
Start: 2020-07-27 | End: 2020-07-27 | Stop reason: HOSPADM

## 2020-07-27 RX ORDER — LABETALOL 20 MG/4 ML (5 MG/ML) INTRAVENOUS SYRINGE
10
Status: DISCONTINUED | OUTPATIENT
Start: 2020-07-27 | End: 2020-07-27 | Stop reason: HOSPADM

## 2020-07-27 RX ORDER — LOPERAMIDE HCL 2 MG
2-4 CAPSULE ORAL 4 TIMES DAILY PRN
Status: DISCONTINUED | OUTPATIENT
Start: 2020-07-27 | End: 2020-07-29 | Stop reason: HOSPADM

## 2020-07-27 RX ORDER — SODIUM CHLORIDE, SODIUM LACTATE, POTASSIUM CHLORIDE, CALCIUM CHLORIDE 600; 310; 30; 20 MG/100ML; MG/100ML; MG/100ML; MG/100ML
INJECTION, SOLUTION INTRAVENOUS CONTINUOUS PRN
Status: DISCONTINUED | OUTPATIENT
Start: 2020-07-27 | End: 2020-07-27

## 2020-07-27 RX ORDER — FENTANYL CITRATE 50 UG/ML
25-50 INJECTION, SOLUTION INTRAMUSCULAR; INTRAVENOUS
Status: DISCONTINUED | OUTPATIENT
Start: 2020-07-27 | End: 2020-07-27 | Stop reason: HOSPADM

## 2020-07-27 RX ORDER — BUPIVACAINE HYDROCHLORIDE AND EPINEPHRINE 5; 5 MG/ML; UG/ML
INJECTION, SOLUTION PERINEURAL PRN
Status: DISCONTINUED | OUTPATIENT
Start: 2020-07-27 | End: 2020-07-27 | Stop reason: HOSPADM

## 2020-07-27 RX ORDER — CEFAZOLIN SODIUM 1 G/3ML
1 INJECTION, POWDER, FOR SOLUTION INTRAMUSCULAR; INTRAVENOUS SEE ADMIN INSTRUCTIONS
Status: DISCONTINUED | OUTPATIENT
Start: 2020-07-27 | End: 2020-07-27 | Stop reason: HOSPADM

## 2020-07-27 RX ORDER — ONDANSETRON 2 MG/ML
INJECTION INTRAMUSCULAR; INTRAVENOUS PRN
Status: DISCONTINUED | OUTPATIENT
Start: 2020-07-27 | End: 2020-07-27

## 2020-07-27 RX ORDER — DEXAMETHASONE SODIUM PHOSPHATE 4 MG/ML
INJECTION, SOLUTION INTRA-ARTICULAR; INTRALESIONAL; INTRAMUSCULAR; INTRAVENOUS; SOFT TISSUE PRN
Status: DISCONTINUED | OUTPATIENT
Start: 2020-07-27 | End: 2020-07-27

## 2020-07-27 RX ORDER — SIMETHICONE 80 MG
240 TABLET,CHEWABLE ORAL AT BEDTIME
Status: DISCONTINUED | OUTPATIENT
Start: 2020-07-27 | End: 2020-07-29 | Stop reason: HOSPADM

## 2020-07-27 RX ORDER — PROPOFOL 10 MG/ML
INJECTION, EMULSION INTRAVENOUS PRN
Status: DISCONTINUED | OUTPATIENT
Start: 2020-07-27 | End: 2020-07-27

## 2020-07-27 RX ORDER — NEOSTIGMINE METHYLSULFATE 1 MG/ML
VIAL (ML) INJECTION PRN
Status: DISCONTINUED | OUTPATIENT
Start: 2020-07-27 | End: 2020-07-27

## 2020-07-27 RX ORDER — ONDANSETRON 4 MG/1
4 TABLET, ORALLY DISINTEGRATING ORAL EVERY 30 MIN PRN
Status: DISCONTINUED | OUTPATIENT
Start: 2020-07-27 | End: 2020-07-27 | Stop reason: HOSPADM

## 2020-07-27 RX ADMIN — OXYCODONE HYDROCHLORIDE 10 MG: 5 TABLET ORAL at 08:51

## 2020-07-27 RX ADMIN — PHENYLEPHRINE HYDROCHLORIDE 200 MCG: 10 INJECTION INTRAVENOUS at 20:35

## 2020-07-27 RX ADMIN — DEXAMETHASONE SODIUM PHOSPHATE 4 MG: 4 INJECTION, SOLUTION INTRA-ARTICULAR; INTRALESIONAL; INTRAMUSCULAR; INTRAVENOUS; SOFT TISSUE at 19:39

## 2020-07-27 RX ADMIN — GABAPENTIN 200 MG: 100 CAPSULE ORAL at 13:12

## 2020-07-27 RX ADMIN — LIDOCAINE HYDROCHLORIDE 30 MG: 10 INJECTION, SOLUTION INFILTRATION; PERINEURAL at 19:39

## 2020-07-27 RX ADMIN — ROCURONIUM BROMIDE 50 MG: 10 INJECTION INTRAVENOUS at 19:39

## 2020-07-27 RX ADMIN — ACETAMINOPHEN 1000 MG: 500 TABLET, FILM COATED ORAL at 00:36

## 2020-07-27 RX ADMIN — PHENYLEPHRINE HYDROCHLORIDE 100 MCG: 10 INJECTION INTRAVENOUS at 20:28

## 2020-07-27 RX ADMIN — HYDROXYZINE HYDROCHLORIDE 50 MG: 50 TABLET, FILM COATED ORAL at 08:51

## 2020-07-27 RX ADMIN — GLYCOPYRROLATE 0.2 MG: 0.2 INJECTION, SOLUTION INTRAMUSCULAR; INTRAVENOUS at 19:39

## 2020-07-27 RX ADMIN — SODIUM CHLORIDE, POTASSIUM CHLORIDE, SODIUM LACTATE AND CALCIUM CHLORIDE: 600; 310; 30; 20 INJECTION, SOLUTION INTRAVENOUS at 19:35

## 2020-07-27 RX ADMIN — LOPERAMIDE HYDROCHLORIDE 4 MG: 2 CAPSULE ORAL at 01:43

## 2020-07-27 RX ADMIN — Medication 3 MG: at 20:47

## 2020-07-27 RX ADMIN — KETOROLAC TROMETHAMINE 30 MG: 30 INJECTION, SOLUTION INTRAMUSCULAR at 00:38

## 2020-07-27 RX ADMIN — PHENYLEPHRINE HYDROCHLORIDE 100 MCG: 10 INJECTION INTRAVENOUS at 20:12

## 2020-07-27 RX ADMIN — GLYCOPYRROLATE 0.4 MG: 0.2 INJECTION, SOLUTION INTRAMUSCULAR; INTRAVENOUS at 20:47

## 2020-07-27 RX ADMIN — SODIUM CHLORIDE, POTASSIUM CHLORIDE, SODIUM LACTATE AND CALCIUM CHLORIDE: 600; 310; 30; 20 INJECTION, SOLUTION INTRAVENOUS at 20:30

## 2020-07-27 RX ADMIN — LOPERAMIDE HYDROCHLORIDE 2 MG: 2 CAPSULE ORAL at 13:12

## 2020-07-27 RX ADMIN — ONDANSETRON HYDROCHLORIDE 4 MG: 2 INJECTION, SOLUTION INTRAVENOUS at 20:38

## 2020-07-27 RX ADMIN — METOPROLOL TARTRATE 2.5 MG: 5 INJECTION INTRAVENOUS at 19:54

## 2020-07-27 RX ADMIN — FENTANYL CITRATE 100 MCG: 50 INJECTION, SOLUTION INTRAMUSCULAR; INTRAVENOUS at 19:39

## 2020-07-27 RX ADMIN — ACETAMINOPHEN 1000 MG: 500 TABLET, FILM COATED ORAL at 13:12

## 2020-07-27 RX ADMIN — HYDROXYZINE HYDROCHLORIDE 50 MG: 50 TABLET, FILM COATED ORAL at 00:36

## 2020-07-27 RX ADMIN — PROPOFOL 75 MCG/KG/MIN: 10 INJECTION, EMULSION INTRAVENOUS at 19:49

## 2020-07-27 RX ADMIN — HYDROMORPHONE HYDROCHLORIDE 0.3 MG: 1 INJECTION, SOLUTION INTRAMUSCULAR; INTRAVENOUS; SUBCUTANEOUS at 06:18

## 2020-07-27 RX ADMIN — CEFAZOLIN SODIUM 2 G: 2 INJECTION, SOLUTION INTRAVENOUS at 19:50

## 2020-07-27 RX ADMIN — HYDROMORPHONE HYDROCHLORIDE 1 MG: 1 INJECTION, SOLUTION INTRAMUSCULAR; INTRAVENOUS; SUBCUTANEOUS at 19:39

## 2020-07-27 RX ADMIN — KETOROLAC TROMETHAMINE 30 MG: 30 INJECTION, SOLUTION INTRAMUSCULAR at 16:30

## 2020-07-27 RX ADMIN — MIDAZOLAM 2 MG: 1 INJECTION INTRAMUSCULAR; INTRAVENOUS at 19:35

## 2020-07-27 RX ADMIN — TIZANIDINE 4 MG: 2 TABLET ORAL at 05:27

## 2020-07-27 RX ADMIN — PHENYLEPHRINE HYDROCHLORIDE 100 MCG: 10 INJECTION INTRAVENOUS at 20:15

## 2020-07-27 RX ADMIN — PHENYLEPHRINE HYDROCHLORIDE 200 MCG: 10 INJECTION INTRAVENOUS at 20:02

## 2020-07-27 RX ADMIN — GABAPENTIN 200 MG: 100 CAPSULE ORAL at 08:51

## 2020-07-27 RX ADMIN — PROPOFOL 200 MG: 10 INJECTION, EMULSION INTRAVENOUS at 19:39

## 2020-07-27 SDOH — HEALTH STABILITY: MENTAL HEALTH: CURRENT SMOKER: 0

## 2020-07-27 NOTE — TELEPHONE ENCOUNTER
Returned call. Admitted to Obs. IV pain meds not helping. Started on Gabapentin without relief.    Discussed next steps. Given nature of disc herniation at L4-5, central protrusion that is causing L5 radic, IL seemed most efficacious.  Doubt he would get much relief from TFESI L4-5 or L5-S1.  Therefore would recommend consult with Neurosurgery for consideration of microdiscectomy.    Already has a call into Neurosurgery and will discuss case.    Lashanda Villa DO, CAQSM  [facility]

## 2020-07-27 NOTE — PLAN OF CARE
"PRIMARY DIAGNOSIS:Back pain  OUTPATIENT/OBSERVATION GOALS TO BE MET BEFORE DISCHARGE:  1. Pain Status: No improvement noted. Consider adjustment in pain regimen.      2. Return to near baseline physical activity: No     3. Cleared for discharge by consultants (if involved): No        Discharge Planner Nurse      Safe discharge environment identified: Yes  Barriers to discharge: Yes       Entered by: Jeny Dougherty 07/27/2020 4:46 AM     Please review provider order for any additional goals.   Nurse to notify provider when observation goals have been met and patient is ready for discharge.    /66 (BP Location: Left arm)   Pulse 62   Temp 95.9  F (35.5  C) (Oral)   Resp 16   Ht 1.778 m (5' 10\")   Wt 65.1 kg (143 lb 8 oz)   SpO2 98%   BMI 20.59 kg/m    Patient alert and oriented. Patient rated his pain as low as a 5/10 after tylenol and atarax. Offered patient further meds and ice if he felt those meds were not working, but patient was asleep when RN returned to assess pain. Pt appeared to be resting comfortably on his right side. Will continue to monitor and provide supportive cares.  "

## 2020-07-27 NOTE — PLAN OF CARE
"/74 (BP Location: Right arm)   Pulse 87   Temp 97.1  F (36.2  C) (Oral)   Resp 16   Ht 1.778 m (5' 10\")   Wt 65.1 kg (143 lb 8 oz)   SpO2 97%   BMI 20.59 kg/m      Neuro: WNL   Cardiac: WNL   Lungs: WNL   GI: Ex. Multiple loose stools. Imodium given x1.   : WNL   Pain: No improvement with pain regimen.   IV: SL   Meds: Tolerating PO   Labs/tests: See results review.   Diet: NPO   Activity: Bedrest.   Misc: Neurosurgery consulted    Plan: Surgery this evening. Possible transfer to 6th after surgery.      Will continue to monitor and provide cares.    "

## 2020-07-27 NOTE — UTILIZATION REVIEW
"  Admission Status; Secondary Review Determination         Under the authority of the Utilization Management Committee, the utilization review process indicated a secondary review on the above patient.  The review outcome is based on review of the medical records, discussions with staff, and applying clinical experience noted on the date of the review.        (x)      Inpatient Status Appropriate - This patient's medical care is consistent with medical management for inpatient care and reasonable inpatient medical practice.      () Observation Status Appropriate - This patient does not meet hospital inpatient criteria and is placed in observation status. If this patient's primary payer is Medicare and was admitted as an inpatient, Condition Code 44 should be used and patient status changed to \"observation\".   () Admission Status NOT Appropriate - This patient's medical care is not consistent with medical management for Inpatient or Observation Status.          RATIONALE FOR DETERMINATION   Alex Cardona is a 49 year old male with ulcerative colitis s/p ileostomy and subsequent takedown in 05/2020, lumbar degenerative disc disease, left lumbar radiculopathy, herniated disc impinging L5 nerve root, and left leg weakness who was admitted on 7/25/2020 for evaluation of management of worsening/intractable back pain. He was initially admitted to observation status for pain management.  However, his pain has persisted/worsened and he is unable to get out of bed unassisted.  Neurosurgery consultation requested and possible procedure pending recommendations.  Pt is not stable for discharge.  IP status appropriate at this time given failure to improve and need for further workup/intervention.       The severity of illness, intensity of service provided, expected LOS and risk for adverse outcome make the care complex, high risk and appropriate for hospital admission.        The information on this document is developed by " the utilization review team in order for the business office to ensure compliance.  This only denotes the appropriateness of proper admission status and does not reflect the quality of care rendered.         The definitions of Inpatient Status and Observation Status used in making the determination above are those provided in the CMS Coverage Manual, Chapter 1 and Chapter 6, section 70.4.      Sincerely,     Helen Lockett MD  Physician Advisor   Utilization Review/ Case Management  [facility].       numerical 0-10

## 2020-07-27 NOTE — TELEPHONE ENCOUNTER
Em is calling about the patient because he has been admitted and she needs to talk to Dr. Villa in regards to what her plans are for his care. He is currently not responding and getting improvement from IV pain meds.. He recently had an FER.     Please call her at the observation unit at [phone]    Micki Esquivel ATC

## 2020-07-27 NOTE — PLAN OF CARE
PRIMARY DIAGNOSIS: CHRONIC BACK PAIN  OUTPATIENT/OBSERVATION GOALS TO BE MET BEFORE DISCHARGE:  1. Pain Status: No improvement noted. Consider adjustment in pain regimen.    2. Return to near baseline physical activity: No    3. Cleared for discharge by consultants (if involved): No    Discharge Planner Nurse   Safe discharge environment identified: Yes  Barriers to discharge: Yes       Entered by: Alexandra Person 07/27/2020 9:08 AM    Pt alert and orientated. VSS. Pt laying in bed on assessment. Pt unable to get up to use the bathroom. SL. Did not want to order food this morning. Possible Spine consult. Plan: Pain management. Supportive cares.     Please review provider order for any additional goals.   Nurse to notify provider when observation goals have been met and patient is ready for discharge.

## 2020-07-27 NOTE — CONSULTS
[facility]     Date of Admission:  7/25/2020  Date of Consult (When I saw the patient): 07/27/20    Assessment & Plan   Alex Cardona is a 49 year old male who was admitted on 7/25/2020. I was asked to see the patient for lumbar herniated disc with LLE pain.    Active Problems:    Back pain    Assessment: Left L4-5 disc herniation; lumbar radiculopathy.  Low back pain    Plan: Discussed plan for left L4-5 microdiscectomy later today and patient agreeable.  He will remain NPO in preparation for surgery and Dr. Jacobson will review in detail prior to surgery.       I have discussed the following assessment and plan with Dr. Martin Jacobson who is in agreement with initial plan and will follow up with further consultation recommendations.    Natasha Alicea [facility]  Tel [phone]        Code Status    Full Code    Reason for Consult   Reason for consult: I was asked by Dina Henry PA-C to evaluate this patient for herniated lumbar disc.    Primary Care Physician   Kati Garcia    Chief Complaint   LLE pain    History is obtained from the patient and patient's EMR.    History of Present Illness   Alex Cardona is a 49 year old male who presented to the ED on 7/25/2020 with left lower back and and LLE pain.  He states he has had pain for the past few weeks, and had recent lumbar FER at [facility] on 7/15/2020 with only mild relief.  He started PT after the injection and states the pain progressively worsened. MRI done on 7/9/2020 shows a moderate-sized posterior central/left paracentral disc extrusion with moderate narrowing of the left lateral recess canal.  He describes pain along the left L5 distribution.  He states the distal leg is the worse pain, and it increases with any activities.  He has had difficulty getting out of bed due to pain.  He denies loss of bowel/bladder control.  He has been receiving Oxycodone and finds  it only minimally helpful. Exam is limited due to pain, with weakness to LLE which seems related to pain.  There is some weakness noted with dorsiflexion on the left and along the left quadriceps, but seems related to pain with movements.  He feels the pain has not improved since being admitted 2 days ago.  He has been NPO except for water with pills since 0600 today.  We discussed proceeding with a left L4-5 microdiscectomy and discussed the surgery including indications, risks and benefits associated.  The patient would like to proceed and will discuss with Dr. Jacobson in detail later today.     Past Medical History   I have reviewed this patient's medical history and updated it with pertinent information if needed.   Past Medical History:   Diagnosis Date     Ulcerative colitis (H)        Past Surgical History   I have reviewed this patient's surgical history and updated it with pertinent information if needed.  Past Surgical History:   Procedure Laterality Date     COLONOSCOPY N/A 5/26/2019    Procedure: incomplete COLONOSCOPY with left colon biopsies by cold biopsy forceps;  Surgeon: Herbert Dior DO;  Location: [facility]     LAPAROSCOPIC ASSISTED COLECTOMY N/A 6/12/2019    Procedure: Laparoscopic Assisted Total Abdominal Colectomy with End Ileostomy;  Surgeon: Brigitte Ruano MD;  Location:  OR     LAPAROSCOPIC ASSISTED COMPLETION PROCTOCOLECTOMY N/A 2/11/2020    Procedure: Completion proctectomy and ileal pouch anal anastomosis with diverting loop ileostomy.;  Surgeon: Brigitte Ruano MD;  Location:  OR     SIGMOIDOSCOPY FLEXIBLE  01/13/2020    Dr. Ruano [facility]     SIGMOIDOSCOPY FLEXIBLE N/A 5/18/2020    Procedure: SIGMOIDOSCOPY, FLEXIBLE;  Surgeon: Brigitte Ruano MD;  Location:  OR     TAKEDOWN ILEOSTOMY N/A 5/18/2020    Procedure: Pouchoscopy and ileostomy reversal;  Surgeon: Brigitte Ruano MD;  Location:  OR       Prior to Admission Medications   Prior to Admission Medications    Prescriptions Last Dose Informant Patient Reported? Taking?   HYDROcodone-acetaminophen (NORCO) 5-325 MG tablet 7/25/2020 at Unknown time  No Yes   Sig: Take 1 tablet by mouth every 6 hours as needed for severe pain   acetaminophen (TYLENOL) 500 MG tablet Past Month at Unknown time  Yes Yes   Sig: Take 1,000 mg by mouth every 8 hours as needed for mild pain   loperamide (IMODIUM) 2 MG capsule 7/24/2020 at Unknown time  Yes Yes   Sig: Take 2 mg by mouth every evening   loperamide (IMODIUM) 2 MG capsule 7/24/2020 at Unknown time  Yes Yes   Sig: Take 4 mg by mouth At Bedtime   melatonin 5 MG tablet 7/24/2020 at Unknown time  Yes Yes   Sig: Take 5 mg by mouth At Bedtime    menthol-zinc oxide (CALMOSEPTINE) 0.44-20.6 % OINT ointment 7/24/2020 at Unknown time  Yes Yes   Sig: Apply topically 4 times daily as needed for skin protection or irritation   multivitamin w/minerals (MULTI-VITAMIN) tablet 7/25/2020 at am  Yes Yes   Sig: Take 1 tablet by mouth daily   simethicone (MYLICON) 125 MG chewable tablet 7/24/2020 at Unknown time  Yes Yes   Sig: Take 125 mg by mouth every evening   simethicone (MYLICON) 125 MG chewable tablet 7/24/2020 at Unknown time  Yes Yes   Sig: Take 250 mg by mouth At Bedtime      Facility-Administered Medications: None     Allergies   Allergies   Allergen Reactions     Blood Transfusion Related (Informational Only) Other (See Comments)     Patient has a history of a clinically significant antibody against RBC antigens.  A delay in compatible RBCs may occur.       Social History   I have reviewed this patient's social history and updated it with pertinent information if needed. Alex BRANDY Dulce  reports that he quit smoking about 11 years ago. His smoking use included cigarettes. He smoked 0.00 packs per day. He has never used smokeless tobacco. He reports previous alcohol use. He reports previous drug use. Drug: Marijuana.    Family History   I have reviewed this patient's family history and updated  "it with pertinent information if needed.   Family History   Problem Relation Age of Onset     Heart Disease Mother      Chronic Obstructive Pulmonary Disease Mother      Cancer Father      No Known Problems Sister      No Known Problems Son      Heart Failure Other      Colon Cancer No family hx of        Review of Systems   Positives noted in HPI    Physical Exam   Temp: 96.2  F (35.7  C) Temp src: Oral BP: 127/81 Pulse: 92   Resp: 18 SpO2: 98 % O2 Device: None (Room air)    Vital Signs with Ranges  Temp:  [95.4  F (35.2  C)-98.8  F (37.1  C)] 96.2  F (35.7  C)  Pulse:  [62-92] 92  Resp:  [16-18] 18  BP: ()/(52-81) 127/81  SpO2:  [97 %-100 %] 98 %  143 lbs 8 oz     , Blood pressure 127/81, pulse 92, temperature 96.2  F (35.7  C), temperature source Oral, resp. rate 18, height 5' 10\" (1.778 m), weight 143 lb 8 oz (65.1 kg), SpO2 98 %.  143 lbs 8 oz  HEENT:  Normocephalic, atraumatic.  PERRLA.  EOM s intact.   Neck:  Supple, non-tender, without lymphadenopathy.  Heart:  No peripheral edema  Lungs:  No SOB  Abdomen:  Soft, non-tender, non-distended.  Normal bowel sounds.  Skin:  Warm and dry, good capillary refill.  Extremities:  Good radial and dorsalis pedis pulses bilaterally, no edema, cyanosis or clubbing.    NEUROLOGICAL EXAMINATION:   Mental status:  Alert and Oriented x 3, speech is fluent.  Cranial nerves:  II-XII intact.   Motor:  Strength is 5/5 throughout the upper extremities  Hip Flexor:                Right: 5/5  Left:  4/5  Hip Adductor:             Right:  5/5  Left:  4/5  Hip Abductor:             Right:  5/5  Left:  4/5  Gastroc Soleus:        Right:  5/5  Left:  4/5  Tib/Ant:                      Right:  5/5  Left:  5/5  DF:                     Right:  5/5  Left:  4-5/5  PF:                       Right:  5/5  Left: 5/5  Sensation:  Intact to light touch  Reflexes:   Negative Clonus.    Gait:  Deferred due to pain; difficulty getting to a sitting position on the edge of the bed with feet " dangling for very short period of time due to pain.    Cervical examination reveals good range of motion.  No tenderness to palpation of the cervical spine or paraspinous muscles bilaterally.     Lumbar examination reveals tenderness of the left paraspinous muscles.  Negative SI joint, sciatic notch or greater trochanteric tenderness to palpation bilaterally.  Straight leg raise is positive on the left; negative on the right.      Data   CBC RESULTS:   Recent Labs   Lab Test 07/27/20  0542   WBC 7.6   RBC 5.50   HGB 12.6*   HCT 40.9   MCV 74*   MCH 22.9*   MCHC 30.8*   RDW 17.1*        Basic Metabolic Panel:  Lab Results   Component Value Date     07/27/2020      Lab Results   Component Value Date    POTASSIUM 3.9 07/27/2020     Lab Results   Component Value Date    CHLORIDE 101 07/27/2020     Lab Results   Component Value Date    KEZIA 8.8 07/27/2020     Lab Results   Component Value Date    CO2 26 07/27/2020     Lab Results   Component Value Date    BUN 32 07/27/2020     Lab Results   Component Value Date    CR 0.97 07/27/2020     Lab Results   Component Value Date     07/27/2020     INR:  No results found for: INR

## 2020-07-27 NOTE — ANESTHESIA PREPROCEDURE EVALUATION
Anesthesia Pre-Procedure Evaluation    Patient: Alex Cardona   MRN: [id number] : 1970          Preoperative Diagnosis: * No pre-op diagnosis entered *    Procedure(s):  L4-L5 Microdiscectomy    Past Medical History:   Diagnosis Date     Ulcerative colitis (H)      Past Surgical History:   Procedure Laterality Date     COLONOSCOPY N/A 2019    Procedure: incomplete COLONOSCOPY with left colon biopsies by cold biopsy forceps;  Surgeon: Herbert Dior DO;  Location:  GI     LAPAROSCOPIC ASSISTED COLECTOMY N/A 2019    Procedure: Laparoscopic Assisted Total Abdominal Colectomy with End Ileostomy;  Surgeon: Brigitte Ruano MD;  Location: [facility] OR     LAPAROSCOPIC ASSISTED COMPLETION PROCTOCOLECTOMY N/A 2020    Procedure: Completion proctectomy and ileal pouch anal anastomosis with diverting loop ileostomy.;  Surgeon: Brigitte Ruano MD;  Location:  OR     SIGMOIDOSCOPY FLEXIBLE  2020    Dr. Ruano [facility]     SIGMOIDOSCOPY FLEXIBLE N/A 2020    Procedure: SIGMOIDOSCOPY, FLEXIBLE;  Surgeon: Brigitte Ruano MD;  Location:  OR     TAKEDOWN ILEOSTOMY N/A 2020    Procedure: Pouchoscopy and ileostomy reversal;  Surgeon: Brigitte Ruano MD;  Location:  OR     Anesthesia Evaluation     . Pt has had prior anesthetic. Type: General    No history of anesthetic complications          ROS/MED HX    ENT/Pulmonary:  - neg pulmonary ROS     Neurologic:     (+)neuropathy - lumbar radiculopathy,     Cardiovascular:  - neg cardiovascular ROS       METS/Exercise Tolerance:     Hematologic:  - neg hematologic  ROS       Musculoskeletal:  - neg musculoskeletal ROS       GI/Hepatic:     (+) Inflammatory bowel disease,       Renal/Genitourinary:  - ROS Renal section negative       Endo:  - neg endo ROS       Psychiatric:  - neg psychiatric ROS       Infectious Disease:  - neg infectious disease ROS       Malignancy:      - no malignancy   Other:    - neg other ROS                       Physical Exam  Normal systems: cardiovascular, pulmonary and dental    Airway   Mallampati: I  TM distance: >3 FB  Neck ROM: full    Dental     Cardiovascular   Rhythm and rate: regular and normal      Pulmonary    breath sounds clear to auscultation    Other findings: Lab Test        07/27/20 06/02/20 05/19/20 05/18/20 05/11/20                       0542          2248          0626          1521          1438          WBC          7.6          11.3*         --           --          4.3           HGB          12.6*        11.7*        10.9*         --          11.7*         MCV          74*          75*           --           --          73*           PLT          394          459*          --          254          252            Lab Test        07/27/20 06/08/20 06/02/20                       0542          1600          2248          NA           134          136          136           POTASSIUM    3.9          4.0          3.9           CHLORIDE     101          101          103           CO2          26           30           28            BUN          32*          8            16            CR           0.97         0.91         0.94          ANIONGAP     7            5            5             KEZIA          8.8          9.4          9.1           GLC          117*         114*         109*                         Lab Results   Component Value Date    WBC 7.6 07/27/2020    HGB 12.6 (L) 07/27/2020    HCT 40.9 07/27/2020     07/27/2020    CRP <2.9 07/03/2020    SED 14 07/03/2020     07/27/2020    POTASSIUM 3.9 07/27/2020    CHLORIDE 101 07/27/2020    CO2 26 07/27/2020    BUN 32 (H) 07/27/2020    CR 0.97 07/27/2020     (H) 07/27/2020    KEZIA 8.8 07/27/2020    PHOS 3.0 06/08/2020    MAG 2.2 06/08/2020    ALBUMIN 3.9 06/02/2020    PROTTOTAL 8.1 06/02/2020    ALT 24 06/02/2020    AST 20 06/02/2020    ALKPHOS 71 06/02/2020    BILITOTAL 0.4 06/02/2020    LIPASE 33 (L)  "05/25/2019       Preop Vitals  BP Readings from Last 3 Encounters:   07/27/20 127/81   07/15/20 130/83   07/13/20 136/72    Pulse Readings from Last 3 Encounters:   07/27/20 92   07/15/20 83   06/04/20 68      Resp Readings from Last 3 Encounters:   07/27/20 18   06/04/20 12   05/20/20 14    SpO2 Readings from Last 3 Encounters:   07/27/20 98%   07/15/20 99%   06/04/20 96%      Temp Readings from Last 1 Encounters:   07/27/20 96.2  F (35.7  C) (Oral)    Ht Readings from Last 1 Encounters:   07/25/20 1.778 m (5' 10\")      Wt Readings from Last 1 Encounters:   07/25/20 65.1 kg (143 lb 8 oz)    Estimated body mass index is 20.59 kg/m  as calculated from the following:    Height as of this encounter: 1.778 m (5' 10\").    Weight as of this encounter: 65.1 kg (143 lb 8 oz).       Anesthesia Plan      History & Physical Review  History and physical reviewed and following examination; no interval change.    ASA Status:  1 .    NPO Status:  > 8 hours    Plan for General with Intravenous induction. Maintenance will be Balanced.    PONV prophylaxis:  Ondansetron (or other 5HT-3) and Dexamethasone or Solumedrol    The patient is not a current smoker      Postoperative Care  Postoperative pain management:  IV analgesics, Oral pain medications and Multi-modal analgesia.      Consents  Anesthetic plan, risks, benefits and alternatives discussed with:  Patient.  Use of blood products discussed: No .   .                 Jorge Gonzalez MD                    .  "

## 2020-07-27 NOTE — PLAN OF CARE
Small sips of water throughout the morning, less than 240ml, otherwise last solid food intake yesterday at 7PM.   NPO per spine surgery. Pt aware and verbalized understanding.

## 2020-07-27 NOTE — PROGRESS NOTES
"PRIMARY DIAGNOSIS:Back pain  OUTPATIENT/OBSERVATION GOALS TO BE MET BEFORE DISCHARGE:  1. Pain Status: No improvement noted. Consider adjustment in pain regimen.     2. Return to near baseline physical activity: No    3. Cleared for discharge by consultants (if involved): No    Discharge Planner Nurse   Safe discharge environment identified: Yes  Barriers to discharge: Yes       Entered by: Jeny Dougherty 07/27/2020 2:46 AM     Please review provider order for any additional goals.   Nurse to notify provider when observation goals have been met and patient is ready for discharge.    /66 (BP Location: Left arm)   Pulse 62   Temp 95.9  F (35.5  C) (Oral)   Resp 16   Ht 1.778 m (5' 10\")   Wt 65.1 kg (143 lb 8 oz)   SpO2 98%   BMI 20.59 kg/m    Patient alert and oriented.Calm and cooperative at this time. Patient rating his pain 7/10. Patient laying supine in bed. Unable to get out of bed at this time. Saline locked. Tolerating a regular diet. Will continue to monitor and provide supportive cares.  "

## 2020-07-27 NOTE — PLAN OF CARE
PRIMARY DIAGNOSIS: ACUTE BACK AND LEFT LEG PAIN  OUTPATIENT/OBSERVATION GOALS TO BE MET BEFORE DISCHARGE:  1. Pain Status: No improvement noted. Consider adjustment in pain regimen.    2. Return to near baseline physical activity: No    3. Cleared for discharge by consultants (if involved): N/A    Discharge Planner Nurse   Safe discharge environment identified: Yes  Barriers to discharge: Yes       Entered by: MERLE ARGUETA 07/26/2020   Vital signs:  Temp: 96.8  F (36  C) Temp src: Oral BP: 130/71 Pulse: 82   Resp: 16 SpO2: 100 % O2 Device: None (Room air)   Alert and oriented x4. Patient reported no pain medications that were given helped. Pt constantly rating their pain level 7/10 and 10/10 with movement. Pt stayed in bed the whole afternoon and evening with supine position with the HOB flat to avoid sudden severe shooting pain. Saline locked. On regular diet. Assist of x1 with walker and gelt belt. PT consulted. Will continue to monitor.       Please review provider order for any additional goals.   Nurse to notify provider when observation goals have been met and patient is ready for discharge.

## 2020-07-27 NOTE — PROGRESS NOTES
[facility]    Hospitalist Progress Note  Name: Aelx Cardona    MRN: [id number]  Provider:  Em Henry PA-C  Date of Service: 07/27/2020    Assessment & Plan   Summary of Stay: Alex Cardona is a 49 year old male with PMH significant for h/o ulcerative colitis s/p ileostomy and subsequent takedown in 05/2020, lumbar degenerative disc disease, left lumbar radiculopathy, and herniated disc impinging L5 nerve root, and left leg weakness who was admitted on 7/25/2020 for evaluation of management of worsening back pain.     #Left L5 herniated disc with nerve root impingement and radiculopathy  #Intractable back pain: symptoms present for about a year but have become progressively worse since 05/2020 with acute worsening since the beginning of July where the patient was seen by orthopedic sports medicine on 7/2/20 due to left lateral lower leg pain that would be worsened by walking, standing, and sitting. Pain initially improved with exercise, not with rest, and pain tends to increase at night. Underwent MRI of lumbar spine on 7/9/20 which showed disc herniation causing impingement of L5 nerve root likely causing leg weakness and increased back pain. S/p FER on 7/15 without significant improvement and started outpatient PT on 7/20. Trouble walking and sitting since 7/23 and was unable to get out of bed since 2 AM on 7/25 prior to coming into the ED. Received a dose of IV solumedrol 125 mg in the ED. Weakness with left plantar flexion. No improvement and patient reports worsening since admission. Discussed with patient's orthopedic provider Dr. Villa who states she was planning to refer the patient to neurosurgery if no improvement after FER.   - scheduled Tylenol, PRN IV Toradol with Pepcid for GI protection, Atarax, Tizanidine, Oxycodone, and IV Dilaudid available   - Gabapentin 200 mg TID  - neurosurgery consult placed, appreciate recommendations  - mobilize with nursing as able, encourage out of  bed  - PT consulted and evaluated patient on 7/26 and felt patient could manage at home once pain controlled     #Refractory ulcerative colitis  #s/p ileal pouch, anal anastomosis, and diverting loop ileostomy with ileostomy reversal/takedown (05/2020): procedure performed by Dr. Ruano without complication. Has baseline diarrhea which is slightly worse today.   - PRN Imodium   - continue PTA Simethicone      DVT Prophylaxis: low risk, encourage ambulation   Code Status: Full Code  Disposition: Expected discharge unclear, expect an additional 1-2 days pending recommendation from neurosurgery evaluation and if surgery recommended     Interval History   Patient reports ongoing pain and repots medications only making him sleepy otherwise not changing his pain. He notes even feeling uncomfortable with laying in bed today. He has been unable to tolerate using the bathroom or sitting on a bed pan. He is having diarrhea today which is baseline and may be slightly worse than usual but states he is not in his usual routine. Discussed plan to contact neurosurgery for options for management.    -Data reviewed today: I reviewed all new labs and imaging reports over the last 24 hours.     Physical Exam   Temp: 96.2  F (35.7  C) Temp src: Oral BP: 127/81 Pulse: 92   Resp: 18 SpO2: 98 % O2 Device: None (Room air)    Vitals:    07/25/20 2020 07/25/20 2355   Weight: 65.8 kg (145 lb) 65.1 kg (143 lb 8 oz)     Vital Signs with Ranges  Temp:  [95.4  F (35.2  C)-98.8  F (37.1  C)] 96.2  F (35.7  C)  Pulse:  [62-92] 92  Resp:  [16-18] 18  BP: ()/(52-81) 127/81  SpO2:  [97 %-100 %] 98 %  No intake/output data recorded.    GEN:  Alert, oriented x 3, appears comfortable laying in bed, NAD.  HEENT:  Normocephalic/atraumatic, no scleral icterus, no nasal discharge, mouth moist.  CV:  Regular rate and rhythm, no murmur or JVD.  S1 + S2 noted, no S3 or S4.  LUNGS:  Clear to auscultation bilaterally without  rales/rhonchi/wheezing/retractions.  Symmetric chest rise on inhalation noted.  ABD:  Active bowel sounds, soft, non-tender/non-distended.  No rebound/guarding/rigidity.  BACK: positive straight leg raise on right with >45 degrees, unable to illicit pain with palpations along lumbar spine or SI joint on left   EXT:  No edema.  No cyanosis.  No acute joint synovitis noted.  SKIN:  Dry to touch, no exanthems noted in the visualized areas.  NEURO: strength of 4/5 of left lower leg with plantar flexion, strength of 5/5 of left upper leg and right LE, sensation to touch grossly intact     Medications       acetaminophen  1,000 mg Oral Q6H WHITNEY     gabapentin  200 mg Oral TID     senna-docusate  1 tablet Oral BID    Or     senna-docusate  2 tablet Oral BID     Data   Results for orders placed or performed during the hospital encounter of 07/25/20   Basic metabolic panel     Status: Abnormal   Result Value Ref Range    Sodium 134 133 - 144 mmol/L    Potassium 3.9 3.4 - 5.3 mmol/L    Chloride 101 94 - 109 mmol/L    Carbon Dioxide 26 20 - 32 mmol/L    Anion Gap 7 3 - 14 mmol/L    Glucose 117 (H) 70 - 99 mg/dL    Urea Nitrogen 32 (H) 7 - 30 mg/dL    Creatinine 0.97 0.66 - 1.25 mg/dL    GFR Estimate 90 >60 mL/min/[1.73_m2]    GFR Estimate If Black >90 >60 mL/min/[1.73_m2]    Calcium 8.8 8.5 - 10.1 mg/dL   CBC with platelets differential     Status: Abnormal   Result Value Ref Range    WBC 7.6 4.0 - 11.0 10e9/L    RBC Count 5.50 4.4 - 5.9 10e12/L    Hemoglobin 12.6 (L) 13.3 - 17.7 g/dL    Hematocrit 40.9 40.0 - 53.0 %    MCV 74 (L) 78 - 100 fl    MCH 22.9 (L) 26.5 - 33.0 pg    MCHC 30.8 (L) 31.5 - 36.5 g/dL    RDW 17.1 (H) 10.0 - 15.0 %    Platelet Count 394 150 - 450 10e9/L    Diff Method Automated Method     % Neutrophils 64.6 %    % Lymphocytes 20.0 %    % Monocytes 13.1 %    % Eosinophils 1.3 %    % Basophils 0.7 %    % Immature Granulocytes 0.3 %    Nucleated RBCs 0 0 /100    Absolute Neutrophil 4.9 1.6 - 8.3 10e9/L     Absolute Lymphocytes 1.5 0.8 - 5.3 10e9/L    Absolute Monocytes 1.0 0.0 - 1.3 10e9/L    Absolute Eosinophils 0.1 0.0 - 0.7 10e9/L    Absolute Basophils 0.1 0.0 - 0.2 10e9/L    Abs Immature Granulocytes 0.0 0 - 0.4 10e9/L    Absolute Nucleated RBC 0.0      Em Henry PA-C

## 2020-07-28 LAB — GLUCOSE BLDC GLUCOMTR-MCNC: 117 MG/DL (ref 70–99)

## 2020-07-28 PROCEDURE — 25000132 ZZH RX MED GY IP 250 OP 250 PS 637: Performed by: NEUROLOGICAL SURGERY

## 2020-07-28 PROCEDURE — 25000128 H RX IP 250 OP 636: Performed by: NEUROLOGICAL SURGERY

## 2020-07-28 PROCEDURE — 99207 ZZC CDG-MDM COMPONENT: MEETS LOW - DOWN CODED: CPT | Performed by: HOSPITALIST

## 2020-07-28 PROCEDURE — 25000132 ZZH RX MED GY IP 250 OP 250 PS 637: Performed by: HOSPITALIST

## 2020-07-28 PROCEDURE — 25000132 ZZH RX MED GY IP 250 OP 250 PS 637: Performed by: PHYSICIAN ASSISTANT

## 2020-07-28 PROCEDURE — 99232 SBSQ HOSP IP/OBS MODERATE 35: CPT | Performed by: HOSPITALIST

## 2020-07-28 PROCEDURE — 00000146 ZZHCL STATISTIC GLUCOSE BY METER IP

## 2020-07-28 PROCEDURE — 12000000 ZZH R&B MED SURG/OB

## 2020-07-28 RX ORDER — OXYCODONE HYDROCHLORIDE 5 MG/1
5-10 TABLET ORAL EVERY 6 HOURS PRN
Qty: 40 TABLET | Refills: 0 | Status: SHIPPED | OUTPATIENT
Start: 2020-07-28 | End: 2020-11-15

## 2020-07-28 RX ADMIN — OXYCODONE HYDROCHLORIDE 10 MG: 5 TABLET ORAL at 20:43

## 2020-07-28 RX ADMIN — ACETAMINOPHEN 1000 MG: 500 TABLET, FILM COATED ORAL at 20:43

## 2020-07-28 RX ADMIN — OXYCODONE HYDROCHLORIDE 5 MG: 5 TABLET ORAL at 05:13

## 2020-07-28 RX ADMIN — Medication 1 LOZENGE: at 00:03

## 2020-07-28 RX ADMIN — ACETAMINOPHEN 1000 MG: 500 TABLET, FILM COATED ORAL at 00:56

## 2020-07-28 RX ADMIN — OXYCODONE HYDROCHLORIDE 10 MG: 5 TABLET ORAL at 16:57

## 2020-07-28 RX ADMIN — CEFAZOLIN SODIUM 2 G: 2 INJECTION, SOLUTION INTRAVENOUS at 08:49

## 2020-07-28 RX ADMIN — OXYCODONE HYDROCHLORIDE 5 MG: 5 TABLET ORAL at 08:49

## 2020-07-28 RX ADMIN — DOCUSATE SODIUM AND SENNOSIDES 1 TABLET: 8.6; 5 TABLET, FILM COATED ORAL at 20:43

## 2020-07-28 RX ADMIN — ACETAMINOPHEN 1000 MG: 500 TABLET, FILM COATED ORAL at 14:44

## 2020-07-28 RX ADMIN — LOPERAMIDE HYDROCHLORIDE 2 MG: 2 CAPSULE ORAL at 22:20

## 2020-07-28 RX ADMIN — Medication 120 MG: at 19:17

## 2020-07-28 RX ADMIN — CEFAZOLIN SODIUM 2 G: 2 INJECTION, SOLUTION INTRAVENOUS at 02:14

## 2020-07-28 RX ADMIN — CEFAZOLIN SODIUM 2 G: 2 INJECTION, SOLUTION INTRAVENOUS at 14:44

## 2020-07-28 RX ADMIN — ACETAMINOPHEN 1000 MG: 500 TABLET, FILM COATED ORAL at 08:49

## 2020-07-28 RX ADMIN — OXYCODONE HYDROCHLORIDE 5 MG: 5 TABLET ORAL at 12:16

## 2020-07-28 RX ADMIN — LOPERAMIDE HYDROCHLORIDE 4 MG: 2 CAPSULE ORAL at 14:44

## 2020-07-28 RX ADMIN — SIMETHICONE CHEW TAB 80 MG 240 MG: 80 TABLET ORAL at 22:20

## 2020-07-28 RX ADMIN — OXYCODONE HYDROCHLORIDE 5 MG: 5 TABLET ORAL at 00:57

## 2020-07-28 ASSESSMENT — ACTIVITIES OF DAILY LIVING (ADL)
ADLS_ACUITY_SCORE: 14
ADLS_ACUITY_SCORE: 24

## 2020-07-28 NOTE — PLAN OF CARE
VSS, A&Ox4, DTV, IV SL, O2 98% RA. Pt has hypoactive bowel sounds, L toes are tingling, and discomfort from lying in bed but declined offer to reposition. Pain controlled with 5mg oxy q3h. Pt on a low fiber diet d/t hx of ulcerative colitis with a J pouch. One episode of stool incontinence, barrier cream applied. Pivot to bedside commode with ax2. Lungs clear, cardiac WDL, dressing CDI.    Pt plans to discharge home today.

## 2020-07-28 NOTE — PLAN OF CARE
"BP 97/47 (BP Location: Right arm)   Pulse 80   Temp 98  F (36.7  C) (Temporal)   Resp 16   Ht 1.778 m (5' 10\")   Wt 65.1 kg (143 lb 8 oz)   SpO2 99%   BMI 20.59 kg/m    Pt is A&O, LS clear on RA. Pt is up SBA w/ walker, refuses gait belt. Pt is on IV Ancef. BS + Pt had 2 loose BMs and has voided well with no retention. Bladder scan post void for 41. Pts pain has been moderately controlled with oxy 5 mg and one dose of 10 mg. Pt has been walking well in room and shaw. Dressing on back is CDI. Plan is for patient to discharge tomorrow. PT to potentially consult tomorrow.  "

## 2020-07-28 NOTE — ANESTHESIA CARE TRANSFER NOTE
Patient: Alex Cardona    Procedure(s):  L4-L5 Microdiscectomy    Diagnosis: * No pre-op diagnosis entered *  Diagnosis Additional Information: No value filed.    Anesthesia Type:   General     Note:  Airway :Face Mask  Patient transferred to:PACU  Handoff Report: Identifed the Patient, Identified the Reponsible Provider, Reviewed the pertinent medical history, Discussed the surgical course, Reviewed Intra-OP anesthesia mangement and issues during anesthesia, Set expectations for post-procedure period and Allowed opportunity for questions and acknowledgement of understanding      Vitals: (Last set prior to Anesthesia Care Transfer)    CRNA VITALS  7/27/2020 2035 - 7/27/2020 2111 7/27/2020             Pulse:  88    SpO2:  100 %                Electronically Signed By: MILTON Mary CRNA  July 27, 2020  9:11 PM

## 2020-07-28 NOTE — OP NOTE
Operative Report    PREOPERATIVE DIAGNOSIS:   1. Left L4-5 herniated disk with intractable left lower extremity radiculopathy  2. LLE weakness  3. Inability to ambulate     POSTOPERATIVE DIAGNOSIS: Same    PROCEDURE:   1. Left L4-5 hemilaminectomy, medial facetectomy, foraminotomy with microdiscectomy  Use of portable X-ray  Use of intraoperative microscope     ASSISTANT: Mike Barragan     INDICATION FOR PROCEDURE: The patient is a 49 year old male that was admitted to the hospital at [facility] for intractable radicular pain and inability to ambulate. After reviewing the imaging studies and examination, the decision was made to proceed with the above procedure. The patient understood the risk of surgery to be infection, CSF leak, nerve root injury, failure of improvement of his symptoms. The patient voiced understanding and wanted to proceed.     DESCRIPTION OF PROCEDURE: The patient was seen in the pre op area and the procedure was discussed with the patient once again and all questions were answered. The consent was then signed and the lumbar spine was marked with a marker. The patient was transported to the operating room on a stretcher and received general endotracheal anesthesia. The patient was placed on the operating table in the prone position with all pressure points padded. The planned operative area of the back was prepped and draped in normal sterile fashion. Portable X-ray was used to identify the appropriate level. Local anesthesia was then injected along the planned incision. A 10 blade scalpel was used to make a midline incision with dissection down through the subcutaneous tissue to the fascia. The fascia was opened on the left side with the Bovie cautery. Subperiosteal dissection exposed the lamina facet joint at L4-5. The Gordillo retractor was then placed and the intraoperating microscope was then brought into the field and the Juan José Eduardo drill and the Kerrison rongeur were used to perform  the hemilaminectomy, medial facetectomy, foraminotomy, and to remove the ligamentum flavum. The thecal sac and L5 nerve root were identified. There was a very large disk herniation entrapping the L5 rot. The annulus was incised with the 11 blade knife and the disk was then removed with the Cristi curette and also the pituitary rongeur. The nerve root was then noted to be decompressed and the Sena ball hook was used to verify that. Copious irrigation was performed with saline. The retractor was removed and the wound was irrigated once again. The fascia was then closed with 0-Vicryl, the subcutaneous tissue with 3-0 Vicryl, and the skin closed with Dermabond. The patient was then transported back to the stretcher, extubated, and sent to recovery. At the end of the case, all counts were correct.       No complications.       ESTIMATED BLOOD LOSS:10 ml       IV FLUID: See Anesthesia report       The patient received Ancef preoperatively      Martin Jacobson MD, MS, FAANS

## 2020-07-28 NOTE — PROGRESS NOTES
[facility]    Hospitalist Progress Note  Name: Alex Cardona    MRN: [id number]  Provider:  Juan Roberto DO, MPH  Date of Service: 07/28/2020    Summary of Stay: Alex Cardona is a 49 year old male with PMH significant for h/o ulcerative colitis s/p ileostomy and subsequent takedown in 05/2020, lumbar degenerative disc disease, left lumbar radiculopathy, and herniated disc impinging L5 nerve root, and left leg weakness who was admitted on 7/25/2020 for evaluation of management of worsening back pain.      #Left L5 herniated disc with nerve root impingement and radiculopathy  #Intractable back pain: symptoms present for about a year but have become progressively worse since 05/2020 with acute worsening since the beginning of July where the patient was seen by orthopedic sports medicine on 7/2/20 due to left lateral lower leg pain that would be worsened by walking, standing, and sitting. Pain initially improved with exercise, not with rest, and pain tends to increase at night. Underwent MRI of lumbar spine on 7/9/20 which showed disc herniation causing impingement of L5 nerve root likely causing leg weakness and increased back pain. S/p FER on 7/15 without significant improvement and started outpatient PT on 7/20. Trouble walking and sitting since 7/23 and was unable to get out of bed since 2 AM on 7/25 prior to coming into the ED. Received a dose of IV solumedrol 125 mg in the ED. Weakness with left plantar flexion. No improvement and patient reports worsening since admission. Discussed with patient's orthopedic provider Dr. Villa who states she was planning to refer the patient to neurosurgery if no improvement after FER.   - scheduled Tylenol, PRN IV Toradol with Pepcid for GI protection, Atarax, Tizanidine, Oxycodone, and IV Dilaudid available   - Gabapentin 200 mg TID  - neurosurgery consult placed, appreciate recommendations  - mobilize with nursing as able, encourage out of bed  - PT consulted  and evaluated patient on 7/26 and felt patient could manage at home once pain controlled      #Refractory ulcerative colitis  #s/p ileal pouch, anal anastomosis, and diverting loop ileostomy with ileostomy reversal/takedown (05/2020): procedure performed by Dr. Ruano without complication. Has baseline diarrhea which is slightly worse today.   - PRN Imodium   - continue PTA Simethicone      DVT Prophylaxis: Pneumatic Compression Devices  Code Status: Full Code  Diet: Low Fiber Diet    Huddleston Catheter: not present  Disposition: Expected discharge in 0-1 days to home. Goals prior to discharge include PT, pain control.   Incidental Findings: None.  Family updated today: No     Interval History   Assumed care from previous hospitalist. The history was fully reviewed.  .jnkr     -Data reviewed today: I personally reviewed all new labs and imaging results over the last 24 hours.     Physical Exam   Temp: 97.8  F (36.6  C) Temp src: Temporal BP: 106/69 Pulse: 80 Heart Rate: 65 Resp: 16 SpO2: 97 % O2 Device: Nasal cannula Oxygen Delivery: 2 LPM  Vitals:    07/25/20 2020 07/25/20 2355   Weight: 65.8 kg (145 lb) 65.1 kg (143 lb 8 oz)     Vital Signs with Ranges  Temp:  [96.2  F (35.7  C)-98.5  F (36.9  C)] 97.8  F (36.6  C)  Pulse:  [73-92] 80  Heart Rate:  [65-91] 65  Resp:  [11-22] 16  BP: ()/(59-99) 106/69  SpO2:  [96 %-100 %] 97 %  I/O last 3 completed shifts:  In: 1300 [P.O.:200; I.V.:1100]  Out: 810 [Urine:800; Blood:10]    GENERAL: No apparent distress. Awake, alert, and fully oriented.  HEENT: Normocephalic, atraumatic. Extraocular movements intact.  CARDIOVASCULAR: Regular rate and rhythm without murmurs or rubs. No S3.  PULMONARY: Clear bilaterally.  GASTROINTESTINAL: Soft, non-tender, non-distended. Bowel sounds normoactive.   EXTREMITIES: No cyanosis or clubbing. No edema.  NEUROLOGICAL: CN 2-12 grossly intact, no focal neurological deficits.  DERMATOLOGICAL: No rash, ulcer, bruising, nor jaundice.      Medications       acetaminophen  1,000 mg Oral Q6H WHITNEY     ceFAZolin  2 g Intravenous Q6H     senna-docusate  1 tablet Oral BID     zz extemporaneous template  120 mg Oral QPM     simethicone  240 mg Oral At Bedtime     Data     Laboratory:  Recent Labs   Lab 07/27/20  0542   WBC 7.6   HGB 12.6*   HCT 40.9   MCV 74*        Recent Labs   Lab 07/27/20  0542      POTASSIUM 3.9   CHLORIDE 101   CO2 26   ANIONGAP 7   *   BUN 32*   CR 0.97   GFRESTIMATED 90   GFRESTBLACK >90   KEZIA 8.8     No results for input(s): CULT in the last 168 hours.    Imaging:  Recent Results (from the past 24 hour(s))   XR Lumbar Spine Port 1 View    Narrative    This exam was marked as non-reportable because it will not be read by a   radiologist or a [facility] non-radiologist provider.               Juan Roberto DO MPH  [facility]  [address] [place] [address]  Pager: [phone]  07/28/2020

## 2020-07-28 NOTE — PROGRESS NOTES
[facility]    Date of Service (when I saw the patient): 07/28/2020     Assessment & Plan   Alex Cardona is a 49 year old male who was admitted on 7/25/2020 with intractable LLE pain with left L4-5 disc herniation and impingement of left L5 nerve root.  He underwent left L4-5 microdiscectomy on 7/28/2020 with Dr. Martin Jacobson.  He states he has noted significant improvement in his pain since surgery last night and is now able to get out of bed independently due to decreased pain and improved strength.  He notes some tingling to the left distal leg, \"But it's so much better.\"  We reviewed discharge instructions for wound care, activity restrictions and follow up. He will continue Oxycodone prn pain and Rx written for discharge with instructions to taper as able and use Tylenol interchangeably.        Active Problems:    Back pain    Assessment: S/p lumbar microdiscectomy    Plan:   -Oxycodone Rx for pain  -From NSG standpoint ok for discharge once hospitalist in agreement      I have discussed the following assessment and plan with Dr. Jacobson who is in agreement with initial plan and will follow up with further consultation recommendations.    Natasha Alicea [facility]  Tel [phone]    Interval History   Day 1 postop; stable    Physical Exam   Temp: 97.8  F (36.6  C) Temp src: Temporal BP: 106/69 Pulse: 80 Heart Rate: 65 Resp: 16 SpO2: 97 % O2 Device: Nasal cannula Oxygen Delivery: 2 LPM  Vitals:    07/25/20 2020 07/25/20 2355   Weight: 145 lb (65.8 kg) 143 lb 8 oz (65.1 kg)     Vital Signs with Ranges  Temp:  [97.1  F (36.2  C)-98.5  F (36.9  C)] 97.8  F (36.6  C)  Pulse:  [73-87] 80  Heart Rate:  [65-91] 65  Resp:  [11-22] 16  BP: ()/(59-99) 106/69  SpO2:  [96 %-100 %] 97 %  I/O last 3 completed shifts:  In: 1300 [P.O.:200; I.V.:1100]  Out: 810 [Urine:800; Blood:10]    Heart Rate: 65, Blood pressure 106/69, pulse 80, temperature 97.8 " " F (36.6  C), temperature source Temporal, resp. rate 16, height 5' 10\" (1.778 m), weight 143 lb 8 oz (65.1 kg), SpO2 97 %.  143 lbs 8 oz  Skin:  Incision covered with dry dressing.    NEUROLOGICAL EXAMINATION:   Mental status:  Awake and alert. speech is fluent.  Cranial nerves:  II-XII intact.   Motor:  Improved strength to left dorsiflexion.  PF 5/5 bilaterally. Able to stand at side of the bed independently.  Gait:  Steady.     Medications       acetaminophen  1,000 mg Oral Q6H WHITNEY     ceFAZolin  2 g Intravenous Q6H     senna-docusate  1 tablet Oral BID     simethicone  120 mg Oral QPM     simethicone  240 mg Oral At Bedtime       Data     CBC RESULTS:   Recent Labs   Lab Test 07/27/20  0542   WBC 7.6   RBC 5.50   HGB 12.6*   HCT 40.9   MCV 74*   MCH 22.9*   MCHC 30.8*   RDW 17.1*        Basic Metabolic Panel:  Lab Results   Component Value Date     07/27/2020      Lab Results   Component Value Date    POTASSIUM 3.9 07/27/2020     Lab Results   Component Value Date    CHLORIDE 101 07/27/2020     Lab Results   Component Value Date    KEZIA 8.8 07/27/2020     Lab Results   Component Value Date    CO2 26 07/27/2020     Lab Results   Component Value Date    BUN 32 07/27/2020     Lab Results   Component Value Date    CR 0.97 07/27/2020     Lab Results   Component Value Date     07/27/2020     INR:  No results found for: INR      "

## 2020-07-28 NOTE — PLAN OF CARE
VS-afebrile, stable, on capnography,  Lung Sounds-clear, no cough, on room air, using IS upto 3250  O2-on room air, on capnography,   GI-- no bs heard, -flatus, lbm was today during OR, pt usually has 6-8 liquid watery stools per day.   -massey in and adequate.   IVF-At 100   Dressings-back drsg is c/d/I, ice top back.   CMS-has some tingling in L leg, +pp, no pedal edema, warm, scds on. L leg is slightly weaker in dorsi flexion than the R leg. Dorsi is strong in both legs.   Drain-none.   Activity-able to pivot from cart to bed with 2 assist, gb, unsteady slightly when up.   Pain-denies at this time. Available at any time.   D/C Plan-home possibly tomorrow. Lives with mom--works 7-5:30.   6711: paged out to hospitalist: pt back from surgery. L4-5 microdiscemtomy. has no colon--hx of IBS. no diet orders or IVF orders. can you order clears diet? he eats low fiber diet at home.

## 2020-07-28 NOTE — ANESTHESIA POSTPROCEDURE EVALUATION
Patient: Alex Cardona    Procedure(s):  L4-L5 Microdiscectomy    Diagnosis:* No pre-op diagnosis entered *  Diagnosis Additional Information: No value filed.    Anesthesia Type:  General    Note:  Anesthesia Post Evaluation    Patient location during evaluation: PACU  Patient participation: Able to fully participate in evaluation  Level of consciousness: awake and alert  Pain management: adequate  multimodal analgesia used between 6 hours prior to anesthesia start to PACU dischargeAirway patency: patent  Cardiovascular status: acceptable  Respiratory status: acceptable  two or more mitigation strategies used for obstructive sleep apneaHydration status: acceptable  PONV: none     Anesthetic complications: None          Last vitals:  Vitals:    07/27/20 1841 07/27/20 2108 07/27/20 2110   BP: (!) 131/94 139/89 128/89   Pulse:   81   Resp: 16 13 12   Temp:  97.9  F (36.6  C)    SpO2:   100%         Electronically Signed By: Luke Crawford MD  July 27, 2020  9:18 PM

## 2020-07-29 ENCOUNTER — APPOINTMENT (OUTPATIENT)
Dept: PHYSICAL THERAPY | Facility: CLINIC | Age: 50
End: 2020-07-29
Attending: HOSPITALIST
Payer: COMMERCIAL

## 2020-07-29 VITALS
BODY MASS INDEX: 20.54 KG/M2 | HEIGHT: 70 IN | DIASTOLIC BLOOD PRESSURE: 68 MMHG | RESPIRATION RATE: 16 BRPM | WEIGHT: 143.5 LBS | HEART RATE: 74 BPM | TEMPERATURE: 98.7 F | SYSTOLIC BLOOD PRESSURE: 102 MMHG | OXYGEN SATURATION: 99 %

## 2020-07-29 LAB — GLUCOSE BLDC GLUCOMTR-MCNC: 94 MG/DL (ref 70–99)

## 2020-07-29 PROCEDURE — 97530 THERAPEUTIC ACTIVITIES: CPT | Mod: GP | Performed by: PHYSICAL THERAPIST

## 2020-07-29 PROCEDURE — 97116 GAIT TRAINING THERAPY: CPT | Mod: GP | Performed by: PHYSICAL THERAPIST

## 2020-07-29 PROCEDURE — 00000146 ZZHCL STATISTIC GLUCOSE BY METER IP

## 2020-07-29 PROCEDURE — 25000132 ZZH RX MED GY IP 250 OP 250 PS 637: Performed by: NEUROLOGICAL SURGERY

## 2020-07-29 PROCEDURE — 97161 PT EVAL LOW COMPLEX 20 MIN: CPT | Mod: GP | Performed by: PHYSICAL THERAPIST

## 2020-07-29 PROCEDURE — 99239 HOSP IP/OBS DSCHRG MGMT >30: CPT | Performed by: HOSPITALIST

## 2020-07-29 RX ADMIN — OXYCODONE HYDROCHLORIDE 10 MG: 5 TABLET ORAL at 03:57

## 2020-07-29 RX ADMIN — ACETAMINOPHEN 1000 MG: 500 TABLET, FILM COATED ORAL at 10:29

## 2020-07-29 RX ADMIN — OXYCODONE HYDROCHLORIDE 10 MG: 5 TABLET ORAL at 00:03

## 2020-07-29 RX ADMIN — OXYCODONE HYDROCHLORIDE 10 MG: 5 TABLET ORAL at 14:07

## 2020-07-29 RX ADMIN — OXYCODONE HYDROCHLORIDE 10 MG: 5 TABLET ORAL at 07:52

## 2020-07-29 RX ADMIN — ACETAMINOPHEN 1000 MG: 500 TABLET, FILM COATED ORAL at 03:57

## 2020-07-29 RX ADMIN — OXYCODONE HYDROCHLORIDE 10 MG: 5 TABLET ORAL at 11:05

## 2020-07-29 ASSESSMENT — ACTIVITIES OF DAILY LIVING (ADL)
ADLS_ACUITY_SCORE: 24
ADLS_ACUITY_SCORE: 24
ADLS_ACUITY_SCORE: 16
ADLS_ACUITY_SCORE: 24

## 2020-07-29 NOTE — PROGRESS NOTES
07/29/20 1121   Quick Adds   Type of Visit Initial PT Evaluation   Living Environment   Living Environment Comment Pt lives in an apartment with mother, who works during day but otherwise able to assist. 1 flight of stairs to enter with B railings.    Self-Care   Usual Activity Tolerance excellent   Current Activity Tolerance moderate   Equipment Currently Used at Home none   Functional Level Prior   Ambulation 0-->independent   Transferring 0-->independent   Toileting 0-->independent   Bathing 0-->independent   Fall history within last six months no   General Information   Onset of Illness/Injury or Date of Surgery - Date 07/25/20   Referring Physician Juan Roberto,     Patient/Family Goals Statement To go home safely   Pertinent History of Current Problem (include personal factors and/or comorbidities that impact the POC) Pt is a 49 year old male s/p left L4-5 microdiscectomy.   Precautions/Limitations fall precautions   Weight-Bearing Status - LLE full weight-bearing   Weight-Bearing Status - RLE full weight-bearing   Cognitive Status Examination   Orientation orientation to person, place and time   Level of Consciousness alert   Follows Commands and Answers Questions able to follow multistep instructions;100% of the time   Personal Safety and Judgment intact   Memory intact   Pain Assessment   Patient Currently in Pain   (6/10 L LE )   Range of Motion (ROM)   ROM Comment B LE WFL   Strength   Strength Comments L knee ext and L ankle DF decreased 4-/5; otherwise B LE grossly 5/5   Bed Mobility   Bed Mobility Comments Mod ind supine to sit   Transfer Skills   Transfer Comments SBA sit to stand with FWW   Gait   Gait Comments SBA with FWW   Balance   Balance Comments good unsupported sitting; good dynamic standing with FWW   Sensory Examination   Sensory Perception Comments reports L LE symptoms in L5 dermatome   General Therapy Interventions   Planned Therapy Interventions balance training;gait  "training;transfer training;home program guidelines;progressive activity/exercise   Clinical Impression   Criteria for Skilled Therapeutic Intervention yes, treatment indicated   PT Diagnosis impaired functional mobility   Influenced by the following impairments decreased balance, L DF and knee ext weakness   Functional limitations due to impairments impaired bed mobility, transfers, and ambulation   Clinical Presentation Stable/Uncomplicated   Clinical Presentation Rationale   (Pt is medically stable)   Clinical Decision Making (Complexity) Low complexity   Therapy Frequency   (Eval and 1x treat)   Predicted Duration of Therapy Intervention (days/wks) 1 day   Anticipated Equipment Needs at Discharge walker   Anticipated Discharge Disposition Home   Risk & Benefits of therapy have been explained Yes   Patient, Family & other staff in agreement with plan of care Yes   [employer] AM-PAC  \"6 Clicks\" V.2 Basic Mobility Inpatient Short Form   1. Turning from your back to your side while in a flat bed without using bedrails? 4 - None   2. Moving from lying on your back to sitting on the side of a flat bed without using bedrails? 4 - None   3. Moving to and from a bed to a chair (including a wheelchair)? 3 - A Little   4. Standing up from a chair using your arms (e.g., wheelchair, or bedside chair)? 3 - A Little   5. To walk in hospital room? 3 - A Little   6. Climbing 3-5 steps with a railing? 3 - A Little   Basic Mobility Raw Score (Score out of 24.Lower scores equate to lower levels of function) 20   Total Evaluation Time   Total Evaluation Time (Minutes) 6     "

## 2020-07-29 NOTE — PLAN OF CARE
"Up with SBA. Pain managed with PRN oxy 10, intermittently over night. Ace wrap is clean, dry, and intact. Voiding appropriately. Loose stool x1, \"normal\" per patient due to j-pouch. Tingling and some increased weakness to left leg/foot since surgery. Passed along in report to discuss further. Plans to discharge today.   "

## 2020-07-29 NOTE — PLAN OF CARE
PT: order received and evaluation complete. Pt is a 49 year old male s/p left L4-5 microdiscectomy. Pt lives in an apartment with mother, who works during day but otherwise able to assist. 1 flight of stairs to enter with B railings.     Discharge Planner PT   Patient plan for discharge: home  Current status: Education provided about spinal precautions. SBA with bed mobility. Instructed patient to ambulate ~200 feet with FWW and CGA. Step through reciprocal patterning with no LOB. Instructed pt to ambulate without AD ~100 feet. Pt demonstrated decreased gait speed and increased DWIGHT, reports feeling more confident with use of walker. Instructed pt to navigate 2x4 stairs with B UE support using step to patterning. No LOB noted, cuing for foot placement for safety.  Barriers to return to prior living situation: none  Recommendations for discharge: Home with FWW  Rationale for recommendations: Pt presenting close to baseline level of mobility and performed safely throughout session with FWW for support.        Entered by: Jorge Starr 07/29/2020 12:08 PM         Physical Therapy Discharge Summary    Reason for therapy discharge:    All goals and outcomes met, no further needs identified.    Progress towards therapy goal(s). See goals on Care Plan in [facility] electronic health record for goal details.  Goals met    Therapy recommendation(s):    Continued therapy is recommended.  Rationale/Recommendations:  defer to ortho.

## 2020-07-29 NOTE — DISCHARGE SUMMARY
Hospitalist Discharge Summary  [facility]    Alex Cardona MRN# [id number]   YOB: 1970 Age: 49 year old     Date of Admission:  7/25/2020  Date of Discharge:  7/29/2020  Admitting Physician:  Pantera Thompson MD  Discharge Physician:  Juan Roberto DO  Discharging Service:  Hospitalist     Primary Provider: Kati Garcia          Discharge Diagnosis:   Left L5 herniated disc with nerve root impingement and radiculopathy  Intractable back pain.  Refractory ulcerative colitis  Status post ileal pouch, anal anastomosis, and diverting loop ileostomy with subsequent takedown in May 2020             Discharge Disposition:   Discharged to home           Allergies:   Allergies   Allergen Reactions     Blood Transfusion Related (Informational Only) Other (See Comments)     Patient has a history of a clinically significant antibody against RBC antigens.  A delay in compatible RBCs may occur.              Discharge Medications:   Current Discharge Medication List      START taking these medications    Details   oxyCODONE (ROXICODONE) 5 MG tablet Take 1-2 tablets (5-10 mg) by mouth every 6 hours as needed for severe pain  Qty: 40 tablet, Refills: 0    Associated Diagnoses: Acute left-sided low back pain with left-sided sciatica         CONTINUE these medications which have NOT CHANGED    Details   acetaminophen (TYLENOL) 500 MG tablet Take 1,000 mg by mouth every 8 hours as needed for mild pain      !! loperamide (IMODIUM) 2 MG capsule Take 2 mg by mouth every evening      !! loperamide (IMODIUM) 2 MG capsule Take 4 mg by mouth At Bedtime      melatonin 5 MG tablet Take 5 mg by mouth At Bedtime       menthol-zinc oxide (CALMOSEPTINE) 0.44-20.6 % OINT ointment Apply topically 4 times daily as needed for skin protection or irritation      multivitamin w/minerals (MULTI-VITAMIN) tablet Take 1 tablet by mouth daily      !! simethicone (MYLICON) 125 MG chewable tablet Take 125 mg by  "mouth every evening      !! simethicone (MYLICON) 125 MG chewable tablet Take 250 mg by mouth At Bedtime       !! - Potential duplicate medications found. Please discuss with provider.      STOP taking these medications       HYDROcodone-acetaminophen (NORCO) 5-325 MG tablet Comments:   Reason for Stopping:                      Condition on Discharge:   Discharge condition: Stable   Discharge vitals: Blood pressure 102/68, pulse 74, temperature 98.7  F (37.1  C), temperature source Temporal, resp. rate 16, height 1.778 m (5' 10\"), weight 65.1 kg (143 lb 8 oz), SpO2 99 %.   Code status on discharge: Full Code      BASIC PHYSICAL EXAMINATION:  GENERAL: No apparent distress.  CARDIOVASCULAR: Regular rate and rhythm without murmurs.  PULMONARY: Clear to auscultation bilaterally.   GASTROINTESTINAL: Abdomen soft, non-tender.  EXTREMITIES: No edema, pulses intact.  NEUROLOGIC: No focal deficits.            History of Illness:   See detailed admission note for full details.               Procedures excluding imaging which is summarized below:   Please see details in the electronic medical record.           Consultations:   PHYSICAL THERAPY ADULT IP CONSULT  SPINE SURGERY ADULT IP CONSULT          Significant Results:   Results for orders placed or performed during the hospital encounter of 07/25/20   XR Lumbar Spine Port 1 View    Narrative    This exam was marked as non-reportable because it will not be read by a   radiologist or a [place] non-radiologist provider.               Transthoracic Echocardiogram Results:  No results found for this or any previous visit (from the past 4320 hour(s)).             Pending Results:   Unresulted Labs Ordered in the Past 30 Days of this Admission     No orders found from 6/25/2020 to 7/26/2020.                      Discharge Instructions and Follow-Up:   Discharge instructions and follow-up:   Discharge Procedure Orders   Reason for your hospital stay   Order Comments: You were in " the hospital for low back and leg pain and underwent L4-5 microdiscectomy surgery.     Follow-up and recommended labs and tests    Order Comments: Please follow up at [facility] 6 weeks after surgery for your post op appointment.  Please call the clinic at [phone] to schedule your appointment with Natasha Alicea CNP.     Wound care and dressings   Order Comments: Instructions to care for your wound at home: Keep your incision clean and dry at all times.  OK to remove dressing on postop day 2.  OK to shower on postop day 3 and allow water to run over incision, pat dry after shower.  No bathing swimming or submerging in water.  Call immediately or come to ED if any drainage occurs, or you develop new pain, redness, or swelling.     Activity   Order Comments: Your activity upon discharge: Discharge instructions:  No lifting of more than 10 pounds, no bending, no twisting until follow up visit.    Ok to shampoo hair, shower or bathe, but, do not scrub or submerge incision under water until evaluated post-operatively in clinic.    Ok to walk as tolerated, avoid bed rest and prolonged sitting.    No contact sports until after follow up visit  No high impact activities such as; running/jogging, snowmobile or 4 willingham riding or any other recreational vehicles.    Do not take NSAIDS (ibuprofen, advil, aleve, naproxen, etc) for pain control.    Do NOT drive while taking narcotic pain medication.    Call [facility] at [phone] for increasing redness, swelling or pus draining from the incision, increased pain or any other questions and concerns.  If you need a refill of pain medication please call 5 days in advance of needing the refill.     Order Specific Question Answer Comments   Is discharge order? Yes      Diet   Order Comments: Follow this diet upon discharge: Orders Placed This Encounter      Low Fiber Diet     Order Specific Question Answer Comments   Is discharge order? Yes              Hospital Course:    Alex Cardona is a 49 year old male with PMH significant for h/o ulcerative colitis s/p ileostomy and subsequent takedown in 05/2020, lumbar degenerative disc disease, left lumbar radiculopathy, and herniated disc impinging L5 nerve root, and left leg weakness who was admitted on 7/25/2020 for evaluation of management of worsening back pain.      Symptoms present for about a year but have become progressively worse since 05/2020 with acute worsening since the beginning of July where the patient was seen by orthopedic sports medicine on 7/2/20 due to left lateral lower leg pain that would be worsened by walking, standing, and sitting. Pain initially improved with exercise, not with rest, and pain tends to increase at night. Underwent MRI of lumbar spine on 7/9/20 which showed disc herniation causing impingement of L5 nerve root likely causing leg weakness and increased back pain. S/p FER on 7/15 without significant improvement and started outpatient PT on 7/20. Trouble walking and sitting since 7/23 and was unable to get out of bed since 2 AM on 7/25 prior to coming into the ED. Received a dose of IV solumedrol 125 mg in the ED. Weakness with left plantar flexion. No improvement and patient reports worsening since admission. Discussed with patient's orthopedic provider Dr. Villa who states she was planning to refer the patient to neurosurgery if no improvement after FER.     PT consulted and evaluated patient on 7/26 and felt patient could manage at home once pain controlled.  He ultimately failed so underwent lumbar microdiscectomy on 7/28 with Dr. Jacobson.  He has noticed significant improvement of his low back pain and radicular symptoms since that time.  Still has minimal numbness of the left lower extremity but dramatically improved.  Has done well on oral analgesics during the hospital stay.  Neurosurgery has written for pain medications on discharge.  He will work with physical therapy once today and then  plans to discharge home this afternoon.    To note, he also has a history of refractory ulcerative colitis status post ileal pouch, anal anastomosis, and diverting loop ileostomy with reversal/takedown in May 2020.  This was performed by Dr. Navarro.  He has some baseline diarrhea but no acute issues during the hospitalization.     The patient was seen, examined, and counseled on this day. The hospitalization and plan of care was reviewed with the patient extensively. All questions were addressed and the patient agreed to follow-up as noted above.      Total time spent in face to face contact with the patient and coordinating discharge was:  35 Minutes    Juan Roberto DO MPH  [facility] Hospitalist  [address] [place] [address]  Pager: [phone]  07/29/2020

## 2020-07-30 ENCOUNTER — TELEPHONE (OUTPATIENT)
Dept: INTERNAL MEDICINE | Facility: CLINIC | Age: 50
End: 2020-07-30

## 2020-07-30 ENCOUNTER — PATIENT OUTREACH (OUTPATIENT)
Dept: NURSING | Facility: CLINIC | Age: 50
End: 2020-07-30
Payer: COMMERCIAL

## 2020-07-30 DIAGNOSIS — Z71.89 OTHER SPECIFIED COUNSELING: ICD-10-CM

## 2020-07-30 NOTE — PROGRESS NOTES
Clinic Care Coordination Contact  Community Health Worker Initial Outreach    CHW Initial Information Gathering:  Referral Source: IP Report  Preferred Hospital: [facility], [place]  [phone]  Current living arrangement:: I live in a private home with family  Type of residence:: Apartment  Community Resources: None  Supplies used at home:: None  Equipment Currently Used at Home: walker, standard  Informal Support system:: Family, Friends, Parent  No PCP office visit in Past Year: No  Transportation means:: Medical transport  CHW Additional Questions  If ED/Hospital discharge, follow-up appointment scheduled as recommended?: No  Patient agreeable to assistance with scheduling?: No  Patient declined (specify): Patient will contact  Medication changes made following ED/Hospital discharge?: N/A  MyChart active?: Yes  Patient sent Social Determinants of Health questionnaire?: Yes    Patient accepts CC: Yes. Patient scheduled for assessment with BOWEN East RN, on 08/04/20 at 1:30PM. Patient noted desire to discuss CC.     Spoke to Alex,  Alex is doing well, just resting as he got home late last night.  Patient had questioned if he should be getting Home Care PT, as that's what he was usually familiar with, with previous discharges.  However, from looking at patient instructions, it did state no strenuous lifting, bending or twisting, so that may be why Home Care PT wasn't ordered just yet.  CHW informed patient of the recommendation to follow up with Orthopedics and they may order PT once he's a little more healed if appropriate.  Patient acknowledged understanding and will plan to contact [facility].    CHW introduced self and roles with CC.  CHW offered follow up with CC to assess for additional support and resources.  Alex agreed for assessment.  SDOH questionnaire has been sent to patient.      Wondering if he should be getting home care PT.

## 2020-07-30 NOTE — TELEPHONE ENCOUNTER
IP F/U    Date: 7/29/20  Diagnosis: Left-Sided Low Back Pain With Left-Sided Sciatica, Unspecified Chronicity, Acute Left-Sided Low Back Pain With Left-Side  Is patient active in care coordination? No  Was patient in TCU? No

## 2020-07-30 NOTE — PROGRESS NOTES
Patient identified as high risk for readmission.  Patient meets criteria for care coordination outreach.    Zoe Terry, [facility]  Clinic Care Coordinator  Ph. [phone]  [email or web address]

## 2020-08-04 ENCOUNTER — PATIENT OUTREACH (OUTPATIENT)
Dept: NURSING | Facility: CLINIC | Age: 50
End: 2020-08-04
Attending: INTERNAL MEDICINE
Payer: COMMERCIAL

## 2020-08-04 ASSESSMENT — ACTIVITIES OF DAILY LIVING (ADL): DEPENDENT_IADLS:: CLEANING;COOKING;LAUNDRY;SHOPPING;MEAL PREPARATION

## 2020-08-04 NOTE — LETTER
[facility]ARE COORDINATION  [address]  [place] [address]      August 4, 2020    Alex Cardona  [address]   [place] [address]      Dear Alex,    I am a clinic care coordinator who works with Kati Garcia MD at [facility]. I wanted to thank you for taking the time to talk with me.  Below is a description of clinic care coordination and how I can further assist you.      The clinic care coordination team is made up of a registered nurse,  and community health worker who understand the health care system. The goal of clinic care coordination is to help you manage your health and improve access to the health care system in the most efficient manner. The team can assist you in meeting your health care goals by providing education, coordinating services, strengthening the communication among your providers and supporting you with any resource needs.    The best way to apply for General assistance and Food support is with the following CAF application: [email or web address]  Once completed, you can turn it to [facility] the following ways:   By Mail: [facility], [address] [place] [address]  Forms can also be submitted electronically:  By email to [email or web address]    By fax at [phone]    Please feel free to reach out to me if you need help in completing the form.     Please feel free to contact me at [phone] with any questions or concerns. We are focused on providing you with the highest-quality healthcare experience possible and that all starts with you.     Sincerely,     Charles Henley, RN Care Coordinator  [facility]: [place]  Phone: [phone]  E-Mail: [email or web address]    Enclosed: I have enclosed a copy of the Complex Care Plan. This has helpful information and goals that we have talked  about. Please keep this in an easy to access place to use as needed.

## 2020-08-04 NOTE — PROGRESS NOTES
Clinic Care Coordination Contact    Clinic Care Coordination Contact  OUTREACH    Referral Information:  Referral Source: IP Report    Primary Diagnosis: Orthopedic    Chief Complaint   Patient presents with     Clinic Care Coordination - Initial     resource/support        Universal Utilization: Reviewed and no concern.  Clinic Utilization  Difficulty keeping appointments:: No  Compliance Concerns: No  No-Show Concerns: No  No PCP office visit in Past Year: No  Utilization    Last refreshed: 8/4/2020  3:15 PM:  Hospital Admissions 5           Last refreshed: 8/4/2020  3:15 PM:  ED Visits 2           Last refreshed: 8/4/2020  3:15 PM:  No Show Count (past year) 1              Current as of: 8/4/2020  3:15 PM              Clinical Concerns: Phone call to Alex to complete outreach. Patient reports feeling better since the surgery.  Patient indicates he is currently living with his mom and has no income coming in because he is not working. He has have a lot of surgery since May of 2019 and thus have been out of work. He has [employer] which is covering a lot of his medical care but other than that, he has no income. Currently, PT is not recommended because he is still in the healing stage. He is due to see his orthopedic six weeks after this last surgery and it will be decided at that time if he should be getting PT. Currently, he is taking the Oxycodone every 12 hours to help with the pain and this has been effective but there is still some residual pain and tingling on his left leg. He has no concern with transportation. Patient indicates he is independent in his activities of daily living but his mom is currently helping him with grocery shopping, laundry, cleaning and cooking because is restricted from doing anything that involves bending. Patient is agreeable to Care Coordinator s help in locating financial resources.     Current Medical Concerns:    Patient Active Problem List   Diagnosis     Diarrhea     Colitis      Ulcerative colitis, acute, with rectal bleeding (H)     Ileostomy status (H)     S/P laparoscopic colectomy     Hypoproteinemia (H)     Ulcerative colitis (H)     Abdominal pain     Lumbar degenerative disc disease     Left lumbar radiculopathy     Facet arthropathy, lumbar     Weakness of left foot     Back pain         Education Provided to patient: CC s role. General assistance and food support.   Pain  Pain (GOAL):: Yes  Type: Acute (<3mo)  Location of chronic pain:: Leg  Radiating: No  Progression: Improving  Description of pain: Aching, Numb  Chronic pain severity:: 8  Limitation of routine activities due to chronic pain:: Yes  Description: Unable to perform most daily activities (chores, hobbies, social activities, driving)  Alleviating Factors: Rest, Pain Medication    Medication Management:  Reviewed and reconciled. Patient indicates Patient is independent in managing his medications.      Functional Status:  Dependent ADLs:: Ambulation-walker  Dependent IADLs:: Cleaning, Cooking, Laundry, Shopping, Meal Preparation  Bed or wheelchair confined:: No  Mobility Status: Independent w/Device  Fallen 2 or more times in the past year?: No  Any fall with injury in the past year?: No    Living Situation:  Current living arrangement:: I live in a private home with family  Type of residence:: Apartment    Lifestyle & Psychosocial Needs:  Lifestyle     Physical activity     Days per week: 0 days     Minutes per session: 0 min     Stress: Only a little     Social Needs     Financial resource strain: Very hard     Food insecurity     Worry: Never true     Inability: Never true     Transportation needs     Medical: No     Non-medical: Yes     Diet:: Low Fiber  Inadequate nutrition (GOAL):: No  Tube Feeding: No  Inadequate activity/exercise (GOAL):: No  Significant changes in sleep pattern (GOAL): No  Transportation means:: Medical transport, Family     Informal Support system:: Family, Friends, Parent   Socioeconomic  History     Marital status: Single     Spouse name: Not on file     Number of children: 1     Years of education: Not on file     Highest education level: GED or equivalent   Relationships     Social connections     Talks on phone: Once a week     Gets together: Never     Attends Caodaism service: Never     Active member of club or organization: No     Attends meetings of clubs or organizations: Never     Relationship status:      Intimate partner violence     Fear of current or ex partner: Not on file     Emotionally abused: Not on file     Physically abused: Not on file     Forced sexual activity: Not on file     Tobacco Use     Smoking status: Former Smoker     Packs/day: 0.00     Types: Cigarettes     Last attempt to quit: 2009     Years since quittin.5     Smokeless tobacco: Never Used   Substance and Sexual Activity     Alcohol use: Not Currently     Frequency: Never     Binge frequency: Never     Comment: none  since       Drug use: Not Currently     Types: Marijuana     Comment: none since 2019     Sexual activity: Not Currently        Resources and Interventions:  Current Resources:      Community Resources: None  Supplies used at home:: None  Equipment Currently Used at Home: walker, standard    Advance Care Plan/Directive  Advanced Care Plans/Directives on file:: No  Advanced Care Plan/Directive Status: Referral to [facility] Facilitator    Goals:   Goals        General    1. Financial Wellbeing (pt-stated)     Notes - Note edited  2020  4:10 PM by Lucas Henley RN    Goal Statement:  I would like to obtained financial support for myself.   Date goal set: 2020  Barriers: complex system in obtaining financial support from the [facility]  Strengths: good self advocate  Date to Achieve By: 2020   Patient expressed understanding of goal: Yes    Action steps to achieve this goal  1. I will complete Combined application and submit this to the [facility] to apply for general  assistance and food support.   2. I will gather the requested proof and document requested by the county.   3. I will reach out to Care coordinator or Community Health Worker for any question, barrier or support needs.                 Patient/Caregiver understanding: Patient verbalized understanding and denies any additional questions or concerns at this time. RNCC engaged in [employer] communications during encounter.       Outreach Frequency: 6 weeks      Plan: Patient will contact PCP for follow up appointment and medical concern.   -Patient indicates Patient will look for information regarding [place] and Food support CC is sending out to him.  -CC will send out introduction letter and care plan to Patient along with the following message: The best way to apply for General assistance and Food support is with the following CAF application: [email or web address]  Once completed, you can turn it to [facility] [employer] the following ways:   By Mail: [facility], [address] [place] [id number]  Forms can also be submitted electronically:  By email to [place] [email or web address].    By fax at [phone]  -CHW will connect with Patient in three weeks and CC will connect with Patient in six months.   -Patient will contact CC or CHW if there is any question, concern or support needs.       Charles Henley, RN Care Coordinator  [facility]: [place], [place], [place], [place]  Phone: [phone]  E-Mail: [email or web address]

## 2020-08-04 NOTE — LETTER
[facility]ly [facility], later [facility]  Complex Care Plan  About Me:    Patient Name:  Alex Cardona    YOB: 1970  Age:         50 year old   [facility]e MRN:    [id number] Telephone Information:  Home Phone [phone]   Mobile [phone]       Address:  [address]  [place] [address] Email address:  [email or web address]      Emergency Contact(s)    Name Relationship Lgl Grd Work Phone Home Phone Mobile Phone   1. ABBY CARDONA Mother  [phone]   2. DECLINED, PER * Declined   [phone]            Primary language:  English     needed? No   [employer] Language Services:  [phone] op. 1  Other communication barriers: None  Preferred Method of Communication:     Current living arrangement: I live in a private home with family  Mobility Status/ Medical Equipment: Independent w/Device    Health Maintenance  Health Maintenance Reviewed:      My Access Plan  Medical Emergency 911   Primary Clinic Line [facility] - [phone]   24 Hour Appointment Line [phone] or  [phone]) (toll-free)   24 Hour Nurse Line [phone] (toll-free)   Preferred Urgent Care [facility] [phone]   Preferred Hospital [facility]  [phone]   Preferred Pharmacy [id number] [place] [address] [place] [facility] [address]     Behavioral Health Crisis Line The National Suicide Prevention Lifeline at 1-625.428.6459 or 911             My Care Team Members  Patient Care Team       Relationship Specialty Notifications Start End    Kati Garcia MD PCP - General Internal Medicine  10/22/19     Phone: [phone] Pager: [phone] Fax: [phone]        [address] [facility] [address]    Kati Garcia MD Assigned PCP   10/10/19     Phone: [phone] Pager: [phone] Fax: [phone]        [address]  [facility] [address]    Lucas Henley, RN Lead Care Coordinator   8/4/20     Bruna Mcneil Community Health Worker   8/4/20             My Care Plans  Self Management and Treatment Plan  Goals and (Comments)  Goals        General    1. Financial Wellbeing (pt-stated)     Notes - Note edited  8/4/2020  4:10 PM by Lucas Henley, RN    Goal Statement:  I would like to obtained financial support for myself.   Date goal set: 8/4/2020  Barriers: complex system in obtaining financial support from the [facility]  Strengths: good self advocate  Date to Achieve By: 11/30/2020   Patient expressed understanding of goal: Yes    Action steps to achieve this goal  1. I will complete Combined application and submit this to the [facility] to apply for general assistance and food support.   2. I will gather the requested proof and document requested by the [facility].   3. I will reach out to Care coordinator or Community Health Worker for any question, barrier or support needs.                       My Medical and Care Information  Problem List   Patient Active Problem List   Diagnosis     Diarrhea     Colitis     Ulcerative colitis, acute, with rectal bleeding (H)     Ileostomy status (H)     S/P laparoscopic colectomy     Hypoproteinemia (H)     Ulcerative colitis (H)     Abdominal pain     Lumbar degenerative disc disease     Left lumbar radiculopathy     Facet arthropathy, lumbar     Weakness of left foot     Back pain      Current Medications and Allergies:   Current Outpatient Medications   Medication     acetaminophen (TYLENOL) 500 MG tablet     loperamide (IMODIUM) 2 MG capsule     loperamide (IMODIUM) 2 MG capsule     melatonin 5 MG tablet     menthol-zinc oxide (CALMOSEPTINE) 0.44-20.6 % OINT ointment     multivitamin w/minerals (MULTI-VITAMIN) tablet     oxyCODONE (ROXICODONE) 5 MG tablet     simethicone (MYLICON) 125 MG chewable tablet     simethicone (MYLICON) 125 MG chewable tablet     No current facility-administered medications for  this visit.        Care Coordination Start Date: 8/4/2020   Frequency of Care Coordination: 6 weeks   Form Last Updated: 08/04/2020

## 2020-09-09 ENCOUNTER — TRANSFERRED RECORDS (OUTPATIENT)
Dept: HEALTH INFORMATION MANAGEMENT | Facility: CLINIC | Age: 50
End: 2020-09-09

## 2020-09-09 ENCOUNTER — PATIENT OUTREACH (OUTPATIENT)
Dept: CARE COORDINATION | Facility: CLINIC | Age: 50
End: 2020-09-09

## 2020-09-09 NOTE — PROGRESS NOTES
Clinic Care Coordination Contact  [facility]/Voicemail       Clinical Data: Care Coordinator Outreach  Outreach attempted x 1.  Left message on patient's voicemail with call back information and requested return call.    Plan:  Care Coordinator will try to reach patient again in 10 business days.    HERMINIA Dodge  Clinic Care Coordination  [facility] Clinics : [facility], [place], and [place]  Phone: [phone]    _____________________  CHW delegation from [facility] [place] RN, on 08/04/20:  Can you please check in with Patient in three weeks to see if he was able to complete the CAF application to apply for GA. Thanks!     09/09:  LMTCB #1  ______________________  Next Outreach:  09/22/20  Planned Outreach Frequency: Monthly  Preferred Phone Number: [phone]    Enrollment Date:  08/04/20  Last Care Plan Assessment:  08/04/20

## 2020-09-16 ENCOUNTER — PATIENT OUTREACH (OUTPATIENT)
Dept: CARE COORDINATION | Facility: CLINIC | Age: 50
End: 2020-09-16

## 2020-09-16 NOTE — PROGRESS NOTES
Clinic Care Coordination Contact    Situation: Patient chart reviewed by care coordinator.    Background: Patient enrolled with Care Coordination program to support with applying for cash assistance from the [facility]. Patient is not working at this time.     Assessment: Per Chart review, Patient is due to have follow up with Orthopedic in six weeks from 7/29/2020 but per chart review, it s unclear if Patient has seen Orthopedic for follow up. CC will ask Patient about this when CC talks to Patient.     Plan/Recommendations: Note CHW has attempt to made contact with Patient therefore CC will not duplicate. CC will extend outreach to three weeks.     Charles Henley RN Care Coordinator  [facility]: [place], [place], [place], [place]  Phone: [phone]  E-Mail: [email or web address]

## 2020-09-29 NOTE — PROGRESS NOTES
Clinic Care Coordination Contact  [facility]/Voicemail       Clinical Data: Care Coordinator Outreach  Outreach attempted x 2.  Left message on patient's voicemail with call back information and requested return call.    Plan:  Care Coordinator will try to reach patient again in 1 month.    HERMINIA Dodge  Clinic Care Coordination  [facility] Clinics : [place], [place], [place], and [place]  Phone: [phone]    _____________________  CHW delegation from [facility] [place] RN, on 08/04/20:  Can you please check in with Patient in three weeks to see if he was able to complete the CAF application to apply for GA. Thanks!     09/09:  LMTCB #1    09/29:  LMTCB #2  ______________________  Next Outreach:  10/20/20  Planned Outreach Frequency: Monthly  Preferred Phone Number: [phone]    Enrollment Date:  08/04/20  Last Care Plan Assessment:  08/04/20

## 2020-10-01 ENCOUNTER — PATIENT OUTREACH (OUTPATIENT)
Dept: CARE COORDINATION | Facility: CLINIC | Age: 50
End: 2020-10-01

## 2020-10-01 NOTE — LETTER
[place] CARE COORDINATION  [address]  [place] [address]    October 2, 2020    Alex Cardona  [address]   [place] [address]      Dear Alex,    I have been unsuccessful in reaching you since our last contact. At this time the Care Coordination team will make no further attempts to reach you, however this does not change your ability to continue receiving care from your providers at your primary care clinic. If you need additional support from a care coordinator in the future please contact me at [phone].    All of us at [facility] are invested in your health and are here to assist you in meeting your goals.     Sincerely,    Charles Henley RN Care Coordinator  [facility]: [place], [place], [place], [place]  Phone: [phone]  E-Mail: [email or web address]

## 2020-10-01 NOTE — PROGRESS NOTES
Clinic Care Coordination Contact  [facility]/Voicemail       Clinical Data: Care Coordinator Outreach  Outreach attempted x 1.  Left message on patient's voicemail with call back information and requested return call.  Plan:  Care Coordinator will try to reach patient again in 1-2 business days, if unable to reach, CC will disenroll Patient from Care Coordination.     Charles Henley RN Care Coordinator  [facility] Clinics: [place], [place], [place], [place]  Phone: [phone]  E-Mail: [email or web address]

## 2020-10-02 NOTE — PROGRESS NOTES
Clinic Care Coordination Contact  [facility]/Voicemail       Clinical Data: Care Coordinator Outreach  Outreach attempted x 2.  Left message on patient's voicemail with call back information and requested return call.  -CHW has also attempt multiple times in reaching out to Patient.   Plan: Care Coordinator will send disenrollment letter with care coordinator contact information via [employer] ( [employer] ). Care Coordinator will do no further outreaches at this time.    Charles Henley RN Care Coordinator  [facility] Clinics: [place], [place]  Phone: [phone]  E-Mail: [email or web address]

## 2020-11-12 ASSESSMENT — ENCOUNTER SYMPTOMS
NAUSEA: 0
CONSTIPATION: 0
COUGH: 0
JOINT SWELLING: 1
SORE THROAT: 0
DYSURIA: 0
DIARRHEA: 1
NERVOUS/ANXIOUS: 1
EYE PAIN: 0
HEMATURIA: 0
PALPITATIONS: 0
SHORTNESS OF BREATH: 0
FREQUENCY: 0
PARESTHESIAS: 0
FEVER: 0
HEADACHES: 0
ARTHRALGIAS: 1
MYALGIAS: 0
HEMATOCHEZIA: 0
CHILLS: 0
HEARTBURN: 0
WEAKNESS: 0
ABDOMINAL PAIN: 0
DIZZINESS: 0

## 2020-11-13 ENCOUNTER — OFFICE VISIT (OUTPATIENT)
Dept: INTERNAL MEDICINE | Facility: CLINIC | Age: 50
End: 2020-11-13
Payer: COMMERCIAL

## 2020-11-13 VITALS
RESPIRATION RATE: 17 BRPM | DIASTOLIC BLOOD PRESSURE: 62 MMHG | HEIGHT: 70 IN | OXYGEN SATURATION: 96 % | BODY MASS INDEX: 21.86 KG/M2 | WEIGHT: 152.7 LBS | TEMPERATURE: 98.5 F | SYSTOLIC BLOOD PRESSURE: 116 MMHG | HEART RATE: 95 BPM

## 2020-11-13 DIAGNOSIS — M54.16 LEFT LUMBAR RADICULOPATHY: Primary | ICD-10-CM

## 2020-11-13 DIAGNOSIS — Z00.00 ROUTINE HISTORY AND PHYSICAL EXAMINATION OF ADULT: ICD-10-CM

## 2020-11-13 DIAGNOSIS — D64.9 LOW HEMOGLOBIN: ICD-10-CM

## 2020-11-13 LAB
ERYTHROCYTE [DISTWIDTH] IN BLOOD BY AUTOMATED COUNT: 15.8 % (ref 10–15)
HCT VFR BLD AUTO: 32.4 % (ref 40–53)
HGB BLD-MCNC: 9.6 G/DL (ref 13.3–17.7)
MCH RBC QN AUTO: 21.9 PG (ref 26.5–33)
MCHC RBC AUTO-ENTMCNC: 29.6 G/DL (ref 31.5–36.5)
MCV RBC AUTO: 74 FL (ref 78–100)
PLATELET # BLD AUTO: 320 10E9/L (ref 150–450)
RBC # BLD AUTO: 4.39 10E12/L (ref 4.4–5.9)
WBC # BLD AUTO: 4.2 10E9/L (ref 4–11)

## 2020-11-13 PROCEDURE — 80061 LIPID PANEL: CPT | Performed by: INTERNAL MEDICINE

## 2020-11-13 PROCEDURE — 99396 PREV VISIT EST AGE 40-64: CPT | Performed by: INTERNAL MEDICINE

## 2020-11-13 PROCEDURE — 36415 COLL VENOUS BLD VENIPUNCTURE: CPT | Performed by: INTERNAL MEDICINE

## 2020-11-13 PROCEDURE — G0103 PSA SCREENING: HCPCS | Performed by: INTERNAL MEDICINE

## 2020-11-13 PROCEDURE — 85027 COMPLETE CBC AUTOMATED: CPT | Performed by: INTERNAL MEDICINE

## 2020-11-13 RX ORDER — GABAPENTIN 300 MG/1
300 CAPSULE ORAL 2 TIMES DAILY
Qty: 180 CAPSULE | Refills: 1 | Status: SHIPPED | OUTPATIENT
Start: 2020-11-13 | End: 2021-05-13

## 2020-11-13 RX ORDER — GABAPENTIN 300 MG/1
CAPSULE ORAL
COMMUNITY
Start: 2020-10-20 | End: 2020-11-13

## 2020-11-13 ASSESSMENT — ENCOUNTER SYMPTOMS
HEADACHES: 0
HEMATURIA: 0
SORE THROAT: 0
ABDOMINAL PAIN: 0
PARESTHESIAS: 0
HEMATOCHEZIA: 0
DIZZINESS: 0
DYSURIA: 0
HEARTBURN: 0
DIARRHEA: 1
SHORTNESS OF BREATH: 0
WEAKNESS: 0
MYALGIAS: 0
FEVER: 0
ARTHRALGIAS: 1
COUGH: 0
NERVOUS/ANXIOUS: 1
NAUSEA: 0
CONSTIPATION: 0
EYE PAIN: 0
PALPITATIONS: 0
CHILLS: 0
FREQUENCY: 0

## 2020-11-13 ASSESSMENT — MIFFLIN-ST. JEOR: SCORE: 1553.89

## 2020-11-13 NOTE — PROGRESS NOTES
SUBJECTIVE:   CC: Alex Cardona is an 50 year old male who presents for preventative health visit.       Patient has been advised of split billing requirements and indicates understanding: Yes  Healthy Habits:     Getting at least 3 servings of Calcium per day:  Yes    Bi-annual eye exam:  NO    Dental care twice a year:  NO    Sleep apnea or symptoms of sleep apnea:  None    Diet:  Other    Frequency of exercise:  6-7 days/week    Duration of exercise:  Greater than 60 minutes    Taking medications regularly:  Yes    Medication side effects:  None    PHQ-2 Total Score: 0    Additional concerns today:  No    Pt recently had back surgery and is on Gabapentin 300 mg bid and would like refills as he is not seeing his back surgeon .  Pt also had ileostomy take down few months ago and doing fine per pt .   Gets on and off ankle edema at the end of the day.no calf pain       Today's PHQ-2 Score:   PHQ-2 ( 1999 [employer]) 11/12/2020   Q1: Little interest or pleasure in doing things 0   Q2: Feeling down, depressed or hopeless 0   PHQ-2 Score 0   Q1: Little interest or pleasure in doing things Not at all   Q2: Feeling down, depressed or hopeless Not at all   PHQ-2 Score 0       Abuse: Current or Past(Physical, Sexual or Emotional)- No  Do you feel safe in your environment? Yes      Past Medical History:   Diagnosis Date     Ulcerative colitis (H)        Past Surgical History:   Procedure Laterality Date     COLONOSCOPY N/A 5/26/2019    Procedure: incomplete COLONOSCOPY with left colon biopsies by cold biopsy forceps;  Surgeon: Herbert Dior DO;  Location: [facility]     DISCECTOMY LUMBAR POSTERIOR MICROSCOPIC ONE LEVEL Left 7/27/2020    Procedure: Left L4-5 hemilaminectomy, medial facetectomy, foraminotomy with microdiscectomy;  Surgeon: Martin Jacobson MD;  Location: [facility] OR     LAPAROSCOPIC ASSISTED COLECTOMY N/A 6/12/2019    Procedure: Laparoscopic Assisted Total Abdominal Colectomy with End Ileostomy;  Surgeon:  Brigitte Ruano MD;  Location: [facility] OR     LAPAROSCOPIC ASSISTED COMPLETION PROCTOCOLECTOMY N/A 2020    Procedure: Completion proctectomy and ileal pouch anal anastomosis with diverting loop ileostomy.;  Surgeon: Brigitte Ruano MD;  Location: [facility] OR     SIGMOIDOSCOPY FLEXIBLE  2020    Dr. Ruano [facility]     SIGMOIDOSCOPY FLEXIBLE N/A 2020    Procedure: SIGMOIDOSCOPY, FLEXIBLE;  Surgeon: Brigitte Ruano MD;  Location: [facility] OR     TAKEDOWN ILEOSTOMY N/A 2020    Procedure: Pouchoscopy and ileostomy reversal;  Surgeon: Brigitte Ruano MD;  Location: [facility] OR       Current Outpatient Medications   Medication Sig Dispense Refill     acetaminophen (TYLENOL) 500 MG tablet Take 1,000 mg by mouth every 8 hours as needed for mild pain       gabapentin (NEURONTIN) 300 MG capsule Take 1 capsule (300 mg) by mouth 2 times daily 180 capsule 1     loperamide (IMODIUM) 2 MG capsule Take 2 mg by mouth every evening       loperamide (IMODIUM) 2 MG capsule Take 4 mg by mouth At Bedtime       melatonin 5 MG tablet Take 5 mg by mouth At Bedtime        menthol-zinc oxide (CALMOSEPTINE) 0.44-20.6 % OINT ointment Apply topically 4 times daily as needed for skin protection or irritation       multivitamin w/minerals (MULTI-VITAMIN) tablet Take 1 tablet by mouth daily       simethicone (MYLICON) 125 MG chewable tablet Take 250 mg by mouth At Bedtime BID         Family History   Problem Relation Age of Onset     Heart Disease Mother      Chronic Obstructive Pulmonary Disease Mother      Cancer Father      No Known Problems Sister      No Known Problems Son      Heart Failure Other      Colon Cancer No family hx of        Social History     Tobacco Use     Smoking status: Former Smoker     Packs/day: 0.00     Types: Cigarettes     Quit date: 2009     Years since quittin.7     Smokeless tobacco: Never Used   Substance Use Topics     Alcohol use: Not Currently     Frequency: Never     Binge frequency: Never     " Comment: none  since 05/19      If you drink alcohol do you typically have >3 drinks per day or >7 drinks per week? No    Alcohol Use 11/12/2020   Prescreen: >3 drinks/day or >7 drinks/week? Not Applicable   Prescreen: >3 drinks/day or >7 drinks/week? -   No flowsheet data found.    Last PSA: No results found for: PSA    Reviewed orders with patient. Reviewed health maintenance and updated orders accordingly - Yes  Lab work is in process    Reviewed and updated as needed this visit by clinical staff                 Reviewed and updated as needed this visit by Provider                    Review of Systems   Constitutional: Negative for chills and fever.   HENT: Negative for congestion, ear pain, hearing loss and sore throat.    Eyes: Negative for pain and visual disturbance.   Respiratory: Negative for cough and shortness of breath.    Cardiovascular: Negative for chest pain and palpitations.   Gastrointestinal: Positive for diarrhea. Negative for abdominal pain, constipation, heartburn, hematochezia and nausea.   Genitourinary: Negative for discharge, dysuria, frequency, genital sores, hematuria, impotence and urgency.   Musculoskeletal: Positive for arthralgias. Negative for myalgias.   Skin: Negative for rash.   Neurological: Negative for dizziness, weakness, headaches and paresthesias.   Psychiatric/Behavioral: Negative for mood changes. The patient is nervous/anxious.        OBJECTIVE:   /62   Pulse 95   Temp 98.5  F (36.9  C) (Oral)   Resp 17   Ht 1.778 m (5' 10\")   Wt 69.3 kg (152 lb 11.2 oz)   SpO2 96%   BMI 21.91 kg/m      Physical Exam  GENERAL: healthy, alert and no distress  EYES: Eyes grossly normal to inspection, PERRL and conjunctivae and sclerae normal  NECK: no adenopathy, no asymmetry, masses, or scars and thyroid normal to palpation  RESP: lungs clear to auscultation - no rales, rhonchi or wheezes  CV: regular rate and rhythm   ABDOMEN: soft, nontender,  and bowel sounds normal  MS:  " "No edema, no calf tenderness  NEURO: Normal strength and tone, mentation intact and speech normal  PSYCH: mentation appears normal, affect normal/bright    ASSESSMENT/PLAN:       (Z00.00) Routine history and physical examination of adult  Plan: had CMP in 06/20, check CBC with platelets, Lipid panel reflex to         direct LDL Non-fasting, Prostate spec antigen         screen            (M54.16) Left lumbar radiculopathy   Plan:s/p Left L4-5 hemilaminectomy, medial facetectomy, foraminotomy with microdiscectomy, refilled gabapentin (NEURONTIN) 300 MG capsule bid .explained clearly about the medication,insructions and side effects.           Patient has been advised of split billing requirements and indicates understanding: Yes     COUNSELING:   Reviewed preventive health counseling, as reflected in patient instructions       Regular exercise       Healthy diet/nutrition    Estimated body mass index is 21.91 kg/m  as calculated from the following:    Height as of this encounter: 1.778 m (5' 10\").    Weight as of this encounter: 69.3 kg (152 lb 11.2 oz).         He reports that he quit smoking about 11 years ago. His smoking use included cigarettes. He smoked 0.00 packs per day. He has never used smokeless tobacco.      Counseling Resources:  ATP IV Guidelines  Pooled Cohorts Equation Calculator  FRAX Risk Assessment  ICSI Preventive Guidelines  Dietary Guidelines for Americans, 2010  USDA's MyPlate  ASA Prophylaxis  Lung CA Screening    Kati Garcia MD  [facility]  "

## 2020-11-15 PROBLEM — E77.8 HYPOPROTEINEMIA (H): Status: RESOLVED | Noted: 2019-06-17 | Resolved: 2020-11-15

## 2020-11-15 LAB
CHOLEST SERPL-MCNC: 165 MG/DL
HDLC SERPL-MCNC: 59 MG/DL
LDLC SERPL CALC-MCNC: 81 MG/DL
NONHDLC SERPL-MCNC: 106 MG/DL
PSA SERPL-ACNC: 0.33 UG/L (ref 0–4)
TRIGL SERPL-MCNC: 125 MG/DL

## 2020-11-23 PROBLEM — M51.369 LUMBAR DEGENERATIVE DISC DISEASE: Status: RESOLVED | Noted: 2020-07-20 | Resolved: 2020-11-23

## 2020-11-23 PROBLEM — R29.898 WEAKNESS OF LEFT FOOT: Status: RESOLVED | Noted: 2020-07-20 | Resolved: 2020-11-23

## 2020-11-23 PROBLEM — M47.816 FACET ARTHROPATHY, LUMBAR: Status: RESOLVED | Noted: 2020-07-20 | Resolved: 2020-11-23

## 2020-11-23 PROBLEM — M54.16 LEFT LUMBAR RADICULOPATHY: Status: RESOLVED | Noted: 2020-07-20 | Resolved: 2020-11-23

## 2020-12-18 DIAGNOSIS — D64.9 LOW HEMOGLOBIN: ICD-10-CM

## 2020-12-18 LAB
ERYTHROCYTE [DISTWIDTH] IN BLOOD BY AUTOMATED COUNT: 16.4 % (ref 10–15)
HCT VFR BLD AUTO: 32.3 % (ref 40–53)
HGB BLD-MCNC: 9.8 G/DL (ref 13.3–17.7)
MCH RBC QN AUTO: 21.9 PG (ref 26.5–33)
MCHC RBC AUTO-ENTMCNC: 30.3 G/DL (ref 31.5–36.5)
MCV RBC AUTO: 72 FL (ref 78–100)
PLATELET # BLD AUTO: 318 10E9/L (ref 150–450)
RBC # BLD AUTO: 4.47 10E12/L (ref 4.4–5.9)
WBC # BLD AUTO: 3.9 10E9/L (ref 4–11)

## 2020-12-18 PROCEDURE — 85027 COMPLETE CBC AUTOMATED: CPT | Performed by: INTERNAL MEDICINE

## 2020-12-18 PROCEDURE — 83540 ASSAY OF IRON: CPT | Performed by: INTERNAL MEDICINE

## 2020-12-18 PROCEDURE — 83550 IRON BINDING TEST: CPT | Performed by: INTERNAL MEDICINE

## 2020-12-18 PROCEDURE — 36415 COLL VENOUS BLD VENIPUNCTURE: CPT | Performed by: INTERNAL MEDICINE

## 2020-12-19 LAB
IRON SATN MFR SERPL: 3 % (ref 15–46)
IRON SERPL-MCNC: 15 UG/DL (ref 35–180)
TIBC SERPL-MCNC: 439 UG/DL (ref 240–430)

## 2021-03-13 ENCOUNTER — DOCUMENTATION ONLY (OUTPATIENT)
Dept: INTERNAL MEDICINE | Facility: CLINIC | Age: 51
End: 2021-03-13

## 2021-03-13 DIAGNOSIS — D64.9 LOW HEMOGLOBIN: Primary | ICD-10-CM

## 2021-03-13 DIAGNOSIS — D50.9 IRON DEFICIENCY ANEMIA, UNSPECIFIED IRON DEFICIENCY ANEMIA TYPE: ICD-10-CM

## 2021-03-18 DIAGNOSIS — D50.9 IRON DEFICIENCY ANEMIA, UNSPECIFIED IRON DEFICIENCY ANEMIA TYPE: ICD-10-CM

## 2021-03-18 LAB
ERYTHROCYTE [DISTWIDTH] IN BLOOD BY AUTOMATED COUNT: 18.7 % (ref 10–15)
HCT VFR BLD AUTO: 40.7 % (ref 40–53)
HGB BLD-MCNC: 13 G/DL (ref 13.3–17.7)
MCH RBC QN AUTO: 25.3 PG (ref 26.5–33)
MCHC RBC AUTO-ENTMCNC: 31.9 G/DL (ref 31.5–36.5)
MCV RBC AUTO: 79 FL (ref 78–100)
PLATELET # BLD AUTO: 256 10E9/L (ref 150–450)
RBC # BLD AUTO: 5.14 10E12/L (ref 4.4–5.9)
WBC # BLD AUTO: 4.2 10E9/L (ref 4–11)

## 2021-03-18 PROCEDURE — 85027 COMPLETE CBC AUTOMATED: CPT | Performed by: INTERNAL MEDICINE

## 2021-03-18 PROCEDURE — 36415 COLL VENOUS BLD VENIPUNCTURE: CPT | Performed by: INTERNAL MEDICINE

## 2021-03-18 PROCEDURE — 83550 IRON BINDING TEST: CPT | Performed by: INTERNAL MEDICINE

## 2021-03-18 PROCEDURE — 83540 ASSAY OF IRON: CPT | Performed by: INTERNAL MEDICINE

## 2021-03-19 LAB
IRON SATN MFR SERPL: 14 % (ref 15–46)
IRON SERPL-MCNC: 61 UG/DL (ref 35–180)
TIBC SERPL-MCNC: 441 UG/DL (ref 240–430)

## 2021-06-14 ENCOUNTER — TELEPHONE (OUTPATIENT)
Dept: INTERNAL MEDICINE | Facility: CLINIC | Age: 51
End: 2021-06-14

## 2021-06-14 DIAGNOSIS — D64.9 LOW HEMOGLOBIN: Primary | ICD-10-CM

## 2021-06-14 NOTE — TELEPHONE ENCOUNTER
Reason for Call:  Other call back    Detailed comments: Patient is coming in for labs on 06/18/21 to have his cbc done. Patient is wondering if he should have the iron tests repeated as they were abnormal? Please advise. Thank you     Phone Number Patient can be reached at: Home number on file [phone] (home)    Best Time: Any    Can we leave a detailed message on this number? YES    Call taken on 6/14/2021 at 3:25 PM by Leslee Alberts

## 2021-06-18 DIAGNOSIS — D64.9 LOW HEMOGLOBIN: ICD-10-CM

## 2021-06-18 LAB
ERYTHROCYTE [DISTWIDTH] IN BLOOD BY AUTOMATED COUNT: 14.4 % (ref 10–15)
HCT VFR BLD AUTO: 42.2 % (ref 40–53)
HGB BLD-MCNC: 14.4 G/DL (ref 13.3–17.7)
MCH RBC QN AUTO: 29.3 PG (ref 26.5–33)
MCHC RBC AUTO-ENTMCNC: 34.1 G/DL (ref 31.5–36.5)
MCV RBC AUTO: 86 FL (ref 78–100)
PLATELET # BLD AUTO: 222 10E9/L (ref 150–450)
RBC # BLD AUTO: 4.91 10E12/L (ref 4.4–5.9)
WBC # BLD AUTO: 4.2 10E9/L (ref 4–11)

## 2021-06-18 PROCEDURE — 83540 ASSAY OF IRON: CPT | Performed by: INTERNAL MEDICINE

## 2021-06-18 PROCEDURE — 83550 IRON BINDING TEST: CPT | Performed by: INTERNAL MEDICINE

## 2021-06-18 PROCEDURE — 85027 COMPLETE CBC AUTOMATED: CPT | Performed by: INTERNAL MEDICINE

## 2021-06-18 PROCEDURE — 36415 COLL VENOUS BLD VENIPUNCTURE: CPT | Performed by: INTERNAL MEDICINE

## 2021-06-21 LAB
IRON SATN MFR SERPL: 32 % (ref 15–46)
IRON SERPL-MCNC: 125 UG/DL (ref 35–180)
TIBC SERPL-MCNC: 396 UG/DL (ref 240–430)

## 2021-09-20 ENCOUNTER — DOCUMENTATION ONLY (OUTPATIENT)
Dept: LAB | Facility: CLINIC | Age: 51
End: 2021-09-20

## 2021-09-20 DIAGNOSIS — Z93.2 ILEOSTOMY STATUS (H): Primary | ICD-10-CM

## 2021-09-21 ENCOUNTER — LAB (OUTPATIENT)
Dept: LAB | Facility: CLINIC | Age: 51
End: 2021-09-21
Payer: COMMERCIAL

## 2021-09-21 DIAGNOSIS — Z93.2 ILEOSTOMY STATUS (H): ICD-10-CM

## 2021-09-21 LAB
BASOPHILS # BLD AUTO: 0 10E3/UL (ref 0–0.2)
BASOPHILS NFR BLD AUTO: 1 %
EOSINOPHIL # BLD AUTO: 0.1 10E3/UL (ref 0–0.7)
EOSINOPHIL NFR BLD AUTO: 3 %
ERYTHROCYTE [DISTWIDTH] IN BLOOD BY AUTOMATED COUNT: 12.6 % (ref 10–15)
HCT VFR BLD AUTO: 41.9 % (ref 40–53)
HGB BLD-MCNC: 14.4 G/DL (ref 13.3–17.7)
LYMPHOCYTES # BLD AUTO: 1.4 10E3/UL (ref 0.8–5.3)
LYMPHOCYTES NFR BLD AUTO: 32 %
MCH RBC QN AUTO: 30.3 PG (ref 26.5–33)
MCHC RBC AUTO-ENTMCNC: 34.4 G/DL (ref 31.5–36.5)
MCV RBC AUTO: 88 FL (ref 78–100)
MONOCYTES # BLD AUTO: 0.3 10E3/UL (ref 0–1.3)
MONOCYTES NFR BLD AUTO: 8 %
NEUTROPHILS # BLD AUTO: 2.5 10E3/UL (ref 1.6–8.3)
NEUTROPHILS NFR BLD AUTO: 56 %
PLATELET # BLD AUTO: 239 10E3/UL (ref 150–450)
RBC # BLD AUTO: 4.76 10E6/UL (ref 4.4–5.9)
WBC # BLD AUTO: 4.4 10E3/UL (ref 4–11)

## 2021-09-21 PROCEDURE — 85025 COMPLETE CBC W/AUTO DIFF WBC: CPT

## 2021-09-21 PROCEDURE — 36415 COLL VENOUS BLD VENIPUNCTURE: CPT

## 2021-09-21 PROCEDURE — 83550 IRON BINDING TEST: CPT

## 2021-09-22 LAB
IRON SATN MFR SERPL: 37 % (ref 15–46)
IRON SERPL-MCNC: 136 UG/DL (ref 35–180)
TIBC SERPL-MCNC: 364 UG/DL (ref 240–430)

## 2021-11-13 ASSESSMENT — ENCOUNTER SYMPTOMS
CHILLS: 0
COUGH: 0
ARTHRALGIAS: 0
HEMATOCHEZIA: 0
SORE THROAT: 0
CONSTIPATION: 0
JOINT SWELLING: 0
WEAKNESS: 0
HEARTBURN: 0
FREQUENCY: 0
HEADACHES: 0
PARESTHESIAS: 0
DIARRHEA: 0
FEVER: 0
NERVOUS/ANXIOUS: 0
PALPITATIONS: 0
ABDOMINAL PAIN: 0
NAUSEA: 0
MYALGIAS: 0
DIZZINESS: 1
EYE PAIN: 0
SHORTNESS OF BREATH: 0
DYSURIA: 0
HEMATURIA: 0

## 2021-11-15 ENCOUNTER — OFFICE VISIT (OUTPATIENT)
Dept: INTERNAL MEDICINE | Facility: CLINIC | Age: 51
End: 2021-11-15
Payer: COMMERCIAL

## 2021-11-15 VITALS
HEART RATE: 77 BPM | WEIGHT: 150 LBS | RESPIRATION RATE: 16 BRPM | TEMPERATURE: 98.1 F | OXYGEN SATURATION: 98 % | SYSTOLIC BLOOD PRESSURE: 140 MMHG | BODY MASS INDEX: 22.22 KG/M2 | HEIGHT: 69 IN | DIASTOLIC BLOOD PRESSURE: 74 MMHG

## 2021-11-15 DIAGNOSIS — K51.018 ULCERATIVE PANCOLITIS WITH OTHER COMPLICATION (H): ICD-10-CM

## 2021-11-15 DIAGNOSIS — Z00.00 ROUTINE HISTORY AND PHYSICAL EXAMINATION OF ADULT: Primary | ICD-10-CM

## 2021-11-15 DIAGNOSIS — M54.16 LEFT LUMBAR RADICULOPATHY: ICD-10-CM

## 2021-11-15 PROBLEM — Z93.2 ILEOSTOMY STATUS (H): Status: RESOLVED | Noted: 2019-06-17 | Resolved: 2021-11-15

## 2021-11-15 LAB
ERYTHROCYTE [DISTWIDTH] IN BLOOD BY AUTOMATED COUNT: 13 % (ref 10–15)
HCT VFR BLD AUTO: 44.8 % (ref 40–53)
HGB BLD-MCNC: 15.5 G/DL (ref 13.3–17.7)
MCH RBC QN AUTO: 30.6 PG (ref 26.5–33)
MCHC RBC AUTO-ENTMCNC: 34.6 G/DL (ref 31.5–36.5)
MCV RBC AUTO: 88 FL (ref 78–100)
PLATELET # BLD AUTO: 222 10E3/UL (ref 150–450)
RBC # BLD AUTO: 5.07 10E6/UL (ref 4.4–5.9)
WBC # BLD AUTO: 4.6 10E3/UL (ref 4–11)

## 2021-11-15 PROCEDURE — 85027 COMPLETE CBC AUTOMATED: CPT | Performed by: INTERNAL MEDICINE

## 2021-11-15 PROCEDURE — 36415 COLL VENOUS BLD VENIPUNCTURE: CPT | Performed by: INTERNAL MEDICINE

## 2021-11-15 PROCEDURE — 80053 COMPREHEN METABOLIC PANEL: CPT | Performed by: INTERNAL MEDICINE

## 2021-11-15 PROCEDURE — 80061 LIPID PANEL: CPT | Performed by: INTERNAL MEDICINE

## 2021-11-15 PROCEDURE — 99396 PREV VISIT EST AGE 40-64: CPT | Performed by: INTERNAL MEDICINE

## 2021-11-15 RX ORDER — GABAPENTIN 300 MG/1
CAPSULE ORAL
Qty: 180 CAPSULE | Refills: 1 | Status: SHIPPED | OUTPATIENT
Start: 2021-11-15 | End: 2022-02-04

## 2021-11-15 ASSESSMENT — MIFFLIN-ST. JEOR: SCORE: 1516.81

## 2021-11-15 ASSESSMENT — ENCOUNTER SYMPTOMS
SHORTNESS OF BREATH: 0
HEADACHES: 0
PARESTHESIAS: 0
CONSTIPATION: 0
EYE PAIN: 0
FEVER: 0
WEAKNESS: 0
ABDOMINAL PAIN: 0
ARTHRALGIAS: 0
HEMATOCHEZIA: 0
SORE THROAT: 0
FREQUENCY: 0
NERVOUS/ANXIOUS: 0
COUGH: 0
DYSURIA: 0
CHILLS: 0
HEMATURIA: 0
HEARTBURN: 0
JOINT SWELLING: 0
PALPITATIONS: 0
MYALGIAS: 0
DIARRHEA: 1
NAUSEA: 0

## 2021-11-15 NOTE — PROGRESS NOTES
SUBJECTIVE:   CC: Alex Cardona is an 51 year old male who presents for preventative health visit.       Patient has been advised of split billing requirements and indicates understanding: Yes  Healthy Habits:     Getting at least 3 servings of Calcium per day:  Yes    Bi-annual eye exam:  NO    Dental care twice a year:  NO    Sleep apnea or symptoms of sleep apnea:  None    Diet:  Regular (no restrictions)    Frequency of exercise:  6-7 days/week    Duration of exercise:  Greater than 60 minutes    Taking medications regularly:  Yes    PHQ-2 Total Score: 0    Additional concerns today:  No    History of ulcerative colitis s/p laparoscopic colectomy and ileostomy take down, pt has diarrhea every day and takes imodium daily. Has not seen GI recently.      Today's PHQ-2 Score:   PHQ-2 ( 1999 [employer]) 11/13/2021   Q1: Little interest or pleasure in doing things 0   Q2: Feeling down, depressed or hopeless 0   PHQ-2 Score 0   Q1: Little interest or pleasure in doing things Not at all   Q2: Feeling down, depressed or hopeless Not at all   PHQ-2 Score 0       Abuse: Current or Past(Physical, Sexual or Emotional)- No  Do you feel safe in your environment? Yes    Have you ever done Advance Care Planning? (For example, a Health Directive, POLST, or a discussion with a medical provider or your loved ones about your wishes): No, advance care planning information given to patient to review.  Patient declined advance care planning discussion at this time.      Past Medical History:   Diagnosis Date     Ulcerative colitis (H)        Past Surgical History:   Procedure Laterality Date     COLONOSCOPY N/A 5/26/2019    Procedure: incomplete COLONOSCOPY with left colon biopsies by cold biopsy forceps;  Surgeon: Herbert Dior DO;  Location: [facility]     DISCECTOMY LUMBAR POSTERIOR MICROSCOPIC ONE LEVEL Left 7/27/2020    Procedure: Left L4-5 hemilaminectomy, medial facetectomy, foraminotomy with microdiscectomy;  Surgeon: Kavon  Martin Rudd MD;  Location: [facility]     LAPAROSCOPIC ASSISTED COLECTOMY N/A 6/12/2019    Procedure: Laparoscopic Assisted Total Abdominal Colectomy with End Ileostomy;  Surgeon: Brigitte Ruano MD;  Location: [facility]     LAPAROSCOPIC ASSISTED COMPLETION PROCTOCOLECTOMY N/A 2/11/2020    Procedure: Completion proctectomy and ileal pouch anal anastomosis with diverting loop ileostomy.;  Surgeon: Brigitte Ruano MD;  Location: [facility]     SIGMOIDOSCOPY FLEXIBLE  01/13/2020    Dr. Runao [facility]     SIGMOIDOSCOPY FLEXIBLE N/A 5/18/2020    Procedure: SIGMOIDOSCOPY, FLEXIBLE;  Surgeon: Brigitte Ruano MD;  Location: [facility]     TAKEDOWN ILEOSTOMY N/A 5/18/2020    Procedure: Pouchoscopy and ileostomy reversal;  Surgeon: Brigitte Ruano MD;  Location: [facility]       Current Outpatient Medications   Medication Sig Dispense Refill     acetaminophen (TYLENOL) 500 MG tablet Take 1,000 mg by mouth every 8 hours as needed for mild pain       gabapentin (NEURONTIN) 300 MG capsule TAKE 1 CAPSULE(300 MG) BY MOUTH TWICE DAILY 180 capsule 1     melatonin 5 MG tablet Take 5 mg by mouth At Bedtime        menthol-zinc oxide (CALMOSEPTINE) 0.44-20.6 % OINT ointment Apply topically 4 times daily as needed for skin protection or irritation       multivitamin w/minerals (MULTI-VITAMIN) tablet Take 1 tablet by mouth daily       simethicone (MYLICON) 125 MG chewable tablet Take 250 mg by mouth At Bedtime BID       loperamide (IMODIUM) 2 MG capsule Take 2 mg by mouth every evening       loperamide (IMODIUM) 2 MG capsule Take 4 mg by mouth At Bedtime         Family History   Problem Relation Age of Onset     Heart Disease Mother      Chronic Obstructive Pulmonary Disease Mother      Cancer Father      No Known Problems Sister      No Known Problems Son      Heart Failure Other      Colon Cancer No family hx of        Social History     Tobacco Use     Smoking status: Former Smoker     Packs/day: 1.00     Years: 25.00     Pack years:  "25.00     Types: Cigarettes     Quit date: 2009     Years since quittin.7     Smokeless tobacco: Never Used   Substance Use Topics     Alcohol use: Not Currently     Comment: none  since           Alcohol Use 2021   Prescreen: >3 drinks/day or >7 drinks/week? Not Applicable   Prescreen: >3 drinks/day or >7 drinks/week? -       Last PSA:   PSA   Date Value Ref Range Status   2020 0.33 0 - 4 ug/L Final     Comment:     Assay Method:  Chemiluminescence using Siemens Vista analyzer       Reviewed orders with patient. Reviewed health maintenance and updated orders accordingly - Yes     Reviewed and updated as needed this visit by clinical staff  Tobacco  Allergies    Med Hx  Surg Hx  Fam Hx  Soc Hx       Reviewed and updated as needed this visit by Provider                 Review of Systems   Constitutional: Negative for chills and fever.   HENT: Negative for congestion, ear pain, hearing loss and sore throat.    Eyes: Negative for pain and visual disturbance.   Respiratory: Negative for cough and shortness of breath.    Cardiovascular: Negative for chest pain, palpitations and peripheral edema.   Gastrointestinal: Positive for diarrhea. Negative for abdominal pain, constipation, heartburn, hematochezia and nausea.   Genitourinary: Negative for dysuria, frequency, genital sores, hematuria, impotence, penile discharge and urgency.   Musculoskeletal: Negative for arthralgias, joint swelling and myalgias.   Skin: Negative for rash.   Neurological: Negative for weakness, headaches and paresthesias.   Psychiatric/Behavioral: Negative for mood changes. The patient is not nervous/anxious.       OBJECTIVE:   BP (!) 140/74   Pulse 77   Temp 98.1  F (36.7  C) (Oral)   Resp 16   Ht 1.746 m (5' 8.75\")   Wt 68 kg (150 lb)   SpO2 98%   BMI 22.31 kg/m      Physical Exam  GENERAL: healthy, alert and no distress  EYES: Eyes grossly normal to inspection, PERRL and conjunctivae and sclerae " "normal  NECK: no adenopathy, no asymmetry, masses, or scars and thyroid normal to palpation  RESP: lungs clear to auscultation - no rales, rhonchi or wheezes  CV: regular rate and rhythm, normal S1 S2,    ABDOMEN: soft, nontender,  and bowel sounds normal  MS: no gross musculoskeletal defects noted, no edema  NEURO: Normal strength and tone, mentation intact and speech normal  PSYCH: mentation appears normal, affect normal/bright    ASSESSMENT/PLAN:       (Z00.00) Routine history and physical examination of adult  (primary encounter diagnosis)  Plan: Lipid panel reflex to direct LDL Fasting,         Comprehensive metabolic panel, CBC with         platelets            (M54.16) Left lumbar radiculopathy  Plan: gabapentin (NEURONTIN) 300 MG capsule refilled as directed.explained clearly about the medication,insructions and side effects.            (K51.018) Ulcerative pancolitis with other complication (H)  Plan: S/p laparoscopic colectomy and s/p takedown ileostomy 5/2020    Slightly elevated SBP- pt says he always has normal bp and will monitor BP, follow low sodium diet and exercise.    Patient has been advised of split billing requirements and indicates understanding: Yes  COUNSELING:   Reviewed preventive health counseling, as reflected in patient instructions       Regular exercise       Healthy diet/nutrition    Estimated body mass index is 22.31 kg/m  as calculated from the following:    Height as of this encounter: 1.746 m (5' 8.75\").    Weight as of this encounter: 68 kg (150 lb).        He reports that he quit smoking about 12 years ago. His smoking use included cigarettes. He has a 25.00 pack-year smoking history. He has never used smokeless tobacco.      Counseling Resources:  ATP IV Guidelines  Pooled Cohorts Equation Calculator  FRAX Risk Assessment  ICSI Preventive Guidelines  Dietary Guidelines for Americans, 2010  USDA's MyPlate  ASA Prophylaxis  Lung CA Screening    Kati Garcia MD  M " [facility]

## 2021-11-16 LAB
ALBUMIN SERPL-MCNC: 4 G/DL (ref 3.4–5)
ALP SERPL-CCNC: 59 U/L (ref 40–150)
ALT SERPL W P-5'-P-CCNC: 25 U/L (ref 0–70)
ANION GAP SERPL CALCULATED.3IONS-SCNC: 4 MMOL/L (ref 3–14)
AST SERPL W P-5'-P-CCNC: 18 U/L (ref 0–45)
BILIRUB SERPL-MCNC: 0.8 MG/DL (ref 0.2–1.3)
BUN SERPL-MCNC: 14 MG/DL (ref 7–30)
CALCIUM SERPL-MCNC: 9.5 MG/DL (ref 8.5–10.1)
CHLORIDE BLD-SCNC: 104 MMOL/L (ref 94–109)
CHOLEST SERPL-MCNC: 211 MG/DL
CO2 SERPL-SCNC: 29 MMOL/L (ref 20–32)
CREAT SERPL-MCNC: 1.01 MG/DL (ref 0.66–1.25)
FASTING STATUS PATIENT QL REPORTED: YES
GFR SERPL CREATININE-BSD FRML MDRD: 85 ML/MIN/1.73M2
GLUCOSE BLD-MCNC: 96 MG/DL (ref 70–99)
HDLC SERPL-MCNC: 85 MG/DL
LDLC SERPL CALC-MCNC: 106 MG/DL
NONHDLC SERPL-MCNC: 126 MG/DL
POTASSIUM BLD-SCNC: 4.8 MMOL/L (ref 3.4–5.3)
PROT SERPL-MCNC: 7.5 G/DL (ref 6.8–8.8)
SODIUM SERPL-SCNC: 137 MMOL/L (ref 133–144)
TRIGL SERPL-MCNC: 101 MG/DL

## 2022-02-04 DIAGNOSIS — M54.16 LEFT LUMBAR RADICULOPATHY: ICD-10-CM

## 2022-02-04 RX ORDER — GABAPENTIN 300 MG/1
CAPSULE ORAL
Qty: 60 CAPSULE | Refills: 11 | Status: SHIPPED | OUTPATIENT
Start: 2022-02-04 | End: 2023-12-21

## 2022-05-12 DIAGNOSIS — M54.16 LEFT LUMBAR RADICULOPATHY: ICD-10-CM

## 2022-05-13 NOTE — TELEPHONE ENCOUNTER
Pt has refills until 02/23. I refilled for 1 yr at his last ov. Pl inform pt and check with pharmacy

## 2022-05-16 RX ORDER — GABAPENTIN 300 MG/1
CAPSULE ORAL
Qty: 180 CAPSULE | OUTPATIENT
Start: 2022-05-16

## 2022-05-16 NOTE — TELEPHONE ENCOUNTER
Note sent to pharmacy     Cristina King RN, BSN  [facility]

## 2022-05-31 ENCOUNTER — TELEPHONE (OUTPATIENT)
Dept: INTERNAL MEDICINE | Facility: CLINIC | Age: 52
End: 2022-05-31

## 2022-05-31 NOTE — TELEPHONE ENCOUNTER
Patient has refills until 02/23, please see below and please inform patient to call his pharmacy    gabapentin (NEURONTIN) 300 MG capsule 60 capsule 11 2/4/2022  No   Sig: TAKE 1 CAPSULE(300 MG) BY MOUTH TWICE DAILY   Sent to pharmacy as: Gabapentin 300 MG Oral Capsule (NEURONTIN)   Class: E-Prescribe   Order: [id number]   E-Prescribing Status: Receipt confirmed by pharmacy (2/4/2022  3:08 PM CST)

## 2022-05-31 NOTE — TELEPHONE ENCOUNTER
Pt is looking to get Gabapentin refilled. Pt has 1 week remaining   [facility] in [place] [address]

## 2022-10-10 ENCOUNTER — HEALTH MAINTENANCE LETTER (OUTPATIENT)
Age: 52
End: 2022-10-10

## 2022-11-15 ASSESSMENT — ENCOUNTER SYMPTOMS
WEAKNESS: 0
HEARTBURN: 0
EYE PAIN: 0
ARTHRALGIAS: 0
NERVOUS/ANXIOUS: 0
ABDOMINAL PAIN: 0
PARESTHESIAS: 0
PALPITATIONS: 0
DIARRHEA: 0
HEMATURIA: 0
SORE THROAT: 0
FREQUENCY: 0
DYSURIA: 0
HEMATOCHEZIA: 0
HEADACHES: 0
COUGH: 0
NAUSEA: 0
CHILLS: 0
JOINT SWELLING: 0
FEVER: 0
CONSTIPATION: 0
DIZZINESS: 0
MYALGIAS: 0
SHORTNESS OF BREATH: 0

## 2022-11-18 ENCOUNTER — OFFICE VISIT (OUTPATIENT)
Dept: INTERNAL MEDICINE | Facility: CLINIC | Age: 52
End: 2022-11-18
Payer: COMMERCIAL

## 2022-11-18 VITALS
OXYGEN SATURATION: 94 % | SYSTOLIC BLOOD PRESSURE: 118 MMHG | BODY MASS INDEX: 22.26 KG/M2 | RESPIRATION RATE: 18 BRPM | DIASTOLIC BLOOD PRESSURE: 72 MMHG | HEIGHT: 69 IN | HEART RATE: 81 BPM | WEIGHT: 150.3 LBS | TEMPERATURE: 96.3 F

## 2022-11-18 DIAGNOSIS — Z11.59 NEED FOR HEPATITIS C SCREENING TEST: ICD-10-CM

## 2022-11-18 DIAGNOSIS — Z11.4 SCREENING FOR HIV (HUMAN IMMUNODEFICIENCY VIRUS): ICD-10-CM

## 2022-11-18 DIAGNOSIS — Z23 NEED FOR VACCINATION: ICD-10-CM

## 2022-11-18 DIAGNOSIS — K51.018 ULCERATIVE PANCOLITIS WITH OTHER COMPLICATION (H): ICD-10-CM

## 2022-11-18 DIAGNOSIS — M54.16 LEFT LUMBAR RADICULOPATHY: ICD-10-CM

## 2022-11-18 DIAGNOSIS — Z00.00 ROUTINE GENERAL MEDICAL EXAMINATION AT A HEALTH CARE FACILITY: Primary | ICD-10-CM

## 2022-11-18 PROBLEM — Z98.890 S/P LUMBAR MICRODISCECTOMY: Status: ACTIVE | Noted: 2020-07-27

## 2022-11-18 LAB
ALBUMIN SERPL BCG-MCNC: 4.7 G/DL (ref 3.5–5.2)
ALP SERPL-CCNC: 65 U/L (ref 40–129)
ALT SERPL W P-5'-P-CCNC: 15 U/L (ref 10–50)
ANION GAP SERPL CALCULATED.3IONS-SCNC: 12 MMOL/L (ref 7–15)
AST SERPL W P-5'-P-CCNC: 26 U/L (ref 10–50)
BILIRUB SERPL-MCNC: 0.7 MG/DL
BUN SERPL-MCNC: 13 MG/DL (ref 6–20)
CALCIUM SERPL-MCNC: 9.7 MG/DL (ref 8.6–10)
CHLORIDE SERPL-SCNC: 99 MMOL/L (ref 98–107)
CHOLEST SERPL-MCNC: 197 MG/DL
CREAT SERPL-MCNC: 0.95 MG/DL (ref 0.67–1.17)
DEPRECATED HCO3 PLAS-SCNC: 26 MMOL/L (ref 22–29)
ERYTHROCYTE [DISTWIDTH] IN BLOOD BY AUTOMATED COUNT: 12.3 % (ref 10–15)
GFR SERPL CREATININE-BSD FRML MDRD: >90 ML/MIN/1.73M2
GLUCOSE SERPL-MCNC: 95 MG/DL (ref 70–99)
HCT VFR BLD AUTO: 45.1 % (ref 40–53)
HDLC SERPL-MCNC: 70 MG/DL
HGB BLD-MCNC: 15.4 G/DL (ref 13.3–17.7)
LDLC SERPL CALC-MCNC: 106 MG/DL
MCH RBC QN AUTO: 29.9 PG (ref 26.5–33)
MCHC RBC AUTO-ENTMCNC: 34.1 G/DL (ref 31.5–36.5)
MCV RBC AUTO: 88 FL (ref 78–100)
NONHDLC SERPL-MCNC: 127 MG/DL
PLATELET # BLD AUTO: 234 10E3/UL (ref 150–450)
POTASSIUM SERPL-SCNC: 4.6 MMOL/L (ref 3.4–5.3)
PROT SERPL-MCNC: 7.6 G/DL (ref 6.4–8.3)
RBC # BLD AUTO: 5.15 10E6/UL (ref 4.4–5.9)
SODIUM SERPL-SCNC: 137 MMOL/L (ref 136–145)
TRIGL SERPL-MCNC: 107 MG/DL
WBC # BLD AUTO: 4.1 10E3/UL (ref 4–11)

## 2022-11-18 PROCEDURE — 99214 OFFICE O/P EST MOD 30 MIN: CPT | Mod: 25

## 2022-11-18 PROCEDURE — 80061 LIPID PANEL: CPT

## 2022-11-18 PROCEDURE — 90471 IMMUNIZATION ADMIN: CPT

## 2022-11-18 PROCEDURE — 85027 COMPLETE CBC AUTOMATED: CPT

## 2022-11-18 PROCEDURE — 90746 HEPB VACCINE 3 DOSE ADULT IM: CPT

## 2022-11-18 PROCEDURE — 87389 HIV-1 AG W/HIV-1&-2 AB AG IA: CPT

## 2022-11-18 PROCEDURE — 36415 COLL VENOUS BLD VENIPUNCTURE: CPT

## 2022-11-18 PROCEDURE — 86803 HEPATITIS C AB TEST: CPT

## 2022-11-18 PROCEDURE — 80053 COMPREHEN METABOLIC PANEL: CPT

## 2022-11-18 PROCEDURE — 99396 PREV VISIT EST AGE 40-64: CPT | Mod: 25

## 2022-11-18 RX ORDER — GABAPENTIN 400 MG/1
400 CAPSULE ORAL 2 TIMES DAILY
Qty: 60 CAPSULE | Refills: 4 | Status: SHIPPED | OUTPATIENT
Start: 2022-11-18 | End: 2023-04-13

## 2022-11-18 ASSESSMENT — ENCOUNTER SYMPTOMS
JOINT SWELLING: 0
CONSTIPATION: 0
FEVER: 0
HEADACHES: 0
PARESTHESIAS: 0
WEAKNESS: 0
NAUSEA: 0
SHORTNESS OF BREATH: 0
MYALGIAS: 0
ABDOMINAL PAIN: 0
SORE THROAT: 0
FREQUENCY: 0
DIARRHEA: 0
ARTHRALGIAS: 0
DIZZINESS: 0
COUGH: 0
CHILLS: 0
HEARTBURN: 0
HEMATOCHEZIA: 0
PALPITATIONS: 0
NERVOUS/ANXIOUS: 0
EYE PAIN: 0
HEMATURIA: 0
DYSURIA: 0

## 2022-11-18 ASSESSMENT — PAIN SCALES - GENERAL: PAINLEVEL: NO PAIN (0)

## 2022-11-18 NOTE — NURSING NOTE
Prior to injection, verified patient identity using patient's name and date of birth.  Due to injection administration, patient instructed to remain in clinic for 15 minutes afterwards, and to report any adverse reaction to me or the  staff immediately.      Drug Amount Wasted:  None.  Vial/Syringe: Syringe  Expiration Date:  06/02/24    Screening Questionnaire for Adult Immunization     Are you sick today?   No    Do you have allergies to medications, food or any vaccine?   No    Have you ever had a serious reaction after receiving a vaccination?   No    Do you have a long-term health problem with heart disease, lung disease,  asthma, kidney disease, diabetes, anemia, metabolic or blood disease?   No    Do you have cancer, leukemia, AIDS, or any immune system problem?   No    Do you take cortisone, prednisone, other steroids, or anticancer drugs, or  have you had any x-ray (radiation) treatments?   No    Have you had a seizure, brain, or other nervous system problem?   No    During the past year, have you received a transfusion of blood or blood       products, or been given a medicine called immune (gamma) globulin?   No    For women: Are you pregnant or is there a chance you could become         pregnant during the next month?   No    Have you received any vaccinations in the past 4 weeks?   No     Immunization questionnaire answers were all negative.      MNVFC doesn't apply on this patient     Screening performed by Khadijah Recio MA on 11/18/2022 at 2:53 PM.

## 2022-11-18 NOTE — PROGRESS NOTES
SUBJECTIVE:   CC: Alex is an 52 year old who presents for preventative health visit.     Patient has been advised of split billing requirements and indicates understanding: Yes  Healthy Habits:     Getting at least 3 servings of Calcium per day:  Yes    Bi-annual eye exam:  NO    Dental care twice a year:  NO    Sleep apnea or symptoms of sleep apnea:  None    Diet:  Regular (no restrictions)    Frequency of exercise:  6-7 days/week    Duration of exercise:  45-60 minutes    Taking medications regularly:  Yes    Medication side effects:  None    PHQ-2 Total Score: 0    Additional concerns today:  No    Today's PHQ-2 Score:   PHQ-2 (  [employer]) 11/15/2022   Q1: Little interest or pleasure in doing things 0   Q2: Feeling down, depressed or hopeless 0   PHQ-2 Score 0   PHQ-2 Total Score (12-17 Years)- Positive if 3 or more points; Administer PHQ-A if positive -   Q1: Little interest or pleasure in doing things Not at all   Q2: Feeling down, depressed or hopeless Not at all   PHQ-2 Score 0       Social History     Tobacco Use     Smoking status: Former     Packs/day: 1.00     Years: 25.00     Pack years: 25.00     Types: Cigarettes     Quit date: 2009     Years since quittin.7     Smokeless tobacco: Never   Substance Use Topics     Alcohol use: Not Currently     Comment: none  since           Alcohol Use 11/15/2022   Prescreen: >3 drinks/day or >7 drinks/week? Not Applicable   Prescreen: >3 drinks/day or >7 drinks/week? -     Last PSA:   PSA   Date Value Ref Range Status   2020 0.33 0 - 4 ug/L Final     Comment:     Assay Method:  Chemiluminescence using Siemens Vista analyzer       Reviewed orders with patient. Reviewed health maintenance and updated orders accordingly - Yes    Reviewed and updated as needed this visit by clinical staff    Allergies  Meds              Reviewed and updated as needed this visit by Provider                 Past Medical History:   Diagnosis Date     Ulcerative  colitis (H)       Past Surgical History:   Procedure Laterality Date     COLONOSCOPY N/A 5/26/2019    Procedure: incomplete COLONOSCOPY with left colon biopsies by cold biopsy forceps;  Surgeon: Herbert Dior DO;  Location:  GI     DISCECTOMY LUMBAR POSTERIOR MICROSCOPIC ONE LEVEL Left 7/27/2020    Procedure: Left L4-5 hemilaminectomy, medial facetectomy, foraminotomy with microdiscectomy;  Surgeon: Martin Jacobson MD;  Location: [facility]     LAPAROSCOPIC ASSISTED COLECTOMY N/A 6/12/2019    Procedure: Laparoscopic Assisted Total Abdominal Colectomy with End Ileostomy;  Surgeon: Brigitte Ruano MD;  Location: [facility]     LAPAROSCOPIC ASSISTED COMPLETION PROCTOCOLECTOMY N/A 2/11/2020    Procedure: Completion proctectomy and ileal pouch anal anastomosis with diverting loop ileostomy.;  Surgeon: Brigitte Ruano MD;  Location:  OR     SIGMOIDOSCOPY FLEXIBLE  01/13/2020    Dr. Ruano [facility]     SIGMOIDOSCOPY FLEXIBLE N/A 5/18/2020    Procedure: SIGMOIDOSCOPY, FLEXIBLE;  Surgeon: Brigitte Ruano MD;  Location:  OR     TAKEDOWN ILEOSTOMY N/A 5/18/2020    Procedure: Pouchoscopy and ileostomy reversal;  Surgeon: Brigitte Ruano MD;  Location: [facility]       Review of Systems   Constitutional: Negative for chills and fever.   HENT: Negative for congestion, ear pain, hearing loss and sore throat.    Eyes: Negative for pain and visual disturbance.   Respiratory: Negative for cough and shortness of breath.    Cardiovascular: Negative for chest pain, palpitations and peripheral edema.   Gastrointestinal: Negative for abdominal pain, constipation, diarrhea, heartburn, hematochezia and nausea.   Genitourinary: Negative for dysuria, frequency, genital sores, hematuria, impotence, penile discharge and urgency.   Musculoskeletal: Negative for arthralgias, joint swelling and myalgias.   Skin: Negative for rash.   Neurological: Negative for dizziness, weakness, headaches and paresthesias.    Psychiatric/Behavioral: Negative for mood changes. The patient is not nervous/anxious.      Patient has J-pouch- thinks that everything is normal with this    S/p laparoscopic colectomy and s/p takedown ileostomy 5/2020    OBJECTIVE:   There were no vitals taken for this visit.    Physical Exam  GENERAL: alert and no distress  EYES: Eyes grossly normal to inspection, PERRL and conjunctivae and sclerae normal  HENT: ear canals and TM's normal, nose and mouth without ulcers or lesions  NECK: no adenopathy, no asymmetry, masses, or scars and thyroid normal to palpation  RESP: lungs clear to auscultation - no rales, rhonchi or wheezes  CV: regular rate and rhythm, normal S1 S2, no S3 or S4, no murmur, click or rub, no peripheral edema and peripheral pulses strong  ABDOMEN: soft, nontender, no hepatosplenomegaly, no masses and bowel sounds normal  MS: no gross musculoskeletal defects noted, no edema  SKIN: no suspicious lesions or rashes  NEURO: Normal strength and tone, mentation intact and speech normal  PSYCH: mentation appears normal, affect normal/bright      ASSESSMENT/PLAN:   (Z00.00) Routine general medical examination at a health care facility  (primary encounter diagnosis)  Comment: routine physical  Plan: Comprehensive metabolic panel (BMP + Alb, Alk         Phos, ALT, AST, Total. Bili, TP), CBC with         platelets, Lipid panel reflex to direct LDL         Fasting            (Z11.4) Screening for HIV (human immunodeficiency virus)  Comment: Once in a lifetime testing   Plan: HIV Antigen Antibody Combo            (Z11.59) Need for hepatitis C screening test  Comment: Once in a lifetime testing   Plan: Hepatitis C Screen Reflex to HCV RNA Quant and         Genotype            (K51.018) Ulcerative pancolitis with other complication (H)  Comment: Patient has J pouch. He does have diarrhea and takes imodium BID. I think it would be worthwhile to establish with GI in case there is a problem with J pouch and to  see if there is anything additional that can be done to relieve diarrhea  Plan: Adult GI  Referral - Consult Only            (M54.16) Left lumbar radiculopathy  Comment: increase dose from 300mg BID. Bothers him mostly at night when trying to go to bed  Plan: gabapentin (NEURONTIN) 400 MG capsule           COUNSELING:   Reviewed preventive health counseling, as reflected in patient instructions      He reports that he quit smoking about 13 years ago. His smoking use included cigarettes. He has a 25.00 pack-year smoking history. He has never used smokeless tobacco.      Fortino Portillo NP  [facility]

## 2022-11-19 LAB
HCV AB SERPL QL IA: NONREACTIVE
HIV 1+2 AB+HIV1 P24 AG SERPL QL IA: NONREACTIVE

## 2022-12-02 ENCOUNTER — TRANSFERRED RECORDS (OUTPATIENT)
Dept: HEALTH INFORMATION MANAGEMENT | Facility: CLINIC | Age: 52
End: 2022-12-02

## 2023-01-30 ENCOUNTER — TRANSFERRED RECORDS (OUTPATIENT)
Dept: HEALTH INFORMATION MANAGEMENT | Facility: CLINIC | Age: 53
End: 2023-01-30
Payer: COMMERCIAL

## 2023-02-15 ENCOUNTER — TRANSFERRED RECORDS (OUTPATIENT)
Dept: HEALTH INFORMATION MANAGEMENT | Facility: CLINIC | Age: 53
End: 2023-02-15
Payer: COMMERCIAL

## 2023-03-20 ENCOUNTER — TRANSFERRED RECORDS (OUTPATIENT)
Dept: HEALTH INFORMATION MANAGEMENT | Facility: CLINIC | Age: 53
End: 2023-03-20
Payer: COMMERCIAL

## 2023-03-21 ENCOUNTER — TRANSFERRED RECORDS (OUTPATIENT)
Dept: HEALTH INFORMATION MANAGEMENT | Facility: CLINIC | Age: 53
End: 2023-03-21
Payer: COMMERCIAL

## 2023-04-13 DIAGNOSIS — M54.16 LEFT LUMBAR RADICULOPATHY: ICD-10-CM

## 2023-04-13 RX ORDER — GABAPENTIN 400 MG/1
400 CAPSULE ORAL 2 TIMES DAILY
Qty: 60 CAPSULE | Refills: 4 | Status: SHIPPED | OUTPATIENT
Start: 2023-04-13 | End: 2023-08-11

## 2023-12-20 ASSESSMENT — ENCOUNTER SYMPTOMS
HEMATURIA: 0
DYSURIA: 0
CONSTIPATION: 0
DIARRHEA: 0
WEAKNESS: 0
HEARTBURN: 0
JOINT SWELLING: 0
SORE THROAT: 0
FEVER: 0
NERVOUS/ANXIOUS: 0
PALPITATIONS: 0
PARESTHESIAS: 0
MYALGIAS: 0
HEMATOCHEZIA: 0
NAUSEA: 0
ARTHRALGIAS: 0
SHORTNESS OF BREATH: 0
DIZZINESS: 0
FREQUENCY: 0
HEADACHES: 0
COUGH: 0
ABDOMINAL PAIN: 0
EYE PAIN: 0
CHILLS: 0

## 2023-12-21 ENCOUNTER — OFFICE VISIT (OUTPATIENT)
Dept: INTERNAL MEDICINE | Facility: CLINIC | Age: 53
End: 2023-12-21
Payer: COMMERCIAL

## 2023-12-21 VITALS
TEMPERATURE: 95.9 F | WEIGHT: 149.4 LBS | OXYGEN SATURATION: 99 % | HEIGHT: 69 IN | SYSTOLIC BLOOD PRESSURE: 112 MMHG | RESPIRATION RATE: 14 BRPM | BODY MASS INDEX: 22.13 KG/M2 | HEART RATE: 71 BPM | DIASTOLIC BLOOD PRESSURE: 68 MMHG

## 2023-12-21 DIAGNOSIS — Z00.00 ROUTINE HISTORY AND PHYSICAL EXAMINATION OF ADULT: Primary | ICD-10-CM

## 2023-12-21 DIAGNOSIS — G47.00 INSOMNIA, UNSPECIFIED TYPE: ICD-10-CM

## 2023-12-21 DIAGNOSIS — M54.16 LEFT LUMBAR RADICULOPATHY: ICD-10-CM

## 2023-12-21 DIAGNOSIS — K51.018 ULCERATIVE PANCOLITIS WITH OTHER COMPLICATION (H): ICD-10-CM

## 2023-12-21 LAB
ALBUMIN SERPL BCG-MCNC: 4.6 G/DL (ref 3.5–5.2)
ALP SERPL-CCNC: 52 U/L (ref 40–150)
ALT SERPL W P-5'-P-CCNC: 15 U/L (ref 0–70)
ANION GAP SERPL CALCULATED.3IONS-SCNC: 9 MMOL/L (ref 7–15)
AST SERPL W P-5'-P-CCNC: 22 U/L (ref 0–45)
BILIRUB SERPL-MCNC: 0.7 MG/DL
BUN SERPL-MCNC: 12.3 MG/DL (ref 6–20)
CALCIUM SERPL-MCNC: 9.6 MG/DL (ref 8.6–10)
CHLORIDE SERPL-SCNC: 102 MMOL/L (ref 98–107)
CHOLEST SERPL-MCNC: 182 MG/DL
CREAT SERPL-MCNC: 0.97 MG/DL (ref 0.67–1.17)
DEPRECATED HCO3 PLAS-SCNC: 28 MMOL/L (ref 22–29)
EGFRCR SERPLBLD CKD-EPI 2021: >90 ML/MIN/1.73M2
ERYTHROCYTE [DISTWIDTH] IN BLOOD BY AUTOMATED COUNT: 12.3 % (ref 10–15)
FASTING STATUS PATIENT QL REPORTED: YES
GLUCOSE SERPL-MCNC: 88 MG/DL (ref 70–99)
HCT VFR BLD AUTO: 43.5 % (ref 40–53)
HDLC SERPL-MCNC: 72 MG/DL
HGB BLD-MCNC: 15 G/DL (ref 13.3–17.7)
LDLC SERPL CALC-MCNC: 93 MG/DL
MCH RBC QN AUTO: 30.8 PG (ref 26.5–33)
MCHC RBC AUTO-ENTMCNC: 34.5 G/DL (ref 31.5–36.5)
MCV RBC AUTO: 89 FL (ref 78–100)
NONHDLC SERPL-MCNC: 110 MG/DL
PLATELET # BLD AUTO: 210 10E3/UL (ref 150–450)
POTASSIUM SERPL-SCNC: 4.3 MMOL/L (ref 3.4–5.3)
PROT SERPL-MCNC: 7.3 G/DL (ref 6.4–8.3)
PSA SERPL DL<=0.01 NG/ML-MCNC: 0.3 NG/ML (ref 0–3.5)
RBC # BLD AUTO: 4.87 10E6/UL (ref 4.4–5.9)
SODIUM SERPL-SCNC: 139 MMOL/L (ref 135–145)
TRIGL SERPL-MCNC: 86 MG/DL
WBC # BLD AUTO: 4.3 10E3/UL (ref 4–11)

## 2023-12-21 PROCEDURE — 80053 COMPREHEN METABOLIC PANEL: CPT | Performed by: INTERNAL MEDICINE

## 2023-12-21 PROCEDURE — 99213 OFFICE O/P EST LOW 20 MIN: CPT | Mod: 25 | Performed by: INTERNAL MEDICINE

## 2023-12-21 PROCEDURE — 36415 COLL VENOUS BLD VENIPUNCTURE: CPT | Performed by: INTERNAL MEDICINE

## 2023-12-21 PROCEDURE — 80061 LIPID PANEL: CPT | Performed by: INTERNAL MEDICINE

## 2023-12-21 PROCEDURE — 85027 COMPLETE CBC AUTOMATED: CPT | Performed by: INTERNAL MEDICINE

## 2023-12-21 PROCEDURE — G0103 PSA SCREENING: HCPCS | Performed by: INTERNAL MEDICINE

## 2023-12-21 PROCEDURE — 99396 PREV VISIT EST AGE 40-64: CPT | Performed by: INTERNAL MEDICINE

## 2023-12-21 RX ORDER — TRAZODONE HYDROCHLORIDE 50 MG/1
50 TABLET, FILM COATED ORAL AT BEDTIME
Qty: 30 TABLET | Refills: 0 | Status: SHIPPED | OUTPATIENT
Start: 2023-12-21 | End: 2024-01-17

## 2023-12-21 RX ORDER — GABAPENTIN 400 MG/1
400 CAPSULE ORAL 2 TIMES DAILY
Qty: 180 CAPSULE | Refills: 3 | Status: SHIPPED | OUTPATIENT
Start: 2023-12-21

## 2023-12-21 ASSESSMENT — ENCOUNTER SYMPTOMS
DYSURIA: 0
PARESTHESIAS: 0
SHORTNESS OF BREATH: 0
CONSTIPATION: 0
HEARTBURN: 0
COUGH: 0
WEAKNESS: 0
FREQUENCY: 0
HEMATURIA: 0
MYALGIAS: 0
FEVER: 0
NAUSEA: 0
PALPITATIONS: 0
EYE PAIN: 0
HEADACHES: 0
ABDOMINAL PAIN: 0
HEMATOCHEZIA: 0
ARTHRALGIAS: 0
DIARRHEA: 0
DIZZINESS: 0
SORE THROAT: 0
CHILLS: 0
NERVOUS/ANXIOUS: 0
JOINT SWELLING: 0

## 2023-12-21 NOTE — PROGRESS NOTES
SUBJECTIVE:   Alex is a 53 year old, presenting for the following:  Physical        12/21/2023     2:44 PM   Additional Questions   Roomed by maikel   Accompanied by self         12/21/2023     2:44 PM   Patient Reported Additional Medications   Patient reports taking the following new medications none       Healthy Habits:     Getting at least 3 servings of Calcium per day:  Yes    Bi-annual eye exam:  NO    Dental care twice a year:  NO    Sleep apnea or symptoms of sleep apnea:  None    Diet:  Regular (no restrictions)    Frequency of exercise:  6-7 days/week    Duration of exercise:  45-60 minutes    Taking medications regularly:  Yes    Medication side effects:  None    Additional concerns today:  Yes    Today's PHQ-2 Score:       12/20/2023    10:15 PM   PHQ-2 ( 1999 [employer])   Q1: Little interest or pleasure in doing things 0   Q2: Feeling down, depressed or hopeless 0   PHQ-2 Score 0   Q1: Little interest or pleasure in doing things Not at all   Q2: Feeling down, depressed or hopeless Not at all   PHQ-2 Score 0       Pt c/o insomnia for several yrs,has tried OTC melatonin w/o symptom relief .    H/o Lumbar radiculopathy; on gabapentin and requesting refills    History of ulcerative colitis: Follows up with the gastroenterologist([facility])       Past Medical History:   Diagnosis Date    Ulcerative colitis (H)        Past Surgical History:   Procedure Laterality Date    COLONOSCOPY N/A 5/26/2019    Procedure: incomplete COLONOSCOPY with left colon biopsies by cold biopsy forceps;  Surgeon: Herbert Dior DO;  Location: [facility]    DISCECTOMY LUMBAR POSTERIOR MICROSCOPIC ONE LEVEL Left 7/27/2020    Procedure: Left L4-5 hemilaminectomy, medial facetectomy, foraminotomy with microdiscectomy;  Surgeon: Martin Jacobson MD;  Location:  OR    LAPAROSCOPIC ASSISTED COLECTOMY N/A 6/12/2019    Procedure: Laparoscopic Assisted Total Abdominal Colectomy with End Ileostomy;  Surgeon: Brigitte Ruano MD;   Location: [facility]    LAPAROSCOPIC ASSISTED COMPLETION PROCTOCOLECTOMY N/A 2020    Procedure: Completion proctectomy and ileal pouch anal anastomosis with diverting loop ileostomy.;  Surgeon: Brigitte Ruano MD;  Location: [facility]    SIGMOIDOSCOPY FLEXIBLE  2020    Dr. Ruano [facility]    SIGMOIDOSCOPY FLEXIBLE N/A 2020    Procedure: SIGMOIDOSCOPY, FLEXIBLE;  Surgeon: Brigitte Ruano MD;  Location: [facility]    TAKEDOWN ILEOSTOMY N/A 2020    Procedure: Pouchoscopy and ileostomy reversal;  Surgeon: Brigitte Ruano MD;  Location: [facility]       Current Outpatient Medications   Medication Sig Dispense Refill    acetaminophen (TYLENOL) 500 MG tablet Take 1,000 mg by mouth every 8 hours as needed for mild pain      gabapentin (NEURONTIN) 400 MG capsule Take 1 capsule (400 mg) by mouth 2 times daily 180 capsule 3    melatonin 5 MG tablet Take 5 mg by mouth At Bedtime      menthol-zinc oxide (CALMOSEPTINE) 0.44-20.6 % OINT ointment Apply topically 4 times daily as needed for skin protection or irritation      multivitamin w/minerals (THERA-VIT-M) tablet Take 1 tablet by mouth daily      psyllium (METAMUCIL/KONSYL) Packet       traZODone (DESYREL) 50 MG tablet Take 1 tablet (50 mg) by mouth at bedtime 30 tablet 0       Family History   Problem Relation Age of Onset    Heart Disease Mother     Chronic Obstructive Pulmonary Disease Mother     Osteoporosis Mother     Cancer Father     Other Cancer Father     Anxiety Disorder Father     No Known Problems Sister     No Known Problems Son     Heart Failure Other     Colon Cancer No family hx of        Social History     Tobacco Use    Smoking status: Former     Packs/day: 1.00     Years: 25.00     Additional pack years: 0.00     Total pack years: 25.00     Types: Cigarettes     Quit date: 2009     Years since quittin.8     Passive exposure: Never    Smokeless tobacco: Never   Substance Use Topics    Alcohol use: Not Currently     Comment: none  since  "05/19 12/20/2023    10:14 PM   Alcohol Use   Prescreen: >3 drinks/day or >7 drinks/week? Not Applicable       Last PSA:   PSA   Date Value Ref Range Status   11/13/2020 0.33 0 - 4 ug/L Final     Comment:     Assay Method:  Chemiluminescence using Siemens Vista analyzer       Reviewed orders with patient. Reviewed health maintenance and updated orders accordingly - Yes      Reviewed and updated as needed this visit by clinical staff   Tobacco                Reviewed and updated as needed this visit by Provider                     Review of Systems   Constitutional:  Negative for chills and fever.   HENT:  Negative for congestion, ear pain, hearing loss and sore throat.    Eyes:  Negative for pain and visual disturbance.   Respiratory:  Negative for cough and shortness of breath.    Cardiovascular:  Negative for chest pain, palpitations and peripheral edema.   Gastrointestinal:  Negative for abdominal pain, constipation, diarrhea, heartburn, hematochezia and nausea.   Genitourinary:  Negative for dysuria, frequency, genital sores, hematuria, impotence, penile discharge and urgency.   Musculoskeletal:  Negative for arthralgias, joint swelling and myalgias.   Skin:  Negative for rash.   Neurological:  Negative for dizziness, weakness, headaches and paresthesias.   Psychiatric/Behavioral:  Negative for mood changes. The patient is not nervous/anxious.        OBJECTIVE:   /68   Pulse 71   Temp (!) 95.9  F (35.5  C) (Tympanic)   Resp 14   Ht 1.746 m (5' 8.75\")   Wt 67.8 kg (149 lb 6.4 oz)   SpO2 99%   BMI 22.22 kg/m      Physical Exam  GENERAL: healthy, alert and no distress  EYES: Eyes grossly normal to inspection, PERRL and conjunctivae and sclerae normal  HENT: ear canals and TM's normal, nose and mouth without ulcers or lesions  RESP: lungs clear to auscultation - no rales, rhonchi or wheezes  CV: regular rate and rhythm, normal S1 S2,    ABDOMEN: soft, nontender,  and bowel sounds normal  MS: " no gross musculoskeletal defects noted, no edema  NEURO: Normal strength and tone, mentation intact and speech normal  PSYCH: mentation appears normal, affect normal/bright      ASSESSMENT/PLAN:       (Z00.00) Routine history and physical examination of adult  (primary encounter diagnosis)  Plan: CBC with platelets, Comprehensive metabolic         panel, Lipid panel reflex to direct LDL         Fasting, Prostate Specific Antigen Screen            (K51.018) Ulcerative pancolitis with other complication (H)  Plan: Follows up with [place] [facility]    (M54.16) Left lumbar radiculopathy  Comment: Stable  Plan: gabapentin (NEURONTIN) 400 MG capsule refilled as directed.explained clearly about the medication,insructions and side effects.            (G47.00) Insomnia, unspecified type  Plan: Currently on melatonin without symptom relief, discussed and started on traZODone (DESYREL) 50 MG tablet as directed.explained clearly about the medication,insructions and side effects.  Advised not to drive or operate any machinery while on this med      Patient has been advised of split billing requirements and indicates understanding: Yes      COUNSELING:   Reviewed preventive health counseling, as reflected in patient instructions       Regular exercise       Healthy diet/nutrition        He reports that he quit smoking about 14 years ago. His smoking use included cigarettes. He has a 25 pack-year smoking history. He has never been exposed to tobacco smoke. He has never used smokeless tobacco.            Kati Garcia MD  [facility]

## 2023-12-21 NOTE — NURSING NOTE
"/68   Pulse 71   Temp (!) 95.9  F (35.5  C) (Tympanic)   Resp 14   Ht 1.746 m (5' 8.75\")   Wt 67.8 kg (149 lb 6.4 oz)   SpO2 99%   BMI 22.22 kg/m      "

## 2024-01-17 ENCOUNTER — MYC REFILL (OUTPATIENT)
Dept: INTERNAL MEDICINE | Facility: CLINIC | Age: 54
End: 2024-01-17
Payer: COMMERCIAL

## 2024-01-17 DIAGNOSIS — G47.00 INSOMNIA, UNSPECIFIED TYPE: ICD-10-CM

## 2024-01-17 RX ORDER — TRAZODONE HYDROCHLORIDE 50 MG/1
50 TABLET, FILM COATED ORAL AT BEDTIME
Qty: 90 TABLET | Refills: 2 | Status: SHIPPED | OUTPATIENT
Start: 2024-01-17 | End: 2024-08-29

## 2024-01-17 NOTE — TELEPHONE ENCOUNTER
Pending Prescriptions:                       Disp   Refills    traZODone (DESYREL) 50 MG tablet           30 tab*0        Sig: Take 1 tablet (50 mg) by mouth at bedtime

## 2024-01-31 NOTE — CONSULTS
CLINICAL NUTRITION SERVICES BRIEF NOTE - AUTOMATED CONSULT RECEIVED  Received consult for Low residue diet teaching post-op. Refer to last reassessment dated 6/11.    RD already following, pt had been on Low fiber diet prior to colostomy with end ileostomy placement yesterday. No additional education indicated at this time.   - Diet:  CLD. Advancement as tolerated per CRS.    Await diet advancement/tolerance. RD will continue to follow for adequacy of intakes, education needs, ?indication for more aggressive nutrition care if needed.   Of note, in light of recent significant weight loss/malabsorption, pt is at risk for further decline if diet is unable to be advanced in the next couple days.     Hattie Merida RD, LD  3rd floor/ICU: [phone]  All other floors: [phone]  Weekend/holiday: [phone]  Office: [phone]      [General Appearance - Alert] : alert [General Appearance - In No Acute Distress] : in no acute distress [Oriented To Time, Place, And Person] : oriented to person, place, and time [Impaired Insight] : insight and judgment were intact [Affect] : the affect was normal

## 2024-04-20 NOTE — PROGRESS NOTES
COLON & RECTAL SURGERY  PROGRESS NOTE      SUBJECTIVE:  Pain 6/10 this morning, Small amount of belching but no nausea or emesis. No flatus or stool from ostomy. Discussed operative findings.     OBJECTIVE:  Temp:  [96.2  F (35.7  C)-98.7  F (37.1  C)] 96.7  F (35.9  C)  Pulse:  [61-80] 79  Heart Rate:  [66-93] 72  Resp:  [8-18] 17  BP: ()/(57-72) 114/68  SpO2:  [98 %-100 %] 100 %    Intake/Output Summary (Last 24 hours) at 6/12/2019 0919  Last data filed at 6/12/2019 0539  Gross per 24 hour   Intake 1640 ml   Output --   Net 1640 ml       GENERAL:  Awake, alert, no acute distress, lying in bed.  EXTREMITIES: warm and well perfused  ABDOMEN:  Soft, appropriately tender, non-distended, no rebound or guarding, no peritoneal signs. Ostomy pink with no flatus/stool in bag    LABS:  Lab Results   Component Value Date    WBC 6.7 06/12/2019     Lab Results   Component Value Date    HGB 9.7 06/12/2019     Lab Results   Component Value Date    HCT 29.7 06/12/2019     Lab Results   Component Value Date     06/12/2019     Last Basic Metabolic Panel:  Lab Results   Component Value Date     06/12/2019      Lab Results   Component Value Date    POTASSIUM 3.5 06/12/2019     Lab Results   Component Value Date    CHLORIDE 100 06/12/2019     Lab Results   Component Value Date    KEZIA 7.5 06/12/2019     Lab Results   Component Value Date    CO2 26 06/12/2019     Lab Results   Component Value Date    BUN 9 06/12/2019     Lab Results   Component Value Date    CR 0.77 06/12/2019     Lab Results   Component Value Date    GLC 78 06/12/2019       ASSESSMENT/PLAN: 49 y/o male with ulcerative colitis refractory to medical management now s/p laparoscopic total abdominal colectomy with end ileostomy on 6/12/19.     Clears as tolerated. Continue IVF until taking adequate PO.  Replete lytes as needed. Continue to monitor sodium levels. Labs pending this morning.   Steroid taper ordered.  Plan for massey removal tomorrow.  Will  add lidocaine patches for pain control.   Encourage ambulation and OOB.  Lovenox for DVT proph. Will need 28 day course post-op given IBD.  WOCN education for stoma care.      Plan discussed with Dr. Ruano. For questions/paging, please contact the CRS office at [phone].    Tamra Cedilloura   Colon and Rectal Surgery Fellow  [facility]   6/13/2019   7:43 AM      Colon and Rectal Surgery Staff  I performed a history and physical examination of the patient and discussed their management with the physician assistant. I reviewed the physician assistants note and agree with the documented findings and plan of care.     Agree with above.  Waiting for return of bowel function.  Cont clears  Steroid taper  Lovenox for DVT ppx    Leigh Lafleur MD  [facility]  Office Phone # [phone].       extension,   Seated    Lower Extremity: knee flexion and knee extensions,          Education:   - Present and ready to learn: patient  Education provided during session:  - Results of above outlined education: Verbalizes understanding    ASSESSMENT   Discharge needs based on today's assessment:   - Current level of function: at baseline level of function   - Therapy needs at discharge: does not require ongoing therapy    AM-PAC  - Generalized Prior Level of Function:         Key: MOD A=moderate assistance, IND/MOD I=independent/modified independent  - Generalized Current Level of Function     - Current Mobility Score: 24       AM-PAC Scoring Key= >21 Modified Independent; 20-21 Supervision; 18-19 Minimal assist; 13-17 Moderate assist; 9-12 Max assist; <9 Total assist            Predicted patient presentation: Low (stable) - Patient comorbidities and complexities, as defined above, will have little effect on progress for prescribed plan of care.    PLAN (while hospitalized)  Suggestions for next session as indicated: PT Frequency: no inpatient Physical Therapy needs      Documented in the chart in the following areas: Prior Level of Function. Assessment/Plan. Plan.      Patient at End of Session:   Location: in chair  Safety measures: call light within reach  Handoff to: nurse      Therapy procedure time and total treatment time can be found documented on the Time Entry flowsheet

## 2024-08-29 DIAGNOSIS — G47.00 INSOMNIA, UNSPECIFIED TYPE: ICD-10-CM

## 2024-08-29 RX ORDER — TRAZODONE HYDROCHLORIDE 50 MG/1
50 TABLET, FILM COATED ORAL AT BEDTIME
Qty: 90 TABLET | Refills: 1 | Status: SHIPPED | OUTPATIENT
Start: 2024-08-29

## 2024-11-08 ENCOUNTER — MYC MEDICAL ADVICE (OUTPATIENT)
Dept: INTERNAL MEDICINE | Facility: CLINIC | Age: 54
End: 2024-11-08
Payer: COMMERCIAL

## 2024-11-08 ENCOUNTER — TELEPHONE (OUTPATIENT)
Dept: INTERNAL MEDICINE | Facility: CLINIC | Age: 54
End: 2024-11-08
Payer: COMMERCIAL

## 2024-11-08 DIAGNOSIS — M54.16 LEFT LUMBAR RADICULOPATHY: ICD-10-CM

## 2024-11-08 RX ORDER — GABAPENTIN 400 MG/1
CAPSULE ORAL
Qty: 60 CAPSULE | Refills: 0 | Status: SHIPPED | OUTPATIENT
Start: 2024-11-08

## 2024-11-08 NOTE — TELEPHONE ENCOUNTER
Last sen in 12/23, refilled for 1 month, pt needs appt in 12/24. . Please schedule on any of my same-day or approval only slot

## 2024-11-11 NOTE — TELEPHONE ENCOUNTER
Last read by Alex Cardona at  4:50 AM on 11/9/2024.     Patient scheduled the following appointment.    Next 5 appointments (look out 90 days)      Jan 16, 2025 3:00 PM  (Arrive by 2:40 PM)  Adult Preventative Visit with Kati Garcia MD  [facility] [address]  [address]  [facility] [address] [phone]          Based on message from provider likely need to reschedule appointment.

## 2024-11-11 NOTE — TELEPHONE ENCOUNTER
I do not work on Thursdays from January 2025, patient has scheduled his appointment on Thursday.  Please schedule patient anytime from Monday to Wednesday, can use my same-day or approval slots in December.

## 2024-12-05 SDOH — HEALTH STABILITY: PHYSICAL HEALTH: ON AVERAGE, HOW MANY MINUTES DO YOU ENGAGE IN EXERCISE AT THIS LEVEL?: 60 MIN

## 2024-12-05 SDOH — HEALTH STABILITY: PHYSICAL HEALTH: ON AVERAGE, HOW MANY DAYS PER WEEK DO YOU ENGAGE IN MODERATE TO STRENUOUS EXERCISE (LIKE A BRISK WALK)?: 7 DAYS

## 2024-12-05 ASSESSMENT — SOCIAL DETERMINANTS OF HEALTH (SDOH): HOW OFTEN DO YOU GET TOGETHER WITH FRIENDS OR RELATIVES?: ONCE A WEEK

## 2024-12-08 DIAGNOSIS — M54.16 LEFT LUMBAR RADICULOPATHY: ICD-10-CM

## 2024-12-09 ENCOUNTER — OFFICE VISIT (OUTPATIENT)
Dept: INTERNAL MEDICINE | Facility: CLINIC | Age: 54
End: 2024-12-09
Payer: COMMERCIAL

## 2024-12-09 VITALS
OXYGEN SATURATION: 97 % | TEMPERATURE: 96.1 F | HEIGHT: 69 IN | HEART RATE: 96 BPM | SYSTOLIC BLOOD PRESSURE: 110 MMHG | WEIGHT: 140.1 LBS | RESPIRATION RATE: 14 BRPM | BODY MASS INDEX: 20.75 KG/M2 | DIASTOLIC BLOOD PRESSURE: 62 MMHG

## 2024-12-09 DIAGNOSIS — Z00.00 ROUTINE HISTORY AND PHYSICAL EXAMINATION OF ADULT: Primary | ICD-10-CM

## 2024-12-09 DIAGNOSIS — Z90.49 S/P LAPAROSCOPIC COLECTOMY: ICD-10-CM

## 2024-12-09 DIAGNOSIS — G47.00 INSOMNIA, UNSPECIFIED TYPE: ICD-10-CM

## 2024-12-09 DIAGNOSIS — K51.018 ULCERATIVE PANCOLITIS WITH OTHER COMPLICATION (H): ICD-10-CM

## 2024-12-09 DIAGNOSIS — M54.16 LEFT LUMBAR RADICULOPATHY: ICD-10-CM

## 2024-12-09 LAB
ALBUMIN SERPL BCG-MCNC: 4.5 G/DL (ref 3.5–5.2)
ALP SERPL-CCNC: 55 U/L (ref 40–150)
ALT SERPL W P-5'-P-CCNC: 20 U/L (ref 0–70)
ANION GAP SERPL CALCULATED.3IONS-SCNC: 10 MMOL/L (ref 7–15)
AST SERPL W P-5'-P-CCNC: 26 U/L (ref 0–45)
BILIRUB SERPL-MCNC: 0.5 MG/DL
BUN SERPL-MCNC: 11.8 MG/DL (ref 6–20)
CALCIUM SERPL-MCNC: 9.9 MG/DL (ref 8.8–10.4)
CHLORIDE SERPL-SCNC: 101 MMOL/L (ref 98–107)
CHOLEST SERPL-MCNC: 179 MG/DL
CREAT SERPL-MCNC: 0.97 MG/DL (ref 0.67–1.17)
EGFRCR SERPLBLD CKD-EPI 2021: >90 ML/MIN/1.73M2
ERYTHROCYTE [DISTWIDTH] IN BLOOD BY AUTOMATED COUNT: 14.3 % (ref 10–15)
FASTING STATUS PATIENT QL REPORTED: YES
FASTING STATUS PATIENT QL REPORTED: YES
GLUCOSE SERPL-MCNC: 100 MG/DL (ref 70–99)
HCO3 SERPL-SCNC: 27 MMOL/L (ref 22–29)
HCT VFR BLD AUTO: 41.5 % (ref 40–53)
HDLC SERPL-MCNC: 75 MG/DL
HGB BLD-MCNC: 14.3 G/DL (ref 13.3–17.7)
LDLC SERPL CALC-MCNC: 80 MG/DL
MCH RBC QN AUTO: 28.1 PG (ref 26.5–33)
MCHC RBC AUTO-ENTMCNC: 34.5 G/DL (ref 31.5–36.5)
MCV RBC AUTO: 82 FL (ref 78–100)
NONHDLC SERPL-MCNC: 104 MG/DL
PLATELET # BLD AUTO: 260 10E3/UL (ref 150–450)
POTASSIUM SERPL-SCNC: 4.2 MMOL/L (ref 3.4–5.3)
PROT SERPL-MCNC: 7.6 G/DL (ref 6.4–8.3)
PSA SERPL DL<=0.01 NG/ML-MCNC: 0.36 NG/ML (ref 0–3.5)
RBC # BLD AUTO: 5.09 10E6/UL (ref 4.4–5.9)
SODIUM SERPL-SCNC: 138 MMOL/L (ref 135–145)
TRIGL SERPL-MCNC: 122 MG/DL
WBC # BLD AUTO: 4.9 10E3/UL (ref 4–11)

## 2024-12-09 PROCEDURE — 80061 LIPID PANEL: CPT | Performed by: INTERNAL MEDICINE

## 2024-12-09 PROCEDURE — 36415 COLL VENOUS BLD VENIPUNCTURE: CPT | Performed by: INTERNAL MEDICINE

## 2024-12-09 PROCEDURE — 99213 OFFICE O/P EST LOW 20 MIN: CPT | Mod: 25 | Performed by: INTERNAL MEDICINE

## 2024-12-09 PROCEDURE — 99396 PREV VISIT EST AGE 40-64: CPT | Performed by: INTERNAL MEDICINE

## 2024-12-09 PROCEDURE — G0103 PSA SCREENING: HCPCS | Performed by: INTERNAL MEDICINE

## 2024-12-09 PROCEDURE — 80053 COMPREHEN METABOLIC PANEL: CPT | Performed by: INTERNAL MEDICINE

## 2024-12-09 PROCEDURE — 85027 COMPLETE CBC AUTOMATED: CPT | Performed by: INTERNAL MEDICINE

## 2024-12-09 RX ORDER — GABAPENTIN 400 MG/1
CAPSULE ORAL
Qty: 60 CAPSULE | Refills: 0 | OUTPATIENT
Start: 2024-12-09

## 2024-12-09 RX ORDER — GABAPENTIN 400 MG/1
400 CAPSULE ORAL 2 TIMES DAILY
Qty: 180 CAPSULE | Refills: 3 | Status: SHIPPED | OUTPATIENT
Start: 2024-12-09

## 2024-12-09 RX ORDER — TRAZODONE HYDROCHLORIDE 50 MG/1
50 TABLET, FILM COATED ORAL AT BEDTIME
Qty: 90 TABLET | Refills: 1 | Status: SHIPPED | OUTPATIENT
Start: 2024-12-09

## 2024-12-09 NOTE — PROGRESS NOTES
Preventive Care Visit  [facility]  Kati Garcia MD, Internal Medicine  Dec 9, 2024      Assessment & Plan     (Z00.00) Routine history and physical examination of adult  (primary encounter diagnosis)  Plan: CBC with platelets, Comprehensive metabolic         panel, Lipid panel reflex to direct LDL         Fasting, Prostate Specific Antigen Screen            (K51.018) Ulcerative pancolitis with other complication (H)  (Z90.49) S/P laparoscopic colectomy  Plan: Follows up with colorectal specialist       (M54.16) Left lumbar radiculopathy  Plan: Status post lumbar microdiscectomy, refilled gabapentin (NEURONTIN) 400 MG capsule as directed.explained clearly about the medication,insructions and side effects.            (G47.00) Insomnia, unspecified type  Comment: Stable  Plan: traZODone (DESYREL) 50 MG tablet refilled as directed.explained clearly about the medication,insructions and side effects.            Patient has been advised of split billing requirements and indicates understanding: Yes        Counseling  Appropriate preventive services were addressed with this patient via screening, questionnaire, or discussion as appropriate         Itz Johnson is a 54 year old, presenting for the following:  Physical        12/9/2024     2:39 PM   Additional Questions   Roomed by maikel   Accompanied by self         12/9/2024     2:39 PM   Patient Reported Additional Medications   Patient reports taking the following new medications none          HPI     Insomnia follow-up : stable on trazodone       History of colitis symptoms s/p laparoscopic colectomy: Follows up with the colorectal specialist      Health Care Directive  Patient does not have a Health Care Directive: Discussed advance care planning with patient; information given to patient to review.          12/5/2024   General Health   How would you rate your overall physical health? (!) FAIR   Feel stress (tense, anxious,  or unable to sleep)  Much ( world events , not work or family related)       (!) STRESS CONCERN      12/5/2024   Nutrition   Three or more servings of calcium each day? Yes   Diet: Regular (no restrictions)   How many servings of fruit and vegetables per day? (!) 2-3   How many sweetened beverages each day? 0-1            12/5/2024   Exercise   Days per week of moderate/strenous exercise 7 days   Average minutes spent exercising at this level 60 min            12/5/2024   Social Factors   Frequency of gathering with friends or relatives Once a week   Worry food won't last until get money to buy more No   Food not last or not have enough money for food? No   Do you have housing? (Housing is defined as stable permanent housing and does not include staying ouside in a car, in a tent, in an abandoned building, in an overnight shelter, or couch-surfing.) Yes   Are you worried about losing your housing? No   Lack of transportation? No   Unable to get utilities (heat,electricity)? No            12/9/2024   Fall Risk   Fallen 2 or more times in the past year? No   Trouble with walking or balance? No             12/5/2024   Dental   Dentist two times every year? (!) NO           Today's PHQ-2 Score:       12/8/2024    11:49 PM   PHQ-2 ( 1999 [employer])   Q1: Little interest or pleasure in doing things 1    Q2: Feeling down, depressed or hopeless 1    PHQ-2 Score 2    Q1: Little interest or pleasure in doing things Several days   Q2: Feeling down, depressed or hopeless Several days   PHQ-2 Score 2       Patient-reported           12/5/2024   Substance Use   Alcohol more than 3/day or more than 7/wk Not Applicable   Do you use any other substances recreationally? No        Social History     Tobacco Use    Smoking status: Former     Current packs/day: 0.00     Average packs/day: 1 pack/day for 25.0 years (25.0 ttl pk-yrs)     Types: Cigarettes     Start date: 2/5/1984     Quit date: 2/5/2009     Years since quitting: 15.8      Passive exposure: Never    Smokeless tobacco: Never   Vaping Use    Vaping status: Never Used   Substance Use Topics    Alcohol use: Not Currently     Comment: none  since 05/19     Drug use: Not Currently     Comment: none since 5/2019 12/5/2024   STI Screening   New sexual partner(s) since last STI/HIV test? No      Last PSA:   PSA   Date Value Ref Range Status   11/13/2020 0.33 0 - 4 ug/L Final     Comment:     Assay Method:  Chemiluminescence using Siemens Vista analyzer     Prostate Specific Antigen Screen   Date Value Ref Range Status   12/21/2023 0.30 0.00 - 3.50 ng/mL Final             Reviewed and updated as needed this visit by Provider                    Past Medical History:   Diagnosis Date    Ulcerative colitis (H)      Past Surgical History:   Procedure Laterality Date    COLONOSCOPY N/A 5/26/2019    Procedure: incomplete COLONOSCOPY with left colon biopsies by cold biopsy forceps;  Surgeon: Herbert Dior DO;  Location:  GI    DISCECTOMY LUMBAR POSTERIOR MICROSCOPIC ONE LEVEL Left 7/27/2020    Procedure: Left L4-5 hemilaminectomy, medial facetectomy, foraminotomy with microdiscectomy;  Surgeon: Martin Jacobson MD;  Location:  OR    LAPAROSCOPIC ASSISTED COLECTOMY N/A 6/12/2019    Procedure: Laparoscopic Assisted Total Abdominal Colectomy with End Ileostomy;  Surgeon: Brigitte Ruano MD;  Location:  OR    LAPAROSCOPIC ASSISTED COMPLETION PROCTOCOLECTOMY N/A 2/11/2020    Procedure: Completion proctectomy and ileal pouch anal anastomosis with diverting loop ileostomy.;  Surgeon: Brigitte Ruano MD;  Location:  OR    SIGMOIDOSCOPY FLEXIBLE  01/13/2020    Dr. Ruano [facility]    SIGMOIDOSCOPY FLEXIBLE N/A 5/18/2020    Procedure: SIGMOIDOSCOPY, FLEXIBLE;  Surgeon: Brigitte Ruano MD;  Location:  OR    TAKEDOWN ILEOSTOMY N/A 5/18/2020    Procedure: Pouchoscopy and ileostomy reversal;  Surgeon: Brigitte Ruano MD;  Location:  OR     Current Outpatient  "Medications   Medication Sig Dispense Refill    acetaminophen (TYLENOL) 500 MG tablet Take 1,000 mg by mouth at bedtime.      gabapentin (NEURONTIN) 400 MG capsule Take 1 capsule (400 mg) by mouth 2 times daily. 180 capsule 3    melatonin 5 MG tablet Take 5 mg by mouth At Bedtime      menthol-zinc oxide (CALMOSEPTINE) 0.44-20.6 % OINT ointment Apply topically 4 times daily as needed for skin protection or irritation      multivitamin w/minerals (THERA-VIT-M) tablet Take 1 tablet by mouth daily      psyllium (METAMUCIL/KONSYL) Packet       traZODone (DESYREL) 50 MG tablet Take 1 tablet (50 mg) by mouth at bedtime. 90 tablet 1         Review of Systems  CONSTITUTIONAL: NEGATIVE for fever, chills,   EYES: NEGATIVE for vision changes or irritation  ENT/MOUTH: NEGATIVE for ear, mouth and throat problems  RESP: NEGATIVE for significant cough or SOB  CV: NEGATIVE for chest pain, palpitations or peripheral edema  GI: NEGATIVE for nausea, abdominal pain, heartburn, or change in bowel habits  : NEGATIVE for frequency, dysuria, or hematuria  MUSCULOSKELETAL: NEGATIVE for significant arthralgias or myalgia  NEURO: NEGATIVE for weakness, dizziness or paresthesias  ENDOCRINE: NEGATIVE for temperature intolerance, skin/hair changes  HEME: NEGATIVE for bleeding problems  PSYCHIATRIC: NEGATIVE for changes in mood or affect     Objective    Exam  /62   Pulse 96   Temp (!) 96.1  F (35.6  C) (Tympanic)   Resp 14   Ht 1.746 m (5' 8.75\")   Wt 63.5 kg (140 lb 1.6 oz)   SpO2 97%   BMI 20.84 kg/m     Estimated body mass index is 20.84 kg/m  as calculated from the following:    Height as of this encounter: 1.746 m (5' 8.75\").    Weight as of this encounter: 63.5 kg (140 lb 1.6 oz).    Physical Exam  GENERAL: alert and no distress  EYES: Eyes grossly normal to inspection, PERRL and conjunctivae and sclerae normal  HENT: ear canals and TM's normal, nose and mouth without ulcers or lesions  RESP: lungs clear to auscultation - no " rales, rhonchi or wheezes  CV: regular rate and rhythm, normal S1 S2,    ABDOMEN: soft, nontender, and bowel sounds normal  MS: no gross musculoskeletal defects noted, no edema  NEURO: Normal strength and tone, mentation intact and speech normal  PSYCH: mentation appears normal, affect normal/bright        Signed Electronically by: Kati Garcia MD

## 2024-12-09 NOTE — NURSING NOTE
"/62   Pulse 96   Temp (!) 96.1  F (35.6  C) (Tympanic)   Resp 14   Ht 1.746 m (5' 8.75\")   Wt 63.5 kg (140 lb 1.6 oz)   SpO2 97%   BMI 20.84 kg/m  ;s    "

## 2025-03-15 DIAGNOSIS — G47.00 INSOMNIA, UNSPECIFIED TYPE: ICD-10-CM

## 2025-03-17 RX ORDER — TRAZODONE HYDROCHLORIDE 50 MG/1
50 TABLET ORAL AT BEDTIME
Qty: 90 TABLET | Refills: 1 | Status: SHIPPED | OUTPATIENT
Start: 2025-03-17

## (undated) DEVICE — SU PDS II 1 CTX 36" Z371T

## (undated) DEVICE — INFLATION DEVICE BIG 60 ENDO-AN6012

## (undated) DEVICE — DRAIN JACKSON PRATT RESERVOIR 100ML SU130-1305

## (undated) DEVICE — PREP CHLORAPREP 26ML TINTED ORANGE  260815

## (undated) DEVICE — PACK SMALL SPINE RIDGES

## (undated) DEVICE — SU VICRYL 0 CT-2 CR 8X18" J727D

## (undated) DEVICE — DRAIN JACKSON PRATT ROUND SIL 19FR W/TROCAR LF JP-2232

## (undated) DEVICE — STPL LINEAR CUT 75MM TLC75

## (undated) DEVICE — KIT PATIENT POSITIONING PIGAZZI LATEX FREE 40580

## (undated) DEVICE — ADH SKIN CLOSURE PREMIERPRO EXOFIN 1.0ML 3470

## (undated) DEVICE — SPONGE LAP 18X18" X8435

## (undated) DEVICE — LINEN POUCH DBL 5427

## (undated) DEVICE — SU VICRYL 3-0 SH 27" UND J416H

## (undated) DEVICE — DECANTER VIAL 2006S

## (undated) DEVICE — DRAPE LEGGINGS 8421

## (undated) DEVICE — SU VICRYL 3-0 SH-1 CR 8X18" J772D

## (undated) DEVICE — LINEN FULL SHEET 5511

## (undated) DEVICE — LINEN HALF SHEET 5512

## (undated) DEVICE — SU VICRYL 2-0 TIE 12X18" J905T

## (undated) DEVICE — STPL CONTOUR CUT CVD GREEN CS40G

## (undated) DEVICE — LINEN TOWEL PACK X10 5473

## (undated) DEVICE — KIT ENDO TURNOVER/PROCEDURE W/CLEAN A SCOPE LINERS 103888

## (undated) DEVICE — ENDO TROCAR BLUNT TIP KII BALLOON 12X100MM C0R47

## (undated) DEVICE — SOL NACL 0.9% IRRIG 1000ML BOTTLE 2F7124

## (undated) DEVICE — SUCTION MANIFOLD NEPTUNE 2 SYS 4 PORT 0702-020-000

## (undated) DEVICE — EVAC SYSTEM CLEAR FLOW SC082500

## (undated) DEVICE — LINEN DRAPE 54X72" 5467

## (undated) DEVICE — SU VICRYL 2-0 CT-2 CR 8X18" J726D

## (undated) DEVICE — DRSG KERLIX FLUFFS X5

## (undated) DEVICE — DRSG GAUZE 4X4" 3033

## (undated) DEVICE — SU PROLENE 2-0 SHDA 48" 8533H

## (undated) DEVICE — ENDO FORCEP ENDOJAW BIOPSY 2.8MMX160CM FB-220K

## (undated) DEVICE — GOWN XLG DISP 9545

## (undated) DEVICE — ESU LIGASURE LAPAROSCOPIC BLUNT TIP SEALER 5MMX37CM LF1837

## (undated) DEVICE — Device

## (undated) DEVICE — SU PDS II 0 CT 36" Z358T

## (undated) DEVICE — GLOVE PROTEXIS W/NEU-THERA 8.5  2D73TE85

## (undated) DEVICE — STPL SINGLE USE 28MM W/3.5MM EEA2835

## (undated) DEVICE — STPL LINEAR CUT 60X3.5MM TX60B

## (undated) DEVICE — CATH TRAY FOLEY SURESTEP 16FR DRAIN BAG STATOCK A899916

## (undated) DEVICE — ESU GROUND PAD ADULT W/CORD E7507

## (undated) DEVICE — SYR 10ML LL W/O NDL 302995

## (undated) DEVICE — LINEN TOWEL PACK X5 5464

## (undated) DEVICE — PREP POVIDONE IODINE SOLUTION 10% 4OZ BOTTLE 29906-004

## (undated) DEVICE — SU MONOCRYL 4-0 PS-2 18" UND Y496G

## (undated) DEVICE — ENDO FORCEP ENDOJAW BIOPSY 2.8MMX230CM FB-220U

## (undated) DEVICE — SU MONOCRYL 4-0 PS-2 27" UND Y426H

## (undated) DEVICE — PEN MARKING SKIN W/LABELS 31145884

## (undated) DEVICE — PACK MAJOR SBA15MAFSI

## (undated) DEVICE — SUCTION IRR STRYKERFLOW II W/TIP 250-070-520

## (undated) DEVICE — PROTECTOR ARM ONE-STEP TRENDELENBURG 40418

## (undated) DEVICE — PREP SKIN SCRUB TRAY 4461A

## (undated) DEVICE — SU PDS II 0 ENDOLOOP EZ10G

## (undated) DEVICE — DECANTER BAG 2002S

## (undated) DEVICE — SU VICRYL 0 CT-2 27" UND J270H

## (undated) DEVICE — ENDO TROCAR SLEEVE KII Z-THREADED 05X100MM CTS02

## (undated) DEVICE — SU ETHILON 2-0 PS 18" 585H

## (undated) DEVICE — SU VICRYL 3-0 SH 8X18" UND J864D

## (undated) DEVICE — STPL RELOAD LINEAR CUT 100X3.8MM TCR10

## (undated) DEVICE — BLADE CLIPPER 3M 9670

## (undated) DEVICE — DRSG TELFA ISLAND 4X10"

## (undated) DEVICE — ESU PENCIL W/HOLSTER E2350H

## (undated) DEVICE — SYSTEM CLEARIFY VISUALIZATION 21-345

## (undated) DEVICE — DRAIN PENROSE 0.50"X18" LATEX FREE GR203

## (undated) DEVICE — SU VICRYL 2-0 PS-2 27" UNDYED J428H

## (undated) DEVICE — GLOVE PROTEXIS BLUE W/NEU-THERA 8.5  2D73EB85

## (undated) DEVICE — TAPE DURAPORE 3" SILK 1538-3

## (undated) DEVICE — PAD FOAM WILSON FRAME/JACKSON TABLE PT KIT 5878

## (undated) DEVICE — TUBING SUCTION MEDI-VAC SOFT 3/16"X20' N520A

## (undated) DEVICE — SUCTION TIP POOLE K770

## (undated) DEVICE — BAG CLEAR TRASH 1.3M 39X33" P4040C

## (undated) DEVICE — SU VICRYL 0 UR-6 27" J603H

## (undated) DEVICE — SU VICRYL 2-0 CT-1 27" UND J259H

## (undated) DEVICE — SU VICRYL 0 TIE 12X18" J906G

## (undated) DEVICE — GLOVE PROTEXIS W/NEU-THERA 7.0  2D73TE70

## (undated) DEVICE — SUCTION FRAZIER 12FR W/OBTURATOR 33120

## (undated) DEVICE — MIDAS REX DISSECTING TOOL TRI-FLUTED BURR 14MH30T

## (undated) DEVICE — KIT PROCEDURE W/CLEAN-A-SCOPE LINERS V2 200800

## (undated) DEVICE — BLADE KNIFE SURG 10 371110

## (undated) DEVICE — SU WND CLOSURE VLOC 180 ABS 3-0 12" P-14 VLOCL0114

## (undated) DEVICE — SU VICRYL 3-0 SH 27" J316H

## (undated) DEVICE — ESU ELEC BLADE 4" COATED

## (undated) DEVICE — PAD CHUX UNDERPAD 30X36" P3036C

## (undated) DEVICE — STPL RELOAD LINEAR CUT 75MM TCR75

## (undated) DEVICE — SOL WATER IRRIG 1000ML BOTTLE 2F7114

## (undated) DEVICE — SPONGE COTTONOID 1/2X1/2" 80-1400

## (undated) DEVICE — ENDO ACCESS PLATFORM GELPOINT MINI CNGL3

## (undated) DEVICE — PANTIES MESH LG/XLG 2PK 706M2

## (undated) DEVICE — SU VICRYL 3-0 SH CR 8X18" J774

## (undated) DEVICE — BARRIER SEPRAFILM 5X6" SINGLE SHEET 4301-02

## (undated) DEVICE — ESU CLEANER TIP 31142717

## (undated) DEVICE — LINEN ORTHO ACL PACK 5447

## (undated) DEVICE — ENDO TROCAR FIRST ENTRY KII FIOS Z-THRD 05X100MM CTF03

## (undated) DEVICE — GLOVE PROTEXIS MICRO 7.5  2D73PM75

## (undated) DEVICE — SUCTION CANISTER MEDIVAC LINER 3000ML W/LID 65651-530

## (undated) DEVICE — GOWN IMPERVIOUS SPECIALTY XLG/XLONG 32474

## (undated) DEVICE — POSITIONER PT PRONESAFE HEAD REST W/DERMAPROX INSERT 40599

## (undated) DEVICE — ESU HOLDER LAP INST DISP PURPLE LONG 330MM H-PRO-330

## (undated) DEVICE — DRAPE LAP W/ARMBOARD 29410

## (undated) DEVICE — RX SURGIFLO HEMOSTATIC MATRIX 8ML 2991

## (undated) DEVICE — SU PDS II 0 CTX 60" Z990G

## (undated) DEVICE — ESU CORD MONOPOLAR 10'  E0510

## (undated) DEVICE — DRAPE IOBAN INCISE 23X17" 6650EZ

## (undated) DEVICE — SPONGE SURGIFOAM 100 1974

## (undated) DEVICE — DRAPE MICROSCOPE OPMI ZEISS 48X118" 306071-0000-000

## (undated) DEVICE — DRAPE MAYO STAND 23X54 8337

## (undated) DEVICE — GOWN IMPERVIOUS ZONED XLG 9041

## (undated) DEVICE — STPL LINEAR CUT 100MM TLC10

## (undated) DEVICE — GLOVE PROTEXIS POWDER FREE 6.5 ORTHOPEDIC 2D73ET65

## (undated) DEVICE — SUCTION TIP YANKAUER CTRL 6 K83A

## (undated) DEVICE — GOWN XXL 9575

## (undated) DEVICE — SUCTION TIP YANKAUER W/O VENT K86

## (undated) DEVICE — SU VICRYL 3-0 PS-2 27" UND J427H

## (undated) RX ORDER — BUPIVACAINE HYDROCHLORIDE AND EPINEPHRINE 2.5; 5 MG/ML; UG/ML
INJECTION, SOLUTION EPIDURAL; INFILTRATION; INTRACAUDAL; PERINEURAL
Status: DISPENSED
Start: 2019-06-12

## (undated) RX ORDER — LIDOCAINE HYDROCHLORIDE 20 MG/ML
JELLY TOPICAL
Status: DISPENSED
Start: 2019-06-10

## (undated) RX ORDER — PROPOFOL 10 MG/ML
INJECTION, EMULSION INTRAVENOUS
Status: DISPENSED
Start: 2019-06-12

## (undated) RX ORDER — EPHEDRINE SULFATE 50 MG/ML
INJECTION, SOLUTION INTRAMUSCULAR; INTRAVENOUS; SUBCUTANEOUS
Status: DISPENSED
Start: 2020-02-11

## (undated) RX ORDER — GLYCOPYRROLATE 0.2 MG/ML
INJECTION INTRAMUSCULAR; INTRAVENOUS
Status: DISPENSED
Start: 2020-07-27

## (undated) RX ORDER — EPHEDRINE SULFATE 50 MG/ML
INJECTION, SOLUTION INTRAMUSCULAR; INTRAVENOUS; SUBCUTANEOUS
Status: DISPENSED
Start: 2019-06-12

## (undated) RX ORDER — ONDANSETRON 2 MG/ML
INJECTION INTRAMUSCULAR; INTRAVENOUS
Status: DISPENSED
Start: 2019-06-12

## (undated) RX ORDER — FENTANYL CITRATE 50 UG/ML
INJECTION, SOLUTION INTRAMUSCULAR; INTRAVENOUS
Status: DISPENSED
Start: 2020-05-18

## (undated) RX ORDER — HYDROMORPHONE HYDROCHLORIDE 1 MG/ML
INJECTION, SOLUTION INTRAMUSCULAR; INTRAVENOUS; SUBCUTANEOUS
Status: DISPENSED
Start: 2019-06-12

## (undated) RX ORDER — FENTANYL CITRATE 50 UG/ML
INJECTION, SOLUTION INTRAMUSCULAR; INTRAVENOUS
Status: DISPENSED
Start: 2020-01-13

## (undated) RX ORDER — DEXAMETHASONE SODIUM PHOSPHATE 4 MG/ML
INJECTION, SOLUTION INTRA-ARTICULAR; INTRALESIONAL; INTRAMUSCULAR; INTRAVENOUS; SOFT TISSUE
Status: DISPENSED
Start: 2020-07-27

## (undated) RX ORDER — KETAMINE HCL IN 0.9 % NACL 50 MG/5 ML
SYRINGE (ML) INTRAVENOUS
Status: DISPENSED
Start: 2020-02-11

## (undated) RX ORDER — DEXAMETHASONE SODIUM PHOSPHATE 4 MG/ML
INJECTION, SOLUTION INTRA-ARTICULAR; INTRALESIONAL; INTRAMUSCULAR; INTRAVENOUS; SOFT TISSUE
Status: DISPENSED
Start: 2019-06-12

## (undated) RX ORDER — CEFAZOLIN SODIUM 2 G/100ML
INJECTION, SOLUTION INTRAVENOUS
Status: DISPENSED
Start: 2020-07-27

## (undated) RX ORDER — PHENYLEPHRINE HCL IN 0.9% NACL 1 MG/10 ML
SYRINGE (ML) INTRAVENOUS
Status: DISPENSED
Start: 2019-06-12

## (undated) RX ORDER — ONDANSETRON 2 MG/ML
INJECTION INTRAMUSCULAR; INTRAVENOUS
Status: DISPENSED
Start: 2020-07-27

## (undated) RX ORDER — ACETAMINOPHEN 325 MG/1
TABLET ORAL
Status: DISPENSED
Start: 2020-02-11

## (undated) RX ORDER — FENTANYL CITRATE 50 UG/ML
INJECTION, SOLUTION INTRAMUSCULAR; INTRAVENOUS
Status: DISPENSED
Start: 2019-06-12

## (undated) RX ORDER — ACETAMINOPHEN 325 MG/1
TABLET ORAL
Status: DISPENSED
Start: 2019-06-12

## (undated) RX ORDER — FENTANYL CITRATE 50 UG/ML
INJECTION, SOLUTION INTRAMUSCULAR; INTRAVENOUS
Status: DISPENSED
Start: 2020-02-11

## (undated) RX ORDER — NEOSTIGMINE METHYLSULFATE 1 MG/ML
VIAL (ML) INJECTION
Status: DISPENSED
Start: 2019-06-12

## (undated) RX ORDER — VASOPRESSIN 20 U/ML
INJECTION PARENTERAL
Status: DISPENSED
Start: 2019-06-12

## (undated) RX ORDER — ACETAMINOPHEN 325 MG/1
TABLET ORAL
Status: DISPENSED
Start: 2020-05-18

## (undated) RX ORDER — BUPIVACAINE HYDROCHLORIDE AND EPINEPHRINE 5; 5 MG/ML; UG/ML
INJECTION, SOLUTION EPIDURAL; INTRACAUDAL; PERINEURAL
Status: DISPENSED
Start: 2020-07-27

## (undated) RX ORDER — LIDOCAINE HYDROCHLORIDE 10 MG/ML
INJECTION, SOLUTION EPIDURAL; INFILTRATION; INTRACAUDAL; PERINEURAL
Status: DISPENSED
Start: 2020-07-27

## (undated) RX ORDER — HYDROMORPHONE HYDROCHLORIDE 1 MG/ML
INJECTION, SOLUTION INTRAMUSCULAR; INTRAVENOUS; SUBCUTANEOUS
Status: DISPENSED
Start: 2020-05-18

## (undated) RX ORDER — OXYCODONE HYDROCHLORIDE 5 MG/1
TABLET ORAL
Status: DISPENSED
Start: 2020-05-18

## (undated) RX ORDER — FENTANYL CITRATE 50 UG/ML
INJECTION, SOLUTION INTRAMUSCULAR; INTRAVENOUS
Status: DISPENSED
Start: 2019-05-26

## (undated) RX ORDER — BUPIVACAINE HYDROCHLORIDE AND EPINEPHRINE 2.5; 5 MG/ML; UG/ML
INJECTION, SOLUTION EPIDURAL; INFILTRATION; INTRACAUDAL; PERINEURAL
Status: DISPENSED
Start: 2020-05-18

## (undated) RX ORDER — FENTANYL CITRATE 50 UG/ML
INJECTION, SOLUTION INTRAMUSCULAR; INTRAVENOUS
Status: DISPENSED
Start: 2020-07-27

## (undated) RX ORDER — GLYCOPYRROLATE 0.2 MG/ML
INJECTION INTRAMUSCULAR; INTRAVENOUS
Status: DISPENSED
Start: 2019-06-12

## (undated) RX ORDER — PROPOFOL 10 MG/ML
INJECTION, EMULSION INTRAVENOUS
Status: DISPENSED
Start: 2020-07-27

## (undated) RX ORDER — HEPARIN SODIUM 5000 [USP'U]/.5ML
INJECTION, SOLUTION INTRAVENOUS; SUBCUTANEOUS
Status: DISPENSED
Start: 2020-02-11